# Patient Record
Sex: FEMALE | Race: WHITE | NOT HISPANIC OR LATINO | Employment: OTHER | URBAN - NONMETROPOLITAN AREA
[De-identification: names, ages, dates, MRNs, and addresses within clinical notes are randomized per-mention and may not be internally consistent; named-entity substitution may affect disease eponyms.]

---

## 2017-01-20 ENCOUNTER — OFFICE VISIT (OUTPATIENT)
Dept: FAMILY MEDICINE CLINIC | Facility: CLINIC | Age: 67
End: 2017-01-20

## 2017-01-20 VITALS
HEIGHT: 65 IN | BODY MASS INDEX: 36.32 KG/M2 | DIASTOLIC BLOOD PRESSURE: 76 MMHG | SYSTOLIC BLOOD PRESSURE: 128 MMHG | WEIGHT: 218 LBS

## 2017-01-20 DIAGNOSIS — E03.9 ACQUIRED HYPOTHYROIDISM: ICD-10-CM

## 2017-01-20 DIAGNOSIS — Z11.59 NEED FOR HEPATITIS C SCREENING TEST: ICD-10-CM

## 2017-01-20 DIAGNOSIS — E78.00 PURE HYPERCHOLESTEROLEMIA: ICD-10-CM

## 2017-01-20 DIAGNOSIS — E11.9 DIABETES MELLITUS TYPE 2, DIET-CONTROLLED (HCC): ICD-10-CM

## 2017-01-20 DIAGNOSIS — I10 ESSENTIAL HYPERTENSION: Primary | ICD-10-CM

## 2017-01-20 LAB
ALBUMIN SERPL-MCNC: 3.8 GM/DL (ref 3.4–4.8)
ALP SERPL-CCNC: 66 U/L (ref 38–126)
ALT SERPL-CCNC: 33 U/L (ref 9–52)
ANION GAP SERPL CALCULATED.3IONS-SCNC: 11 MMOL/L (ref 5–15)
AST SERPL-CCNC: 20 U/L (ref 14–36)
BILIRUB SERPL-MCNC: 0.7 MG/DL (ref 0.2–1.3)
BUN SERPL-MCNC: 21 MG/DL (ref 7–21)
CALCIUM SERPL-MCNC: 9.7 MG/DL (ref 8.4–10.2)
CHLORIDE SERPL-SCNC: 101 MMOL/L (ref 95–110)
CHOLEST SERPL-MCNC: 252 MG/DL (ref 0–199)
CO2 SERPL-SCNC: 27 MMOL/L (ref 22–31)
CREAT SERPL-MCNC: 0.9 MG/DL (ref 0.5–1)
CREAT UR-MCNC: 107.9 MG/DL
GLUCOSE SERPL-MCNC: 88 MG/DL (ref 60–100)
HBA1C MFR BLD CALC: 6.9 %TOTHGB (ref 4–5.6)
HCV AB SERPL QL IA: NEGATIVE
HDLC SERPL-MCNC: 64 MG/DL (ref 60–200)
LDLC SERPL CALC-MCNC: 154 MG/DL (ref 0–129)
MICROALBUMIN UR-MCNC: 0.6 MG/DL (ref 0–1.7)
MICROALBUMIN/CREAT UR: 6 MG/G (ref 0–30)
POTASSIUM SERPL-SCNC: 4.4 MMOL/L (ref 3.5–5.1)
PROT SERPL-MCNC: 7.1 GM/DL (ref 6.3–8.6)
SODIUM SERPL-SCNC: 139 MMOL/L (ref 137–145)
TRIGL SERPL-MCNC: 168 MG/DL (ref 20–199)

## 2017-01-20 PROCEDURE — 99214 OFFICE O/P EST MOD 30 MIN: CPT | Performed by: FAMILY MEDICINE

## 2017-01-20 NOTE — PROGRESS NOTES
Subjective   Emily Haas is a 66 y.o. female.     Hypertension   This is a chronic problem. The problem is unchanged. The problem is controlled. Associated symptoms include palpitations (seeing Dr Villanueva). Pertinent negatives include no chest pain, orthopnea, peripheral edema, PND or shortness of breath.   Hypothyroidism   This is a chronic problem. The problem has been unchanged. Associated symptoms include fatigue. Pertinent negatives include no chest pain. Rash: lips.   Diabetes   She presents for her follow-up diabetic visit. She has type 2 diabetes mellitus. Associated symptoms include fatigue. Pertinent negatives for diabetes include no chest pain, no foot paresthesias, no foot ulcerations, no polydipsia and no polyuria. She monitors urine at home 1-2 x per day. Her breakfast blood glucose is taken between 7-8 am. Her breakfast blood glucose range is generally 140-180 mg/dl. Her highest blood glucose is 140-180 mg/dl. An ACE inhibitor/angiotensin II receptor blocker is being taken. Eye exam is current.   Hyperlipidemia   This is a chronic problem. The problem is controlled. Exacerbating diseases include hypothyroidism. Pertinent negatives include no chest pain or shortness of breath.        The following portions of the patient's history were reviewed and updated as appropriate: allergies, current medications, past family history, past medical history, past social history, past surgical history and problem list.    Review of Systems   Constitutional: Positive for fatigue.   Respiratory: Negative for shortness of breath.    Cardiovascular: Positive for palpitations (seeing Dr Villanueva). Negative for chest pain, orthopnea and PND.   Endocrine: Negative for polydipsia and polyuria.   Skin: Rash: lips.       Objective   Physical Exam   Constitutional: She is oriented to person, place, and time. She appears well-developed and well-nourished. No distress.   HENT:   Head: Normocephalic and atraumatic.    Cardiovascular: Normal rate, regular rhythm, normal heart sounds and intact distal pulses.  Exam reveals no gallop and no friction rub.    No murmur heard.  Pulmonary/Chest: Effort normal. No respiratory distress. She has no wheezes. She has no rales.   Abdominal: Soft. Normal appearance and bowel sounds are normal. She exhibits no shifting dullness, no distension, no pulsatile liver, no fluid wave, no abdominal bruit, no ascites, no pulsatile midline mass and no mass. There is no hepatosplenomegaly. There is tenderness in the periumbilical area. There is no rigidity and no rebound.   Musculoskeletal: She exhibits no edema or tenderness.    Emily had a diabetic foot exam performed (callus noted) today.   During the foot exam she had a monofilament test performed (normal).    Vascular Status -  Her exam exhibits right foot vasculature abnormal and no right foot edema. Her exam exhibits left foot vasculature abnormal and no left foot edema.  Neurological: She is alert and oriented to person, place, and time.   Skin: Skin is warm and dry. She is not diaphoretic.        Psychiatric: She has a normal mood and affect. Her behavior is normal. Judgment and thought content normal.   Nursing note and vitals reviewed.      Assessment/Plan   Problems Addressed this Visit     None          At this point she does not have an acute abdomen.    this point we have discussed other options including flat plate of the abdomen GI consult and possible further workup.  Since it hasn't been a extreme acute change at this point she is chosen to observe this problem and follow-up sooner if her condition worsens.

## 2017-01-20 NOTE — MR AVS SNAPSHOT
Emily Haas   1/20/2017 1:45 PM   Office Visit    Dept Phone:  [phone]   Encounter #:  [id number]    Provider:  Fernando Hutton MD   Department:  [facility]                Your Full Care Plan              Today's Medication Changes          These changes are accurate as of: 1/20/17  2:10 PM.  If you have any questions, ask your nurse or doctor.               Stop taking medication(s)listed here:     spironolactone 25 MG tablet   Commonly known as:  ALDACTONE           valACYclovir 1000 MG tablet   Commonly known as:  VALTREX                      Your Updated Medication List          This list is accurate as of: 1/20/17  2:10 PM.  Always use your most recent med list.                albuterol 108 (90 BASE) MCG/ACT inhaler   Commonly known as:  PROVENTIL HFA;VENTOLIN HFA       carvedilol 3.125 MG tablet   Commonly known as:  COREG       fenofibrate 160 MG tablet       fexofenadine 180 MG tablet   Commonly known as:  ALLEGRA       glucose blood test strip       levothyroxine 175 MCG tablet   Commonly known as:  SYNTHROID, LEVOTHROID   Take 1 tablet by mouth Daily. Take first thing of the morning on an empty stomach.       losartan 100 MG tablet   Commonly known as:  COZAAR       meloxicam 7.5 MG tablet   Commonly known as:  MOBIC       MIRALAX powder   Generic drug:  polyethylene glycol       nitroglycerin 0.4 MG SL tablet   Commonly known as:  NITROSTAT               We Performed the Following     Comprehensive Metabolic Panel     Hemoglobin A1c     Hepatitis C Antibody     Lipid Panel     Microalbumin / Creatinine Urine Ratio       You Were Diagnosed With        Codes Comments    Essential hypertension    -  Primary ICD-10-CM: I10  ICD-9-CM: 401.9     Acquired hypothyroidism     ICD-10-CM: E03.9  ICD-9-CM: 244.9     Diabetes mellitus type 2, diet-controlled     ICD-10-CM: E11.9  ICD-9-CM: 250.00     Pure hypercholesterolemia     ICD-10-CM:  E78.00  ICD-9-CM: 272.0     Need for hepatitis C screening test     ICD-10-CM: Z11.59  ICD-9-CM: V73.89       Instructions     None    Patient Instructions History      Upcoming Appointments     Visit Type Date Time Department    OFFICE VISIT 2017  1:45 PM [facility]    FOLLOW UP 2017  9:15 AM [facility]    OFFICE VISIT 2017  9:45 AM [facility]      MyChart Signup     [employer] allows you to send messages to your doctor, view your test results, renew your prescriptions, schedule appointments, and more. To sign up, go to Send the Trend and click on the Sign Up Now link in the New User? box. Enter your "[employer]" Activation Code exactly as it appears below along with the last four digits of your Social Security Number and your Date of Birth () to complete the sign-up process. If you do not sign up before the expiration date, you must request a new code.    "[employer] Activation Code: [email or web address]  Expires: 2/3/2017  2:09 PM    If you have questions, you can email [email or web address] or call [phone] to talk to our "[employer] staff. Remember, "[employer]" is NOT to be used for urgent needs. For medical emergencies, dial 911.               Other Info from Your Visit           Your Appointments     2017  9:15 AM CDT   Follow Up with SLEEP CLINIC [facility] (--)    [address] [place] [address] [address] [place] [address] [phone]           Arrive 15 minutes prior to appointment.            2017  9:45 AM CDT   Office Visit with Fernando Hutton MD   [facility] (--)    [address] [place] [address] Dr  [facility] [address] [place] [id number] [phone]           Arrive 15 minutes prior to appointment.              Allergies     Atenolol      Augmentin [Amoxicillin-pot Clavulanate]      Dilaudid [Hydromorphone]      Lipitor [Atorvastatin]      Pravachol [Pravastatin]      Sulfa Antibiotics   "      Reason for Visit     Hypertension recheck for essential hypertension,hypothyroidism,type 2 diabetes mellitus      Vital Signs     Blood Pressure Height Weight Last Menstrual Period Body Mass Index Smoking Status    128/76 65\" (165.1 cm) 218 lb (98.9 kg) (LMP Unknown) 36.28 kg/m2 Former Smoker      Problems and Diagnoses Noted     Acquired underactive thyroid    Diet-controlled type 2 diabetes mellitus    High blood pressure    High cholesterol or triglycerides    Need for hepatitis C screening test            "

## 2017-01-22 NOTE — PROGRESS NOTES
Cholesterol still too high.  Recommend adding Zetia 10 mg a day to fenofibrate.  She is also a little dehydrated.  Have her use her fluids to 6-8 glasses of water a day and recheck a BMP in 2 weeks.  If this does not improve, we'll have to consider stopping her Meloxicam.

## 2017-02-08 ENCOUNTER — TELEPHONE (OUTPATIENT)
Dept: FAMILY MEDICINE CLINIC | Facility: CLINIC | Age: 67
End: 2017-02-08

## 2017-02-08 DIAGNOSIS — E86.0 DEHYDRATION: Primary | ICD-10-CM

## 2017-02-08 RX ORDER — EZETIMIBE 10 MG/1
10 TABLET ORAL DAILY
Qty: 30 TABLET | Refills: 5 | Status: SHIPPED | OUTPATIENT
Start: 2017-02-08 | End: 2017-04-05

## 2017-02-08 NOTE — PROGRESS NOTES
Pr Dr. Hutton's instruction, patient was called with test results & recommendations  He Cholesterol is high, adding Zetia 10 mg daily in addition the the fenofibrate   Script sent to [facility],  Dehydrated, She needs to increase her fluids to 6-8 glasses of water a day and repeat labs in 2 weeks.( BMP Ordered)  If not improved, may have to consider stopping her Meloxicam.  Patient states understanding of these recommendations, labs ordered and srcipt sent.

## 2017-02-08 NOTE — TELEPHONE ENCOUNTER
Pr Dr. Hutton's instruction, patient was called with test results & recommendations  He Cholesterol is high, adding Zetia 10 mg daily in addition the the fenofibrate   Script sent to [facility],  Dehydrated, She needs to increase her fluids to 6-8 glasses of water a day and repeat labs in 2 weeks.( BMP Ordered)  If not improved, may have to consider stopping her Meloxicam.  Patient states understanding of these recommendations, labs ordered and srcipt sent.     ----- Message from Fernando Hutton MD sent at 1/22/2017 10:47 AM CST -----  Cholesterol still too high.  Recommend adding Zetia 10 mg a day to fenofibrate.  She is also a little dehydrated.  Have her use her fluids to 6-8 glasses of water a day and recheck a BMP in 2 weeks.  If this does not improve, we'll have to consider stopping her Meloxicam.

## 2017-03-02 ENCOUNTER — OFFICE VISIT (OUTPATIENT)
Dept: RETAIL CLINIC | Facility: CLINIC | Age: 67
End: 2017-03-02

## 2017-03-02 VITALS
TEMPERATURE: 98.6 F | RESPIRATION RATE: 20 BRPM | BODY MASS INDEX: 36.85 KG/M2 | DIASTOLIC BLOOD PRESSURE: 90 MMHG | WEIGHT: 221.2 LBS | OXYGEN SATURATION: 97 % | HEIGHT: 65 IN | SYSTOLIC BLOOD PRESSURE: 110 MMHG | HEART RATE: 96 BPM

## 2017-03-02 DIAGNOSIS — J40 BRONCHITIS: Primary | ICD-10-CM

## 2017-03-02 PROBLEM — I86.8 VARICOSE VEINS OF OTHER SPECIFIED SITES: Status: ACTIVE | Noted: 2017-03-02

## 2017-03-02 PROCEDURE — 99213 OFFICE O/P EST LOW 20 MIN: CPT | Performed by: NURSE PRACTITIONER

## 2017-03-02 RX ORDER — METHYLPREDNISOLONE 4 MG/1
TABLET ORAL
Qty: 21 TABLET | Refills: 0 | Status: SHIPPED | OUTPATIENT
Start: 2017-03-02 | End: 2017-04-05

## 2017-03-02 RX ORDER — LEVOFLOXACIN 500 MG/1
500 TABLET, FILM COATED ORAL DAILY
Qty: 7 TABLET | Refills: 0 | Status: SHIPPED | OUTPATIENT
Start: 2017-03-02 | End: 2017-04-05

## 2017-03-02 RX ORDER — ALBUTEROL SULFATE 90 UG/1
2 AEROSOL, METERED RESPIRATORY (INHALATION) EVERY 4 HOURS PRN
Qty: 3.7 G | Refills: 0 | Status: SHIPPED | OUTPATIENT
Start: 2017-03-02 | End: 2017-07-20

## 2017-03-02 RX ORDER — BROMPHENIRAMINE MALEATE, PSEUDOEPHEDRINE HYDROCHLORIDE, AND DEXTROMETHORPHAN HYDROBROMIDE 2; 30; 10 MG/5ML; MG/5ML; MG/5ML
5-10 SYRUP ORAL 4 TIMES DAILY PRN
Qty: 120 ML | Refills: 0 | Status: SHIPPED | OUTPATIENT
Start: 2017-03-02 | End: 2017-04-05

## 2017-03-02 NOTE — PROGRESS NOTES
Subjective   Emily Haas is a 67 y.o. female.     Cough   This is a new problem. The current episode started in the past 7 days. The problem has been gradually worsening. The problem occurs every few hours. The cough is productive of purulent sputum. Associated symptoms include postnasal drip and a sore throat. The symptoms are aggravated by exercise. She has tried OTC cough suppressant for the symptoms. The treatment provided mild relief.        The following portions of the patient's history were reviewed and updated as appropriate: allergies, current medications, past family history, past medical history, past social history, past surgical history and problem list.  ...    Review of Systems   Constitutional: Negative.    HENT: Positive for postnasal drip and sore throat.    Eyes: Negative.    Respiratory: Positive for cough.    Cardiovascular: Negative.    Gastrointestinal: Negative.    Endocrine: Negative.    Genitourinary: Negative.    Musculoskeletal: Negative.    Skin: Negative.    Allergic/Immunologic: Negative.    Neurological: Negative.    Hematological: Negative.    Psychiatric/Behavioral: Negative.        Objective   Physical Exam   Constitutional: She is oriented to person, place, and time. She appears well-nourished.   HENT:   Head: Normocephalic and atraumatic.   Right Ear: External ear normal.   Left Ear: External ear normal.   Mouth/Throat: Oropharynx is clear and moist.   Eyes: Conjunctivae are normal.   Neck: Neck supple.   Cardiovascular: Normal rate and regular rhythm.    Pulmonary/Chest:   Coarse breath sounds bilarerally   Abdominal: Soft. Bowel sounds are normal.   Musculoskeletal: Normal range of motion.   Neurological: She is alert and oriented to person, place, and time.   Skin: Skin is warm and dry.   Psychiatric: She has a normal mood and affect. Her behavior is normal.   Nursing note and vitals reviewed.      Assessment/Plan   Emily was seen today for cough.    Diagnoses and all  orders for this visit:    Bronchitis    Other orders  -     levoFLOXacin (LEVAQUIN) 500 MG tablet; Take 1 tablet by mouth Daily.  -     brompheniramine-pseudoephedrine-DM 30-2-10 MG/5ML syrup; Take 5-10 mL by mouth 4 (Four) Times a Day As Needed for cough.  -     MethylPREDNISolone (MEDROL, SAM,) 4 MG tablet; Take as directed on package instructions.  -     albuterol (PROAIR HFA) 108 (90 BASE) MCG/ACT inhaler; Inhale 2 puffs Every 4 (Four) Hours As Needed for wheezing.

## 2017-03-21 ENCOUNTER — HOSPITAL ENCOUNTER (EMERGENCY)
Facility: HOSPITAL | Age: 67
Discharge: HOME | End: 2017-03-21
Attending: EMERGENCY MEDICINE | Admitting: NURSE PRACTITIONER

## 2017-03-21 ENCOUNTER — APPOINTMENT (OUTPATIENT)
Dept: CT IMAGING | Facility: HOSPITAL | Age: 67
End: 2017-03-21

## 2017-03-21 ENCOUNTER — APPOINTMENT (OUTPATIENT)
Dept: GENERAL RADIOLOGY | Facility: HOSPITAL | Age: 67
End: 2017-03-21

## 2017-03-21 VITALS
TEMPERATURE: 97.8 F | HEART RATE: 73 BPM | OXYGEN SATURATION: 98 % | DIASTOLIC BLOOD PRESSURE: 78 MMHG | RESPIRATION RATE: 18 BRPM | HEIGHT: 65 IN | BODY MASS INDEX: 37.36 KG/M2 | WEIGHT: 224.2 LBS | SYSTOLIC BLOOD PRESSURE: 158 MMHG

## 2017-03-21 DIAGNOSIS — K21.9 GASTROESOPHAGEAL REFLUX DISEASE WITHOUT ESOPHAGITIS: ICD-10-CM

## 2017-03-21 DIAGNOSIS — M54.9 OTHER ACUTE BACK PAIN: Primary | ICD-10-CM

## 2017-03-21 LAB
ALBUMIN SERPL-MCNC: 3.8 G/DL (ref 3.4–4.8)
ALBUMIN/GLOB SERPL: 1.5 G/DL (ref 1.1–1.8)
ALP SERPL-CCNC: 60 U/L (ref 38–126)
ALT SERPL W P-5'-P-CCNC: 36 U/L (ref 9–52)
ANION GAP SERPL CALCULATED.3IONS-SCNC: 10 MMOL/L (ref 5–15)
AST SERPL-CCNC: 23 U/L (ref 14–36)
BASOPHILS # BLD AUTO: 0.02 10*3/MM3 (ref 0–0.2)
BASOPHILS NFR BLD AUTO: 0.2 % (ref 0–2)
BILIRUB SERPL-MCNC: 0.5 MG/DL (ref 0.2–1.3)
BUN BLD-MCNC: 19 MG/DL (ref 7–21)
BUN/CREAT SERPL: 21.6 (ref 7–25)
CALCIUM SPEC-SCNC: 9.1 MG/DL (ref 8.4–10.2)
CHLORIDE SERPL-SCNC: 101 MMOL/L (ref 95–110)
CK MB SERPL-CCNC: 0.43 NG/ML (ref 0–5)
CK MB SERPL-CCNC: 0.57 NG/ML (ref 0–5)
CK SERPL-CCNC: 29 U/L (ref 30–135)
CK SERPL-CCNC: 35 U/L (ref 30–135)
CO2 SERPL-SCNC: 26 MMOL/L (ref 22–31)
CREAT BLD-MCNC: 0.88 MG/DL (ref 0.5–1)
D-DIMER, QUANTITATIVE (MAD,POW, STR): 999 NG/ML (FEU) (ref 0–470)
DEPRECATED RDW RBC AUTO: 44.8 FL (ref 36.4–46.3)
EOSINOPHIL # BLD AUTO: 0.15 10*3/MM3 (ref 0–0.7)
EOSINOPHIL NFR BLD AUTO: 1.6 % (ref 0–7)
ERYTHROCYTE [DISTWIDTH] IN BLOOD BY AUTOMATED COUNT: 13.5 % (ref 11.5–14.5)
GFR SERPL CREATININE-BSD FRML MDRD: 64 ML/MIN/1.73 (ref 45–104)
GLOBULIN UR ELPH-MCNC: 2.6 GM/DL (ref 2.3–3.5)
GLUCOSE BLD-MCNC: 128 MG/DL (ref 60–100)
HCT VFR BLD AUTO: 36 % (ref 35–45)
HGB BLD-MCNC: 12.1 G/DL (ref 12–15.5)
HOLD SPECIMEN: NORMAL
HOLD SPECIMEN: NORMAL
IMM GRANULOCYTES # BLD: 0.04 10*3/MM3 (ref 0–0.02)
IMM GRANULOCYTES NFR BLD: 0.4 % (ref 0–0.5)
INR PPP: 0.93 (ref 0.8–1.2)
LYMPHOCYTES # BLD AUTO: 2.34 10*3/MM3 (ref 0.6–4.2)
LYMPHOCYTES NFR BLD AUTO: 24.3 % (ref 10–50)
MCH RBC QN AUTO: 30.9 PG (ref 26.5–34)
MCHC RBC AUTO-ENTMCNC: 33.6 G/DL (ref 31.4–36)
MCV RBC AUTO: 91.8 FL (ref 80–98)
MONOCYTES # BLD AUTO: 0.97 10*3/MM3 (ref 0–0.9)
MONOCYTES NFR BLD AUTO: 10.1 % (ref 0–12)
NEUTROPHILS # BLD AUTO: 6.12 10*3/MM3 (ref 2–8.6)
NEUTROPHILS NFR BLD AUTO: 63.4 % (ref 37–80)
NT-PROBNP SERPL-MCNC: 65.2 PG/ML (ref 0–900)
PLATELET # BLD AUTO: 181 10*3/MM3 (ref 150–450)
PMV BLD AUTO: 10.3 FL (ref 8–12)
POTASSIUM BLD-SCNC: 3.5 MMOL/L (ref 3.5–5.1)
PROT SERPL-MCNC: 6.4 G/DL (ref 6.3–8.6)
PROTHROMBIN TIME: 12.5 SECONDS (ref 11.1–15.3)
RBC # BLD AUTO: 3.92 10*6/MM3 (ref 3.77–5.16)
SODIUM BLD-SCNC: 137 MMOL/L (ref 137–145)
TROPONIN I SERPL-MCNC: <0.012 NG/ML
TROPONIN I SERPL-MCNC: <0.012 NG/ML
WBC NRBC COR # BLD: 9.64 10*3/MM3 (ref 3.2–9.8)
WHOLE BLOOD HOLD SPECIMEN: NORMAL
WHOLE BLOOD HOLD SPECIMEN: NORMAL

## 2017-03-21 PROCEDURE — 85610 PROTHROMBIN TIME: CPT | Performed by: NURSE PRACTITIONER

## 2017-03-21 PROCEDURE — 99284 EMERGENCY DEPT VISIT MOD MDM: CPT

## 2017-03-21 PROCEDURE — 84484 ASSAY OF TROPONIN QUANT: CPT | Performed by: NURSE PRACTITIONER

## 2017-03-21 PROCEDURE — 93005 ELECTROCARDIOGRAM TRACING: CPT

## 2017-03-21 PROCEDURE — 0 IOPAMIDOL PER 1 ML: Performed by: EMERGENCY MEDICINE

## 2017-03-21 PROCEDURE — 85379 FIBRIN DEGRADATION QUANT: CPT | Performed by: NURSE PRACTITIONER

## 2017-03-21 PROCEDURE — 82550 ASSAY OF CK (CPK): CPT | Performed by: NURSE PRACTITIONER

## 2017-03-21 PROCEDURE — 85025 COMPLETE CBC W/AUTO DIFF WBC: CPT | Performed by: NURSE PRACTITIONER

## 2017-03-21 PROCEDURE — 71275 CT ANGIOGRAPHY CHEST: CPT

## 2017-03-21 PROCEDURE — 71020 HC CHEST PA AND LATERAL: CPT

## 2017-03-21 PROCEDURE — 82553 CREATINE MB FRACTION: CPT | Performed by: NURSE PRACTITIONER

## 2017-03-21 PROCEDURE — 83880 ASSAY OF NATRIURETIC PEPTIDE: CPT | Performed by: NURSE PRACTITIONER

## 2017-03-21 PROCEDURE — 80053 COMPREHEN METABOLIC PANEL: CPT | Performed by: NURSE PRACTITIONER

## 2017-03-21 RX ORDER — CLONIDINE HYDROCHLORIDE 0.2 MG/1
0.2 TABLET ORAL ONCE
Status: COMPLETED | OUTPATIENT
Start: 2017-03-21 | End: 2017-03-21

## 2017-03-21 RX ORDER — ASPIRIN 325 MG
325 TABLET ORAL ONCE
Status: COMPLETED | OUTPATIENT
Start: 2017-03-21 | End: 2017-03-21

## 2017-03-21 RX ORDER — SODIUM CHLORIDE 0.9 % (FLUSH) 0.9 %
10 SYRINGE (ML) INJECTION AS NEEDED
Status: DISCONTINUED | OUTPATIENT
Start: 2017-03-21 | End: 2017-03-22 | Stop reason: HOSPADM

## 2017-03-21 RX ADMIN — IOPAMIDOL 52 ML: 755 INJECTION, SOLUTION INTRAVENOUS at 20:54

## 2017-03-21 RX ADMIN — CLONIDINE HYDROCHLORIDE 0.2 MG: 0.2 TABLET ORAL at 16:45

## 2017-03-21 RX ADMIN — ASPIRIN 325 MG: 325 TABLET, COATED ORAL at 16:45

## 2017-03-21 NOTE — ED NOTES
Pt presents to the ED with complaints of mid in between shoulder blades that started at 13:00 today. It started suddenly and was described as severe.  Pt denies any chest pain or soa.  Pt stated that she did have a brief pain in right shoulder.  Symptoms have improved upon assessment.  Pain is now 2/10.  Pt has had nausea, but no vomiting.      Nahomy Leyva RN  03/21/17 [id number]

## 2017-03-21 NOTE — ED PROVIDER NOTES
Subjective   HPI Comments: C/o pain between shoulder blades while putting up groceries that lasted about an hour, had nausea at that time. Took soda water, then zantac. Denies chest pain or SOB. C/o feeling weak.      History provided by:  Patient      Review of Systems   HENT: Negative.    Eyes: Negative.    Respiratory: Negative.    Cardiovascular: Negative.  Negative for chest pain.   Gastrointestinal: Negative.    Genitourinary: Negative.    Musculoskeletal: Negative.    Skin: Negative.    Neurological: Positive for weakness.   Psychiatric/Behavioral: Negative.        Past Medical History:   Diagnosis Date   • Abrasion, right lower leg, initial encounter    • Acquired hypothyroidism    • Acromioclavicular joint pain    • Allergic rhinitis    • Anxiety state    • Asthma    • Degenerative joint disease involving multiple joints    • Diabetes mellitus type 2, diet-controlled    • Diverticular disease of colon    • Diverticulitis of colon    • Essential hypertension    • Generalized anxiety disorder    • Hyperlipidemia    • Ingrowing toenail    • Insomnia    • Irritable bowel syndrome    • Joint pain    • Local infection of the skin and subcutaneous tissue, unspecified    • Low back pain     abn MRI   • Neck pain     abn MRI-post Fusion   • Need for prophylactic vaccination against Streptococcus pneumoniae (pneumococcus)    • Open wound of lower leg    • Pain in right hip    • Peripheral edema    • Screening mammogram, encounter for 06/11/2015   • Vitamin D deficiency        Allergies   Allergen Reactions   • Atenolol    • Augmentin [Amoxicillin-Pot Clavulanate]    • Dilaudid [Hydromorphone]    • Lipitor [Atorvastatin]    • Pravachol [Pravastatin]    • Sulfa Antibiotics        Past Surgical History:   Procedure Laterality Date   • BLADDER SURGERY     • CHOLECYSTECTOMY     • COLON SURGERY     • ENDOSCOPY  03/02/2012    Colon endoscopy 14109 (1)    Diverticulum in sigmoid colon.    • EXCISION LESION  08/04/2015     "Destruction of Benign Lesion (1-14) 71532 (1)     • HYSTERECTOMY     • INJECTION OF MEDICATION  07/08/2014    Celestone (betamethasone) (3)      • INJECTION OF MEDICATION  09/23/2013    Dexamethasone (1)      • INJECTION OF MEDICATION  06/28/2016    Kenalog (8)          History reviewed. No pertinent family history.    Social History     Social History   • Marital status:      Spouse name: N/A   • Number of children: N/A   • Years of education: N/A     Social History Main Topics   • Smoking status: Former Smoker   • Smokeless tobacco: Never Used   • Alcohol use No   • Drug use: No   • Sexual activity: Defer     Other Topics Concern   • None     Social History Narrative   • None           Objective   Physical Exam   Constitutional: She is oriented to person, place, and time. She appears well-developed and well-nourished.   HENT:   Head: Normocephalic.   Eyes: EOM are normal. Pupils are equal, round, and reactive to light.   Neck: Normal range of motion. Neck supple.   Cardiovascular: Normal rate, regular rhythm and normal heart sounds.    Pulmonary/Chest: Effort normal and breath sounds normal.   Abdominal: Soft. Bowel sounds are normal.   Musculoskeletal:   No point tenderness along spinal processes or paraspinal muscles, thoracic   Neurological: She is alert and oriented to person, place, and time.   Skin: Skin is warm and dry.   Nursing note and vitals reviewed.    /78 (BP Location: Left arm, Patient Position: Lying)  Pulse 73  Temp 97.8 °F (36.6 °C) (Oral)   Resp 18  Ht 65\" (165.1 cm)  Wt 224 lb 3.2 oz (102 kg)  SpO2 98%  BMI 37.31 kg/m2      ECG 12 Lead    Date/Time: 3/21/2017 3:29 PM  Performed by: HEIDY MONTGOMERY  Authorized by: BRENDA AKHTAR   Interpreted by physician  Previous ECG: no previous ECG available  Rhythm: sinus rhythm  BPM: 87  Clinical impression: normal ECG                 ED Course  ED Course      Results for orders placed or performed during the hospital encounter of " 03/21/17   Troponin   Result Value Ref Range    Troponin I <0.012 <=0.034 ng/mL   Comprehensive Metabolic Panel   Result Value Ref Range    Glucose 128 (H) 60 - 100 mg/dL    BUN 19 7 - 21 mg/dL    Creatinine 0.88 0.50 - 1.00 mg/dL    Sodium 137 137 - 145 mmol/L    Potassium 3.5 3.5 - 5.1 mmol/L    Chloride 101 95 - 110 mmol/L    CO2 26.0 22.0 - 31.0 mmol/L    Calcium 9.1 8.4 - 10.2 mg/dL    Total Protein 6.4 6.3 - 8.6 g/dL    Albumin 3.80 3.40 - 4.80 g/dL    ALT (SGPT) 36 9 - 52 U/L    AST (SGOT) 23 14 - 36 U/L    Alkaline Phosphatase 60 38 - 126 U/L    Total Bilirubin 0.5 0.2 - 1.3 mg/dL    eGFR Non  Amer 64 45 - 104 mL/min/1.73    Globulin 2.6 2.3 - 3.5 gm/dL    A/G Ratio 1.5 1.1 - 1.8 g/dL    BUN/Creatinine Ratio 21.6 7.0 - 25.0    Anion Gap 10.0 5.0 - 15.0 mmol/L   BNP   Result Value Ref Range    proBNP 65.2 0.0 - 900.0 pg/mL   Protime-INR   Result Value Ref Range    Protime 12.5 11.1 - 15.3 Seconds    INR 0.93 0.80 - 1.20   CK   Result Value Ref Range    Creatine Kinase 35 30 - 135 U/L   CK-MB   Result Value Ref Range    CKMB 0.57 0.00 - 5.00 ng/mL   CBC Auto Differential   Result Value Ref Range    WBC 9.64 3.20 - 9.80 10*3/mm3    RBC 3.92 3.77 - 5.16 10*6/mm3    Hemoglobin 12.1 12.0 - 15.5 g/dL    Hematocrit 36.0 35.0 - 45.0 %    MCV 91.8 80.0 - 98.0 fL    MCH 30.9 26.5 - 34.0 pg    MCHC 33.6 31.4 - 36.0 g/dL    RDW 13.5 11.5 - 14.5 %    RDW-SD 44.8 36.4 - 46.3 fl    MPV 10.3 8.0 - 12.0 fL    Platelets 181 150 - 450 10*3/mm3    Neutrophil % 63.4 37.0 - 80.0 %    Lymphocyte % 24.3 10.0 - 50.0 %    Monocyte % 10.1 0.0 - 12.0 %    Eosinophil % 1.6 0.0 - 7.0 %    Basophil % 0.2 0.0 - 2.0 %    Immature Grans % 0.4 0.0 - 0.5 %    Neutrophils, Absolute 6.12 2.00 - 8.60 10*3/mm3    Lymphocytes, Absolute 2.34 0.60 - 4.20 10*3/mm3    Monocytes, Absolute 0.97 (H) 0.00 - 0.90 10*3/mm3    Eosinophils, Absolute 0.15 0.00 - 0.70 10*3/mm3    Basophils, Absolute 0.02 0.00 - 0.20 10*3/mm3    Immature Grans, Absolute  0.04 (H) 0.00 - 0.02 10*3/mm3   CK-MB   Result Value Ref Range    CKMB 0.43 0.00 - 5.00 ng/mL   Troponin   Result Value Ref Range    Troponin I <0.012 <=0.034 ng/mL   CK   Result Value Ref Range    Creatine Kinase 29 (L) 30 - 135 U/L   D-dimer, Quantitative   Result Value Ref Range    D-Dimer, Quantitative 999 (H) 0 - 470 ng/mL (FEU)   Light Blue Top   Result Value Ref Range    Extra Tube hold for add-on    Green Top (Gel)   Result Value Ref Range    Extra Tube Hold for add-ons.    Lavender Top   Result Value Ref Range    Extra Tube hold for add-on    Gold Top - SST   Result Value Ref Range    Extra Tube Hold for add-ons.      CT Angiogram Chest With Contrast   Final Result   Unremarkable CT angiogram of the chest. No evidence   of pulmonary embolus.         Electronically signed by:  John Hathaway MD  3/21/2017 9:09 PM   CDT Workstation: [employer] Chest 2 View   Final Result   CONCLUSION:   No Acute Disease      [id number]      Electronically signed by:  Nikolay Pelaez MD  3/21/2017 4:51 PM CDT   Workstation: [id number]                      [facility]  Number of Diagnoses or Management Options  Gastroesophageal reflux disease without esophagitis:   Other acute back pain:   Diagnosis management comments: C/o back pain and nausea onset today. C/o being under more stress at home. Had not taken her BP med today. Cardiac enzymes negative, 2 sets. D-dimer was elevated, CTA chest negative. Denied chest pain, nausea, or SOB while in ED. EKG and CXR negative as well. Will follow up with her PCP. Advised return to ED any worsening in symptoms.      Final diagnoses:   Other acute back pain   Gastroesophageal reflux disease without esophagitis            Linda Richardson, APRN  03/23/17 1133

## 2017-03-21 NOTE — ED NOTES
Pt is presented to ED with c/o pain between her shoulder blades and nausea.  Pt states this started approximately 1300 today.     Nova Tellez RN  03/21/17 7992

## 2017-03-22 NOTE — DISCHARGE INSTRUCTIONS
Continue with the Zantac BID (over the counter).  Follow up with Dr Hutton.  May return to ED any worsening of symptoms.

## 2017-04-05 ENCOUNTER — OFFICE VISIT (OUTPATIENT)
Dept: OPHTHALMOLOGY | Facility: CLINIC | Age: 67
End: 2017-04-05

## 2017-04-05 DIAGNOSIS — E11.9 DIABETES MELLITUS WITHOUT COMPLICATION (HCC): ICD-10-CM

## 2017-04-05 DIAGNOSIS — H25.049 POSTERIOR SUBCAPSULAR POLAR AGE-RELATED CATARACT, UNSPECIFIED LATERALITY: Primary | ICD-10-CM

## 2017-04-05 PROCEDURE — 92004 COMPRE OPH EXAM NEW PT 1/>: CPT | Performed by: OPHTHALMOLOGY

## 2017-04-05 RX ORDER — LANOLIN ALCOHOL/MO/W.PET/CERES
CREAM (GRAM) TOPICAL
COMMUNITY
End: 2017-10-24

## 2017-04-05 RX ORDER — ACETAMINOPHEN 325 MG/1
TABLET ORAL EVERY 6 HOURS
COMMUNITY
End: 2018-05-31

## 2017-04-05 NOTE — PROGRESS NOTES
Subjective   Emily Haas is a 67 y.o. female.   Chief Complaint   Patient presents with   • Blurred Vision   • Diabetic Eye Exam     HPI     Blurred Vision   Laterality: right eye   Onset: 3 days ago           Diabetic Eye Exam   Associated symptoms: blurred vision   Diabetes Type: Type 2   Duration: years   Blood Sugars: is controlled           Comments   BV OD x 3 days       Last edited by Oscar Parker MD on 4/5/2017  1:51 PM. (History)          Review of Systems    Objective   Base Eye Exam     Visual Acuity (Snellen - Linear)      Right Left   Dist cc 20/50 -2 20/40 -2       Correction:  Glasses      Tonometry (Applanation, 1:15 PM)      Right Left   Pressure 17 17         Pupils      Pupils   Right PERRL   Left PERRL         Visual Fields      Left Right   Result Full Full         Extraocular Movement      Right Left   Result Full Full         Dilation     Both eyes:  2.5% Paul Synephrine, 1.0% Mydriacyl @ 1:18 PM            Additional Tests     Amsler      Right Left   Amsler Normal Normal               Slit Lamp and Fundus Exam     External Exam      Right Left    External Normal Normal      Slit Lamp Exam      Right Left    Lids/Lashes Normal Normal    Conjunctiva/Sclera White and quiet White and quiet    Cornea Clear Clear    Anterior Chamber Deep and quiet Deep and quiet    Iris Round and reactive Round and reactive    Lens 2+ Posterior subcapsular cataract 1+ Posterior subcapsular cataract    Vitreous Normal Normal      Fundus Exam      Right Left    Disc Normal Normal    Macula Normal Normal    Vessels Normal Normal    Periphery Normal Normal            Refraction     Wearing Rx      Sphere Cylinder Axis Add   Right +1.25 +1.25 006 +2.25   Left +1.25 +0.75 031 +2.25         Manifest Refraction      Sphere   Right ni   Left ni                   Assessment/Plan   Diagnoses and all orders for this visit:    Posterior subcapsular polar age-related cataract, unspecified laterality    Diabetes  mellitus without complication    Plan:   consider CE  Eye disease risks with diabetes discussed      Return in about 1 year (around 4/5/2018).

## 2017-04-20 ENCOUNTER — OFFICE VISIT (OUTPATIENT)
Dept: FAMILY MEDICINE CLINIC | Facility: CLINIC | Age: 67
End: 2017-04-20

## 2017-04-20 VITALS
HEART RATE: 73 BPM | WEIGHT: 217 LBS | DIASTOLIC BLOOD PRESSURE: 80 MMHG | SYSTOLIC BLOOD PRESSURE: 128 MMHG | BODY MASS INDEX: 36.11 KG/M2 | OXYGEN SATURATION: 99 %

## 2017-04-20 DIAGNOSIS — H16.131: ICD-10-CM

## 2017-04-20 DIAGNOSIS — I10 ESSENTIAL HYPERTENSION: ICD-10-CM

## 2017-04-20 DIAGNOSIS — L82.0 INFLAMED SEBORRHEIC KERATOSIS: Primary | ICD-10-CM

## 2017-04-20 DIAGNOSIS — E11.9 DIABETES MELLITUS TYPE 2, DIET-CONTROLLED (HCC): ICD-10-CM

## 2017-04-20 PROCEDURE — 17110 DESTRUCTION B9 LES UP TO 14: CPT | Performed by: FAMILY MEDICINE

## 2017-04-20 PROCEDURE — 99214 OFFICE O/P EST MOD 30 MIN: CPT | Performed by: FAMILY MEDICINE

## 2017-04-20 NOTE — PROGRESS NOTES
Subjective   Emily Haas is a 67 y.o. female.     Hypertension   This is a chronic problem. The problem is unchanged. The problem is controlled. Pertinent negatives include no chest pain, orthopnea, palpitations (seeing Dr Villanueva), peripheral edema, PND or shortness of breath.   Hypothyroidism   This is a chronic problem. The problem has been unchanged. Associated symptoms include fatigue. Pertinent negatives include no chest pain. Rash: lips.   Diabetes   She presents for her follow-up diabetic visit. She has type 2 diabetes mellitus. Associated symptoms include fatigue. Pertinent negatives for diabetes include no chest pain, no foot paresthesias, no foot ulcerations, no polydipsia and no polyuria. She monitors urine at home 1-2 x per day. Her breakfast blood glucose is taken between 7-8 am. Her breakfast blood glucose range is generally 110-130 mg/dl. Her highest blood glucose is 140-180 mg/dl. An ACE inhibitor/angiotensin II receptor blocker is being taken. Eye exam is current.   Hyperlipidemia   This is a chronic problem. The problem is controlled. Exacerbating diseases include hypothyroidism. Pertinent negatives include no chest pain or shortness of breath.        The following portions of the patient's history were reviewed and updated as appropriate: allergies, current medications, past family history, past medical history, past social history, past surgical history and problem list.    Review of Systems   Constitutional: Positive for fatigue.   Respiratory: Negative for shortness of breath.    Cardiovascular: Negative for chest pain, palpitations (seeing Dr Villanueva), orthopnea and PND.   Endocrine: Negative for polydipsia and polyuria.   Skin: Rash: lips.       Objective   Physical Exam   Constitutional: She is oriented to person, place, and time. She appears well-developed and well-nourished. No distress.   HENT:   Head: Normocephalic and atraumatic.   Cardiovascular: Normal rate, regular rhythm,  normal heart sounds and intact distal pulses.  Exam reveals no gallop and no friction rub.    No murmur heard.  Pulmonary/Chest: Effort normal. No respiratory distress. She has no wheezes. She has no rales.   Abdominal: Normal appearance. She exhibits no shifting dullness, no pulsatile liver, no fluid wave, no abdominal bruit, no ascites and no pulsatile midline mass. There is no hepatosplenomegaly. There is no rigidity.   Musculoskeletal: She exhibits no edema or tenderness.    Emily had a diabetic foot exam performed (callus noted) today.   During the foot exam she had a monofilament test performed (normal).    Vascular Status -  Her exam exhibits right foot vasculature abnormal and no right foot edema. Her exam exhibits left foot vasculature abnormal and no left foot edema.  Neurological: She is alert and oriented to person, place, and time.   Skin: Skin is warm and dry. She is not diaphoretic.   Psychiatric: She has a normal mood and affect. Her behavior is normal. Judgment and thought content normal.   Nursing note and vitals reviewed.      Assessment/Plan   Problems Addressed this Visit        Cardiovascular and Mediastinum    Essential hypertension       Endocrine    Diabetes mellitus type 2, diet-controlled      Other Visit Diagnoses     Inflamed seborrheic keratosis    -  Primary    Relevant Orders    Cryotherapy, Skin Lesion    Actinic keratitis, right        Relevant Orders    Cryotherapy, Skin Lesion

## 2017-04-20 NOTE — PROGRESS NOTES
Procedure   Cryotherapy, Skin Lesion  Date/Time: 4/20/2017 10:20 AM  Performed by: CORA SANDERS  Authorized by: CORA SANDERS   Local anesthesia used: no    Anesthesia:  Local anesthesia used: no  Sedation:  Patient sedated: no    Patient tolerance: Patient tolerated the procedure well with no immediate complications  Comments: Inflamed seb keratosis left ankle frozen and AK on right ear lobe

## 2017-06-12 ENCOUNTER — TELEPHONE (OUTPATIENT)
Dept: FAMILY MEDICINE CLINIC | Facility: CLINIC | Age: 67
End: 2017-06-12

## 2017-06-14 ENCOUNTER — CLINICAL SUPPORT (OUTPATIENT)
Dept: FAMILY MEDICINE CLINIC | Facility: CLINIC | Age: 67
End: 2017-06-14

## 2017-06-14 DIAGNOSIS — M19.90 ARTHRITIS: Primary | ICD-10-CM

## 2017-06-14 PROCEDURE — 96372 THER/PROPH/DIAG INJ SC/IM: CPT | Performed by: FAMILY MEDICINE

## 2017-06-14 RX ORDER — TRIAMCINOLONE ACETONIDE 40 MG/ML
80 INJECTION, SUSPENSION INTRA-ARTICULAR; INTRAMUSCULAR ONCE
Status: COMPLETED | OUTPATIENT
Start: 2017-06-14 | End: 2017-06-14

## 2017-06-14 RX ADMIN — TRIAMCINOLONE ACETONIDE 80 MG: 40 INJECTION, SUSPENSION INTRA-ARTICULAR; INTRAMUSCULAR at 14:36

## 2017-07-20 ENCOUNTER — LAB (OUTPATIENT)
Dept: LAB | Facility: HOSPITAL | Age: 67
End: 2017-07-20

## 2017-07-20 ENCOUNTER — OFFICE VISIT (OUTPATIENT)
Dept: FAMILY MEDICINE CLINIC | Facility: CLINIC | Age: 67
End: 2017-07-20

## 2017-07-20 VITALS
HEIGHT: 65 IN | DIASTOLIC BLOOD PRESSURE: 80 MMHG | SYSTOLIC BLOOD PRESSURE: 132 MMHG | OXYGEN SATURATION: 100 % | WEIGHT: 207.5 LBS | BODY MASS INDEX: 34.57 KG/M2 | HEART RATE: 70 BPM

## 2017-07-20 DIAGNOSIS — Z12.11 SCREEN FOR COLON CANCER: ICD-10-CM

## 2017-07-20 DIAGNOSIS — E03.9 ACQUIRED HYPOTHYROIDISM: ICD-10-CM

## 2017-07-20 DIAGNOSIS — E78.00 PURE HYPERCHOLESTEROLEMIA: Primary | ICD-10-CM

## 2017-07-20 DIAGNOSIS — R53.83 FATIGUE, UNSPECIFIED TYPE: ICD-10-CM

## 2017-07-20 DIAGNOSIS — F41.1 GENERALIZED ANXIETY DISORDER: ICD-10-CM

## 2017-07-20 DIAGNOSIS — I10 ESSENTIAL HYPERTENSION: ICD-10-CM

## 2017-07-20 DIAGNOSIS — E11.9 DIABETES MELLITUS TYPE 2, DIET-CONTROLLED (HCC): ICD-10-CM

## 2017-07-20 LAB
ALBUMIN SERPL-MCNC: 4.1 G/DL (ref 3.4–4.8)
ALBUMIN UR-MCNC: <0.6 MG/L
ALBUMIN/GLOB SERPL: 1.2 G/DL (ref 1.1–1.8)
ALP SERPL-CCNC: 86 U/L (ref 38–126)
ALT SERPL W P-5'-P-CCNC: 36 U/L (ref 9–52)
ANION GAP SERPL CALCULATED.3IONS-SCNC: 8 MMOL/L (ref 5–15)
ARTICHOKE IGE QN: 138 MG/DL (ref 1–129)
AST SERPL-CCNC: 31 U/L (ref 14–36)
BILIRUB SERPL-MCNC: 0.7 MG/DL (ref 0.2–1.3)
BUN BLD-MCNC: 18 MG/DL (ref 7–21)
BUN/CREAT SERPL: 18.6 (ref 7–25)
CALCIUM SPEC-SCNC: 9.4 MG/DL (ref 8.4–10.2)
CHLORIDE SERPL-SCNC: 102 MMOL/L (ref 95–110)
CHOLEST SERPL-MCNC: 221 MG/DL (ref 0–199)
CO2 SERPL-SCNC: 27 MMOL/L (ref 22–31)
CREAT BLD-MCNC: 0.97 MG/DL (ref 0.5–1)
CREAT UR-MCNC: 83.4 MG/DL
GFR SERPL CREATININE-BSD FRML MDRD: 57 ML/MIN/1.73 (ref 45–104)
GLOBULIN UR ELPH-MCNC: 3.3 GM/DL (ref 2.3–3.5)
GLUCOSE BLD-MCNC: 110 MG/DL (ref 60–100)
HBA1C MFR BLD: 6.58 % (ref 4–5.6)
HDLC SERPL-MCNC: 54 MG/DL (ref 60–200)
LDLC/HDLC SERPL: 2.45 {RATIO} (ref 0–3.22)
MICROALBUMIN/CREAT UR: NORMAL MG/G (ref 0–30)
POTASSIUM BLD-SCNC: 4.6 MMOL/L (ref 3.5–5.1)
PROT SERPL-MCNC: 7.4 G/DL (ref 6.3–8.6)
SODIUM BLD-SCNC: 137 MMOL/L (ref 137–145)
T4 FREE SERPL-MCNC: 1.97 NG/DL (ref 0.78–2.19)
TRIGL SERPL-MCNC: 174 MG/DL (ref 20–199)
TSH SERPL DL<=0.05 MIU/L-ACNC: 3.27 MIU/ML (ref 0.46–4.68)

## 2017-07-20 PROCEDURE — 80053 COMPREHEN METABOLIC PANEL: CPT | Performed by: FAMILY MEDICINE

## 2017-07-20 PROCEDURE — 99214 OFFICE O/P EST MOD 30 MIN: CPT | Performed by: FAMILY MEDICINE

## 2017-07-20 PROCEDURE — 82570 ASSAY OF URINE CREATININE: CPT | Performed by: FAMILY MEDICINE

## 2017-07-20 PROCEDURE — 82043 UR ALBUMIN QUANTITATIVE: CPT | Performed by: FAMILY MEDICINE

## 2017-07-20 PROCEDURE — 96372 THER/PROPH/DIAG INJ SC/IM: CPT | Performed by: FAMILY MEDICINE

## 2017-07-20 PROCEDURE — 36415 COLL VENOUS BLD VENIPUNCTURE: CPT | Performed by: FAMILY MEDICINE

## 2017-07-20 PROCEDURE — 80061 LIPID PANEL: CPT | Performed by: FAMILY MEDICINE

## 2017-07-20 PROCEDURE — 84443 ASSAY THYROID STIM HORMONE: CPT | Performed by: FAMILY MEDICINE

## 2017-07-20 PROCEDURE — 83036 HEMOGLOBIN GLYCOSYLATED A1C: CPT | Performed by: FAMILY MEDICINE

## 2017-07-20 PROCEDURE — 84439 ASSAY OF FREE THYROXINE: CPT | Performed by: FAMILY MEDICINE

## 2017-07-20 RX ORDER — CARVEDILOL 3.12 MG/1
3.12 TABLET ORAL 2 TIMES DAILY
Qty: 180 TABLET | Refills: 2 | Status: SHIPPED | OUTPATIENT
Start: 2017-07-20 | End: 2019-11-15

## 2017-07-20 RX ORDER — LOSARTAN POTASSIUM 100 MG/1
100 TABLET ORAL DAILY
Qty: 90 TABLET | Refills: 2 | Status: SHIPPED | OUTPATIENT
Start: 2017-07-20 | End: 2018-06-21 | Stop reason: SDUPTHER

## 2017-07-20 RX ORDER — CYANOCOBALAMIN 1000 UG/ML
1000 INJECTION, SOLUTION INTRAMUSCULAR; SUBCUTANEOUS ONCE
Status: COMPLETED | OUTPATIENT
Start: 2017-07-20 | End: 2017-07-20

## 2017-07-20 RX ORDER — HYDROXYZINE HYDROCHLORIDE 25 MG/1
25 TABLET, FILM COATED ORAL NIGHTLY PRN
Qty: 30 TABLET | Refills: 11 | Status: SHIPPED | OUTPATIENT
Start: 2017-07-20 | End: 2017-09-22

## 2017-07-20 RX ORDER — GUANFACINE 1 MG/1
1 TABLET ORAL DAILY
COMMUNITY
Start: 2017-06-12 | End: 2022-02-11

## 2017-07-20 RX ORDER — LEVOTHYROXINE SODIUM 175 UG/1
175 TABLET ORAL DAILY
Qty: 90 TABLET | Refills: 3 | Status: SHIPPED | OUTPATIENT
Start: 2017-07-20 | End: 2018-10-08 | Stop reason: SDUPTHER

## 2017-07-20 RX ADMIN — CYANOCOBALAMIN 1000 MCG: 1000 INJECTION, SOLUTION INTRAMUSCULAR; SUBCUTANEOUS at 11:05

## 2017-07-20 NOTE — PROGRESS NOTES
Subjective   Emily Haas is a 67 y.o. female.     Hypertension   This is a chronic problem. The current episode started more than 1 year ago. The problem is unchanged. The problem is controlled. Pertinent negatives include no chest pain, orthopnea, palpitations, peripheral edema, PND or shortness of breath.   Hypothyroidism   This is a chronic problem. The current episode started more than 1 year ago. The problem has been unchanged. Associated symptoms include fatigue. Pertinent negatives include no chest pain or myalgias. Rash: lips.   Diabetes   She presents for her follow-up diabetic visit. She has type 2 diabetes mellitus. Hypoglycemia symptoms include nervousness/anxiousness. Associated symptoms include fatigue. Pertinent negatives for diabetes include no chest pain, no foot paresthesias, no foot ulcerations, no polydipsia and no polyuria. She monitors urine at home 1-2 x per day. Her breakfast blood glucose is taken between 7-8 am. Her breakfast blood glucose range is generally  mg/dl. An ACE inhibitor/angiotensin II receptor blocker is being taken. Eye exam is current.   Hyperlipidemia   This is a chronic problem. The problem is uncontrolled. Exacerbating diseases include hypothyroidism. Pertinent negatives include no chest pain, myalgias or shortness of breath.        The following portions of the patient's history were reviewed and updated as appropriate: allergies, current medications, past family history, past medical history, past social history, past surgical history and problem list.    Review of Systems   Constitutional: Positive for fatigue.   Respiratory: Negative for shortness of breath.    Cardiovascular: Negative for chest pain, palpitations, orthopnea and PND.   Endocrine: Negative for polydipsia and polyuria.   Musculoskeletal: Negative for myalgias.   Skin: Rash: lips.   Psychiatric/Behavioral: Positive for sleep disturbance. Negative for dysphoric mood. The patient is  nervous/anxious.        Objective   Physical Exam   Constitutional: She is oriented to person, place, and time. She appears well-developed and well-nourished. No distress.   HENT:   Head: Normocephalic and atraumatic.   Cardiovascular: Normal rate, regular rhythm, normal heart sounds and intact distal pulses.  Exam reveals no gallop and no friction rub.    No murmur heard.  Pulmonary/Chest: Effort normal. No respiratory distress. She has no wheezes. She has no rales.   Abdominal: Normal appearance. She exhibits no shifting dullness, no pulsatile liver, no fluid wave, no abdominal bruit, no ascites and no pulsatile midline mass. There is no hepatosplenomegaly. There is no rigidity.   Musculoskeletal: She exhibits no edema or tenderness.    Emily had a diabetic foot exam performed (callus noted) today.   During the foot exam she had a monofilament test performed (normal).    Vascular Status -  Her exam exhibits right foot vasculature abnormal and no right foot edema. Her exam exhibits left foot vasculature abnormal and no left foot edema.  Neurological: She is alert and oriented to person, place, and time.   Skin: Skin is warm and dry. She is not diaphoretic.   Psychiatric: She has a normal mood and affect. Her behavior is normal. Judgment and thought content normal.   Nursing note and vitals reviewed.      Assessment/Plan   Problems Addressed this Visit        Cardiovascular and Mediastinum    Hyperlipidemia - Primary    Relevant Orders    Lipid Panel    Essential hypertension    Relevant Medications    guanFACINE (TENEX) 1 MG tablet    losartan (COZAAR) 100 MG tablet    carvedilol (COREG) 3.125 MG tablet    Other Relevant Orders    Comprehensive Metabolic Panel       Endocrine    Diabetes mellitus type 2, diet-controlled    Relevant Orders    Hemoglobin A1c    Microalbumin / Creatinine Urine Ratio    Acquired hypothyroidism    Relevant Medications    levothyroxine (SYNTHROID, LEVOTHROID) 175 MCG tablet    carvedilol  (COREG) 3.125 MG tablet    Other Relevant Orders    TSH    T4, free       Other    Generalized anxiety disorder    Relevant Medications    hydrOXYzine (ATARAX) 25 MG tablet      Other Visit Diagnoses     Fatigue, unspecified type

## 2017-07-25 NOTE — PROGRESS NOTES
Pr Dr. Hutton, Ms. Haas has been called with her recent lab results & recommendations.  Continue her current medications and follow-up as planned or sooner if any problems.

## 2017-09-22 ENCOUNTER — OFFICE VISIT (OUTPATIENT)
Dept: GASTROENTEROLOGY | Facility: CLINIC | Age: 67
End: 2017-09-22

## 2017-09-22 VITALS
HEIGHT: 65 IN | BODY MASS INDEX: 34.27 KG/M2 | WEIGHT: 205.7 LBS | SYSTOLIC BLOOD PRESSURE: 146 MMHG | DIASTOLIC BLOOD PRESSURE: 84 MMHG | HEART RATE: 71 BPM

## 2017-09-22 DIAGNOSIS — K58.2 IRRITABLE BOWEL SYNDROME WITH BOTH CONSTIPATION AND DIARRHEA: Primary | ICD-10-CM

## 2017-09-22 DIAGNOSIS — K21.9 GASTROESOPHAGEAL REFLUX DISEASE, ESOPHAGITIS PRESENCE NOT SPECIFIED: ICD-10-CM

## 2017-09-22 DIAGNOSIS — Z12.11 ENCOUNTER FOR SCREENING FOR MALIGNANT NEOPLASM OF COLON: ICD-10-CM

## 2017-09-22 DIAGNOSIS — K57.30 DIVERTICULOSIS OF LARGE INTESTINE WITHOUT HEMORRHAGE: ICD-10-CM

## 2017-09-22 PROCEDURE — S0260 H&P FOR SURGERY: HCPCS | Performed by: PHYSICIAN ASSISTANT

## 2017-09-22 RX ORDER — SODIUM, POTASSIUM,MAG SULFATES 17.5-3.13G
1 SOLUTION, RECONSTITUTED, ORAL ORAL ONCE
Qty: 1 BOTTLE | Refills: 0 | Status: SHIPPED | OUTPATIENT
Start: 2017-09-22 | End: 2017-09-22

## 2017-09-22 RX ORDER — DEXTROSE AND SODIUM CHLORIDE 5; .45 G/100ML; G/100ML
30 INJECTION, SOLUTION INTRAVENOUS CONTINUOUS PRN
Status: CANCELLED | OUTPATIENT
Start: 2017-11-17

## 2017-10-02 ENCOUNTER — TELEPHONE (OUTPATIENT)
Dept: FAMILY MEDICINE CLINIC | Facility: CLINIC | Age: 67
End: 2017-10-02

## 2017-10-02 NOTE — TELEPHONE ENCOUNTER
Relayed to patient she can come by office and get Kenalog injection per Dr. Hutton. Pt verbalized understanding.

## 2017-10-02 NOTE — TELEPHONE ENCOUNTER
----- Message from Fernando Hutton MD sent at 10/2/2017  1:09 PM CDT -----   Kenalog 80 mg OK  ----- Message -----     From: Deep Wilson MA     Sent: 10/2/2017  11:22 AM       To: Fernando Hutton MD    For allergies, her last injection was 06/12/2017.  Please advise, thank you    ----- Message -----     From: Nicole Rogers     Sent: 10/2/2017   8:26 AM       To: Deep Wilson MA    Emily called this am and asked if it is time for her to be able to get another Kenalog inj.?

## 2017-10-03 ENCOUNTER — CLINICAL SUPPORT (OUTPATIENT)
Dept: FAMILY MEDICINE CLINIC | Facility: CLINIC | Age: 67
End: 2017-10-03

## 2017-10-03 DIAGNOSIS — J30.2 CHRONIC SEASONAL ALLERGIC RHINITIS DUE TO OTHER ALLERGEN: Primary | ICD-10-CM

## 2017-10-03 PROCEDURE — 96372 THER/PROPH/DIAG INJ SC/IM: CPT | Performed by: FAMILY MEDICINE

## 2017-10-03 RX ORDER — TRIAMCINOLONE ACETONIDE 40 MG/ML
80 INJECTION, SUSPENSION INTRA-ARTICULAR; INTRAMUSCULAR ONCE
Status: COMPLETED | OUTPATIENT
Start: 2017-10-03 | End: 2017-10-03

## 2017-10-03 RX ADMIN — TRIAMCINOLONE ACETONIDE 80 MG: 40 INJECTION, SUSPENSION INTRA-ARTICULAR; INTRAMUSCULAR at 14:48

## 2017-10-24 ENCOUNTER — OFFICE VISIT (OUTPATIENT)
Dept: FAMILY MEDICINE CLINIC | Facility: CLINIC | Age: 67
End: 2017-10-24

## 2017-10-24 VITALS
BODY MASS INDEX: 34.24 KG/M2 | WEIGHT: 205.5 LBS | OXYGEN SATURATION: 97 % | DIASTOLIC BLOOD PRESSURE: 84 MMHG | HEART RATE: 66 BPM | HEIGHT: 65 IN | SYSTOLIC BLOOD PRESSURE: 131 MMHG

## 2017-10-24 DIAGNOSIS — I10 ESSENTIAL HYPERTENSION: Primary | ICD-10-CM

## 2017-10-24 DIAGNOSIS — E11.9 DIABETES MELLITUS WITHOUT COMPLICATION (HCC): ICD-10-CM

## 2017-10-24 DIAGNOSIS — E03.9 ACQUIRED HYPOTHYROIDISM: ICD-10-CM

## 2017-10-24 PROCEDURE — 90662 IIV NO PRSV INCREASED AG IM: CPT | Performed by: FAMILY MEDICINE

## 2017-10-24 PROCEDURE — G0008 ADMIN INFLUENZA VIRUS VAC: HCPCS | Performed by: FAMILY MEDICINE

## 2017-10-24 PROCEDURE — 96372 THER/PROPH/DIAG INJ SC/IM: CPT | Performed by: FAMILY MEDICINE

## 2017-10-24 PROCEDURE — 99214 OFFICE O/P EST MOD 30 MIN: CPT | Performed by: FAMILY MEDICINE

## 2017-10-24 RX ORDER — CYANOCOBALAMIN 1000 UG/ML
1000 INJECTION, SOLUTION INTRAMUSCULAR; SUBCUTANEOUS ONCE
Status: COMPLETED | OUTPATIENT
Start: 2017-10-24 | End: 2017-10-24

## 2017-10-24 RX ADMIN — CYANOCOBALAMIN 1000 MCG: 1000 INJECTION, SOLUTION INTRAMUSCULAR; SUBCUTANEOUS at 10:17

## 2017-10-24 NOTE — PROGRESS NOTES
Subjective   Emily Haas is a 67 y.o. female.     Hyperlipidemia   This is a chronic problem. The problem is uncontrolled. Exacerbating diseases include hypothyroidism. Pertinent negatives include no chest pain, myalgias or shortness of breath.   Hypertension   This is a chronic problem. The current episode started more than 1 year ago. The problem is unchanged. The problem is controlled. Associated symptoms include peripheral edema. Pertinent negatives include no chest pain, orthopnea, palpitations, PND or shortness of breath.   Hypothyroidism   This is a chronic problem. The current episode started more than 1 year ago. The problem has been unchanged. Associated symptoms include fatigue. Pertinent negatives include no chest pain or myalgias. Rash: lips.   Diabetes   She presents for her follow-up diabetic visit. She has type 2 diabetes mellitus. Associated symptoms include fatigue. Pertinent negatives for diabetes include no chest pain, no foot paresthesias, no foot ulcerations, no polydipsia and no polyuria. She monitors urine at home 1-2 x per day. Her breakfast blood glucose is taken between 7-8 am. Her breakfast blood glucose range is generally  mg/dl. An ACE inhibitor/angiotensin II receptor blocker is being taken. Eye exam is current.        The following portions of the patient's history were reviewed and updated as appropriate: allergies, current medications, past family history, past medical history, past social history, past surgical history and problem list.    Review of Systems   Constitutional: Positive for fatigue.   Respiratory: Negative for shortness of breath.    Cardiovascular: Negative for chest pain, palpitations, orthopnea and PND.   Endocrine: Negative for polydipsia and polyuria.   Musculoskeletal: Negative for myalgias.   Skin: Rash: lips.   Psychiatric/Behavioral: Positive for sleep disturbance. Negative for dysphoric mood.       Objective   Physical Exam   Constitutional: She  is oriented to person, place, and time. She appears well-developed and well-nourished. No distress.   HENT:   Head: Normocephalic and atraumatic.   Cardiovascular: Normal rate, regular rhythm, normal heart sounds and intact distal pulses.  Exam reveals no gallop and no friction rub.    No murmur heard.  Pulmonary/Chest: Effort normal. No respiratory distress. She has no wheezes. She has no rales.   Abdominal: Normal appearance. She exhibits no shifting dullness, no pulsatile liver, no fluid wave, no abdominal bruit, no ascites and no pulsatile midline mass. There is no hepatosplenomegaly. There is no rigidity.   Musculoskeletal: She exhibits no edema or tenderness.    Emily had a diabetic foot exam performed (callus noted) today.   During the foot exam she had a monofilament test performed (normal).    Vascular Status -  Her exam exhibits right foot vasculature normal. Her exam exhibits no right foot edema. Her exam exhibits left foot vasculature normal. Her exam exhibits no left foot edema.  Neurological: She is alert and oriented to person, place, and time.   Skin: Skin is warm and dry. She is not diaphoretic.   Psychiatric: She has a normal mood and affect. Her behavior is normal. Judgment and thought content normal.   Nursing note and vitals reviewed.      Assessment/Plan   Problems Addressed this Visit        Cardiovascular and Mediastinum    Essential hypertension - Primary       Endocrine    Acquired hypothyroidism    Diabetes mellitus without complication

## 2017-11-17 ENCOUNTER — ANESTHESIA EVENT (OUTPATIENT)
Dept: GASTROENTEROLOGY | Facility: HOSPITAL | Age: 67
End: 2017-11-17

## 2017-11-17 ENCOUNTER — HOSPITAL ENCOUNTER (OUTPATIENT)
Facility: HOSPITAL | Age: 67
Setting detail: HOSPITAL OUTPATIENT SURGERY
Discharge: HOME | End: 2017-11-17
Attending: INTERNAL MEDICINE | Admitting: INTERNAL MEDICINE

## 2017-11-17 ENCOUNTER — ANESTHESIA (OUTPATIENT)
Dept: GASTROENTEROLOGY | Facility: HOSPITAL | Age: 67
End: 2017-11-17

## 2017-11-17 VITALS
BODY MASS INDEX: 34.09 KG/M2 | OXYGEN SATURATION: 98 % | SYSTOLIC BLOOD PRESSURE: 169 MMHG | RESPIRATION RATE: 16 BRPM | HEART RATE: 72 BPM | TEMPERATURE: 97.6 F | HEIGHT: 65 IN | WEIGHT: 204.59 LBS | DIASTOLIC BLOOD PRESSURE: 82 MMHG

## 2017-11-17 DIAGNOSIS — K57.30 DIVERTICULOSIS OF LARGE INTESTINE WITHOUT HEMORRHAGE: ICD-10-CM

## 2017-11-17 DIAGNOSIS — K58.2 IRRITABLE BOWEL SYNDROME WITH BOTH CONSTIPATION AND DIARRHEA: ICD-10-CM

## 2017-11-17 DIAGNOSIS — Z12.11 ENCOUNTER FOR SCREENING FOR MALIGNANT NEOPLASM OF COLON: ICD-10-CM

## 2017-11-17 LAB — GLUCOSE BLDC GLUCOMTR-MCNC: 96 MG/DL (ref 70–130)

## 2017-11-17 PROCEDURE — G0121 COLON CA SCRN NOT HI RSK IND: HCPCS | Performed by: INTERNAL MEDICINE

## 2017-11-17 PROCEDURE — 82962 GLUCOSE BLOOD TEST: CPT

## 2017-11-17 PROCEDURE — 25010000002 PROPOFOL 10 MG/ML EMULSION: Performed by: NURSE ANESTHETIST, CERTIFIED REGISTERED

## 2017-11-17 PROCEDURE — 25010000002 MIDAZOLAM PER 1 MG: Performed by: NURSE ANESTHETIST, CERTIFIED REGISTERED

## 2017-11-17 RX ORDER — DEXTROSE AND SODIUM CHLORIDE 5; .45 G/100ML; G/100ML
30 INJECTION, SOLUTION INTRAVENOUS CONTINUOUS PRN
Status: DISCONTINUED | OUTPATIENT
Start: 2017-11-17 | End: 2017-11-17 | Stop reason: HOSPADM

## 2017-11-17 RX ORDER — PROPOFOL 10 MG/ML
VIAL (ML) INTRAVENOUS AS NEEDED
Status: DISCONTINUED | OUTPATIENT
Start: 2017-11-17 | End: 2017-11-17 | Stop reason: SURG

## 2017-11-17 RX ORDER — MIDAZOLAM HYDROCHLORIDE 1 MG/ML
INJECTION INTRAMUSCULAR; INTRAVENOUS AS NEEDED
Status: DISCONTINUED | OUTPATIENT
Start: 2017-11-17 | End: 2017-11-17 | Stop reason: SURG

## 2017-11-17 RX ADMIN — PROPOFOL 20 MG: 10 INJECTION, EMULSION INTRAVENOUS at 11:55

## 2017-11-17 RX ADMIN — PROPOFOL 60 MG: 10 INJECTION, EMULSION INTRAVENOUS at 11:49

## 2017-11-17 RX ADMIN — MIDAZOLAM 2 MG: 1 INJECTION INTRAMUSCULAR; INTRAVENOUS at 11:47

## 2017-11-17 RX ADMIN — PROPOFOL 20 MG: 10 INJECTION, EMULSION INTRAVENOUS at 11:52

## 2017-11-17 RX ADMIN — DEXTROSE AND SODIUM CHLORIDE 30 ML/HR: 5; 450 INJECTION, SOLUTION INTRAVENOUS at 10:43

## 2017-11-17 NOTE — ANESTHESIA POSTPROCEDURE EVALUATION
Patient: Emily Haas    Procedure Summary     Date Anesthesia Start Anesthesia Stop Room / Location    11/17/17 1147 1200 [facility] ENDOSCOPY 1 / [facility] ENDOSCOPY       Procedure Diagnosis Surgeon Provider    COLONOSCOPY (N/A ) Encounter for screening for malignant neoplasm of colon; Diverticulosis of large intestine without hemorrhage; Irritable bowel syndrome with both constipation and diarrhea  (Encounter for screening for malignant neoplasm of colon [Z12.11]; Diverticulosis of large intestine without hemorrhage [K57.30]; Irritable bowel syndrome with both constipation and diarrhea [K58.2]) MD Nicole Rdz CRNA          Anesthesia Type: MAC  Last vitals  BP   (!) 220/99 (11/17/17 1031)   Temp   98 °F (36.7 °C) (11/17/17 1020)   Pulse   86 (11/17/17 1020)   Resp   16 (11/17/17 1020)     SpO2   98 % (11/17/17 1020)     Post Anesthesia Care and Evaluation    Patient location during evaluation: bedside  Patient participation: complete - patient participated  Level of consciousness: awake and alert  Pain management: adequate  Airway patency: patent  Anesthetic complications: No anesthetic complications  PONV Status: none  Cardiovascular status: acceptable  Respiratory status: acceptable  Hydration status: acceptable

## 2017-11-17 NOTE — H&P
Progress Notes  Encounter Date: 11/17/2017  Cole cartagena  Gastroenterology   Expand All Collapse All    []Hide copied text  []Hover for attribution information       Chief Complaint   Patient presents with   • Eval For Colonoscopy       Ref. Dr. Brennen MARTINEZ PROCEDURE ORDERED: COLON-screen, diverticular dz, colon resection        Subjective        Emily Haas is a 67 y.o. female. 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 Hutton who saw the patient on 7/20/17 with hypertension, diabetes.  Was last seen 5/2/12.  She does undergone colonoscopy on 3/2/12 showing diverticulosis of the sigmoid colon.  She had a previous colon resection in 2006.  She has a personal history of bladder cancer.  Patient states she has occasional constipation, sometimes she has some difficulty with her bowel movements.  Especially if she eats a salad or french fries at a restaurant she will get explosive diarrhea.  Generally she denies any blood in her stool, no mucus in her stool, no color changes.  She denies significant abdominal pain.  She takes over-the-counter Zantac and does well for her heartburn.  She denied nausea, vomiting, dysphagia.  Weight is down 18 pounds since last visit.     Patient had CT scan angio of the chest on 3/21/17 showed a small hiatal hernia.  D-dimer was 999, negative cardiac enzymes, INR, BNP, CBC.  CMP showed glucose 128, otherwise normal.  She had a UTI on 5/13/17.  More recent laboratory on 7/20/17 showed normal TSH, T4, cholesterol 221, CMP showed glucose 110, otherwise normal, A1c 6.58%.     Patient currently denies tobacco, alcohol, illicit substance use.  She was previously a 20 year smoker.  She has a history of diabetes, hysterectomy, cholecystectomy, bladder cancer, spinal fusion, colon resection, breast reduction.  Family  history of diabetes, cancer, hypertension, gallstones, heart disease.  Father  age 74 with Alzheimer's and pneumonia.  Mother  age 88 with CHF.  Spouse in good health,  48 years.  One brother  of CHF.  2 children one in good health, one is diabetic and a drug addict.     A/P: GERD appears well-controlled on Zantac.  Patient encouraged continued dietary modification and weight loss.  Normal liver enzymes.  Known diverticular disease.  Personal history of bladder cancer at some increased risk for colon cancer.  Recommend colonoscopy screening.  Further pending clinical course and the results of the above.      The following portions of the patient's history were reviewed and updated as appropriate:    Medical History         Past Medical History:   Diagnosis Date   • Abrasion, right lower leg, initial encounter     • Acquired hypothyroidism     • Acromioclavicular joint pain     • Allergic rhinitis     • Anxiety state     • Asthma     • Degenerative joint disease involving multiple joints     • Diabetes mellitus type 2, diet-controlled     • Diverticular disease of colon     • Diverticulitis of colon     • Essential hypertension     • Generalized anxiety disorder     • Hyperlipidemia     • Ingrowing toenail     • Insomnia     • Irritable bowel syndrome     • Joint pain     • Local infection of the skin and subcutaneous tissue, unspecified     • Low back pain       abn MRI   • Neck pain       abn MRI-post Fusion   • Need for prophylactic vaccination against Streptococcus pneumoniae (pneumococcus)     • Open wound of lower leg     • Pain in right hip     • Peripheral edema     • Screening mammogram, encounter for 2015   • Vitamin D deficiency            Surgical History          Past Surgical History:   Procedure Laterality Date   • BLADDER SURGERY       • CHOLECYSTECTOMY       • COLON SURGERY       • COLONOSCOPY   2012   • ENDOSCOPY   2012     Colon endoscopy 61538 (1)     "Diverticulum in sigmoid colon.    • EXCISION LESION   08/04/2015     Destruction of Benign Lesion (1-14) 86510 (1)     • HYSTERECTOMY       • INJECTION OF MEDICATION   07/08/2014     Celestone (betamethasone) (3)      • INJECTION OF MEDICATION   09/23/2013     Dexamethasone (1)      • INJECTION OF MEDICATION   06/28/2016     Kenalog (8)                  Family History   Problem Relation Age of Onset   • Diabetes Other     • Heart disease Other     • Cancer Other     • Hypertension Other            OB History      No data available                Allergies   Allergen Reactions   • Atenolol     • Augmentin [Amoxicillin-Pot Clavulanate]     • Dilaudid [Hydromorphone]     • Lipitor [Atorvastatin]     • Pravachol [Pravastatin]     • Statins Other (See Comments)       \"muscle pain \"    • Sulfa Antibiotics         Social History    Social History            Social History   • Marital status:        Spouse name: N/A   • Number of children: N/A   • Years of education: N/A           Social History Main Topics   • Smoking status: Former Smoker   • Smokeless tobacco: Never Used   • Alcohol use No   • Drug use: No   • Sexual activity: Defer           Other Topics Concern   • None      Social History Narrative            Current Outpatient Prescriptions:   •  acetaminophen (TYLENOL) 325 MG tablet, Take  by mouth Every 6 (Six) Hours., Disp: , Rfl:   •  carvedilol (COREG) 3.125 MG tablet, Take 1 tablet by mouth 2 (Two) Times a Day., Disp: 180 tablet, Rfl: 2  •  glucose blood test strip, by Other route 2 (Two) Times a Day. Use as instructed, Disp: , Rfl:   •  guanFACINE (TENEX) 1 MG tablet, Take 1 mg by mouth Daily., Disp: , Rfl:   •  levothyroxine (SYNTHROID, LEVOTHROID) 175 MCG tablet, Take 1 tablet by mouth Daily. Take first thing of the morning on an empty stomach., Disp: 90 tablet, Rfl: 3  •  losartan (COZAAR) 100 MG tablet, Take 1 tablet by mouth Daily., Disp: 90 tablet, Rfl: 2  •  meloxicam (MOBIC) 7.5 MG tablet, Take " "7.5 mg by mouth Daily As Needed (1-2 tablets daily prn pain)., Disp: , Rfl:   •  polyethylene glycol (MIRALAX) powder, Take 17 g by mouth As Needed. 17gm in 8oz bid , Disp: , Rfl:   •  vitamin B-12 (CYANOCOBALAMIN) 1000 MCG tablet, Take  by mouth., Disp: , Rfl:   Review of Systems  Review of Systems   Constitutional: Negative for unexpected weight change.   HENT: Negative for trouble swallowing.    Respiratory: Negative for chest tightness.    Cardiovascular: Negative for chest pain.   Gastrointestinal: Positive for abdominal pain, constipation and diarrhea. Negative for abdominal distention, anal bleeding, blood in stool, nausea, rectal pain and vomiting.   Genitourinary: Negative for difficulty urinating.   Musculoskeletal: Negative for myalgias.   All other systems reviewed and are negative.                                 Objective        /84 (BP Location: Left arm)  Pulse 71  Ht 65\" (165.1 cm)  Wt 205 lb 11.2 oz (93.3 kg)  LMP  (LMP Unknown)  BMI 34.23 kg/m2  Physical Exam   Constitutional: She is oriented to person, place, and time. She appears well-developed and well-nourished. No distress.   HENT:   Head: Normocephalic and atraumatic.   Eyes: EOM are normal. Pupils are equal, round, and reactive to light.   Neck: Normal range of motion.   Cardiovascular: Normal rate, regular rhythm and normal heart sounds.    Pulmonary/Chest: Effort normal and breath sounds normal.   Abdominal: Soft. Bowel sounds are normal. She exhibits no shifting dullness, no distension, no abdominal bruit, no ascites and no mass. There is no hepatosplenomegaly. There is tenderness. There is no rigidity, no rebound, no guarding and no CVA tenderness. No hernia. Hernia confirmed negative in the ventral area.   Mild, scar   Musculoskeletal: Normal range of motion.   Neurological: She is alert and oriented to person, place, and time.   Skin: Skin is warm and dry.   Psychiatric: She has a normal mood and affect. Her behavior is " normal. Judgment and thought content normal.   Nursing note and vitals reviewed.        Assessment/Plan           1. Irritable bowel syndrome with both constipation and diarrhea    2. Diverticulosis of large intestine without hemorrhage    3. Encounter for screening for malignant neoplasm of colon    4. Gastroesophageal reflux disease, esophagitis presence not specified    .   Emily was seen today for eval for colonoscopy.     Diagnoses and all orders for this visit:     Irritable bowel syndrome with both constipation and diarrhea  -     Case Request; Standing  -     dextrose 5 % and sodium chloride 0.45 % infusion; Infuse 30 mL/hr into a venous catheter Continuous As Needed (Start Prior to Procedure).  -     Case Request     Diverticulosis of large intestine without hemorrhage  -     Case Request; Standing  -     dextrose 5 % and sodium chloride 0.45 % infusion; Infuse 30 mL/hr into a venous catheter Continuous As Needed (Start Prior to Procedure).  -     Case Request     Encounter for screening for malignant neoplasm of colon  -     Case Request; Standing  -     dextrose 5 % and sodium chloride 0.45 % infusion; Infuse 30 mL/hr into a venous catheter Continuous As Needed (Start Prior to Procedure).  -     Case Request     Gastroesophageal reflux disease, esophagitis presence not specified     Other orders  -     Obtain Informed Consent; Standing  -     POC Glucose Fingerstick; Standing  -     sodium-potassium-magnesium sulfates (SUPREP BOWEL PREP KIT) 17.5-3.13-1.6 GM/180ML solution oral solution; Take 1 bottle by mouth 1 (One) Time for 1 dose. Take as per instruction sheet for colonoscopy prep.           Orders placed during this encounter include:  No orders of the defined types were placed in this encounter.        Medications prescribed:       New Medications Ordered This Visit   Medications   • sodium-potassium-magnesium sulfates (SUPREP BOWEL PREP KIT) 17.5-3.13-1.6 GM/180ML solution oral solution       Sig:  Take 1 bottle by mouth 1 (One) Time for 1 dose. Take as per instruction sheet for colonoscopy prep.       Dispense:  1 bottle       Refill:  0      Discontinued Medications        Reason for Discontinue     hydrOXYzine (ATARAX) 25 MG tablet Not Efficacious     fexofenadine (ALLEGRA) 180 MG tablet Non-compliance         Requested Prescriptions             Signed Prescriptions Disp Refills   • sodium-potassium-magnesium sulfates (SUPREP BOWEL PREP KIT) 17.5-3.13-1.6 GM/180ML solution oral solution 1 bottle 0       Sig: Take 1 bottle by mouth 1 (One) Time for 1 dose. Take as per instruction sheet for colonoscopy prep.         Review and/or summary of lab tests, radiology, procedures, medications. Review and summary of old records and obtaining of history. The risks and benefits of my recommendations, as well as other treatment options were discussed with the patient today. Questions were answered.     Follow-up: Return if symptoms worsen or fail to improve.     COLONOSCOPY (N/A)           This document has been electronically signed by Cole Quezada MD on November 17, 2017 10:11 AM               Results for orders placed or performed in visit on 07/20/17   TSH   Result Value Ref Range     TSH 3.270 0.460 - 4.680 mIU/mL   T4, free   Result Value Ref Range     Free T4 1.97 0.78 - 2.19 ng/dL   Results for orders placed or performed in visit on 07/20/17   Microalbumin / Creatinine Urine Ratio   Result Value Ref Range     Microalbumin/Creatinine Ratio   0.0 - 30.0 mg/g     Creatinine, Urine 83.4 mg/dL     Microalbumin, Urine <0.6 mg/L   Hemoglobin A1c   Result Value Ref Range     Hemoglobin A1C 6.58 (H) 4 - 5.6 %   Lipid Panel   Result Value Ref Range     Total Cholesterol 221 (H) 0 - 199 mg/dL     Triglycerides 174 20 - 199 mg/dL     HDL Cholesterol 54 (L) 60 - 200 mg/dL     LDL Cholesterol  138 (H) 1 - 129 mg/dL     LDL/HDL Ratio 2.45 0.00 - 3.22   Comprehensive Metabolic Panel   Result Value Ref Range     Glucose 110  (H) 60 - 100 mg/dL     BUN 18 7 - 21 mg/dL     Creatinine 0.97 0.50 - 1.00 mg/dL     Sodium 137 137 - 145 mmol/L     Potassium 4.6 3.5 - 5.1 mmol/L     Chloride 102 95 - 110 mmol/L     CO2 27.0 22.0 - 31.0 mmol/L     Calcium 9.4 8.4 - 10.2 mg/dL     Total Protein 7.4 6.3 - 8.6 g/dL     Albumin 4.10 3.40 - 4.80 g/dL     ALT (SGPT) 36 9 - 52 U/L     AST (SGOT) 31 14 - 36 U/L     Alkaline Phosphatase 86 38 - 126 U/L     Total Bilirubin 0.7 0.2 - 1.3 mg/dL     eGFR Non  Amer 57 45 - 104 mL/min/1.73     Globulin 3.3 2.3 - 3.5 gm/dL     A/G Ratio 1.2 1.1 - 1.8 g/dL     BUN/Creatinine Ratio 18.6 7.0 - 25.0     Anion Gap 8.0 5.0 - 15.0 mmol/L   Results for orders placed or performed during the hospital encounter of 05/13/17   POCT Urinalysis (manual dipstick)   Result Value Ref Range     Color Yellow Yellow, Straw, Dark Yellow, Ángela     Clarity, UA Clear Clear     Glucose, UA Negative Negative, 1000 mg/dL (3+) mg/dL     Bilirubin Negative Negative     Ketones, UA Negative Negative     Specific Gravity  1.010 1.005 - 1.030     Blood, UA Negative Negative     pH, Urine 7.0 5.0 - 8.0     Protein, POC Negative Negative mg/dL     Urobilinogen, UA Normal Normal     Leukocytes Moderate (2+) (A) Negative     Nitrite, UA Negative Negative   Results for orders placed or performed during the hospital encounter of 03/21/17   Gold Top - SST   Result Value Ref Range     Extra Tube Hold for add-ons.     Green Top (Gel)   Result Value Ref Range     Extra Tube Hold for add-ons.     CK-MB   Result Value Ref Range     CKMB 0.43 0.00 - 5.00 ng/mL   CK-MB   Result Value Ref Range     CKMB 0.57 0.00 - 5.00 ng/mL   CBC Auto Differential   Result Value Ref Range     WBC 9.64 3.20 - 9.80 10*3/mm3     RBC 3.92 3.77 - 5.16 10*6/mm3     Hemoglobin 12.1 12.0 - 15.5 g/dL     Hematocrit 36.0 35.0 - 45.0 %     MCV 91.8 80.0 - 98.0 fL     MCH 30.9 26.5 - 34.0 pg     MCHC 33.6 31.4 - 36.0 g/dL     RDW 13.5 11.5 - 14.5 %     RDW-SD 44.8 36.4 - 46.3  fl     MPV 10.3 8.0 - 12.0 fL     Platelets 181 150 - 450 10*3/mm3     Neutrophil % 63.4 37.0 - 80.0 %     Lymphocyte % 24.3 10.0 - 50.0 %     Monocyte % 10.1 0.0 - 12.0 %     Eosinophil % 1.6 0.0 - 7.0 %     Basophil % 0.2 0.0 - 2.0 %     Immature Grans % 0.4 0.0 - 0.5 %     Neutrophils, Absolute 6.12 2.00 - 8.60 10*3/mm3     Lymphocytes, Absolute 2.34 0.60 - 4.20 10*3/mm3     Monocytes, Absolute 0.97 (H) 0.00 - 0.90 10*3/mm3     Eosinophils, Absolute 0.15 0.00 - 0.70 10*3/mm3     Basophils, Absolute 0.02 0.00 - 0.20 10*3/mm3     Immature Grans, Absolute 0.04 (H) 0.00 - 0.02 10*3/mm3   Lavender Top   Result Value Ref Range     Extra Tube hold for add-on     Light Blue Top   Result Value Ref Range     Extra Tube hold for add-on     Troponin   Result Value Ref Range     Troponin I <0.012 <=0.034 ng/mL   Troponin   Result Value Ref Range     Troponin I <0.012 <=0.034 ng/mL   Protime-INR   Result Value Ref Range     Protime 12.5 11.1 - 15.3 Seconds     INR 0.93 0.80 - 1.20   D-dimer, Quantitative   Result Value Ref Range     D-Dimer, Quantitative 999 (H) 0 - 470 ng/mL (FEU)   BNP   Result Value Ref Range     proBNP 65.2 0.0 - 900.0 pg/mL   CK   Result Value Ref Range     Creatine Kinase 29 (L) 30 - 135 U/L   CK   Result Value Ref Range     Creatine Kinase 35 30 - 135 U/L   Comprehensive Metabolic Panel   Result Value Ref Range     Glucose 128 (H) 60 - 100 mg/dL     BUN 19 7 - 21 mg/dL     Creatinine 0.88 0.50 - 1.00 mg/dL     Sodium 137 137 - 145 mmol/L     Potassium 3.5 3.5 - 5.1 mmol/L     Chloride 101 95 - 110 mmol/L     CO2 26.0 22.0 - 31.0 mmol/L     Calcium 9.1 8.4 - 10.2 mg/dL     Total Protein 6.4 6.3 - 8.6 g/dL     Albumin 3.80 3.40 - 4.80 g/dL     ALT (SGPT) 36 9 - 52 U/L     AST (SGOT) 23 14 - 36 U/L     Alkaline Phosphatase 60 38 - 126 U/L     Total Bilirubin 0.5 0.2 - 1.3 mg/dL     eGFR Non  Amer 64 45 - 104 mL/min/1.73     Globulin 2.6 2.3 - 3.5 gm/dL     A/G Ratio 1.5 1.1 - 1.8 g/dL      BUN/Creatinine Ratio 21.6 7.0 - 25.0     Anion Gap 10.0 5.0 - 15.0 mmol/L   Results for orders placed or performed during the hospital encounter of 01/20/17   TSH   Result Value Ref Range     TSH 0.77 0.46 - 4.68 uIU/ml   T4, Free   Result Value Ref Range     Free T4 1.51 0.78 - 2.19 ng/dl      *Note: Due to a large number of results and/or encounters for the requested time period, some results have not been displayed. A complete set of results can be found in Results Review.         Some portions of this note have been dictated using voice recognition software and may contain errors and/or omissions.            Office Visit on 9/22/2017              Detailed Report

## 2017-11-17 NOTE — PLAN OF CARE
Problem: Patient Care Overview (Adult)  Goal: Plan of Care Review    11/17/17 1216   Coping/Psychosocial Response Interventions   Plan Of Care Reviewed With patient   Patient Care Overview   Progress no change   Outcome Evaluation   Outcome Summary/Follow up Plan vss         Problem: GI Endoscopy (Adult)  Goal: Signs and Symptoms of Listed Potential Problems Will be Absent or Manageable (GI Endoscopy)    11/17/17 1216   GI Endoscopy   Problems Assessed (GI Endoscopy) all   Problems Present (GI Endoscopy) none

## 2017-11-17 NOTE — PLAN OF CARE
Problem: Patient Care Overview (Adult)  Goal: Plan of Care Review  Outcome: Ongoing (interventions implemented as appropriate)    11/17/17 1200   Coping/Psychosocial Response Interventions   Plan Of Care Reviewed With patient   Patient Care Overview   Progress no change   Outcome Evaluation   Outcome Summary/Follow up Plan vss         Problem: GI Endoscopy (Adult)  Goal: Signs and Symptoms of Listed Potential Problems Will be Absent or Manageable (GI Endoscopy)  Outcome: Ongoing (interventions implemented as appropriate)    11/17/17 1200   GI Endoscopy   Problems Assessed (GI Endoscopy) all   Problems Present (GI Endoscopy) none

## 2017-11-17 NOTE — ANESTHESIA PREPROCEDURE EVALUATION
Anesthesia Evaluation     no history of anesthetic complications:  NPO Solid Status: > 8 hours  NPO Liquid Status: > 2 hours     Airway   Mallampati: I  TM distance: <3 FB  Neck ROM: full  no difficulty expected  Dental - normal exam     Pulmonary - normal exam   (+) asthma, sleep apnea,   Cardiovascular - normal exam    (+) hypertension well controlled, PVD, hyperlipidemia      Neuro/Psych  (+) psychiatric history Anxiety and Depression,    GI/Hepatic/Renal/Endo    (+)  diabetes mellitus type 2 well controlled, hypothyroidism,     Musculoskeletal     (+) neck pain,   Abdominal    Substance History      OB/GYN          Other   (+) arthritis                                     Anesthesia Plan    ASA 2     MAC     intravenous induction   Anesthetic plan and risks discussed with patient.

## 2017-11-27 ENCOUNTER — OFFICE VISIT (OUTPATIENT)
Dept: GASTROENTEROLOGY | Facility: CLINIC | Age: 67
End: 2017-11-27

## 2017-11-27 VITALS
SYSTOLIC BLOOD PRESSURE: 142 MMHG | WEIGHT: 207.6 LBS | BODY MASS INDEX: 34.59 KG/M2 | DIASTOLIC BLOOD PRESSURE: 78 MMHG | HEART RATE: 58 BPM | HEIGHT: 65 IN

## 2017-11-27 DIAGNOSIS — K57.30 DIVERTICULOSIS OF LARGE INTESTINE WITHOUT HEMORRHAGE: ICD-10-CM

## 2017-11-27 DIAGNOSIS — K58.2 IRRITABLE BOWEL SYNDROME WITH BOTH CONSTIPATION AND DIARRHEA: Primary | ICD-10-CM

## 2017-11-27 PROCEDURE — 99212 OFFICE O/P EST SF 10 MIN: CPT | Performed by: PHYSICIAN ASSISTANT

## 2018-01-09 ENCOUNTER — OFFICE VISIT (OUTPATIENT)
Dept: PODIATRY | Facility: CLINIC | Age: 68
End: 2018-01-09

## 2018-01-09 VITALS
BODY MASS INDEX: 34.34 KG/M2 | WEIGHT: 206.13 LBS | SYSTOLIC BLOOD PRESSURE: 146 MMHG | OXYGEN SATURATION: 97 % | HEART RATE: 85 BPM | DIASTOLIC BLOOD PRESSURE: 81 MMHG | HEIGHT: 65 IN

## 2018-01-09 DIAGNOSIS — L60.0 INGROWN TOENAIL: Primary | ICD-10-CM

## 2018-01-09 DIAGNOSIS — M79.675 PAIN OF TOE OF LEFT FOOT: ICD-10-CM

## 2018-01-09 PROCEDURE — 99203 OFFICE O/P NEW LOW 30 MIN: CPT | Performed by: PODIATRIST

## 2018-01-09 PROCEDURE — 11730 AVULSION NAIL PLATE SIMPLE 1: CPT | Performed by: PODIATRIST

## 2018-01-09 NOTE — PROGRESS NOTES
Emily Haas  1950  67 y.o. female   PCP- Dr. Hutton  A1C- 6.5 on 07/20/2017  Patient presents today for left ingrown toenail.    01/09/2018  Chief Complaint   Patient presents with   • Left Foot - Ingrown Toenail           History of Present Illness    Emily Haas is a 67 y.o. female who presents for evaluation of ingrown toenail on the left foot.  She states is been ongoing for a few weeks.  She describes associated sharp pain with direct pressure.  She states there is some swelling and redness.  She has intermittently had this problem in the past with no significant infections.  She denies any trauma or injuries.  She denies any formal treatments for this issue.    Past Medical History:   Diagnosis Date   • Abrasion, right lower leg, initial encounter    • Acquired hypothyroidism    • Acromioclavicular joint pain    • Allergic rhinitis    • Anxiety state    • Asthma    • Bleeding disorder    • Cancer    • Degenerative joint disease involving multiple joints    • Diabetes mellitus type 2, diet-controlled    • Diverticular disease of colon    • Diverticulitis of colon    • Essential hypertension    • Generalized anxiety disorder    • Hyperlipidemia    • Ingrowing toenail    • Insomnia    • Irritable bowel syndrome    • Joint pain    • Local infection of the skin and subcutaneous tissue, unspecified    • Low back pain     abn MRI   • Neck pain     abn MRI-post Fusion   • Need for prophylactic vaccination against Streptococcus pneumoniae (pneumococcus)    • Open wound of lower leg    • Pain in right hip    • Peripheral edema    • Screening mammogram, encounter for 06/11/2015   • Vitamin D deficiency          Past Surgical History:   Procedure Laterality Date   • BILATERAL BREAST REDUCTION     • BLADDER SURGERY     • CHOLECYSTECTOMY     • COLON SURGERY     • COLONOSCOPY  03/02/2012   • COLONOSCOPY N/A 11/17/2017    Procedure: COLONOSCOPY;  Surgeon: Cole Quezada MD;  Location: [facility] ENDOSCOPY;  Service:     • ENDOSCOPY  03/02/2012    Colon endoscopy 40762 (1)    Diverticulum in sigmoid colon.    • EXCISION LESION  08/04/2015    Destruction of Benign Lesion (1-14) 14170 (1)     • HYSTERECTOMY     • INJECTION OF MEDICATION  07/08/2014    Celestone (betamethasone) (3)      • INJECTION OF MEDICATION  09/23/2013    Dexamethasone (1)      • INJECTION OF MEDICATION  06/28/2016    Kenalog (8)            Family History   Problem Relation Age of Onset   • Diabetes Other    • Heart disease Other    • Cancer Other    • Hypertension Other    • Hypertension Mother    • Heart disease Mother    • Alzheimer's disease Father    • Diabetes Brother    • Heart disease Brother    • Alzheimer's disease Brother    • No Known Problems Maternal Grandmother    • No Known Problems Maternal Grandfather    • No Known Problems Paternal Grandmother    • No Known Problems Paternal Grandfather    • Diabetes Daughter    • Hypothyroidism Daughter    • Hypertension Daughter    • Drug abuse Daughter    • No Known Problems Daughter          Social History     Social History   • Marital status:      Spouse name: N/A   • Number of children: N/A   • Years of education: N/A     Occupational History   • Not on file.     Social History Main Topics   • Smoking status: Former Smoker   • Smokeless tobacco: Never Used      Comment: QUIT 30 YEARS AGO   • Alcohol use No   • Drug use: No   • Sexual activity: Defer     Other Topics Concern   • Not on file     Social History Narrative         Current Outpatient Prescriptions   Medication Sig Dispense Refill   • acetaminophen (TYLENOL) 325 MG tablet Take  by mouth Every 6 (Six) Hours.     • carvedilol (COREG) 3.125 MG tablet Take 1 tablet by mouth 2 (Two) Times a Day. 180 tablet 2   • glucose blood test strip by Other route 2 (Two) Times a Day. Use as instructed     • guanFACINE (TENEX) 1 MG tablet Take 1 mg by mouth Daily.     • levothyroxine (SYNTHROID, LEVOTHROID) 175 MCG tablet Take 1 tablet by mouth Daily.  "Take first thing of the morning on an empty stomach. 90 tablet 3   • losartan (COZAAR) 100 MG tablet Take 1 tablet by mouth Daily. 90 tablet 2   • meloxicam (MOBIC) 7.5 MG tablet Take 7.5 mg by mouth Daily As Needed (1-2 tablets daily prn pain).     • polyethylene glycol (MIRALAX) powder Take 17 g by mouth As Needed. 17gm in 8oz bid        No current facility-administered medications for this visit.          OBJECTIVE    /81  Pulse 85  Ht 165.1 cm (65\")  Wt 93.5 kg (206 lb 2.1 oz)  LMP  (LMP Unknown)  SpO2 97%  BMI 34.3 kg/m2      Review of Systems   Constitutional: Negative.    HENT: Negative.    Eyes: Negative.    Respiratory: Positive for cough.    Cardiovascular: Negative.    Gastrointestinal: Negative.    Endocrine: Negative.    Genitourinary: Negative.    Musculoskeletal: Negative.    Skin: Negative.    Allergic/Immunologic: Negative.    Neurological: Negative.    Hematological: Negative.    Psychiatric/Behavioral: Negative.          Physical Exam   Constitutional: she appears well-developed and well-nourished.   CV: No chest pain. Normal RR  Resp: Non labored respirations  Psychiatric: she has a normal mood and affect. her behavior is normal.      Lower Extremity Exam:  Vascular: DP/PT pulses palpable 2+.   Left hallux edema  Toes warm  Neuro: Protective sensation intact, b/l.  DTRs intact  Integument: No open wounds.  Ingrown lateral nail border, left hallux. Mild erythema, edema. +tenderness to palpation.  Web spaces c/d/i  No skin lesions  Musculoskeletal: LE muscle strength 5/5.   Gait normal  Ankle ROM full without pain or crepitus  STJ ROM full without pain or crepitus  No digital deformities      Nail Removal  Date/Time: 1/9/2018 9:52 AM  Performed by: JESUS FOSTER  Authorized by: JESUS FOSTER   Consent: Written consent obtained.  Risks and benefits: risks, benefits and alternatives were discussed  Consent given by: patient  Time out: Immediately prior to procedure a \"time out\" " was called to verify the correct patient, procedure, equipment, support staff and site/side marked as required.  Location: left foot  Location details: left big toe  Anesthesia: digital block    Anesthesia:  Local Anesthetic: lidocaine 2% without epinephrine  Anesthetic total: 3 mL  Preparation: skin prepped with Betadine  Amount removed: partial  Nail removed location: lateral.  Wedge excision of skin of nail fold: no  Nail bed sutured: no  Removed nail replaced and anchored: no  Dressing: 4x4, antibiotic ointment and gauze roll  Patient tolerance: Patient tolerated the procedure well with no immediate complications  Comments: Matrixectomy with 10% NaOH. Neutralized with acetic acid.                  ASSESSMENT AND PLAN    Emily was seen today for ingrown toenail.    Diagnoses and all orders for this visit:    Ingrown toenail  -     Nail Removal    Pain of toe of left foot    -Comprehensive foot and ankle exam performed  -Diagnosis, prevention, and treatment of ingrown toe nails discussed with patient, including risks and potential benefits of nail avulsion both temporary and permanent versus simple debridement.  -Pt elected for partial permanent avulsion of left hallux  -Aftercare instructions for soapy hauser soaks BID  -Recheck 2 weeks          This document has been electronically signed by Guzman Mistry DPM on January 16, 2018 9:53 AM     EMR Dragon/Transcription disclaimer:   Much of this encounter note is an electronic transcription/translation of spoken language to printed text. The electronic translation of spoken language may permit erroneous, or at times, nonsensical words or phrases to be inadvertently transcribed; Although I have reviewed the note for such errors, some may still exist.    Guzman Mistry DPM  1/16/2018  9:53 AM

## 2018-01-23 ENCOUNTER — OFFICE VISIT (OUTPATIENT)
Dept: PODIATRY | Facility: CLINIC | Age: 68
End: 2018-01-23

## 2018-01-23 VITALS — WEIGHT: 206.13 LBS | BODY MASS INDEX: 34.34 KG/M2 | HEIGHT: 65 IN

## 2018-01-23 DIAGNOSIS — L60.0 INGROWN TOENAIL: Primary | ICD-10-CM

## 2018-01-23 DIAGNOSIS — S91.209D NAIL AVULSION, TOE, SUBSEQUENT ENCOUNTER: ICD-10-CM

## 2018-01-23 PROCEDURE — 99212 OFFICE O/P EST SF 10 MIN: CPT | Performed by: PODIATRIST

## 2018-01-23 NOTE — PROGRESS NOTES
Emily Haas  1950  67 y.o. female   PCP- Dr. Hutton  A1C- 6.5 on 07/20/2017  Patient presents today for left ingrown toenail follow up.    Chief Complaint   Patient presents with   • Left Foot - Follow-up           History of Present Illness    Emily Haas is a 67 y.o. female who presents for Follow-up of left hallux partial permanent nail avulsion    Past Medical History:   Diagnosis Date   • Abrasion, right lower leg, initial encounter    • Acquired hypothyroidism    • Acromioclavicular joint pain    • Allergic rhinitis    • Anxiety state    • Asthma    • Bleeding disorder    • Cancer    • Degenerative joint disease involving multiple joints    • Diabetes mellitus type 2, diet-controlled    • Diverticular disease of colon    • Diverticulitis of colon    • Essential hypertension    • Generalized anxiety disorder    • Hyperlipidemia    • Ingrowing toenail    • Insomnia    • Irritable bowel syndrome    • Joint pain    • Local infection of the skin and subcutaneous tissue, unspecified    • Low back pain     abn MRI   • Neck pain     abn MRI-post Fusion   • Need for prophylactic vaccination against Streptococcus pneumoniae (pneumococcus)    • Open wound of lower leg    • Pain in right hip    • Peripheral edema    • Screening mammogram, encounter for 06/11/2015   • Vitamin D deficiency          Past Surgical History:   Procedure Laterality Date   • BILATERAL BREAST REDUCTION     • BLADDER SURGERY     • CHOLECYSTECTOMY     • COLON SURGERY     • COLONOSCOPY  03/02/2012   • COLONOSCOPY N/A 11/17/2017    Procedure: COLONOSCOPY;  Surgeon: Cole Quezada MD;  Location: [facility] ENDOSCOPY;  Service:    • ENDOSCOPY  03/02/2012    Colon endoscopy 02817 (1)    Diverticulum in sigmoid colon.    • EXCISION LESION  08/04/2015    Destruction of Benign Lesion (1-14) 66796 (1)     • HYSTERECTOMY     • INJECTION OF MEDICATION  07/08/2014    Celestone (betamethasone) (3)      • INJECTION OF MEDICATION  09/23/2013     Dexamethasone (1)      • INJECTION OF MEDICATION  06/28/2016    Kenalog (8)            Family History   Problem Relation Age of Onset   • Diabetes Other    • Heart disease Other    • Cancer Other    • Hypertension Other    • Hypertension Mother    • Heart disease Mother    • Alzheimer's disease Father    • Diabetes Brother    • Heart disease Brother    • Alzheimer's disease Brother    • No Known Problems Maternal Grandmother    • No Known Problems Maternal Grandfather    • No Known Problems Paternal Grandmother    • No Known Problems Paternal Grandfather    • Diabetes Daughter    • Hypothyroidism Daughter    • Hypertension Daughter    • Drug abuse Daughter    • No Known Problems Daughter          Social History     Social History   • Marital status:      Spouse name: N/A   • Number of children: N/A   • Years of education: N/A     Occupational History   • Not on file.     Social History Main Topics   • Smoking status: Former Smoker   • Smokeless tobacco: Never Used      Comment: QUIT 30 YEARS AGO   • Alcohol use No   • Drug use: No   • Sexual activity: Defer     Other Topics Concern   • Not on file     Social History Narrative         Current Outpatient Prescriptions   Medication Sig Dispense Refill   • acetaminophen (TYLENOL) 325 MG tablet Take  by mouth Every 6 (Six) Hours.     • carvedilol (COREG) 3.125 MG tablet Take 1 tablet by mouth 2 (Two) Times a Day. 180 tablet 2   • glucose blood test strip by Other route 2 (Two) Times a Day. Use as instructed     • guanFACINE (TENEX) 1 MG tablet Take 1 mg by mouth Daily.     • levothyroxine (SYNTHROID, LEVOTHROID) 175 MCG tablet Take 1 tablet by mouth Daily. Take first thing of the morning on an empty stomach. 90 tablet 3   • losartan (COZAAR) 100 MG tablet Take 1 tablet by mouth Daily. 90 tablet 2   • meloxicam (MOBIC) 7.5 MG tablet Take 7.5 mg by mouth Daily As Needed (1-2 tablets daily prn pain).     • polyethylene glycol (MIRALAX) powder Take 17 g by mouth As  "Needed. 17gm in 8oz bid      • albuterol (PROVENTIL HFA;VENTOLIN HFA) 108 (90 Base) MCG/ACT inhaler Inhale 2 puffs 4 (Four) Times a Day. 8 g 0     Current Facility-Administered Medications   Medication Dose Route Frequency Provider Last Rate Last Dose   • triamcinolone acetonide (KENALOG-40) injection 80 mg  80 mg Intramuscular Once Fernando ELLIOTT Hutton MD             OBJECTIVE    Ht 165.1 cm (65\")  Wt 93.5 kg (206 lb 2.1 oz)  LMP  (LMP Unknown)  BMI 34.3 kg/m2      Review of Systems   Constitutional: Negative.    HENT: Negative.    Eyes: Negative.    Respiratory: Positive for cough.    Cardiovascular: Negative.    Gastrointestinal: Negative.    Endocrine: Negative.    Genitourinary: Negative.    Musculoskeletal: Negative.    Skin: Negative.    Allergic/Immunologic: Negative.    Neurological: Negative.    Hematological: Negative.    Psychiatric/Behavioral: Negative.          Physical Exam   Constitutional: she appears well-developed and well-nourished.   CV: No chest pain. Normal RR  Resp: Non labored respirations  Psychiatric: she has a normal mood and affect. her behavior is normal.      Lower Extremity Exam:  Vascular: DP/PT pulses palpable 2+.   Left hallux edema  Toes warm  Neuro: Protective sensation intact, b/l.  DTRs intact  Integument: No open wounds.  Left hallux nail border clean dry and intact with minimal erythema.  Web spaces c/d/i  No skin lesions  Musculoskeletal: LE muscle strength 5/5.   Gait normal  Ankle ROM full without pain or crepitus  STJ ROM full without pain or crepitus  No digital deformities      Procedures          ASSESSMENT AND PLAN    Emily was seen today for follow-up.    Diagnoses and all orders for this visit:    Ingrown toenail    Nail avulsion, toe, subsequent encounter    -Comprehensive foot and ankle exam performed  -Doing well.  May wean soaks and bandaging.  -Recheck as needed          This document has been electronically signed by Guzman Mistry DPM on January 25, 2018 " 7:53 AM     EMR Dragon/Transcription disclaimer:   Much of this encounter note is an electronic transcription/translation of spoken language to printed text. The electronic translation of spoken language may permit erroneous, or at times, nonsensical words or phrases to be inadvertently transcribed; Although I have reviewed the note for such errors, some may still exist.    Guzman Mistry DPM  1/25/2018  7:53 AM

## 2018-01-24 ENCOUNTER — OFFICE VISIT (OUTPATIENT)
Dept: FAMILY MEDICINE CLINIC | Facility: CLINIC | Age: 68
End: 2018-01-24

## 2018-01-24 ENCOUNTER — LAB (OUTPATIENT)
Dept: LAB | Facility: HOSPITAL | Age: 68
End: 2018-01-24

## 2018-01-24 ENCOUNTER — OFFICE VISIT (OUTPATIENT)
Dept: SLEEP MEDICINE | Facility: HOSPITAL | Age: 68
End: 2018-01-24

## 2018-01-24 VITALS
WEIGHT: 206 LBS | HEART RATE: 74 BPM | SYSTOLIC BLOOD PRESSURE: 132 MMHG | OXYGEN SATURATION: 98 % | BODY MASS INDEX: 34.32 KG/M2 | HEIGHT: 65 IN | DIASTOLIC BLOOD PRESSURE: 78 MMHG

## 2018-01-24 VITALS
BODY MASS INDEX: 34.16 KG/M2 | DIASTOLIC BLOOD PRESSURE: 72 MMHG | SYSTOLIC BLOOD PRESSURE: 130 MMHG | HEIGHT: 65 IN | WEIGHT: 205 LBS

## 2018-01-24 DIAGNOSIS — E78.00 PURE HYPERCHOLESTEROLEMIA: Primary | ICD-10-CM

## 2018-01-24 DIAGNOSIS — I10 ESSENTIAL HYPERTENSION: ICD-10-CM

## 2018-01-24 DIAGNOSIS — E86.0 DEHYDRATION: ICD-10-CM

## 2018-01-24 DIAGNOSIS — E11.9 DIABETES MELLITUS TYPE 2, DIET-CONTROLLED (HCC): ICD-10-CM

## 2018-01-24 DIAGNOSIS — F51.04 PSYCHOPHYSIOLOGICAL INSOMNIA: ICD-10-CM

## 2018-01-24 DIAGNOSIS — E03.9 ACQUIRED HYPOTHYROIDISM: ICD-10-CM

## 2018-01-24 DIAGNOSIS — G47.33 OBSTRUCTIVE SLEEP APNEA, ADULT: Primary | ICD-10-CM

## 2018-01-24 DIAGNOSIS — M54.2 NECK PAIN: ICD-10-CM

## 2018-01-24 LAB
ALBUMIN SERPL-MCNC: 4 G/DL (ref 3.4–4.8)
ALBUMIN UR-MCNC: 0.8 MG/L
ALBUMIN/GLOB SERPL: 1.3 G/DL (ref 1.1–1.8)
ALP SERPL-CCNC: 74 U/L (ref 38–126)
ALT SERPL W P-5'-P-CCNC: 28 U/L (ref 9–52)
ANION GAP SERPL CALCULATED.3IONS-SCNC: 9 MMOL/L (ref 5–15)
ANION GAP SERPL CALCULATED.3IONS-SCNC: 9 MMOL/L (ref 5–15)
ARTICHOKE IGE QN: 166 MG/DL (ref 1–129)
AST SERPL-CCNC: 28 U/L (ref 14–36)
BILIRUB SERPL-MCNC: 0.6 MG/DL (ref 0.2–1.3)
BUN BLD-MCNC: 23 MG/DL (ref 7–21)
BUN BLD-MCNC: 24 MG/DL (ref 7–21)
BUN/CREAT SERPL: 24.2 (ref 7–25)
BUN/CREAT SERPL: 24.7 (ref 7–25)
CALCIUM SPEC-SCNC: 9.1 MG/DL (ref 8.4–10.2)
CALCIUM SPEC-SCNC: 9.5 MG/DL (ref 8.4–10.2)
CHLORIDE SERPL-SCNC: 105 MMOL/L (ref 95–110)
CHLORIDE SERPL-SCNC: 105 MMOL/L (ref 95–110)
CHOLEST SERPL-MCNC: 245 MG/DL (ref 0–199)
CO2 SERPL-SCNC: 25 MMOL/L (ref 22–31)
CO2 SERPL-SCNC: 26 MMOL/L (ref 22–31)
CREAT BLD-MCNC: 0.95 MG/DL (ref 0.5–1)
CREAT BLD-MCNC: 0.97 MG/DL (ref 0.5–1)
CREAT UR-MCNC: 191.7 MG/DL
GFR SERPL CREATININE-BSD FRML MDRD: 57 ML/MIN/1.73 (ref 45–104)
GFR SERPL CREATININE-BSD FRML MDRD: 59 ML/MIN/1.73 (ref 45–104)
GLOBULIN UR ELPH-MCNC: 3 GM/DL (ref 2.3–3.5)
GLUCOSE BLD-MCNC: 124 MG/DL (ref 60–100)
GLUCOSE BLD-MCNC: 128 MG/DL (ref 60–100)
HBA1C MFR BLD: 6.6 % (ref 4–5.6)
HDLC SERPL-MCNC: 54 MG/DL (ref 60–200)
LDLC/HDLC SERPL: 2.95 {RATIO} (ref 0–3.22)
MICROALBUMIN/CREAT UR: 4.2 MG/G (ref 0–30)
POTASSIUM BLD-SCNC: 4.1 MMOL/L (ref 3.5–5.1)
POTASSIUM BLD-SCNC: 4.3 MMOL/L (ref 3.5–5.1)
PROT SERPL-MCNC: 7 G/DL (ref 6.3–8.6)
SODIUM BLD-SCNC: 139 MMOL/L (ref 137–145)
SODIUM BLD-SCNC: 140 MMOL/L (ref 137–145)
T4 FREE SERPL-MCNC: 1.68 NG/DL (ref 0.78–2.19)
TRIGL SERPL-MCNC: 159 MG/DL (ref 20–199)
TSH SERPL DL<=0.05 MIU/L-ACNC: 5.1 MIU/ML (ref 0.46–4.68)

## 2018-01-24 PROCEDURE — 80053 COMPREHEN METABOLIC PANEL: CPT | Performed by: FAMILY MEDICINE

## 2018-01-24 PROCEDURE — 84443 ASSAY THYROID STIM HORMONE: CPT | Performed by: FAMILY MEDICINE

## 2018-01-24 PROCEDURE — 82043 UR ALBUMIN QUANTITATIVE: CPT | Performed by: FAMILY MEDICINE

## 2018-01-24 PROCEDURE — 82570 ASSAY OF URINE CREATININE: CPT | Performed by: FAMILY MEDICINE

## 2018-01-24 PROCEDURE — 84439 ASSAY OF FREE THYROXINE: CPT | Performed by: FAMILY MEDICINE

## 2018-01-24 PROCEDURE — 99214 OFFICE O/P EST MOD 30 MIN: CPT | Performed by: FAMILY MEDICINE

## 2018-01-24 PROCEDURE — 83036 HEMOGLOBIN GLYCOSYLATED A1C: CPT | Performed by: FAMILY MEDICINE

## 2018-01-24 PROCEDURE — 36415 COLL VENOUS BLD VENIPUNCTURE: CPT | Performed by: FAMILY MEDICINE

## 2018-01-24 PROCEDURE — 99213 OFFICE O/P EST LOW 20 MIN: CPT | Performed by: INTERNAL MEDICINE

## 2018-01-24 PROCEDURE — 80061 LIPID PANEL: CPT | Performed by: FAMILY MEDICINE

## 2018-01-24 RX ORDER — ALBUTEROL SULFATE 90 UG/1
2 AEROSOL, METERED RESPIRATORY (INHALATION)
Qty: 8 G | Refills: 0 | Status: SHIPPED | OUTPATIENT
Start: 2018-01-24 | End: 2018-05-04

## 2018-01-24 RX ORDER — TRIAMCINOLONE ACETONIDE 40 MG/ML
80 INJECTION, SUSPENSION INTRA-ARTICULAR; INTRAMUSCULAR ONCE
Status: DISCONTINUED | OUTPATIENT
Start: 2018-01-24 | End: 2018-09-25

## 2018-01-24 NOTE — PROGRESS NOTES
Subjective   Emily Haas is a 67 y.o. female.     Hypertension   This is a chronic problem. The current episode started more than 1 year ago. The problem is unchanged. The problem is controlled. Associated symptoms include neck pain. Pertinent negatives include no chest pain, orthopnea, peripheral edema, PND or shortness of breath.   Neck Pain    This is a chronic problem. The current episode started more than 1 year ago. The problem occurs intermittently. The problem has been waxing and waning. The pain is present in the left side. The quality of the pain is described as aching. The pain is at a severity of 8/10. The pain is moderate. Pertinent negatives include no chest pain.   Hyperlipidemia   This is a chronic problem. The current episode started more than 1 year ago. The problem is uncontrolled. Exacerbating diseases include hypothyroidism. Pertinent negatives include no chest pain, myalgias or shortness of breath.   Hypothyroidism   This is a chronic problem. The current episode started more than 1 year ago. The problem has been unchanged. Associated symptoms include neck pain. Pertinent negatives include no chest pain, fatigue or myalgias. Rash: lips.   Diabetes   She presents for her follow-up diabetic visit. She has type 2 diabetes mellitus. Pertinent negatives for diabetes include no chest pain, no fatigue, no foot paresthesias, no foot ulcerations, no polydipsia and no polyuria. She monitors urine at home 1-2 x per day. Her breakfast blood glucose is taken between 7-8 am. Her breakfast blood glucose range is generally 110-130 mg/dl. An ACE inhibitor/angiotensin II receptor blocker is being taken. Eye exam is current.        The following portions of the patient's history were reviewed and updated as appropriate: allergies, current medications, past family history, past medical history, past social history, past surgical history and problem list.    Review of Systems   Constitutional: Negative for  fatigue.   Respiratory: Negative for shortness of breath.    Cardiovascular: Negative for chest pain, orthopnea and PND.   Endocrine: Negative for polydipsia and polyuria.   Musculoskeletal: Positive for neck pain. Negative for myalgias.   Skin: Rash: lips.   Psychiatric/Behavioral: Positive for sleep disturbance. Negative for dysphoric mood.       Objective   Physical Exam   Constitutional: She is oriented to person, place, and time. She appears well-developed and well-nourished. No distress.   HENT:   Head: Normocephalic and atraumatic.   Cardiovascular: Normal rate, regular rhythm, normal heart sounds and intact distal pulses.  Exam reveals no gallop and no friction rub.    No murmur heard.  Pulmonary/Chest: Effort normal. No respiratory distress. She has no wheezes. She has no rales.   Abdominal: Normal appearance. She exhibits no shifting dullness, no pulsatile liver, no fluid wave, no abdominal bruit, no ascites and no pulsatile midline mass. There is no hepatosplenomegaly. There is no rigidity.   Musculoskeletal: She exhibits no edema.        Cervical back: She exhibits decreased range of motion, tenderness, pain and spasm. She exhibits no swelling, no edema, no deformity and no laceration.    Emily had a diabetic foot exam performed (callus noted) today.   During the foot exam she had a monofilament test performed (normal).    Vascular Status -  Her exam exhibits right foot vasculature normal. Her exam exhibits no right foot edema. Her exam exhibits left foot vasculature normal. Her exam exhibits no left foot edema.  Neurological: She is alert and oriented to person, place, and time.   Reflex Scores:       Bicep reflexes are 2+ on the left side.  Skin: Skin is warm and dry. She is not diaphoretic.   Psychiatric: She has a normal mood and affect. Her behavior is normal. Judgment and thought content normal.   Nursing note and vitals reviewed.      Assessment/Plan   Problems Addressed this Visit         Cardiovascular and Mediastinum    Hyperlipidemia - Primary    Relevant Orders    Lipid Panel (Completed)    Essential hypertension    Relevant Orders    Comprehensive Metabolic Panel (Completed)       Endocrine    Diabetes mellitus type 2, diet-controlled    Relevant Orders    Hemoglobin A1c (Completed)    Microalbumin / Creatinine Urine Ratio - Urine, Clean Catch (Completed)    Acquired hypothyroidism    Relevant Orders    T4, Free (Completed)    TSH (Completed)      Other Visit Diagnoses     Neck pain        Relevant Medications    triamcinolone acetonide (KENALOG-40) injection 80 mg

## 2018-01-24 NOTE — PROGRESS NOTES
Thyroid test are little off and she is slightly dehydrated.  Have her increase fluids to 6-8 glasses water a day, tell her to be sure she doesn't miss any of her thyroid medicine for the next month, and then recheck freeT4 and TSH.  Cholesterol was elevated also but with her allergies diet is all I'm recommending at this time

## 2018-01-24 NOTE — PROGRESS NOTES
Sleep Clinic Follow Up    Date: 1/24/2018  Primary Care Physician: Fernando Hutton MD      Interim History (1/3):  Since the last visit on 04/14/2016, patient has:      1)  TAVO - Has not remained compliant with CPAP since ~ 04/2017. She has a lot of home stressors. She denies mask and machine issues, dry mouth, headaches, pressures intolerance, or non-compliance. She denies abnormal dreams, sleep paralysis, hypnagogic hallucinations, or cataplexy symptoms.     PAP Data:  Time frame: 10/24/2016 - 04/21/2017   Compliance 87.1%  PAP range : 6-12 cm H2O  Average 90% pressure: 9.0 cmH2O  Leak: < 1 minute   Average AHI 6.2 events/hr  Mask type: nasal  DME: [facility]    Bed time: 2215  Sleep latency: 30-60 minutes  Number of times awakens during the night: 3-4  Wake time: 0730  Estimated total sleep time at night: 4 hours  Caffeine intake: 1-2 coffee  Alcohol intake: none  Nap time: none  Sleepiness with Driving: yes    2) Patient denies RLS symptoms.     PMHx, FH, SH reviewed and pertinent changes are: unchanged from last office visit on 04/14/2016      REVIEW OF SYSTEMS:   Negative for chest pain, fever, chills, SOA, abdominal pain. Smoking: none      Exam (6-11/12):    Vitals:    01/24/18 1019   BP: 130/72     HR - 70  RR - 15    Body mass index is 34.11 kg/(m^2).  Gen:  No distress, conversant, pleasant, appears stated age, alert, oriented  Eyes:   Anicteric sclera, moist conjunctiva, no lid lag     PERRLA, EOMI   Heent:   NC/AT    Oropharynx clear, Mallampati 4    normal hearing  Lungs:  Normal effort, non-labored breathing    Clear to auscultation    CV:  Normal S1, prominent S2    no lower extremity edema  ABD:  Soft, normal bowel sounds       Psych:  Appropriate affect  Neuro:  CN 2-12 intact    Past Medical History:   Diagnosis Date   • Abrasion, right lower leg, initial encounter    • Acquired hypothyroidism    • Acromioclavicular joint pain    • Allergic rhinitis    • Anxiety state    • Asthma    • Bleeding  disorder    • Cancer    • Degenerative joint disease involving multiple joints    • Diabetes mellitus type 2, diet-controlled    • Diverticular disease of colon    • Diverticulitis of colon    • Essential hypertension    • Generalized anxiety disorder    • Hyperlipidemia    • Ingrowing toenail    • Insomnia    • Irritable bowel syndrome    • Joint pain    • Local infection of the skin and subcutaneous tissue, unspecified    • Low back pain     abn MRI   • Neck pain     abn MRI-post Fusion   • Need for prophylactic vaccination against Streptococcus pneumoniae (pneumococcus)    • Open wound of lower leg    • Pain in right hip    • Peripheral edema    • Screening mammogram, encounter for 06/11/2015   • Vitamin D deficiency        Current Outpatient Prescriptions:   •  acetaminophen (TYLENOL) 325 MG tablet, Take  by mouth Every 6 (Six) Hours., Disp: , Rfl:   •  albuterol (PROVENTIL HFA;VENTOLIN HFA) 108 (90 Base) MCG/ACT inhaler, Inhale 2 puffs 4 (Four) Times a Day., Disp: 8 g, Rfl: 0  •  carvedilol (COREG) 3.125 MG tablet, Take 1 tablet by mouth 2 (Two) Times a Day., Disp: 180 tablet, Rfl: 2  •  glucose blood test strip, by Other route 2 (Two) Times a Day. Use as instructed, Disp: , Rfl:   •  guanFACINE (TENEX) 1 MG tablet, Take 1 mg by mouth Daily., Disp: , Rfl:   •  levothyroxine (SYNTHROID, LEVOTHROID) 175 MCG tablet, Take 1 tablet by mouth Daily. Take first thing of the morning on an empty stomach., Disp: 90 tablet, Rfl: 3  •  losartan (COZAAR) 100 MG tablet, Take 1 tablet by mouth Daily., Disp: 90 tablet, Rfl: 2  •  meloxicam (MOBIC) 7.5 MG tablet, Take 7.5 mg by mouth Daily As Needed (1-2 tablets daily prn pain)., Disp: , Rfl:   •  polyethylene glycol (MIRALAX) powder, Take 17 g by mouth As Needed. 17gm in 8oz bid , Disp: , Rfl:     Current Facility-Administered Medications:   •  triamcinolone acetonide (KENALOG-40) injection 80 mg, 80 mg, Intramuscular, Once, Fernando Hutton MD      ASSESSMENT / PLAN:      1. Obstructive sleep apnea  1. PSG on 05/02/2005, AHI of 46.3  2. Currently on 6-12 cm H2O  3. Resume PAP as prescribed.   4. Script for PAP supplies  5. Return to clinic in  3 months  with compliance check unless sx change in the interim period.  2. Insomnia - sleep onset   1. Stress  2. Stop caffeine in the evening      Total time 15 min, more than half spent in face to face counseling and coordination of care.     This document has been electronically signed by Kenan Elmore MD on January 24, 2018         CC: Fernando Hutton MD          No ref. provider found

## 2018-01-26 ENCOUNTER — TELEPHONE (OUTPATIENT)
Dept: FAMILY MEDICINE CLINIC | Facility: CLINIC | Age: 68
End: 2018-01-26

## 2018-01-26 NOTE — TELEPHONE ENCOUNTER
----- Message from Fernando Hutton MD sent at 1/24/2018  5:29 PM CST -----  Thyroid test are little off and she is slightly dehydrated.  Have her increase fluids to 6-8 glasses water a day, tell her to be sure she doesn't miss any of her thyroid medicine for the next month, and then recheck freeT4 and TSH.  Cholesterol was elevated also but with her allergies diet is all I'm recommending at this time

## 2018-04-18 ENCOUNTER — TELEPHONE (OUTPATIENT)
Dept: FAMILY MEDICINE CLINIC | Facility: CLINIC | Age: 68
End: 2018-04-18

## 2018-04-20 ENCOUNTER — CLINICAL SUPPORT NO REQUIREMENTS (OUTPATIENT)
Dept: FAMILY MEDICINE CLINIC | Facility: CLINIC | Age: 68
End: 2018-04-20

## 2018-04-20 DIAGNOSIS — E11.9 DIABETES MELLITUS TYPE 2 WITHOUT RETINOPATHY (HCC): Primary | ICD-10-CM

## 2018-04-30 ENCOUNTER — OFFICE VISIT (OUTPATIENT)
Dept: SLEEP MEDICINE | Facility: HOSPITAL | Age: 68
End: 2018-04-30

## 2018-04-30 VITALS
DIASTOLIC BLOOD PRESSURE: 76 MMHG | SYSTOLIC BLOOD PRESSURE: 136 MMHG | WEIGHT: 215 LBS | HEIGHT: 65 IN | BODY MASS INDEX: 35.82 KG/M2

## 2018-04-30 DIAGNOSIS — F51.04 PSYCHOPHYSIOLOGICAL INSOMNIA: ICD-10-CM

## 2018-04-30 DIAGNOSIS — G47.33 OBSTRUCTIVE SLEEP APNEA, ADULT: Primary | ICD-10-CM

## 2018-04-30 PROCEDURE — 99213 OFFICE O/P EST LOW 20 MIN: CPT | Performed by: INTERNAL MEDICINE

## 2018-05-04 ENCOUNTER — OFFICE VISIT (OUTPATIENT)
Dept: FAMILY MEDICINE CLINIC | Facility: CLINIC | Age: 68
End: 2018-05-04

## 2018-05-04 VITALS
SYSTOLIC BLOOD PRESSURE: 148 MMHG | HEIGHT: 65 IN | HEART RATE: 84 BPM | BODY MASS INDEX: 35.74 KG/M2 | DIASTOLIC BLOOD PRESSURE: 100 MMHG | WEIGHT: 214.5 LBS | OXYGEN SATURATION: 98 %

## 2018-05-04 DIAGNOSIS — E78.00 PURE HYPERCHOLESTEROLEMIA: ICD-10-CM

## 2018-05-04 DIAGNOSIS — L57.0 ACTINIC KERATOSIS: ICD-10-CM

## 2018-05-04 DIAGNOSIS — I10 ESSENTIAL HYPERTENSION: ICD-10-CM

## 2018-05-04 DIAGNOSIS — E11.9 DIABETES MELLITUS TYPE 2, DIET-CONTROLLED (HCC): Primary | ICD-10-CM

## 2018-05-04 DIAGNOSIS — Z00.00 MEDICARE ANNUAL WELLNESS VISIT, SUBSEQUENT: ICD-10-CM

## 2018-05-04 PROCEDURE — G0439 PPPS, SUBSEQ VISIT: HCPCS | Performed by: FAMILY MEDICINE

## 2018-05-04 PROCEDURE — 99214 OFFICE O/P EST MOD 30 MIN: CPT | Performed by: FAMILY MEDICINE

## 2018-05-04 PROCEDURE — 17000 DESTRUCT PREMALG LESION: CPT | Performed by: FAMILY MEDICINE

## 2018-05-04 RX ORDER — FUROSEMIDE 20 MG/1
20 TABLET ORAL DAILY
COMMUNITY
Start: 2018-04-03 | End: 2019-10-18

## 2018-05-04 NOTE — PROGRESS NOTES
Procedure   Cryotherapy, Skin Lesion  Date/Time: 5/4/2018 9:44 AM  Performed by: CORA SANDERS  Authorized by: CORA SANDERS   Local anesthesia used: no    Anesthesia:  Local anesthesia used: no    Sedation:  Patient sedated: no  Patient tolerance: Patient tolerated the procedure well with no immediate complications  Comments: Actinic Keratosis right ear frozen

## 2018-05-04 NOTE — PROGRESS NOTES
Subjective   Emily Haas is a 68 y.o. female.     Hypertension   This is a chronic problem. The current episode started more than 1 year ago. The problem is unchanged. The problem is controlled. Associated symptoms include peripheral edema. Pertinent negatives include no chest pain, orthopnea, palpitations, PND or shortness of breath.   Hyperlipidemia   This is a chronic problem. The current episode started more than 1 year ago. The problem is uncontrolled. Recent lipid tests were reviewed and are high. Exacerbating diseases include hypothyroidism. Pertinent negatives include no chest pain, myalgias or shortness of breath.   Hypothyroidism   This is a chronic problem. The current episode started more than 1 year ago. The problem occurs constantly. The problem has been unchanged. Pertinent negatives include no chest pain or myalgias.   Diabetes   She presents for her follow-up diabetic visit. She has type 2 diabetes mellitus. Her disease course has been stable. Pertinent negatives for diabetes include no chest pain, no foot paresthesias, no foot ulcerations, no polydipsia and no polyuria. She monitors urine at home 1-2 x per day. Her breakfast blood glucose is taken between 7-8 am. Her breakfast blood glucose range is generally 130-140 mg/dl. Her overall blood glucose range is 140-180 mg/dl. An ACE inhibitor/angiotensin II receptor blocker is being taken. Eye exam is current.        The following portions of the patient's history were reviewed and updated as appropriate: allergies, current medications, past family history, past medical history, past social history, past surgical history and problem list.    Review of Systems   Respiratory: Negative for shortness of breath.    Cardiovascular: Negative for chest pain, palpitations, orthopnea and PND.   Endocrine: Negative for polydipsia and polyuria.   Musculoskeletal: Negative for myalgias.   Skin: Positive for wound (lesion right ear).   Psychiatric/Behavioral:  Negative for dysphoric mood.       Objective   Physical Exam   Constitutional: She is oriented to person, place, and time. She appears well-developed and well-nourished. No distress.   HENT:   Head: Normocephalic and atraumatic.   Cardiovascular: Normal rate, regular rhythm, normal heart sounds and intact distal pulses.  Exam reveals no gallop and no friction rub.    No murmur heard.  Pulmonary/Chest: Effort normal. No respiratory distress. She has no wheezes. She has no rales.   Abdominal: Normal appearance. She exhibits no shifting dullness, no pulsatile liver, no fluid wave, no abdominal bruit, no ascites and no pulsatile midline mass. There is no hepatosplenomegaly. There is no rigidity.   Musculoskeletal: She exhibits no edema.        Cervical back: She exhibits decreased range of motion, tenderness, pain and spasm. She exhibits no swelling, no edema, no deformity and no laceration.    Emily had a diabetic foot exam performed (callus noted) today.   During the foot exam she had a monofilament test performed (normal).  Vascular Status -  Her right foot exhibits normal foot vasculature  and no edema. Her left foot exhibits normal foot vasculature  and no edema.  Neurological: She is alert and oriented to person, place, and time.   Reflex Scores:       Bicep reflexes are 2+ on the left side.  Skin: Skin is warm and dry. Lesion noted. She is not diaphoretic.        Psychiatric: She has a normal mood and affect. Her behavior is normal. Judgment and thought content normal.   Nursing note and vitals reviewed.      Assessment/Plan   Problems Addressed this Visit        Cardiovascular and Mediastinum    Hyperlipidemia    Essential hypertension    Relevant Medications    furosemide (LASIX) 20 MG tablet       Endocrine    Diabetes mellitus type 2, diet-controlled - Primary      Other Visit Diagnoses     Actinic keratosis        Relevant Orders    Cryotherapy, Skin Lesion (Completed)    Medicare annual wellness visit,  subsequent

## 2018-05-04 NOTE — PROGRESS NOTES
QUICK REFERENCE INFORMATION:  The ABCs of the Annual Wellness Visit    Subsequent Medicare Wellness Visit    HEALTH RISK ASSESSMENT    1950    Recent Hospitalizations:  No hospitalization(s) within the last year..        Current Medical Providers:  Patient Care Team:  Fernando Hutton MD as PCP - General  Fernando Hutton MD as PCP - Claims Attributed  Guzman Mistry DPM as Consulting Physician (Podiatry)  Anna M. D'Amico, MD as Consulting Physician (Urology)  Kenan Elmore MD as Consulting Physician (Sleep Medicine)        Smoking Status:  History   Smoking Status   • Former Smoker   Smokeless Tobacco   • Never Used     Comment: QUIT 30 YEARS AGO       Alcohol Consumption:  History   Alcohol Use No       Depression Screen:   PHQ-2/PHQ-9 Depression Screening 5/4/2018   Little interest or pleasure in doing things 0   Feeling down, depressed, or hopeless 0   Trouble falling or staying asleep, or sleeping too much 2   Feeling tired or having little energy 1   Poor appetite or overeating 3   Feeling bad about yourself - or that you are a failure or have let yourself or your family down 1   Trouble concentrating on things, such as reading the newspaper or watching television 0   Moving or speaking so slowly that other people could have noticed. Or the opposite - being so fidgety or restless that you have been moving around a lot more than usual 1   Thoughts that you would be better off dead, or of hurting yourself in some way 0   Total Score 8   If you checked off any problems, how difficult have these problems made it for you to do your work, take care of things at home, or get along with other people? Not difficult at all       Health Habits and Functional and Cognitive Screening:  Functional & Cognitive Status 5/4/2018   Do you have difficulty preparing food and eating? No   Do you have difficulty bathing yourself, getting dressed or grooming yourself? No   Do you have difficulty using the toilet? No   Do you  have difficulty moving around from place to place? No   Do you have trouble with steps or getting out of a bed or a chair? No   In the past year have you fallen or experienced a near fall? Yes   Current Diet Well Balanced Diet   Dental Exam Not up to date   Eye Exam Up to date   Exercise (times per week) 0 times per week   Current Exercise Activities Include None   Do you need help using the phone?  No   Are you deaf or do you have serious difficulty hearing?  No   Do you need help with transportation? No   Do you need help shopping? No   Do you need help preparing meals?  No   Do you need help with housework?  No   Do you need help with laundry? No   Do you need help taking your medications? No   Do you need help managing money? No   Do you ever drive or ride in a car without wearing a seat belt? No   Have you felt unusual stress, anger or loneliness in the last month? Yes   Who do you live with? Spouse   If you need help, do you have trouble finding someone available to you? No   Have you been bothered in the last four weeks by sexual problems? No   Do you have difficulty concentrating, remembering or making decisions? No           Does the patient have evidence of cognitive impairment? No    Aspirin use counseling: Does not need ASA (and currently is not on it)      Recent Lab Results:  CMP:  Lab Results   Component Value Date    BUN 24 (H) 01/24/2018    CREATININE 0.97 01/24/2018    EGFRIFNONA 57 01/24/2018    BCR 24.7 01/24/2018     01/24/2018    K 4.3 01/24/2018    CO2 26.0 01/24/2018    CALCIUM 9.5 01/24/2018    ALBUMIN 4.00 01/24/2018    LABIL2 1.3 01/24/2018    BILITOT 0.6 01/24/2018    ALKPHOS 74 01/24/2018    AST 28 01/24/2018    ALT 28 01/24/2018     Lipid Panel:  Lab Results   Component Value Date    CHOL 245 (H) 01/24/2018    TRIG 159 01/24/2018    HDL 54 (L) 01/24/2018    LDLHDL 2.95 01/24/2018     HbA1c:  Lab Results   Component Value Date    HGBA1C 6.6 (H) 01/24/2018       Visual Acuity:  No  exam data present    Age-appropriate Screening Schedule:  Refer to the list below for future screening recommendations based on patient's age, sex and/or medical conditions. Orders for these recommended tests are listed in the plan section. The patient has been provided with a written plan.    Health Maintenance   Topic Date Due   • HEMOGLOBIN A1C  07/24/2018   • INFLUENZA VACCINE  08/01/2018   • MAMMOGRAM  09/14/2018   • PNEUMOCOCCAL VACCINES (65+ LOW/MEDIUM RISK) (2 of 2 - PPSV23) 09/23/2018   • DIABETIC FOOT EXAM  01/24/2019   • LIPID PANEL  01/24/2019   • URINE MICROALBUMIN  01/24/2019   • DIABETIC EYE EXAM  04/20/2019   • TDAP/TD VACCINES (2 - Td) 05/05/2026   • COLONOSCOPY  11/17/2027   • ZOSTER VACCINE  Completed        Subjective   History of Present Illness    Emily Haas is a 68 y.o. female who presents for an Subsequent Wellness Visit.    The following portions of the patient's history were reviewed and updated as appropriate: allergies, current medications, past family history, past medical history, past social history, past surgical history and problem list.    Outpatient Medications Prior to Visit   Medication Sig Dispense Refill   • acetaminophen (TYLENOL) 325 MG tablet Take  by mouth Every 6 (Six) Hours.     • carvedilol (COREG) 3.125 MG tablet Take 1 tablet by mouth 2 (Two) Times a Day. 180 tablet 2   • glucose blood test strip by Other route 2 (Two) Times a Day. Use as instructed     • guanFACINE (TENEX) 1 MG tablet Take 1 mg by mouth Daily.     • levothyroxine (SYNTHROID, LEVOTHROID) 175 MCG tablet Take 1 tablet by mouth Daily. Take first thing of the morning on an empty stomach. 90 tablet 3   • losartan (COZAAR) 100 MG tablet Take 1 tablet by mouth Daily. 90 tablet 2   • meloxicam (MOBIC) 7.5 MG tablet Take 7.5 mg by mouth Daily As Needed (1-2 tablets daily prn pain).     • polyethylene glycol (MIRALAX) powder Take 17 g by mouth As Needed. 17gm in 8oz bid      • albuterol (PROVENTIL  "HFA;VENTOLIN HFA) 108 (90 Base) MCG/ACT inhaler Inhale 2 puffs 4 (Four) Times a Day. 8 g 0     Facility-Administered Medications Prior to Visit   Medication Dose Route Frequency Provider Last Rate Last Dose   • triamcinolone acetonide (KENALOG-40) injection 80 mg  80 mg Intramuscular Once Fernando ELLIOTT Hutton MD           Patient Active Problem List   Diagnosis   • Vitamin D deficiency   • Irritable bowel syndrome   • Insomnia   • Hyperlipidemia   • Generalized anxiety disorder   • Essential hypertension   • Diverticular disease of colon   • Diabetes mellitus type 2, diet-controlled   • Anxiety state   • Acquired hypothyroidism   • Asthma   • Periumbilical abdominal pain   • Telangiectasia of limb   • Varicose veins of other specified sites   • Diabetes mellitus without complication   • Posterior subcapsular polar age-related cataract   • Encounter for screening for malignant neoplasm of colon   • Diverticulosis of large intestine without hemorrhage   • Irritable bowel syndrome with both constipation and diarrhea       Advance Care Planning:  has an advance directive - a copy has been provided and is in file    Identification of Risk Factors:  Risk factors include: weight .    Review of Systems    Compared to one year ago, the patient feels her physical health is better.  Compared to one year ago, the patient feels her mental health is better.    Objective     Physical Exam    Vitals:    05/04/18 0917   BP: 148/100   Pulse: 84   SpO2: 98%   Weight: 97.3 kg (214 lb 8 oz)   Height: 165.1 cm (65\")   PainSc: 0-No pain       Patient's Body mass index is 35.69 kg/m². BMI is above normal parameters. Follow-up plan includes:  educational material.      Assessment/Plan   Patient Self-Management and Personalized Health Advice  The patient has been provided with information about: diet and exercise and preventive services including:   · Exercise counseling provided.    Visit Diagnoses:  No diagnosis found.    No orders of the " defined types were placed in this encounter.      Outpatient Encounter Prescriptions as of 5/4/2018   Medication Sig Dispense Refill   • acetaminophen (TYLENOL) 325 MG tablet Take  by mouth Every 6 (Six) Hours.     • carvedilol (COREG) 3.125 MG tablet Take 1 tablet by mouth 2 (Two) Times a Day. 180 tablet 2   • furosemide (LASIX) 20 MG tablet Take 20 mg by mouth Daily.     • glucose blood test strip by Other route 2 (Two) Times a Day. Use as instructed     • guanFACINE (TENEX) 1 MG tablet Take 1 mg by mouth Daily.     • levothyroxine (SYNTHROID, LEVOTHROID) 175 MCG tablet Take 1 tablet by mouth Daily. Take first thing of the morning on an empty stomach. 90 tablet 3   • losartan (COZAAR) 100 MG tablet Take 1 tablet by mouth Daily. 90 tablet 2   • meloxicam (MOBIC) 7.5 MG tablet Take 7.5 mg by mouth Daily As Needed (1-2 tablets daily prn pain).     • polyethylene glycol (MIRALAX) powder Take 17 g by mouth As Needed. 17gm in 8oz bid      • [DISCONTINUED] albuterol (PROVENTIL HFA;VENTOLIN HFA) 108 (90 Base) MCG/ACT inhaler Inhale 2 puffs 4 (Four) Times a Day. 8 g 0     Facility-Administered Encounter Medications as of 5/4/2018   Medication Dose Route Frequency Provider Last Rate Last Dose   • triamcinolone acetonide (KENALOG-40) injection 80 mg  80 mg Intramuscular Once Fernando ELLIOTT Hutton MD           Reviewed use of high risk medication in the elderly: yes  Reviewed for potential of harmful drug interactions in the elderly: yes    Follow Up:  No Follow-up on file.     An After Visit Summary and PPPS with all of these plans were given to the patient.

## 2018-05-04 NOTE — PATIENT INSTRUCTIONS

## 2018-05-05 ENCOUNTER — HOSPITAL ENCOUNTER (EMERGENCY)
Facility: HOSPITAL | Age: 68
Discharge: HOME | End: 2018-05-05
Attending: EMERGENCY MEDICINE | Admitting: EMERGENCY MEDICINE

## 2018-05-05 VITALS
HEIGHT: 65 IN | SYSTOLIC BLOOD PRESSURE: 164 MMHG | HEART RATE: 88 BPM | WEIGHT: 205 LBS | BODY MASS INDEX: 34.16 KG/M2 | RESPIRATION RATE: 16 BRPM | OXYGEN SATURATION: 97 % | TEMPERATURE: 98.1 F | DIASTOLIC BLOOD PRESSURE: 88 MMHG

## 2018-05-05 DIAGNOSIS — S81.811A SKIN TEAR OF RIGHT LOWER LEG WITHOUT COMPLICATION, INITIAL ENCOUNTER: Primary | ICD-10-CM

## 2018-05-05 PROCEDURE — 99283 EMERGENCY DEPT VISIT LOW MDM: CPT

## 2018-05-05 RX ORDER — ACETAMINOPHEN AND CODEINE PHOSPHATE 300; 30 MG/1; MG/1
1 TABLET ORAL ONCE
Status: COMPLETED | OUTPATIENT
Start: 2018-05-05 | End: 2018-05-05

## 2018-05-05 RX ADMIN — ACETAMINOPHEN AND CODEINE PHOSPHATE 1 TABLET: 300; 30 TABLET ORAL at 18:26

## 2018-05-05 NOTE — ED PROVIDER NOTES
Subjective   Lower Extremity Issue   Location:  Leg  Time since incident:  1 hour  Injury: yes    Mechanism of injury: fall    Fall:     Fall occurred:  Down stairs    Impact surface:  Carpet  Leg location:  L leg  Pain details:     Quality:  Throbbing    Radiates to:  Does not radiate    Severity:  Mild    Onset quality:  Sudden    Timing:  Constant    Progression:  Worsening  Chronicity:  New  Relieved by:  Nothing  Ineffective treatments:  None tried  Associated symptoms: swelling    Associated symptoms: no fever        Review of Systems   Constitutional: Negative.  Negative for fever.   HENT: Negative.    Respiratory: Negative.    Cardiovascular: Negative.  Negative for chest pain.   Gastrointestinal: Negative.  Negative for abdominal pain.   Endocrine: Negative.    Genitourinary: Negative.  Negative for dysuria.   Skin: Positive for wound.   Neurological: Negative.    Psychiatric/Behavioral: Negative.    All other systems reviewed and are negative.      Past Medical History:   Diagnosis Date   • Abrasion, right lower leg, initial encounter    • Acquired hypothyroidism    • Acromioclavicular joint pain    • Allergic rhinitis    • Anxiety state    • Asthma    • Bleeding disorder    • Cancer    • Degenerative joint disease involving multiple joints    • Diabetes mellitus type 2, diet-controlled    • Diverticular disease of colon    • Diverticulitis of colon    • Essential hypertension    • Generalized anxiety disorder    • Hyperlipidemia    • Ingrowing toenail    • Insomnia    • Irritable bowel syndrome    • Joint pain    • Local infection of the skin and subcutaneous tissue, unspecified    • Low back pain     abn MRI   • Neck pain     abn MRI-post Fusion   • Need for prophylactic vaccination against Streptococcus pneumoniae (pneumococcus)    • Open wound of lower leg    • Pain in right hip    • Peripheral edema    • Screening mammogram, encounter for 06/11/2015   • Vitamin D deficiency        Allergies   Allergen  "Reactions   • Atenolol Myalgia   • Augmentin [Amoxicillin-Pot Clavulanate] Hives   • Dilaudid [Hydromorphone] Nausea And Vomiting   • Lipitor [Atorvastatin] Myalgia   • Pravachol [Pravastatin] Myalgia   • Statins Other (See Comments)     \"muscle pain \"    • Sulfa Antibiotics Hives       Past Surgical History:   Procedure Laterality Date   • BILATERAL BREAST REDUCTION     • BLADDER SURGERY     • CHOLECYSTECTOMY     • COLON SURGERY     • COLONOSCOPY  03/02/2012   • COLONOSCOPY N/A 11/17/2017    Procedure: COLONOSCOPY;  Surgeon: Cole Quezada MD;  Location: [facility] ENDOSCOPY;  Service:    • ENDOSCOPY  03/02/2012    Colon endoscopy 11383 (1)    Diverticulum in sigmoid colon.    • EXCISION LESION  08/04/2015    Destruction of Benign Lesion (1-14) 87874 (1)     • HYSTERECTOMY     • INJECTION OF MEDICATION  07/08/2014    Celestone (betamethasone) (3)      • INJECTION OF MEDICATION  09/23/2013    Dexamethasone (1)      • INJECTION OF MEDICATION  06/28/2016    Kenalog (8)          Family History   Problem Relation Age of Onset   • Diabetes Other    • Heart disease Other    • Cancer Other    • Hypertension Other    • Hypertension Mother    • Heart disease Mother    • Arthritis Mother    • Alzheimer's disease Father    • Diabetes Brother    • Heart disease Brother    • Alzheimer's disease Brother    • Hyperlipidemia Brother    • Kidney disease Brother    • No Known Problems Maternal Grandmother    • No Known Problems Maternal Grandfather    • No Known Problems Paternal Grandmother    • No Known Problems Paternal Grandfather    • Diabetes Daughter    • Hypothyroidism Daughter    • Hypertension Daughter    • Drug abuse Daughter    • No Known Problems Daughter    • Cancer Paternal Aunt 80     pancreatic       Social History     Social History   • Marital status:      Social History Main Topics   • Smoking status: Former Smoker   • Smokeless tobacco: Never Used      Comment: QUIT 30 YEARS AGO   • Alcohol use No   • Drug " use: No   • Sexual activity: Defer     Other Topics Concern   • Not on file           Objective   Physical Exam   Constitutional: She is oriented to person, place, and time. She appears well-developed and well-nourished. No distress.   HENT:   Head: Normocephalic and atraumatic.   Nose: Nose normal.   Eyes: Conjunctivae are normal.   Neck: Normal range of motion. Neck supple. No JVD present. No tracheal deviation present.   Cardiovascular: Normal rate.    No murmur heard.  Pulmonary/Chest: Effort normal. No respiratory distress. She has no wheezes.   Abdominal: Soft. There is no tenderness.   Musculoskeletal: Normal range of motion. She exhibits no edema or deformity.   Neurological: She is alert and oriented to person, place, and time. No cranial nerve deficit.   Skin: Skin is warm and dry. No rash noted. She is not diaphoretic. No erythema. No pallor.   5 cm skin tear noted to the anterior tibial crest of the right lower extremity.  Bleeding is controlled.   Psychiatric: She has a normal mood and affect. Her behavior is normal. Thought content normal.   Nursing note and vitals reviewed.      Procedures           ED Course  ED Course                  MDM  Number of Diagnoses or Management Options  Skin tear of right lower leg without complication, initial encounter: minor  Risk of Complications, Morbidity, and/or Mortality  Presenting problems: low  Diagnostic procedures: low  Management options: low    Patient Progress  Patient progress: stable        Final diagnoses:   Skin tear of right lower leg without complication, initial encounter            NILDA Barnhart  05/05/18 9236

## 2018-05-15 ENCOUNTER — OFFICE VISIT (OUTPATIENT)
Dept: FAMILY MEDICINE CLINIC | Facility: CLINIC | Age: 68
End: 2018-05-15

## 2018-05-15 VITALS
HEIGHT: 65 IN | BODY MASS INDEX: 34.32 KG/M2 | SYSTOLIC BLOOD PRESSURE: 138 MMHG | DIASTOLIC BLOOD PRESSURE: 78 MMHG | HEART RATE: 84 BPM | WEIGHT: 206 LBS | OXYGEN SATURATION: 98 %

## 2018-05-15 DIAGNOSIS — S81.811A LACERATION OF RIGHT LOWER LEG, INITIAL ENCOUNTER: Primary | ICD-10-CM

## 2018-05-15 PROCEDURE — 99213 OFFICE O/P EST LOW 20 MIN: CPT | Performed by: FAMILY MEDICINE

## 2018-05-15 RX ORDER — DOCUSATE SODIUM 100 MG/1
100 CAPSULE, LIQUID FILLED ORAL
Qty: 60 CAPSULE | Refills: 11 | Status: SHIPPED | OUTPATIENT
Start: 2018-05-15 | End: 2018-05-31

## 2018-05-15 NOTE — PROGRESS NOTES
Subjective   Emily Haas is a 68 y.o. female.     Wound Check   She was originally treated 5 to 10 days ago. Previous treatment included wound cleansing or irrigation. Her temperature was unmeasured prior to arrival. There has been clear discharge from the wound. There is no redness present. There is no swelling present. The pain has improved. She has no difficulty moving the affected extremity or digit.        The following portions of the patient's history were reviewed and updated as appropriate: allergies, current medications, past family history, past medical history, past social history, past surgical history and problem list.    Review of Systems    Objective   Physical Exam   Constitutional: She is oriented to person, place, and time. She appears well-developed and well-nourished. No distress.   HENT:   Head: Normocephalic and atraumatic.   Neurological: She is alert and oriented to person, place, and time.   Skin: Skin is warm and dry. Abrasion and lesion noted. She is not diaphoretic.        Psychiatric: She has a normal mood and affect. Her behavior is normal. Judgment and thought content normal.   Nursing note and vitals reviewed.      Assessment/Plan   Problems Addressed this Visit     None      Visit Diagnoses     Laceration of right lower leg, initial encounter    -  Primary    Relevant Orders    Ambulatory Referral to Wound Clinic        Wound dressed today.    ER notes reviewed. Reascent TDAP

## 2018-05-16 ENCOUNTER — OFFICE VISIT (OUTPATIENT)
Dept: WOUND CARE | Facility: HOSPITAL | Age: 68
End: 2018-05-16

## 2018-05-16 ENCOUNTER — HOSPITAL ENCOUNTER (OUTPATIENT)
Dept: ULTRASOUND IMAGING | Facility: HOSPITAL | Age: 68
Discharge: HOME | End: 2018-05-16
Admitting: NURSE PRACTITIONER

## 2018-05-16 ENCOUNTER — HOSPITAL ENCOUNTER (OUTPATIENT)
Dept: GENERAL RADIOLOGY | Facility: HOSPITAL | Age: 68
Discharge: HOME | End: 2018-05-16

## 2018-05-16 DIAGNOSIS — R60.0 LOCALIZED EDEMA: ICD-10-CM

## 2018-05-16 DIAGNOSIS — S81.811A LACERATION OF RIGHT LOWER LEG, INITIAL ENCOUNTER: ICD-10-CM

## 2018-05-16 PROCEDURE — 73590 X-RAY EXAM OF LOWER LEG: CPT

## 2018-05-16 PROCEDURE — 93971 EXTREMITY STUDY: CPT

## 2018-05-16 PROCEDURE — G0463 HOSPITAL OUTPT CLINIC VISIT: HCPCS

## 2018-05-18 ENCOUNTER — OFFICE VISIT (OUTPATIENT)
Dept: WOUND CARE | Facility: HOSPITAL | Age: 68
End: 2018-05-18

## 2018-05-22 ENCOUNTER — OFFICE VISIT (OUTPATIENT)
Dept: WOUND CARE | Facility: HOSPITAL | Age: 68
End: 2018-05-22

## 2018-05-22 ENCOUNTER — LAB REQUISITION (OUTPATIENT)
Dept: LAB | Facility: HOSPITAL | Age: 68
End: 2018-05-22

## 2018-05-22 DIAGNOSIS — S81.811A LACERATION OF RIGHT LOWER LEG WITHOUT FOREIGN BODY: ICD-10-CM

## 2018-05-22 PROCEDURE — 87077 CULTURE AEROBIC IDENTIFY: CPT | Performed by: NURSE PRACTITIONER

## 2018-05-22 PROCEDURE — 87186 SC STD MICRODIL/AGAR DIL: CPT | Performed by: NURSE PRACTITIONER

## 2018-05-22 PROCEDURE — 87070 CULTURE OTHR SPECIMN AEROBIC: CPT | Performed by: NURSE PRACTITIONER

## 2018-05-22 PROCEDURE — 87205 SMEAR GRAM STAIN: CPT | Performed by: NURSE PRACTITIONER

## 2018-05-22 PROCEDURE — 87147 CULTURE TYPE IMMUNOLOGIC: CPT | Performed by: NURSE PRACTITIONER

## 2018-05-25 LAB
BACTERIA SPEC AEROBE CULT: ABNORMAL
BACTERIA SPEC AEROBE CULT: ABNORMAL
GRAM STN SPEC: ABNORMAL
GRAM STN SPEC: ABNORMAL

## 2018-05-30 ENCOUNTER — OFFICE VISIT (OUTPATIENT)
Dept: WOUND CARE | Facility: HOSPITAL | Age: 68
End: 2018-05-30

## 2018-06-05 ENCOUNTER — OFFICE VISIT (OUTPATIENT)
Dept: WOUND CARE | Facility: HOSPITAL | Age: 68
End: 2018-06-05

## 2018-06-07 ENCOUNTER — OFFICE VISIT (OUTPATIENT)
Dept: WOUND CARE | Facility: HOSPITAL | Age: 68
End: 2018-06-07

## 2018-06-12 ENCOUNTER — OFFICE VISIT (OUTPATIENT)
Dept: WOUND CARE | Facility: HOSPITAL | Age: 68
End: 2018-06-12

## 2018-06-12 PROCEDURE — 17250 CHEM CAUT OF GRANLTJ TISSUE: CPT

## 2018-06-14 ENCOUNTER — OFFICE VISIT (OUTPATIENT)
Dept: WOUND CARE | Facility: HOSPITAL | Age: 68
End: 2018-06-14

## 2018-06-19 ENCOUNTER — OFFICE VISIT (OUTPATIENT)
Dept: WOUND CARE | Facility: HOSPITAL | Age: 68
End: 2018-06-19

## 2018-06-21 ENCOUNTER — OFFICE VISIT (OUTPATIENT)
Dept: FAMILY MEDICINE CLINIC | Facility: CLINIC | Age: 68
End: 2018-06-21

## 2018-06-21 VITALS
HEART RATE: 82 BPM | BODY MASS INDEX: 34.66 KG/M2 | HEIGHT: 64 IN | OXYGEN SATURATION: 99 % | DIASTOLIC BLOOD PRESSURE: 80 MMHG | SYSTOLIC BLOOD PRESSURE: 130 MMHG | WEIGHT: 203 LBS

## 2018-06-21 DIAGNOSIS — R05.9 COUGH: Primary | ICD-10-CM

## 2018-06-21 PROCEDURE — 99213 OFFICE O/P EST LOW 20 MIN: CPT | Performed by: FAMILY MEDICINE

## 2018-06-21 RX ORDER — ALBUTEROL SULFATE 90 UG/1
2 AEROSOL, METERED RESPIRATORY (INHALATION)
Qty: 8 G | Refills: 0 | Status: SHIPPED | OUTPATIENT
Start: 2018-06-21 | End: 2018-06-21 | Stop reason: SDUPTHER

## 2018-06-21 RX ORDER — ALBUTEROL SULFATE 90 UG/1
2 AEROSOL, METERED RESPIRATORY (INHALATION)
Qty: 8 G | Refills: 0 | Status: SHIPPED | OUTPATIENT
Start: 2018-06-21 | End: 2021-02-17

## 2018-06-21 RX ORDER — LOSARTAN POTASSIUM 100 MG/1
TABLET ORAL
Qty: 90 TABLET | Refills: 2 | Status: SHIPPED | OUTPATIENT
Start: 2018-06-21 | End: 2019-09-25 | Stop reason: SDUPTHER

## 2018-06-21 NOTE — PROGRESS NOTES
Subjective   Emily Haas is a 68 y.o. female.     URI    This is a new problem. The current episode started 1 to 4 weeks ago. The problem has been gradually improving. There has been no fever. Associated symptoms include coughing and wheezing. Pertinent negatives include no chest pain, congestion, sinus pain, sore throat or vomiting.        The following portions of the patient's history were reviewed and updated as appropriate: allergies, current medications, past family history, past medical history, past social history, past surgical history and problem list.    Review of Systems   HENT: Negative for congestion, sinus pain and sore throat.    Respiratory: Positive for cough and wheezing.    Cardiovascular: Negative for chest pain.   Gastrointestinal: Negative for vomiting.       Objective   Physical Exam   Constitutional: She is oriented to person, place, and time. She appears well-developed and well-nourished. No distress.   HENT:   Head: Normocephalic and atraumatic.   Cardiovascular: Normal rate, regular rhythm and normal heart sounds.  Exam reveals no gallop and no friction rub.    No murmur heard.  Pulmonary/Chest: Effort normal. She has decreased breath sounds. She has wheezes. She has rhonchi. She has no rales.   Neurological: She is alert and oriented to person, place, and time.   Skin: Skin is warm and dry. She is not diaphoretic.   Psychiatric: She has a normal mood and affect. Her behavior is normal. Judgment and thought content normal.   Nursing note and vitals reviewed.      Assessment/Plan   Problems Addressed this Visit     None      Visit Diagnoses     Cough    -  Primary        Post bronchitis cough

## 2018-06-22 ENCOUNTER — OFFICE VISIT (OUTPATIENT)
Dept: WOUND CARE | Facility: HOSPITAL | Age: 68
End: 2018-06-22

## 2018-06-26 ENCOUNTER — OFFICE VISIT (OUTPATIENT)
Dept: WOUND CARE | Facility: HOSPITAL | Age: 68
End: 2018-06-26

## 2018-06-28 ENCOUNTER — OFFICE VISIT (OUTPATIENT)
Dept: WOUND CARE | Facility: HOSPITAL | Age: 68
End: 2018-06-28

## 2018-07-02 ENCOUNTER — OFFICE VISIT (OUTPATIENT)
Dept: WOUND CARE | Facility: HOSPITAL | Age: 68
End: 2018-07-02

## 2018-07-02 PROCEDURE — G0463 HOSPITAL OUTPT CLINIC VISIT: HCPCS

## 2018-07-26 ENCOUNTER — OFFICE VISIT (OUTPATIENT)
Dept: PODIATRY | Facility: CLINIC | Age: 68
End: 2018-07-26

## 2018-07-26 VITALS — HEIGHT: 64 IN | BODY MASS INDEX: 35.58 KG/M2 | WEIGHT: 208.4 LBS

## 2018-07-26 DIAGNOSIS — L60.0 INGROWN TOENAIL: ICD-10-CM

## 2018-07-26 DIAGNOSIS — M79.674 PAIN OF TOE OF RIGHT FOOT: Primary | ICD-10-CM

## 2018-07-26 PROCEDURE — 99213 OFFICE O/P EST LOW 20 MIN: CPT | Performed by: PODIATRIST

## 2018-07-26 PROCEDURE — 11750 EXCISION NAIL&NAIL MATRIX: CPT | Performed by: PODIATRIST

## 2018-07-26 NOTE — PROGRESS NOTES
Emily Haas  1950  68 y.o. female   PCP- Dr. Hutton  BS:133 per patient      Chief Complaint   Patient presents with   • Right Foot - Ingrown Toenail           History of Present Illness    Emily Haas is a 68 y.o. female who presents For evaluation of right hallux ingrown toenail.  Underwent left hallux partial nail avulsion approximately 6 months prior.    Past Medical History:   Diagnosis Date   • Abrasion, right lower leg, initial encounter    • Acquired hypothyroidism    • Acromioclavicular joint pain    • Allergic rhinitis    • Anxiety state    • Asthma    • Bleeding disorder (CMS/HCC)    • Cancer (CMS/HCC)    • Degenerative joint disease involving multiple joints    • Diabetes mellitus type 2, diet-controlled (CMS/HCC)    • Diverticular disease of colon    • Diverticulitis of colon    • Essential hypertension    • Generalized anxiety disorder    • Hyperlipidemia    • Ingrowing toenail    • Insomnia    • Irritable bowel syndrome    • Joint pain    • Local infection of the skin and subcutaneous tissue, unspecified    • Low back pain     abn MRI   • Neck pain     abn MRI-post Fusion   • Need for prophylactic vaccination against Streptococcus pneumoniae (pneumococcus)    • Open wound of lower leg    • Pain in right hip    • Peripheral edema    • Screening mammogram, encounter for 06/11/2015   • Vitamin D deficiency          Past Surgical History:   Procedure Laterality Date   • BILATERAL BREAST REDUCTION     • BLADDER SURGERY     • CHOLECYSTECTOMY     • COLON SURGERY     • COLONOSCOPY  03/02/2012   • COLONOSCOPY N/A 11/17/2017    Procedure: COLONOSCOPY;  Surgeon: Cole Quezada MD;  Location: [facility] ENDOSCOPY;  Service:    • ENDOSCOPY  03/02/2012    Colon endoscopy 79488 (1)    Diverticulum in sigmoid colon.    • EXCISION LESION  08/04/2015    Destruction of Benign Lesion (1-14) 09277 (1)     • HYSTERECTOMY     • INJECTION OF MEDICATION  07/08/2014    Celestone (betamethasone) (3)      • INJECTION  OF MEDICATION  09/23/2013    Dexamethasone (1)      • INJECTION OF MEDICATION  06/28/2016    Kenalog (8)            Family History   Problem Relation Age of Onset   • Diabetes Other    • Heart disease Other    • Cancer Other    • Hypertension Other    • Hypertension Mother    • Heart disease Mother    • Arthritis Mother    • Alzheimer's disease Father    • Diabetes Brother    • Heart disease Brother    • Alzheimer's disease Brother    • Hyperlipidemia Brother    • Kidney disease Brother    • No Known Problems Maternal Grandmother    • No Known Problems Maternal Grandfather    • No Known Problems Paternal Grandmother    • No Known Problems Paternal Grandfather    • Diabetes Daughter    • Hypothyroidism Daughter    • Hypertension Daughter    • Drug abuse Daughter    • No Known Problems Daughter    • Cancer Paternal Aunt 80        pancreatic         Social History     Social History   • Marital status:      Spouse name: N/A   • Number of children: N/A   • Years of education: N/A     Occupational History   • Not on file.     Social History Main Topics   • Smoking status: Former Smoker   • Smokeless tobacco: Never Used      Comment: QUIT 30 YEARS AGO   • Alcohol use No   • Drug use: No   • Sexual activity: Defer     Other Topics Concern   • Not on file     Social History Narrative   • No narrative on file         Current Outpatient Prescriptions   Medication Sig Dispense Refill   • albuterol (PROVENTIL HFA;VENTOLIN HFA) 108 (90 Base) MCG/ACT inhaler Inhale 2 puffs 4 (Four) Times a Day. 8 g 0   • carvedilol (COREG) 3.125 MG tablet Take 1 tablet by mouth 2 (Two) Times a Day. 180 tablet 2   • furosemide (LASIX) 20 MG tablet Take 20 mg by mouth Daily.     • glucose blood test strip by Other route 2 (Two) Times a Day. Use as instructed     • guanFACINE (TENEX) 1 MG tablet Take 1 mg by mouth Daily.     • levothyroxine (SYNTHROID, LEVOTHROID) 175 MCG tablet Take 1 tablet by mouth Daily. Take first thing of the morning on  "an empty stomach. 90 tablet 3   • losartan (COZAAR) 100 MG tablet TAKE 1 TABLET EVERY DAY 90 tablet 2   • meloxicam (MOBIC) 7.5 MG tablet Take 7.5 mg by mouth Daily As Needed (1-2 tablets daily prn pain).     • polyethylene glycol (MIRALAX) powder Take 17 g by mouth As Needed. 17gm in 8oz bid        Current Facility-Administered Medications   Medication Dose Route Frequency Provider Last Rate Last Dose   • triamcinolone acetonide (KENALOG-40) injection 80 mg  80 mg Intramuscular Once Fernando ELLIOTT Hutton MD             OBJECTIVE    Ht 162.6 cm (64\")   Wt 94.5 kg (208 lb 6.4 oz)   LMP  (LMP Unknown)   BMI 35.77 kg/m²       Review of Systems   Constitutional: Negative.    HENT: Negative.    Eyes: Negative.    Respiratory: Positive for cough.    Cardiovascular: Negative.    Gastrointestinal: Negative.    Endocrine: Negative.    Genitourinary: Negative.    Musculoskeletal: Negative.    Skin: Negative.    Allergic/Immunologic: Negative.    Neurological: Negative.    Hematological: Negative.    Psychiatric/Behavioral: Negative.          Physical Exam   Constitutional: she appears well-developed and well-nourished.   CV: No chest pain. Normal RR  Resp: Non labored respirations  Psychiatric: she has a normal mood and affect. her behavior is normal.      Lower Extremity Exam:  Vascular: DP/PT pulses palpable 2+.   No edema  Toes warm  Neuro: Protective sensation intact, b/l.  DTRs intact  Integument: No open wounds.  Right hallux medial and lateral nail border mildly ingrown.  No erythema or drainage.  Positive tenderness to palpation.  Web spaces c/d/i  No skin lesions  Musculoskeletal: LE muscle strength 5/5.   Gait normal  Ankle ROM full without pain or crepitus  STJ ROM full without pain or crepitus  No digital deformities      Nail Removal  Date/Time: 7/26/2018 8:36 AM  Performed by: JESUS FOSTER  Authorized by: JESUS FOSTER   Consent: Written consent obtained.  Risks and benefits: risks, benefits and " alternatives were discussed  Consent given by: patient  Location: right foot  Location details: right big toe  Anesthesia: digital block    Anesthesia:  Local Anesthetic: lidocaine 2% without epinephrine  Anesthetic total: 3 mL  Preparation: skin prepped with Betadine  Amount removed: partial  Side: lateral and medial  Wedge excision of skin of nail fold: no  Nail bed sutured: no  Nail matrix removed: partial  Removed nail replaced and anchored: no  Dressing: 4x4, antibiotic ointment and gauze roll  Patient tolerance: Patient tolerated the procedure well with no immediate complications  Comments: Matrixectomy with 10% NaOH. Neutralized with acetic acid.            ASSESSMENT AND PLAN    Emily was seen today for ingrown toenail.    Diagnoses and all orders for this visit:    Ingrown toenail    Pain of toe of right foot      -Comprehensive foot and ankle exam performed  -Diagnosis, prevention, and treatment of ingrown toe nails discussed with patient, including risks and potential benefits of nail avulsion both temporary and permanent versus simple debridement.  -Pt elected for partial permanent avulsion of right hallux  -Aftercare instructions for soapy hauser soaks BID  -Recheck 2 weeks            This document has been electronically signed by Guzman Mistry DPM on July 26, 2018 8:36 AM     EMR Dragon/Transcription disclaimer:   Much of this encounter note is an electronic transcription/translation of spoken language to printed text. The electronic translation of spoken language may permit erroneous, or at times, nonsensical words or phrases to be inadvertently transcribed; Although I have reviewed the note for such errors, some may still exist.    Guzman Mistry DPM  7/26/2018  8:36 AM

## 2018-08-10 ENCOUNTER — OFFICE VISIT (OUTPATIENT)
Dept: PODIATRY | Facility: CLINIC | Age: 68
End: 2018-08-10

## 2018-08-10 VITALS — BODY MASS INDEX: 35.57 KG/M2 | HEIGHT: 64 IN | WEIGHT: 208.34 LBS

## 2018-08-10 DIAGNOSIS — S91.209D NAIL AVULSION, TOE, SUBSEQUENT ENCOUNTER: Primary | ICD-10-CM

## 2018-08-10 DIAGNOSIS — L60.0 INGROWN TOENAIL: ICD-10-CM

## 2018-08-10 PROCEDURE — 99212 OFFICE O/P EST SF 10 MIN: CPT | Performed by: PODIATRIST

## 2018-08-10 NOTE — PROGRESS NOTES
Emily Haas  1950  68 y.o. female   PCP- Dr. Hutton  BS:118 per patient  Patient presents today for right great toe follow up.    Chief Complaint   Patient presents with   • Right Foot - Follow-up           History of Present Illness    Emily Haas is a 68 y.o. female who presents For evaluation of right hallux nail avulsion.    Past Medical History:   Diagnosis Date   • Abrasion, right lower leg, initial encounter    • Acquired hypothyroidism    • Acromioclavicular joint pain    • Allergic rhinitis    • Anxiety state    • Asthma    • Bleeding disorder (CMS/HCC)    • Cancer (CMS/HCC)    • Degenerative joint disease involving multiple joints    • Diabetes mellitus type 2, diet-controlled (CMS/HCC)    • Diverticular disease of colon    • Diverticulitis of colon    • Essential hypertension    • Generalized anxiety disorder    • Hyperlipidemia    • Ingrowing toenail    • Insomnia    • Irritable bowel syndrome    • Joint pain    • Local infection of the skin and subcutaneous tissue, unspecified    • Low back pain     abn MRI   • Neck pain     abn MRI-post Fusion   • Need for prophylactic vaccination against Streptococcus pneumoniae (pneumococcus)    • Open wound of lower leg    • Pain in right hip    • Peripheral edema    • Screening mammogram, encounter for 06/11/2015   • Vitamin D deficiency          Past Surgical History:   Procedure Laterality Date   • BILATERAL BREAST REDUCTION     • BLADDER SURGERY     • CHOLECYSTECTOMY     • COLON SURGERY     • COLONOSCOPY  03/02/2012   • COLONOSCOPY N/A 11/17/2017    Procedure: COLONOSCOPY;  Surgeon: Cole Quezada MD;  Location: [facility] ENDOSCOPY;  Service:    • ENDOSCOPY  03/02/2012    Colon endoscopy 98772 (1)    Diverticulum in sigmoid colon.    • EXCISION LESION  08/04/2015    Destruction of Benign Lesion (1-14) 88222 (1)     • HYSTERECTOMY     • INJECTION OF MEDICATION  07/08/2014    Celestone (betamethasone) (3)      • INJECTION OF MEDICATION  09/23/2013     Dexamethasone (1)      • INJECTION OF MEDICATION  06/28/2016    Kenalog (8)            Family History   Problem Relation Age of Onset   • Diabetes Other    • Heart disease Other    • Cancer Other    • Hypertension Other    • Hypertension Mother    • Heart disease Mother    • Arthritis Mother    • Alzheimer's disease Father    • Diabetes Brother    • Heart disease Brother    • Alzheimer's disease Brother    • Hyperlipidemia Brother    • Kidney disease Brother    • No Known Problems Maternal Grandmother    • No Known Problems Maternal Grandfather    • No Known Problems Paternal Grandmother    • No Known Problems Paternal Grandfather    • Diabetes Daughter    • Hypothyroidism Daughter    • Hypertension Daughter    • Drug abuse Daughter    • No Known Problems Daughter    • Cancer Paternal Aunt 80        pancreatic         Social History     Social History   • Marital status:      Spouse name: N/A   • Number of children: N/A   • Years of education: N/A     Occupational History   • Not on file.     Social History Main Topics   • Smoking status: Former Smoker   • Smokeless tobacco: Never Used      Comment: QUIT 30 YEARS AGO   • Alcohol use No   • Drug use: No   • Sexual activity: Defer     Other Topics Concern   • Not on file     Social History Narrative   • No narrative on file         Current Outpatient Prescriptions   Medication Sig Dispense Refill   • albuterol (PROVENTIL HFA;VENTOLIN HFA) 108 (90 Base) MCG/ACT inhaler Inhale 2 puffs 4 (Four) Times a Day. 8 g 0   • carvedilol (COREG) 3.125 MG tablet Take 1 tablet by mouth 2 (Two) Times a Day. 180 tablet 2   • furosemide (LASIX) 20 MG tablet Take 20 mg by mouth Daily.     • glucose blood test strip by Other route 2 (Two) Times a Day. Use as instructed     • guanFACINE (TENEX) 1 MG tablet Take 1 mg by mouth Daily.     • levothyroxine (SYNTHROID, LEVOTHROID) 175 MCG tablet Take 1 tablet by mouth Daily. Take first thing of the morning on an empty stomach. 90 tablet  "3   • losartan (COZAAR) 100 MG tablet TAKE 1 TABLET EVERY DAY 90 tablet 2   • meloxicam (MOBIC) 7.5 MG tablet Take 7.5 mg by mouth Daily As Needed (1-2 tablets daily prn pain).     • polyethylene glycol (MIRALAX) powder Take 17 g by mouth As Needed. 17gm in 8oz bid        Current Facility-Administered Medications   Medication Dose Route Frequency Provider Last Rate Last Dose   • triamcinolone acetonide (KENALOG-40) injection 80 mg  80 mg Intramuscular Once Fernando Hutton MD             OBJECTIVE    Ht 162.6 cm (64.02\")   Wt 94.5 kg (208 lb 5.4 oz)   LMP  (LMP Unknown)   BMI 35.74 kg/m²       Review of Systems   Constitutional: Negative.    HENT: Negative.    Eyes: Negative.    Respiratory: Positive for cough.    Cardiovascular: Negative.    Gastrointestinal: Negative.    Endocrine: Negative.    Genitourinary: Negative.    Musculoskeletal: Negative.    Skin: Negative.    Allergic/Immunologic: Negative.    Neurological: Negative.    Hematological: Negative.    Psychiatric/Behavioral: Negative.          Physical Exam   Constitutional: she appears well-developed and well-nourished.   CV: No chest pain. Normal RR  Resp: Non labored respirations  Psychiatric: she has a normal mood and affect. her behavior is normal.      Lower Extremity Exam:  Vascular: DP/PT pulses palpable 2+.   No edema  Toes warm  Neuro: Protective sensation intact, b/l.  DTRs intact  Integument: No open wounds.  Right hallux medial and lateral nail border intact with mild debris and erythema.  No significant drainage or tenderness palpation.    Web spaces c/d/i  No skin lesions  Musculoskeletal: LE muscle strength 5/5.   Gait normal  Ankle ROM full without pain or crepitus  STJ ROM full without pain or crepitus  No digital deformities      Procedures    ASSESSMENT AND PLAN    Emily was seen today for follow-up.    Diagnoses and all orders for this visit:    Nail avulsion, toe, subsequent encounter    Ingrown toenail      -Comprehensive foot and " ankle exam performed  -May begin to wean soaks and bandaging  -Recheck as needed            This document has been electronically signed by Guzman Mistry DPM on August 10, 2018 10:25 AM     EMR Dragon/Transcription disclaimer:   Much of this encounter note is an electronic transcription/translation of spoken language to printed text. The electronic translation of spoken language may permit erroneous, or at times, nonsensical words or phrases to be inadvertently transcribed; Although I have reviewed the note for such errors, some may still exist.    Guzman Mistry DPM  8/10/2018  10:25 AM

## 2018-09-10 ENCOUNTER — APPOINTMENT (OUTPATIENT)
Dept: LAB | Facility: HOSPITAL | Age: 68
End: 2018-09-10

## 2018-09-10 ENCOUNTER — OFFICE VISIT (OUTPATIENT)
Dept: FAMILY MEDICINE CLINIC | Facility: CLINIC | Age: 68
End: 2018-09-10

## 2018-09-10 VITALS
DIASTOLIC BLOOD PRESSURE: 80 MMHG | HEIGHT: 64 IN | SYSTOLIC BLOOD PRESSURE: 130 MMHG | BODY MASS INDEX: 34.31 KG/M2 | WEIGHT: 201 LBS

## 2018-09-10 DIAGNOSIS — N76.0 VAGINOSIS: Primary | ICD-10-CM

## 2018-09-10 LAB
CANDIDA ALBICANS: NEGATIVE
GARDNERELLA VAGINALIS: NEGATIVE
TRICHOMONAS VAGINALIS PCR: NEGATIVE

## 2018-09-10 PROCEDURE — 87480 CANDIDA DNA DIR PROBE: CPT | Performed by: NURSE PRACTITIONER

## 2018-09-10 PROCEDURE — 99213 OFFICE O/P EST LOW 20 MIN: CPT | Performed by: NURSE PRACTITIONER

## 2018-09-10 PROCEDURE — 87510 GARDNER VAG DNA DIR PROBE: CPT | Performed by: NURSE PRACTITIONER

## 2018-09-10 PROCEDURE — 87660 TRICHOMONAS VAGIN DIR PROBE: CPT | Performed by: NURSE PRACTITIONER

## 2018-09-10 RX ORDER — FLUCONAZOLE 150 MG/1
150 TABLET ORAL ONCE
Qty: 3 TABLET | Refills: 0 | Status: SHIPPED | OUTPATIENT
Start: 2018-09-10 | End: 2018-09-10

## 2018-09-10 RX ORDER — KETOCONAZOLE 20 MG/G
CREAM TOPICAL EVERY 12 HOURS SCHEDULED
Qty: 60 G | Refills: 0 | Status: SHIPPED | OUTPATIENT
Start: 2018-09-10 | End: 2021-02-17

## 2018-09-10 RX ORDER — NYSTATIN 100000 [USP'U]/G
POWDER TOPICAL 3 TIMES DAILY
Qty: 45 G | Refills: 1 | Status: SHIPPED | OUTPATIENT
Start: 2018-09-10 | End: 2021-02-17

## 2018-09-10 NOTE — PROGRESS NOTES
"  Chief Complaint   Patient presents with   • Vaginitis     had been on antibiotics ( [facility] Patient )     Subjective   Emily Haas is a 68 y.o. female.     Vaginitis   This is a new problem. The current episode started in the past 7 days. The problem occurs every several days. The problem has been gradually worsening. Pertinent negatives include no abdominal pain, anorexia, arthralgias or chest pain. She has tried nothing for the symptoms. The treatment provided no relief.        The following portions of the patient's history were reviewed and updated as appropriate: allergies, current medications, past social history and problem list.    Review of Systems   Constitutional: Negative.    HENT: Negative.    Eyes: Negative.    Respiratory: Negative.    Cardiovascular: Negative for chest pain.   Gastrointestinal: Negative for abdominal pain and anorexia.   Endocrine: Negative.  Negative for cold intolerance, heat intolerance, polydipsia, polyphagia and polyuria.   Genitourinary: Positive for vaginal discharge and vaginal pain.   Musculoskeletal: Negative for arthralgias.   Allergic/Immunologic: Negative.  Negative for environmental allergies, food allergies and immunocompromised state.   Neurological: Negative.  Negative for dizziness and facial asymmetry.   Hematological: Negative.  Negative for adenopathy. Does not bruise/bleed easily.   Psychiatric/Behavioral: Negative.        Objective   /80   Ht 162.6 cm (64\")   Wt 91.2 kg (201 lb)   LMP  (LMP Unknown)   BMI 34.50 kg/m²   Physical Exam   Constitutional: She appears well-developed.   HENT:   Head: Normocephalic.   Eyes: Pupils are equal, round, and reactive to light.   Neck: Normal range of motion.   Cardiovascular: Normal rate, regular rhythm, normal heart sounds and normal pulses.    Pulmonary/Chest: Effort normal.   Abdominal: Soft.   Genitourinary: Rectum normal. Pelvic exam was performed with patient supine. Uterus is not deviated, not enlarged, " not fixed and not tender. Cervix exhibits no motion tenderness, no discharge and no friability. Right adnexum displays no mass. Left adnexum displays no mass. Vaginal discharge found.   Genitourinary Comments: Swollen genital area-hx of antibiotic usage and dm    Musculoskeletal: Normal range of motion.   Neurological: She is alert.   Psychiatric: She has a normal mood and affect.       Assessment/Plan   Problem List Items Addressed This Visit        Genitourinary    Vaginosis - Primary    Relevant Orders    Gardnerella vaginalis, Trichomonas vaginalis, Candida albicans, PCR - Swab, Vagina           New Medications Ordered This Visit   Medications   • fluconazole (DIFLUCAN) 150 MG tablet     Sig: Take 1 tablet by mouth 1 (One) Time for 1 dose. 1 a day for 3 days     Dispense:  3 tablet     Refill:  0   • ketoconazole (NIZORAL) 2 % cream     Sig: Apply  topically to the appropriate area as directed Every 12 (Twelve) Hours.     Dispense:  60 g     Refill:  0   • nystatin (MYCOSTATIN) 286703 UNIT/GM powder     Sig: Apply  topically to the appropriate area as directed 3 (Three) Times a Day.     Dispense:  45 g     Refill:  1      decrease sugar in diet, monitor bs, meds as directed, follow up if worsen

## 2018-09-25 ENCOUNTER — TELEPHONE (OUTPATIENT)
Dept: FAMILY MEDICINE CLINIC | Facility: CLINIC | Age: 68
End: 2018-09-25

## 2018-09-25 ENCOUNTER — APPOINTMENT (OUTPATIENT)
Dept: LAB | Facility: HOSPITAL | Age: 68
End: 2018-09-25

## 2018-09-25 ENCOUNTER — OFFICE VISIT (OUTPATIENT)
Dept: FAMILY MEDICINE CLINIC | Facility: CLINIC | Age: 68
End: 2018-09-25

## 2018-09-25 VITALS
DIASTOLIC BLOOD PRESSURE: 76 MMHG | BODY MASS INDEX: 34.15 KG/M2 | HEIGHT: 64 IN | OXYGEN SATURATION: 97 % | SYSTOLIC BLOOD PRESSURE: 126 MMHG | WEIGHT: 200 LBS | HEART RATE: 83 BPM

## 2018-09-25 DIAGNOSIS — R53.83 FATIGUE, UNSPECIFIED TYPE: ICD-10-CM

## 2018-09-25 DIAGNOSIS — E03.9 ACQUIRED HYPOTHYROIDISM: ICD-10-CM

## 2018-09-25 DIAGNOSIS — R74.01 ELEVATED AST (SGOT): Primary | ICD-10-CM

## 2018-09-25 DIAGNOSIS — E11.9 DIABETES MELLITUS TYPE 2, DIET-CONTROLLED (HCC): ICD-10-CM

## 2018-09-25 DIAGNOSIS — I10 ESSENTIAL HYPERTENSION: Primary | ICD-10-CM

## 2018-09-25 DIAGNOSIS — L82.0 INFLAMED SEBORRHEIC KERATOSIS: ICD-10-CM

## 2018-09-25 DIAGNOSIS — M19.90 ARTHRITIS: ICD-10-CM

## 2018-09-25 DIAGNOSIS — E78.00 PURE HYPERCHOLESTEROLEMIA: ICD-10-CM

## 2018-09-25 LAB
ALBUMIN SERPL-MCNC: 4.2 G/DL (ref 3.4–4.8)
ALBUMIN UR-MCNC: 1 MG/L
ALBUMIN/GLOB SERPL: 1.2 G/DL (ref 1.1–1.8)
ALP SERPL-CCNC: 119 U/L (ref 38–126)
ALT SERPL W P-5'-P-CCNC: 31 U/L (ref 9–52)
ANION GAP SERPL CALCULATED.3IONS-SCNC: 13 MMOL/L (ref 5–15)
ARTICHOKE IGE QN: 159 MG/DL (ref 1–129)
AST SERPL-CCNC: 39 U/L (ref 14–36)
BILIRUB SERPL-MCNC: 0.6 MG/DL (ref 0.2–1.3)
BUN BLD-MCNC: 21 MG/DL (ref 7–21)
BUN/CREAT SERPL: 21.6 (ref 7–25)
CALCIUM SPEC-SCNC: 9.8 MG/DL (ref 8.4–10.2)
CHLORIDE SERPL-SCNC: 100 MMOL/L (ref 95–110)
CHOLEST SERPL-MCNC: 243 MG/DL (ref 0–199)
CO2 SERPL-SCNC: 26 MMOL/L (ref 22–31)
CREAT BLD-MCNC: 0.97 MG/DL (ref 0.5–1)
CREAT UR-MCNC: 207 MG/DL
GFR SERPL CREATININE-BSD FRML MDRD: 57 ML/MIN/1.73 (ref 45–104)
GLOBULIN UR ELPH-MCNC: 3.6 GM/DL (ref 2.3–3.5)
GLUCOSE BLD-MCNC: 119 MG/DL (ref 60–100)
HBA1C MFR BLD: 6.5 % (ref 4–5.6)
HDLC SERPL-MCNC: 48 MG/DL (ref 60–200)
LDLC/HDLC SERPL: 2.88 {RATIO} (ref 0–3.22)
MICROALBUMIN/CREAT UR: 4.8 MG/G (ref 0–30)
POTASSIUM BLD-SCNC: 4.6 MMOL/L (ref 3.5–5.1)
PROT SERPL-MCNC: 7.8 G/DL (ref 6.3–8.6)
SODIUM BLD-SCNC: 139 MMOL/L (ref 137–145)
T4 FREE SERPL-MCNC: 1.65 NG/DL (ref 0.78–2.19)
TRIGL SERPL-MCNC: 285 MG/DL (ref 20–199)
TSH SERPL DL<=0.05 MIU/L-ACNC: 0.56 MIU/ML (ref 0.46–4.68)

## 2018-09-25 PROCEDURE — 84443 ASSAY THYROID STIM HORMONE: CPT | Performed by: FAMILY MEDICINE

## 2018-09-25 PROCEDURE — 84439 ASSAY OF FREE THYROXINE: CPT | Performed by: FAMILY MEDICINE

## 2018-09-25 PROCEDURE — 83036 HEMOGLOBIN GLYCOSYLATED A1C: CPT | Performed by: FAMILY MEDICINE

## 2018-09-25 PROCEDURE — 82043 UR ALBUMIN QUANTITATIVE: CPT | Performed by: FAMILY MEDICINE

## 2018-09-25 PROCEDURE — 17110 DESTRUCTION B9 LES UP TO 14: CPT | Performed by: FAMILY MEDICINE

## 2018-09-25 PROCEDURE — 96372 THER/PROPH/DIAG INJ SC/IM: CPT | Performed by: FAMILY MEDICINE

## 2018-09-25 PROCEDURE — 80061 LIPID PANEL: CPT | Performed by: FAMILY MEDICINE

## 2018-09-25 PROCEDURE — 80053 COMPREHEN METABOLIC PANEL: CPT | Performed by: FAMILY MEDICINE

## 2018-09-25 PROCEDURE — 82570 ASSAY OF URINE CREATININE: CPT | Performed by: FAMILY MEDICINE

## 2018-09-25 PROCEDURE — 99214 OFFICE O/P EST MOD 30 MIN: CPT | Performed by: FAMILY MEDICINE

## 2018-09-25 PROCEDURE — 36415 COLL VENOUS BLD VENIPUNCTURE: CPT | Performed by: FAMILY MEDICINE

## 2018-09-25 RX ORDER — CYANOCOBALAMIN 1000 UG/ML
1000 INJECTION, SOLUTION INTRAMUSCULAR; SUBCUTANEOUS ONCE
Status: COMPLETED | OUTPATIENT
Start: 2018-09-25 | End: 2018-09-25

## 2018-09-25 RX ORDER — TRIAMCINOLONE ACETONIDE 40 MG/ML
80 INJECTION, SUSPENSION INTRA-ARTICULAR; INTRAMUSCULAR ONCE
Status: COMPLETED | OUTPATIENT
Start: 2018-09-25 | End: 2018-09-25

## 2018-09-25 RX ORDER — MINOCYCLINE HYDROCHLORIDE 50 MG/1
1 TABLET ORAL DAILY
COMMUNITY
Start: 2018-09-14 | End: 2019-06-29

## 2018-09-25 RX ADMIN — TRIAMCINOLONE ACETONIDE 80 MG: 40 INJECTION, SUSPENSION INTRA-ARTICULAR; INTRAMUSCULAR at 14:00

## 2018-09-25 RX ADMIN — CYANOCOBALAMIN 1000 MCG: 1000 INJECTION, SOLUTION INTRAMUSCULAR; SUBCUTANEOUS at 14:02

## 2018-09-25 NOTE — PROGRESS NOTES
Procedure   Cryotherapy, Skin Lesion  Date/Time: 9/25/2018 9:39 AM  Performed by: CORA SANDERS  Authorized by: CORA SANDERS   Local anesthesia used: no    Anesthesia:  Local anesthesia used: no    Sedation:  Patient sedated: no  Patient tolerance: Patient tolerated the procedure well with no immediate complications  Comments: Inflamed Seb Keratosis on left index finger frozen

## 2018-09-25 NOTE — PROGRESS NOTES
AST is elevated.  Recommend iron, TIBC, ferritin, CBC, acute hepatitis panel, and ultrasound liver.  Otherwise labs are okay except for cholesterol elevated however with her allergies do not recommend any other medicine at this point just low-cholesterol

## 2018-09-25 NOTE — PROGRESS NOTES
Pr Dr. Cao, Ms. Haas has been called with her recent lab results & recommendations.  Continue her current medications and follow-up as planned or sooner if any problems.    Labs & US ordered

## 2018-09-25 NOTE — PROGRESS NOTES
Subjective   Emily Haas is a 68 y.o. female.     Pain   This is a new (left ahoulder and right wrist) problem. The current episode started more than 1 month ago. The problem occurs constantly. The problem has been gradually worsening. Associated symptoms include myalgias. Pertinent negatives include no chest pain. The symptoms are aggravated by twisting.   Hypertension   This is a chronic problem. The current episode started more than 1 year ago. The problem is unchanged. The problem is controlled. Pertinent negatives include no chest pain, orthopnea, palpitations, peripheral edema, PND or shortness of breath.   Hyperlipidemia   This is a chronic problem. The current episode started more than 1 year ago. The problem is uncontrolled. Recent lipid tests were reviewed and are high. Exacerbating diseases include hypothyroidism. Associated symptoms include myalgias. Pertinent negatives include no chest pain or shortness of breath.   Hypothyroidism   This is a chronic problem. The current episode started more than 1 year ago. The problem occurs constantly. The problem has been unchanged. Associated symptoms include myalgias. Pertinent negatives include no chest pain.   Diabetes   She presents for her follow-up diabetic visit. She has type 2 diabetes mellitus. Her disease course has been stable. Pertinent negatives for diabetes include no chest pain, no foot paresthesias, no foot ulcerations, no polydipsia and no polyuria. She monitors blood glucose at home 1-2 x per week. Her breakfast blood glucose is taken between 7-8 am. Her breakfast blood glucose range is generally 110-130 mg/dl. An ACE inhibitor/angiotensin II receptor blocker is being taken. Eye exam is current.        The following portions of the patient's history were reviewed and updated as appropriate: allergies, current medications, past family history, past medical history, past social history, past surgical history and problem list.    Review of  Systems   Respiratory: Negative for shortness of breath.    Cardiovascular: Negative for chest pain, palpitations, orthopnea and PND.   Endocrine: Negative for polydipsia and polyuria.   Musculoskeletal: Positive for myalgias.   Skin: Positive for wound (lesion right ear, recurrent).   Psychiatric/Behavioral: Negative for dysphoric mood.       Objective   Physical Exam   Constitutional: She is oriented to person, place, and time. She appears well-developed and well-nourished. No distress.   HENT:   Head: Normocephalic and atraumatic.   Cardiovascular: Normal rate, regular rhythm, normal heart sounds and intact distal pulses.  Exam reveals no gallop and no friction rub.    No murmur heard.  Pulmonary/Chest: Effort normal. No respiratory distress. She has no wheezes. She has no rales.   Abdominal: Normal appearance. She exhibits no shifting dullness, no pulsatile liver, no fluid wave, no abdominal bruit, no ascites and no pulsatile midline mass. There is no hepatosplenomegaly. There is no rigidity.   Musculoskeletal: She exhibits no edema.        Left shoulder: She exhibits decreased range of motion and tenderness.        Right wrist: She exhibits tenderness. She exhibits normal range of motion.    Emily had a diabetic foot exam performed (callus noted) today.   During the foot exam she had a monofilament test performed (normal).  Vascular Status -  Her right foot exhibits normal foot vasculature  and no edema. Her left foot exhibits normal foot vasculature  and no edema.  Neurological: She is alert and oriented to person, place, and time.   Reflex Scores:       Bicep reflexes are 2+ on the left side.  Skin: Skin is warm and dry. Lesion noted. She is not diaphoretic.        Psychiatric: She has a normal mood and affect. Her behavior is normal. Judgment and thought content normal.   Nursing note and vitals reviewed.      Assessment/Plan   Problems Addressed this Visit        Cardiovascular and Mediastinum     Hyperlipidemia    Relevant Orders    Lipid Panel    Essential hypertension - Primary    Relevant Orders    Comprehensive Metabolic Panel       Endocrine    Diabetes mellitus type 2, diet-controlled (CMS/[facility])    Relevant Orders    Hemoglobin A1c    Microalbumin / Creatinine Urine Ratio - Urine, Clean Catch    Acquired hypothyroidism    Relevant Orders    TSH    T4, Free      Other Visit Diagnoses     Arthritis        Relevant Medications    triamcinolone acetonide (KENALOG-40) injection 80 mg (Start on 9/25/2018  9:45 AM)    Fatigue, unspecified type        Inflamed seborrheic keratosis        Relevant Orders    Cryotherapy, Skin Lesion

## 2018-09-25 NOTE — TELEPHONE ENCOUNTER
Pr Dr. Cao, Ms. Haas has been called with her recent lab results & recommendations.  Continue her current medications and follow-up as planned or sooner if any problems.    Labs & US ordered      ----- Message from Fernando Hutton MD sent at 9/25/2018  4:20 PM CDT -----  AST is elevated.  Recommend iron, TIBC, ferritin, CBC, acute hepatitis panel, and ultrasound liver.  Otherwise labs are okay except for cholesterol elevated however with her allergies do not recommend any other medicine at this point just low-cholesterol

## 2018-10-04 ENCOUNTER — TELEPHONE (OUTPATIENT)
Dept: FAMILY MEDICINE CLINIC | Facility: CLINIC | Age: 68
End: 2018-10-04

## 2018-10-04 ENCOUNTER — LAB (OUTPATIENT)
Dept: LAB | Facility: HOSPITAL | Age: 68
End: 2018-10-04

## 2018-10-04 ENCOUNTER — HOSPITAL ENCOUNTER (OUTPATIENT)
Dept: ULTRASOUND IMAGING | Facility: HOSPITAL | Age: 68
Discharge: HOME | End: 2018-10-04
Attending: FAMILY MEDICINE | Admitting: FAMILY MEDICINE

## 2018-10-04 DIAGNOSIS — R74.01 ELEVATED AST (SGOT): ICD-10-CM

## 2018-10-04 PROBLEM — K76.0 FATTY LIVER: Status: ACTIVE | Noted: 2018-10-04

## 2018-10-04 LAB
BASOPHILS # BLD AUTO: 0.05 10*3/MM3 (ref 0–0.2)
BASOPHILS NFR BLD AUTO: 0.5 % (ref 0–2)
DEPRECATED RDW RBC AUTO: 43.1 FL (ref 36.4–46.3)
EOSINOPHIL # BLD AUTO: 0.45 10*3/MM3 (ref 0–0.7)
EOSINOPHIL NFR BLD AUTO: 4.6 % (ref 0–7)
ERYTHROCYTE [DISTWIDTH] IN BLOOD BY AUTOMATED COUNT: 13.2 % (ref 11.5–14.5)
FERRITIN SERPL-MCNC: 40 NG/ML (ref 11.1–264)
HCT VFR BLD AUTO: 41.4 % (ref 35–45)
HGB BLD-MCNC: 13.6 G/DL (ref 12–15.5)
IMM GRANULOCYTES # BLD: 0.02 10*3/MM3 (ref 0–0.02)
IMM GRANULOCYTES NFR BLD: 0.2 % (ref 0–0.5)
IRON 24H UR-MRATE: 83 MCG/DL (ref 37–170)
IRON SATN MFR SERPL: 22 % (ref 15–50)
LYMPHOCYTES # BLD AUTO: 2.7 10*3/MM3 (ref 0.6–4.2)
LYMPHOCYTES NFR BLD AUTO: 27.8 % (ref 10–50)
MCH RBC QN AUTO: 29.3 PG (ref 26.5–34)
MCHC RBC AUTO-ENTMCNC: 32.9 G/DL (ref 31.4–36)
MCV RBC AUTO: 89.2 FL (ref 80–98)
MONOCYTES # BLD AUTO: 0.59 10*3/MM3 (ref 0–0.9)
MONOCYTES NFR BLD AUTO: 6.1 % (ref 0–12)
NEUTROPHILS # BLD AUTO: 5.9 10*3/MM3 (ref 2–8.6)
NEUTROPHILS NFR BLD AUTO: 60.8 % (ref 37–80)
PLATELET # BLD AUTO: 242 10*3/MM3 (ref 150–450)
PMV BLD AUTO: 11.1 FL (ref 8–12)
RBC # BLD AUTO: 4.64 10*6/MM3 (ref 3.77–5.16)
TIBC SERPL-MCNC: 369 MCG/DL (ref 265–497)
WBC NRBC COR # BLD: 9.71 10*3/MM3 (ref 3.2–9.8)

## 2018-10-04 PROCEDURE — 83540 ASSAY OF IRON: CPT

## 2018-10-04 PROCEDURE — 82728 ASSAY OF FERRITIN: CPT

## 2018-10-04 PROCEDURE — 80074 ACUTE HEPATITIS PANEL: CPT

## 2018-10-04 PROCEDURE — 36415 COLL VENOUS BLD VENIPUNCTURE: CPT

## 2018-10-04 PROCEDURE — 83550 IRON BINDING TEST: CPT

## 2018-10-04 PROCEDURE — 85025 COMPLETE CBC W/AUTO DIFF WBC: CPT

## 2018-10-04 PROCEDURE — 76705 ECHO EXAM OF ABDOMEN: CPT

## 2018-10-04 NOTE — TELEPHONE ENCOUNTER
Pr Dr. Hutton, Ms. Haas has been called with her recent lab & US of the Liver results & recommendations.  Continue her current medications and follow-up as planned or sooner if any problems.      ----- Message from Fernando Hutton MD sent at 10/4/2018  3:30 PM CDT -----  Ok, call or send card.

## 2018-10-04 NOTE — PROGRESS NOTES
Pr Dr. Hutton, Ms. Haas has been called with her recent lab & US of the Liver results & recommendations.  Continue her current medications and follow-up as planned or sooner if any problems.

## 2018-10-05 LAB
HAV IGM SERPL QL IA: NEGATIVE
HBV CORE IGM SERPL QL IA: NEGATIVE
HBV SURFACE AG SERPL QL IA: NEGATIVE
HCV AB SER DONR QL: NEGATIVE

## 2018-10-09 RX ORDER — LEVOTHYROXINE SODIUM 175 UG/1
TABLET ORAL
Qty: 90 TABLET | Refills: 3 | Status: SHIPPED | OUTPATIENT
Start: 2018-10-09 | End: 2019-04-08 | Stop reason: DRUGHIGH

## 2018-10-09 RX ORDER — HYDROXYZINE HYDROCHLORIDE 25 MG/1
TABLET, FILM COATED ORAL
Qty: 30 TABLET | Refills: 11 | Status: SHIPPED | OUTPATIENT
Start: 2018-10-09 | End: 2021-02-17

## 2019-02-22 ENCOUNTER — CLINICAL SUPPORT NO REQUIREMENTS (OUTPATIENT)
Dept: FAMILY MEDICINE CLINIC | Facility: CLINIC | Age: 69
End: 2019-02-22

## 2019-02-22 DIAGNOSIS — E11.9 DIABETES MELLITUS TYPE 2 WITHOUT RETINOPATHY (HCC): Primary | ICD-10-CM

## 2019-03-01 ENCOUNTER — OFFICE VISIT (OUTPATIENT)
Dept: FAMILY MEDICINE CLINIC | Facility: CLINIC | Age: 69
End: 2019-03-01

## 2019-03-01 VITALS
HEIGHT: 64 IN | HEART RATE: 76 BPM | DIASTOLIC BLOOD PRESSURE: 82 MMHG | WEIGHT: 207.5 LBS | SYSTOLIC BLOOD PRESSURE: 134 MMHG | OXYGEN SATURATION: 99 % | BODY MASS INDEX: 35.42 KG/M2

## 2019-03-01 DIAGNOSIS — E11.9 DIABETES MELLITUS TYPE 2, DIET-CONTROLLED (HCC): ICD-10-CM

## 2019-03-01 DIAGNOSIS — E11.9 DIABETES MELLITUS TYPE 2 WITHOUT RETINOPATHY (HCC): ICD-10-CM

## 2019-03-01 DIAGNOSIS — Z23 FLU VACCINE NEED: ICD-10-CM

## 2019-03-01 DIAGNOSIS — Z12.39 BREAST CANCER SCREENING: Primary | ICD-10-CM

## 2019-03-01 DIAGNOSIS — E03.9 ACQUIRED HYPOTHYROIDISM: ICD-10-CM

## 2019-03-01 DIAGNOSIS — Z23 PNEUMOCOCCAL VACCINATION ADMINISTERED AT CURRENT VISIT: ICD-10-CM

## 2019-03-01 DIAGNOSIS — E78.00 PURE HYPERCHOLESTEROLEMIA: ICD-10-CM

## 2019-03-01 DIAGNOSIS — M67.40 GANGLION CYST: ICD-10-CM

## 2019-03-01 DIAGNOSIS — I10 ESSENTIAL HYPERTENSION: ICD-10-CM

## 2019-03-01 PROCEDURE — 99214 OFFICE O/P EST MOD 30 MIN: CPT | Performed by: FAMILY MEDICINE

## 2019-03-01 PROCEDURE — G0008 ADMIN INFLUENZA VIRUS VAC: HCPCS | Performed by: FAMILY MEDICINE

## 2019-03-01 PROCEDURE — G0009 ADMIN PNEUMOCOCCAL VACCINE: HCPCS | Performed by: FAMILY MEDICINE

## 2019-03-01 PROCEDURE — 90662 IIV NO PRSV INCREASED AG IM: CPT | Performed by: FAMILY MEDICINE

## 2019-03-01 PROCEDURE — 90732 PPSV23 VACC 2 YRS+ SUBQ/IM: CPT | Performed by: FAMILY MEDICINE

## 2019-03-01 NOTE — PROGRESS NOTES
Subjective   Emily Haas is a 69 y.o. female.     Hypertension          {Common H&P Review Areas:26213}    Review of Systems    Objective   Physical Exam    Assessment/Plan   {Assess/PlanSmartLinks:56773}

## 2019-03-01 NOTE — PROGRESS NOTES
Subjective   Emily Haas is a 69 y.o. female.     Hypertension   This is a chronic problem. The current episode started more than 1 year ago. The problem is unchanged. The problem is controlled. Pertinent negatives include no orthopnea, palpitations, peripheral edema, PND or shortness of breath.   Hyperlipidemia   This is a chronic problem. The current episode started more than 1 year ago. The problem is uncontrolled. Recent lipid tests were reviewed and are high. Exacerbating diseases include hypothyroidism. Pertinent negatives include no shortness of breath.   Hypothyroidism   This is a chronic problem. The current episode started more than 1 year ago. The problem occurs constantly. The problem has been unchanged. Associated symptoms include arthralgias (knots on hands).   Diabetes   She presents for her follow-up diabetic visit. She has type 2 diabetes mellitus. Her disease course has been stable. Pertinent negatives for diabetes include no foot paresthesias, no foot ulcerations, no polydipsia and no polyuria. She monitors blood glucose at home 1-2 x per week. (NONE) An ACE inhibitor/angiotensin II receptor blocker is being taken. Eye exam is current.        The following portions of the patient's history were reviewed and updated as appropriate: allergies, current medications, past family history, past medical history, past social history, past surgical history and problem list.    Review of Systems   Respiratory: Negative for shortness of breath.    Cardiovascular: Negative for palpitations, orthopnea and PND.   Endocrine: Negative for polydipsia and polyuria.   Musculoskeletal: Positive for arthralgias (knots on hands).   Skin: Positive for wound (lesion right ear, recurrent).   Psychiatric/Behavioral: Negative for dysphoric mood.       Objective   Physical Exam   Constitutional: She is oriented to person, place, and time. She appears well-developed and well-nourished. No distress.   HENT:   Head:  Normocephalic and atraumatic.   Cardiovascular: Normal rate, regular rhythm, normal heart sounds and intact distal pulses. Exam reveals no gallop and no friction rub.   No murmur heard.  Pulmonary/Chest: Effort normal. No respiratory distress. She has no wheezes. She has no rales.   Abdominal: Normal appearance. She exhibits no shifting dullness, no pulsatile liver, no fluid wave, no abdominal bruit, no ascites and no pulsatile midline mass. There is no hepatosplenomegaly. There is no rigidity.   Musculoskeletal: She exhibits no edema.        Right wrist: She exhibits tenderness. She exhibits normal range of motion.        Arms:   Emily had a diabetic foot exam performed (callus noted) today.   During the foot exam she had a monofilament test performed (normal).  Vascular Status -  Her right foot exhibits no edema. Her left foot exhibits no edema.  Neurological: She is alert and oriented to person, place, and time.   Reflex Scores:       Bicep reflexes are 2+ on the left side.  Skin: Skin is warm and dry. Lesion noted. She is not diaphoretic.        Psychiatric: She has a normal mood and affect. Her behavior is normal. Judgment and thought content normal.   Nursing note and vitals reviewed.      Assessment/Plan   Problems Addressed this Visit        Cardiovascular and Mediastinum    Hyperlipidemia    Relevant Orders    Lipid Panel    Essential hypertension    Relevant Orders    Comprehensive Metabolic Panel       Endocrine    Diabetes mellitus type 2, diet-controlled (CMS/HCC)    Acquired hypothyroidism    Relevant Orders    T4, Free    TSH    Diabetes mellitus type 2 without retinopathy (CMS/HCC)    Relevant Orders    Hemoglobin A1c    Microalbumin / Creatinine Urine Ratio - Urine, Clean Catch    Lipid Panel      Other Visit Diagnoses     Breast cancer screening    -  Primary    Relevant Orders    Mammo Screening Digital Tomosynthesis Bilateral With CAD    Ganglion cyst

## 2019-03-13 ENCOUNTER — APPOINTMENT (OUTPATIENT)
Dept: LAB | Facility: HOSPITAL | Age: 69
End: 2019-03-13

## 2019-03-13 ENCOUNTER — TELEPHONE (OUTPATIENT)
Dept: FAMILY MEDICINE CLINIC | Facility: CLINIC | Age: 69
End: 2019-03-13

## 2019-03-13 LAB
ALBUMIN SERPL-MCNC: 3.9 G/DL (ref 3.4–4.8)
ALBUMIN UR-MCNC: 1.5 MG/L
ALBUMIN/GLOB SERPL: 1.2 G/DL (ref 1.1–1.8)
ALP SERPL-CCNC: 73 U/L (ref 38–126)
ALT SERPL W P-5'-P-CCNC: 9 U/L (ref 9–52)
ANION GAP SERPL CALCULATED.3IONS-SCNC: 6 MMOL/L (ref 5–15)
ARTICHOKE IGE QN: 162 MG/DL (ref 1–129)
AST SERPL-CCNC: 22 U/L (ref 14–36)
BILIRUB SERPL-MCNC: 0.5 MG/DL (ref 0.2–1.3)
BUN BLD-MCNC: 19 MG/DL (ref 7–21)
BUN/CREAT SERPL: 23.2 (ref 7–25)
CALCIUM SPEC-SCNC: 9 MG/DL (ref 8.4–10.2)
CHLORIDE SERPL-SCNC: 103 MMOL/L (ref 95–110)
CHOLEST SERPL-MCNC: 260 MG/DL (ref 0–199)
CO2 SERPL-SCNC: 29 MMOL/L (ref 22–31)
CREAT BLD-MCNC: 0.82 MG/DL (ref 0.5–1)
CREAT UR-MCNC: 247 MG/DL
GFR SERPL CREATININE-BSD FRML MDRD: 69 ML/MIN/1.73 (ref 45–104)
GLOBULIN UR ELPH-MCNC: 3.2 GM/DL (ref 2.3–3.5)
GLUCOSE BLD-MCNC: 112 MG/DL (ref 60–100)
HBA1C MFR BLD: 6.4 % (ref 4–5.6)
HDLC SERPL-MCNC: 48 MG/DL (ref 60–200)
LDLC/HDLC SERPL: 3.52 {RATIO} (ref 0–3.22)
MICROALBUMIN/CREAT UR: 6.1 MG/G (ref 0–30)
POTASSIUM BLD-SCNC: 4.8 MMOL/L (ref 3.5–5.1)
PROT SERPL-MCNC: 7.1 G/DL (ref 6.3–8.6)
SODIUM BLD-SCNC: 138 MMOL/L (ref 137–145)
T4 FREE SERPL-MCNC: 1.37 NG/DL (ref 0.78–2.19)
TRIGL SERPL-MCNC: 216 MG/DL (ref 20–199)
TSH SERPL DL<=0.05 MIU/L-ACNC: 6.78 MIU/ML (ref 0.46–4.68)

## 2019-03-13 PROCEDURE — 82043 UR ALBUMIN QUANTITATIVE: CPT | Performed by: FAMILY MEDICINE

## 2019-03-13 PROCEDURE — 83036 HEMOGLOBIN GLYCOSYLATED A1C: CPT | Performed by: FAMILY MEDICINE

## 2019-03-13 PROCEDURE — 84439 ASSAY OF FREE THYROXINE: CPT | Performed by: FAMILY MEDICINE

## 2019-03-13 PROCEDURE — 36415 COLL VENOUS BLD VENIPUNCTURE: CPT | Performed by: FAMILY MEDICINE

## 2019-03-13 PROCEDURE — 80053 COMPREHEN METABOLIC PANEL: CPT | Performed by: FAMILY MEDICINE

## 2019-03-13 PROCEDURE — 84443 ASSAY THYROID STIM HORMONE: CPT | Performed by: FAMILY MEDICINE

## 2019-03-13 PROCEDURE — 82570 ASSAY OF URINE CREATININE: CPT | Performed by: FAMILY MEDICINE

## 2019-03-13 PROCEDURE — 80061 LIPID PANEL: CPT | Performed by: FAMILY MEDICINE

## 2019-03-13 NOTE — TELEPHONE ENCOUNTER
--Per Dr. Hutton, Ms. Haas has been called with recent lab results & recommendations.  Continue current medications and follow-up as planned or sooner if any problems.      --- Message from Fernando Hutton MD sent at 3/13/2019 11:25 AM CDT -----  Ok, call or send card.

## 2019-03-13 NOTE — PROGRESS NOTES
Per Dr. Hutton, Ms. Haas has been called with recent lab results & recommendations.  Continue current medications and follow-up as planned or sooner if any problems.

## 2019-03-14 NOTE — PROGRESS NOTES
TSH is elevated.  She is already on a fairly high dose of levothyroxine.  Call and confirm that she takes that in the morning before breakfast on empty stomach has not missed any doses.  Also cholesterol high, but with her allergies just recommend focusing on diet

## 2019-03-15 ENCOUNTER — TELEPHONE (OUTPATIENT)
Dept: FAMILY MEDICINE CLINIC | Facility: CLINIC | Age: 69
End: 2019-03-15

## 2019-03-15 NOTE — TELEPHONE ENCOUNTER
-Per Dr. Hutton, Ms. Arevalo has been called with recent lab results & recommendations.  Continue current medications and follow-up as planned or sooner if any problems.    Dr. Hutton,  She states she is on Levothyroxine  She has Not missed any doses  She does Not take it on an empty stomach.    I did recommend she start taking it on an empty stomach with a glass of water and wait at lease 20-30 min before eating anything.     Please Advise if any further action needs to be taken.    ---- Message from Fernando Hutton MD sent at 3/14/2019  5:50 PM CDT -----  TSH is elevated.  She is already on a fairly high dose of levothyroxine.  Call and confirm that she takes that in the morning before breakfast on empty stomach has not missed any doses.  Also cholesterol high, but with her allergies just recommend focusing on diet

## 2019-03-15 NOTE — PROGRESS NOTES
Per Dr. Hutton, Ms. Arevalo has been called with recent lab results & recommendations.  Continue current medications and follow-up as planned or sooner if any problems.    Dr. Hutton,  She states she is on Levothyroxine  She has Not missed any doses  She does Not take it on an empty stomach.    I did recommend she start taking it on an empty stomach with a glass of water and wait at lease 20-30 min before eating anything.     Please Advise if any further action needs to be taken.

## 2019-03-18 ENCOUNTER — TELEPHONE (OUTPATIENT)
Dept: FAMILY MEDICINE CLINIC | Facility: CLINIC | Age: 69
End: 2019-03-18

## 2019-03-18 DIAGNOSIS — E03.9 HYPOTHYROIDISM, UNSPECIFIED TYPE: Primary | ICD-10-CM

## 2019-03-18 NOTE — TELEPHONE ENCOUNTER
Per Dr. Hutton, Ms. Haas has been called and advised to have her labs rechecked in 1 month  Order placed for Free T-4 & TSH      ----- Message from Fernando Hutton MD sent at 3/17/2019  2:25 PM CDT -----  Do that and recheck free T4 and TSH in 1 month

## 2019-04-02 ENCOUNTER — LAB (OUTPATIENT)
Dept: LAB | Facility: HOSPITAL | Age: 69
End: 2019-04-02

## 2019-04-02 DIAGNOSIS — E03.9 HYPOTHYROIDISM, UNSPECIFIED TYPE: ICD-10-CM

## 2019-04-02 PROCEDURE — 36415 COLL VENOUS BLD VENIPUNCTURE: CPT

## 2019-04-02 PROCEDURE — 84439 ASSAY OF FREE THYROXINE: CPT

## 2019-04-02 PROCEDURE — 84443 ASSAY THYROID STIM HORMONE: CPT

## 2019-04-03 LAB
T4 FREE SERPL-MCNC: 1.87 NG/DL (ref 0.93–1.7)
TSH SERPL DL<=0.05 MIU/L-ACNC: 0.31 MIU/ML (ref 0.27–4.2)

## 2019-04-08 ENCOUNTER — TELEPHONE (OUTPATIENT)
Dept: FAMILY MEDICINE CLINIC | Facility: CLINIC | Age: 69
End: 2019-04-08

## 2019-04-08 DIAGNOSIS — E03.9 ACQUIRED HYPOTHYROIDISM: Primary | ICD-10-CM

## 2019-04-08 RX ORDER — LEVOTHYROXINE SODIUM 0.15 MG/1
150 TABLET ORAL DAILY
Qty: 30 TABLET | Refills: 3 | Status: SHIPPED | OUTPATIENT
Start: 2019-04-08 | End: 2019-05-22

## 2019-04-08 NOTE — PROGRESS NOTES
Free T4 is elevated.  This is more consistent with what I expect with a high dose of levothyroxine she is on.  Recommend decrease levothyroxine from 175 mcg a day down to 150 mcg a day.  Recheck free T4 and TSH in 1 month.  Continue to take her levothyroxine first thing in the morning on empty stomach 30 minutes before breakfast

## 2019-04-08 NOTE — PROGRESS NOTES
Per Dr. Hutton, Ms. Haas has been called with recent lab results & recommendations.  Continue current medications and follow-up as planned or sooner if any problems.    New Script sent to [facility] in [place] at patient's request   Labs ordered for 1 month

## 2019-04-08 NOTE — TELEPHONE ENCOUNTER
Per Dr. Hutton, Ms. Haas has been called with recent lab results & recommendations.  Continue current medications and follow-up as planned or sooner if any problems.    New Script sent to [facility] in [place] at patient's request   Labs ordered for 1 month      ----- Message from Fernando Hutton MD sent at 4/8/2019  3:39 PM CDT -----  Free T4 is elevated.  This is more consistent with what I expect with a high dose of levothyroxine she is on.  Recommend decrease levothyroxine from 175 mcg a day down to 150 mcg a day.  Recheck free T4 and TSH in 1 month.  Continue to take her levothyroxine first thing in the morning on empty stomach 30 minutes before breakfast

## 2019-04-16 ENCOUNTER — CLINICAL SUPPORT (OUTPATIENT)
Dept: FAMILY MEDICINE CLINIC | Facility: CLINIC | Age: 69
End: 2019-04-16

## 2019-04-16 DIAGNOSIS — M19.90 ARTHRITIS: Primary | ICD-10-CM

## 2019-04-16 PROCEDURE — 96372 THER/PROPH/DIAG INJ SC/IM: CPT | Performed by: FAMILY MEDICINE

## 2019-04-16 RX ORDER — TRIAMCINOLONE ACETONIDE 40 MG/ML
80 INJECTION, SUSPENSION INTRA-ARTICULAR; INTRAMUSCULAR ONCE
Status: COMPLETED | OUTPATIENT
Start: 2019-04-16 | End: 2019-04-16

## 2019-04-16 RX ADMIN — TRIAMCINOLONE ACETONIDE 80 MG: 40 INJECTION, SUSPENSION INTRA-ARTICULAR; INTRAMUSCULAR at 14:13

## 2019-05-16 ENCOUNTER — TELEPHONE (OUTPATIENT)
Dept: FAMILY MEDICINE CLINIC | Facility: CLINIC | Age: 69
End: 2019-05-16

## 2019-05-16 NOTE — TELEPHONE ENCOUNTER
This medication is not on her medication list, she will need to make an appointment to see Dr Hutton to discuss.  Please schedule her one day next week.  Thank you

## 2019-05-16 NOTE — TELEPHONE ENCOUNTER
Pt has been taking her husbands lexapro. She wants to know if you can call it in for her? Or does she need to be seen?

## 2019-05-21 ENCOUNTER — LAB (OUTPATIENT)
Dept: LAB | Facility: HOSPITAL | Age: 69
End: 2019-05-21

## 2019-05-21 ENCOUNTER — OFFICE VISIT (OUTPATIENT)
Dept: FAMILY MEDICINE CLINIC | Facility: CLINIC | Age: 69
End: 2019-05-21

## 2019-05-21 VITALS
HEART RATE: 75 BPM | HEIGHT: 64 IN | BODY MASS INDEX: 35.34 KG/M2 | OXYGEN SATURATION: 98 % | SYSTOLIC BLOOD PRESSURE: 132 MMHG | WEIGHT: 207 LBS | DIASTOLIC BLOOD PRESSURE: 80 MMHG

## 2019-05-21 DIAGNOSIS — E78.00 PURE HYPERCHOLESTEROLEMIA: ICD-10-CM

## 2019-05-21 DIAGNOSIS — B07.8 OTHER VIRAL WARTS: ICD-10-CM

## 2019-05-21 DIAGNOSIS — E03.9 ACQUIRED HYPOTHYROIDISM: ICD-10-CM

## 2019-05-21 DIAGNOSIS — F32.89 OTHER DEPRESSION: ICD-10-CM

## 2019-05-21 DIAGNOSIS — E11.9 DIABETES MELLITUS TYPE 2 WITHOUT RETINOPATHY (HCC): ICD-10-CM

## 2019-05-21 DIAGNOSIS — I10 ESSENTIAL HYPERTENSION: Primary | ICD-10-CM

## 2019-05-21 PROBLEM — F32.A DEPRESSION: Status: ACTIVE | Noted: 2019-05-21

## 2019-05-21 LAB
T4 FREE SERPL-MCNC: 1.42 NG/DL (ref 0.93–1.7)
TSH SERPL DL<=0.05 MIU/L-ACNC: 10.6 MIU/ML (ref 0.27–4.2)

## 2019-05-21 PROCEDURE — 84443 ASSAY THYROID STIM HORMONE: CPT

## 2019-05-21 PROCEDURE — 84439 ASSAY OF FREE THYROXINE: CPT

## 2019-05-21 PROCEDURE — 99214 OFFICE O/P EST MOD 30 MIN: CPT | Performed by: FAMILY MEDICINE

## 2019-05-21 PROCEDURE — 36415 COLL VENOUS BLD VENIPUNCTURE: CPT

## 2019-05-21 RX ORDER — ESCITALOPRAM OXALATE 10 MG/1
10 TABLET ORAL DAILY
Qty: 90 TABLET | Refills: 3 | Status: SHIPPED | OUTPATIENT
Start: 2019-05-21 | End: 2020-09-17

## 2019-05-21 NOTE — PROGRESS NOTES
Subjective   Emily Haas is a 69 y.o. female.     Hypertension   This is a chronic problem. The current episode started more than 1 year ago. The problem is unchanged. The problem is controlled. Pertinent negatives include no chest pain, orthopnea, palpitations, peripheral edema, PND or shortness of breath.   Hyperlipidemia   This is a chronic problem. The current episode started more than 1 year ago. The problem is uncontrolled. Recent lipid tests were reviewed and are high. Exacerbating diseases include hypothyroidism. Pertinent negatives include no chest pain, myalgias or shortness of breath.   Hypothyroidism   This is a chronic problem. The current episode started more than 1 year ago. The problem occurs constantly. The problem has been unchanged. Pertinent negatives include no arthralgias, chest pain or myalgias.   Diabetes   She presents for her follow-up diabetic visit. She has type 2 diabetes mellitus. Her disease course has been stable. Pertinent negatives for hypoglycemia include no nervousness/anxiousness. Pertinent negatives for diabetes include no chest pain, no foot paresthesias, no foot ulcerations, no polydipsia and no polyuria. Her breakfast blood glucose range is generally  mg/dl. An ACE inhibitor/angiotensin II receptor blocker is being taken. Eye exam is current.   Depression   Visit Type: initial  Onset of symptoms: 1 to 6 months ago  Progression since onset: gradually worsening  Patient presents with the following symptoms: depressed mood and excessive worry.  Patient is not experiencing: insomnia, irritability, nervousness/anxiety, palpitations and shortness of breath.         The following portions of the patient's history were reviewed and updated as appropriate: allergies, current medications, past family history, past medical history, past social history, past surgical history and problem list.    Review of Systems   Constitutional: Negative for irritability.   Respiratory:  Negative for shortness of breath.    Cardiovascular: Negative for chest pain, palpitations, orthopnea and PND.   Endocrine: Negative for polydipsia and polyuria.   Musculoskeletal: Negative for arthralgias and myalgias.   Skin:        Wart left hand and foot X3   Psychiatric/Behavioral: Negative for dysphoric mood. The patient is not nervous/anxious and does not have insomnia.        Objective   Physical Exam   Constitutional: She is oriented to person, place, and time. She appears well-developed and well-nourished. No distress.   HENT:   Head: Normocephalic and atraumatic.   Cardiovascular: Normal rate, regular rhythm, normal heart sounds and intact distal pulses. Exam reveals no gallop and no friction rub.   No murmur heard.  Pulmonary/Chest: Effort normal. No respiratory distress. She has no wheezes. She has no rales.   Abdominal: Normal appearance. She exhibits no shifting dullness, no pulsatile liver, no fluid wave, no abdominal bruit, no ascites and no pulsatile midline mass. There is no hepatosplenomegaly. There is no rigidity.   Musculoskeletal: She exhibits no edema.        Right wrist: She exhibits tenderness. She exhibits normal range of motion.        Arms:   Emily had a diabetic foot exam performed (callus noted) today.   During the foot exam she had a monofilament test performed (normal).  Vascular Status -  Her right foot exhibits no edema. Her left foot exhibits no edema.  Neurological: She is alert and oriented to person, place, and time.   Reflex Scores:       Bicep reflexes are 2+ on the left side.  Skin: Skin is warm and dry. Lesion noted. She is not diaphoretic.        Psychiatric: She has a normal mood and affect. Her speech is normal and behavior is normal. Judgment and thought content normal.   Nursing note and vitals reviewed.      Assessment/Plan   Problems Addressed this Visit        Cardiovascular and Mediastinum    Essential hypertension - Primary    Pure hypercholesterolemia        Endocrine    Acquired hypothyroidism    Diabetes mellitus type 2 without retinopathy (CMS/HCC)       Other    Depression    Relevant Medications    escitalopram (LEXAPRO) 10 MG tablet      Other Visit Diagnoses     Other viral warts              duofilm and duct tape recommended

## 2019-05-22 ENCOUNTER — TELEPHONE (OUTPATIENT)
Dept: FAMILY MEDICINE CLINIC | Facility: CLINIC | Age: 69
End: 2019-05-22

## 2019-05-22 DIAGNOSIS — Z51.89 ENCOUNTER FOR MEDICATION ADJUSTMENT: Primary | ICD-10-CM

## 2019-05-22 DIAGNOSIS — E03.9 HYPOTHYROIDISM, UNSPECIFIED TYPE: ICD-10-CM

## 2019-05-22 RX ORDER — LEVOTHYROXINE SODIUM 175 UG/1
TABLET ORAL
Qty: 30 TABLET | Refills: 3 | Status: SHIPPED | OUTPATIENT
Start: 2019-05-22 | End: 2019-07-02 | Stop reason: CLARIF

## 2019-05-22 RX ORDER — LEVOTHYROXINE SODIUM 0.15 MG/1
TABLET ORAL
Qty: 30 TABLET | Refills: 3 | Status: SHIPPED | OUTPATIENT
Start: 2019-05-22 | End: 2019-07-02 | Stop reason: CLARIF

## 2019-05-22 NOTE — TELEPHONE ENCOUNTER
-Per Dr. Hutton, Ms. Haas has been called with recent lab results & recommendations.  Continue current medications and follow-up as planned or sooner if any problems.    Dr. Hutton  She states she has not missed any of her thyroid medication, she stats she may not take it the same time every day but it is on an empty stomach.  I advised her that if at all possible she needs to try and take this the same time every day on an empty stomach.  I also advised her to alternate between the 175 and 150 mcg tablets daily for 1 month and to nate her labs.  She states understanding and also states she feels great.     Labs ordered and Med List up dated.        ---- Message from Fernando Hutton MD sent at 5/22/2019 10:11 AM CDT -----  There is quite a dramatic shift in her thyroid levels.  More than I would expect from going home from 175 mcg a day down to 150 mcg a day make sure she had missed any doses if not, switch her to 175 mcg alternating with 150 mcg every other day.  Recheck free T4 and TSH in 1 month

## 2019-05-22 NOTE — PROGRESS NOTES
Per Dr. Hutton, Ms. Haas has been called with recent lab results & recommendations.  Continue current medications and follow-up as planned or sooner if any problems.    Dr. Hutton  She states she has not missed any of her thyroid medication, she stats she may not take it the same time every day but it is on an empty stomach.  I advised her that if at all possible she needs to try and take this the same time every day on an empty stomach.  I also advised her to alternate between the 175 and 150 mcg tablets daily for 1 month and to nate her labs.  She states understanding and also states she feels great.     Labs ordered and Med List up dated.

## 2019-05-22 NOTE — PROGRESS NOTES
There is quite a dramatic shift in her thyroid levels.  More than I would expect from going home from 175 mcg a day down to 150 mcg a day make sure she had missed any doses if not, switch her to 175 mcg alternating with 150 mcg every other day.  Recheck free T4 and TSH in 1 month

## 2019-06-25 ENCOUNTER — LAB (OUTPATIENT)
Dept: LAB | Facility: HOSPITAL | Age: 69
End: 2019-06-25

## 2019-06-25 DIAGNOSIS — E03.9 HYPOTHYROIDISM, UNSPECIFIED TYPE: ICD-10-CM

## 2019-06-25 DIAGNOSIS — Z51.89 ENCOUNTER FOR MEDICATION ADJUSTMENT: ICD-10-CM

## 2019-06-25 LAB
T4 FREE SERPL-MCNC: 2.1 NG/DL (ref 0.93–1.7)
TSH SERPL DL<=0.05 MIU/L-ACNC: 0.31 MIU/ML (ref 0.27–4.2)

## 2019-06-25 PROCEDURE — 84443 ASSAY THYROID STIM HORMONE: CPT

## 2019-06-25 PROCEDURE — 36415 COLL VENOUS BLD VENIPUNCTURE: CPT

## 2019-06-25 PROCEDURE — 84439 ASSAY OF FREE THYROXINE: CPT

## 2019-06-28 ENCOUNTER — TELEPHONE (OUTPATIENT)
Dept: FAMILY MEDICINE CLINIC | Facility: CLINIC | Age: 69
End: 2019-06-28

## 2019-06-28 NOTE — TELEPHONE ENCOUNTER
Per Dr. Hutton, Mr. Haas has been called with recent lab results & recommendations.  Continue current medications and follow-up as planned or sooner if any problems.    She states she is taking Levothyroxine 150 mg alternating with Levothyroxine 175 mg daily first thing in the morning on an empty stomach.   She states there may have been a couple of days when she took it a little later but did take it.       ----- Message from Fernando Hutton MD sent at 6/28/2019  2:41 PM CDT -----  Call and confirm her current dose of thyroid medicine.  Also has diffuse changed to a different brand and/or generic recently.  And sent back to me.

## 2019-06-28 NOTE — PROGRESS NOTES
Call and confirm her current dose of thyroid medicine.  Also has diffuse changed to a different brand and/or generic recently.  And sent back to me.

## 2019-06-28 NOTE — PROGRESS NOTES
Per Dr. Hutton, Mr. Haas has been called with recent lab results & recommendations.  Continue current medications and follow-up as planned or sooner if any problems.    She states she is taking Levothyroxine 150 mg alternating with Levothyroxine 175 mg daily first thing in the morning on an empty stomach.   She states there may have been a couple of days when she took it a little later but did take it.

## 2019-07-02 ENCOUNTER — TELEPHONE (OUTPATIENT)
Dept: FAMILY MEDICINE CLINIC | Facility: CLINIC | Age: 69
End: 2019-07-02

## 2019-07-02 DIAGNOSIS — Z79.899 MEDICATION THERAPY CHANGED: ICD-10-CM

## 2019-07-02 DIAGNOSIS — E03.9 HYPOTHYROIDISM, UNSPECIFIED TYPE: Primary | ICD-10-CM

## 2019-07-02 RX ORDER — LEVOTHYROXINE SODIUM 150 MCG
150 TABLET ORAL EVERY OTHER DAY
Qty: 15 TABLET | Refills: 1 | Status: SHIPPED | OUTPATIENT
Start: 2019-07-02 | End: 2019-09-20 | Stop reason: DRUGHIGH

## 2019-07-02 RX ORDER — LEVOTHYROXINE SODIUM 175 MCG
175 TABLET ORAL EVERY OTHER DAY
Qty: 15 TABLET | Refills: 1 | Status: SHIPPED | OUTPATIENT
Start: 2019-07-02 | End: 2019-09-24 | Stop reason: DRUGHIGH

## 2019-07-02 NOTE — TELEPHONE ENCOUNTER
Per Dr. Hutton, Ms. Haas  has been called with recent lab results & recommendations.  Continue current medications and follow-up as planned or sooner if any problems.    New Scripts sent for Name Brand for both strengths of Synthroid 150 mg alternating with 175 mg.  #15 of each with 1 refill  She states understanding to have her thyroid levels rechecked in 1 month.  Labs ordered      ----- Message from Fernando Hutton MD sent at 7/1/2019  4:14 PM CDT -----  Switch her to Synthroid brand name medically necessary and at the same dose.  Recheck free T4 TSH and free T3 in 1 month

## 2019-07-03 ENCOUNTER — OFFICE VISIT (OUTPATIENT)
Dept: FAMILY MEDICINE CLINIC | Facility: CLINIC | Age: 69
End: 2019-07-03

## 2019-07-03 VITALS
HEART RATE: 78 BPM | DIASTOLIC BLOOD PRESSURE: 80 MMHG | HEIGHT: 65 IN | SYSTOLIC BLOOD PRESSURE: 130 MMHG | WEIGHT: 203.5 LBS | OXYGEN SATURATION: 98 % | BODY MASS INDEX: 33.91 KG/M2

## 2019-07-03 DIAGNOSIS — S81.812A LACERATION OF LEFT LOWER EXTREMITY, INITIAL ENCOUNTER: Primary | ICD-10-CM

## 2019-07-03 PROCEDURE — 99213 OFFICE O/P EST LOW 20 MIN: CPT | Performed by: FAMILY MEDICINE

## 2019-07-03 NOTE — PROGRESS NOTES
Subjective   Emily Haas is a 69 y.o. female.     Laceration    The incident occurred 5 to 7 days ago. The laceration is located on the left leg. The laceration is 3 cm in size. The laceration mechanism was a blunt object. The patient is experiencing no pain. She reports no foreign bodies present. Her tetanus status is UTD.   Wound Check   She was originally treated 5 to 10 days ago. Previous treatment included oral antibiotics. There has been clear discharge from the wound. There is no redness present. The swelling has not changed.        The following portions of the patient's history were reviewed and updated as appropriate: allergies, current medications, past family history, past medical history, past social history, past surgical history and problem list.    Review of Systems    Objective   Physical Exam   Constitutional: She is oriented to person, place, and time. She appears well-developed and well-nourished. No distress.   HENT:   Head: Normocephalic and atraumatic.   Musculoskeletal: She exhibits edema (trace) and tenderness.   Neurological: She is alert and oriented to person, place, and time.   Skin: Skin is warm and dry. Laceration noted. She is not diaphoretic. No erythema.        Psychiatric: She has a normal mood and affect. Her behavior is normal. Judgment and thought content normal.   Nursing note and vitals reviewed.      Assessment/Plan   Problems Addressed this Visit     None      Visit Diagnoses     Laceration of left lower extremity, initial encounter    -  Primary          Skin not mature enough to debride. Tegaderm applied and change in 4 day. Consider debridement then

## 2019-07-09 ENCOUNTER — OFFICE VISIT (OUTPATIENT)
Dept: FAMILY MEDICINE CLINIC | Facility: CLINIC | Age: 69
End: 2019-07-09

## 2019-07-09 VITALS
SYSTOLIC BLOOD PRESSURE: 124 MMHG | HEART RATE: 72 BPM | HEIGHT: 65 IN | BODY MASS INDEX: 33.72 KG/M2 | DIASTOLIC BLOOD PRESSURE: 88 MMHG | WEIGHT: 202.4 LBS | OXYGEN SATURATION: 97 %

## 2019-07-09 DIAGNOSIS — S81.812A LACERATION OF LEFT LOWER EXTREMITY, INITIAL ENCOUNTER: Primary | ICD-10-CM

## 2019-07-09 DIAGNOSIS — M19.90 ARTHRITIS: ICD-10-CM

## 2019-07-09 PROCEDURE — 99213 OFFICE O/P EST LOW 20 MIN: CPT | Performed by: FAMILY MEDICINE

## 2019-07-09 PROCEDURE — 96372 THER/PROPH/DIAG INJ SC/IM: CPT | Performed by: FAMILY MEDICINE

## 2019-07-09 RX ORDER — TRIAMCINOLONE ACETONIDE 40 MG/ML
80 INJECTION, SUSPENSION INTRA-ARTICULAR; INTRAMUSCULAR ONCE
Status: COMPLETED | OUTPATIENT
Start: 2019-07-09 | End: 2019-07-09

## 2019-07-09 RX ADMIN — TRIAMCINOLONE ACETONIDE 80 MG: 40 INJECTION, SUSPENSION INTRA-ARTICULAR; INTRAMUSCULAR at 08:53

## 2019-07-09 NOTE — PROGRESS NOTES
Subjective   Emily Haas is a 69 y.o. female.     Laceration    The incident occurred more than 1 week ago. The laceration is located on the left leg. The laceration is 3 cm in size. The laceration mechanism was a blunt object. The patient is experiencing no pain. She reports no foreign bodies present. Her tetanus status is UTD.   Wound Check   She was originally treated 5 to 10 days ago. Previous treatment included oral antibiotics. There has been clear discharge from the wound. There is no redness present. The swelling has not changed.   Arthritis   Presents for follow-up visit. She complains of pain and stiffness. She reports no joint warmth. The symptoms have been worsening. Affected locations include the left shoulder.        The following portions of the patient's history were reviewed and updated as appropriate: allergies, current medications, past family history, past medical history, past social history, past surgical history and problem list.    Review of Systems   Musculoskeletal: Positive for arthritis and stiffness.       Objective   Physical Exam   Constitutional: She is oriented to person, place, and time. She appears well-developed and well-nourished. No distress.   HENT:   Head: Normocephalic and atraumatic.   Musculoskeletal: She exhibits edema (trace) and tenderness.        Left shoulder: She exhibits tenderness, bony tenderness and pain. She exhibits no swelling, no deformity, no laceration and no spasm.   Neurological: She is alert and oriented to person, place, and time.   Skin: Skin is warm and dry. Laceration noted. She is not diaphoretic. No erythema.        Psychiatric: She has a normal mood and affect. Her behavior is normal. Judgment and thought content normal.   Nursing note and vitals reviewed.      Assessment/Plan   Problems Addressed this Visit     None      Visit Diagnoses     Laceration of left lower extremity, initial encounter    -  Primary    Arthritis        Relevant  Medications    triamcinolone acetonide (KENALOG-40) injection 80 mg (Start on 7/9/2019  8:48 AM)           Tegaderm applied and change twice weekly.  No debridement needed today

## 2019-07-22 NOTE — PROGRESS NOTES
Sleep Clinic Follow Up    Date: 4/30/2018  Primary Care Physician: Fernando Hutton MD      Interim History (1/3):  Since the last visit on 01/24/2018, patient has:      1)  TAVO - Has been compliant with CPAP. She denies mask and machine issues, dry mouth, headaches, pressures intolerance, or non-compliance. She denies abnormal dreams, sleep paralysis, hypnagogic hallucinations, or cataplexy symptoms.     PAP Data:  Time frame: 01/30/2018 - 04/29/2018  Compliance 87.8%  PAP range : 6-12 cm H2O  Average 90% pressure: 8-9 cmH2O  Leak: < 5 minute   Average AHI: 5.5 events/hr  Mask type: pillows  DME: [facility]    Bed time: 2200  Sleep latency: 30-12 minutes  Number of times awakens during the night: 1  Wake time: 0700  Estimated total sleep time at night: 6 hours  Caffeine intake: 2-3 per day  Alcohol intake: none  Nap time: none  Sleepiness with Driving: yes    2) Patient denies RLS symptoms.     PMHx, FH, SH reviewed and pertinent changes are: unchanged from last office visit on 01/24/2018      REVIEW OF SYSTEMS:   Negative for chest pain, fever, chills, SOA, abdominal pain. Smoking: none      Exam (6-11/12):    Vitals:    04/30/18 1016   BP: 136/76     Body mass index is 35.78 kg/m².  Gen:  No distress, conversant, pleasant, appears stated age, alert, oriented  Eyes:   Anicteric sclera, moist conjunctiva, no lid lag     PERRLA, EOMI   Heent:   NC/AT    Oropharynx clear, Mallampati 4  Lungs:  Normal effort, non-labored breathing    Clear to auscultation    CV:  Normal S1, prominent S2    no lower extremity edema  ABD:  Soft, normal bowel sounds    Psych:  Appropriate affect  Neuro:  CN 2-12 intact    Past Medical History:   Diagnosis Date   • Abrasion, right lower leg, initial encounter    • Acquired hypothyroidism    • Acromioclavicular joint pain    • Allergic rhinitis    • Anxiety state    • Asthma    • Bleeding disorder    • Cancer    • Degenerative joint disease involving multiple joints    • Diabetes mellitus  type 2, diet-controlled    • Diverticular disease of colon    • Diverticulitis of colon    • Essential hypertension    • Generalized anxiety disorder    • Hyperlipidemia    • Ingrowing toenail    • Insomnia    • Irritable bowel syndrome    • Joint pain    • Local infection of the skin and subcutaneous tissue, unspecified    • Low back pain     abn MRI   • Neck pain     abn MRI-post Fusion   • Need for prophylactic vaccination against Streptococcus pneumoniae (pneumococcus)    • Open wound of lower leg    • Pain in right hip    • Peripheral edema    • Screening mammogram, encounter for 06/11/2015   • Vitamin D deficiency        Current Outpatient Prescriptions:   •  acetaminophen (TYLENOL) 325 MG tablet, Take  by mouth Every 6 (Six) Hours., Disp: , Rfl:   •  albuterol (PROVENTIL HFA;VENTOLIN HFA) 108 (90 Base) MCG/ACT inhaler, Inhale 2 puffs 4 (Four) Times a Day., Disp: 8 g, Rfl: 0  •  carvedilol (COREG) 3.125 MG tablet, Take 1 tablet by mouth 2 (Two) Times a Day., Disp: 180 tablet, Rfl: 2  •  glucose blood test strip, by Other route 2 (Two) Times a Day. Use as instructed, Disp: , Rfl:   •  guanFACINE (TENEX) 1 MG tablet, Take 1 mg by mouth Daily., Disp: , Rfl:   •  levothyroxine (SYNTHROID, LEVOTHROID) 175 MCG tablet, Take 1 tablet by mouth Daily. Take first thing of the morning on an empty stomach., Disp: 90 tablet, Rfl: 3  •  losartan (COZAAR) 100 MG tablet, Take 1 tablet by mouth Daily., Disp: 90 tablet, Rfl: 2  •  meloxicam (MOBIC) 7.5 MG tablet, Take 7.5 mg by mouth Daily As Needed (1-2 tablets daily prn pain)., Disp: , Rfl:   •  polyethylene glycol (MIRALAX) powder, Take 17 g by mouth As Needed. 17gm in 8oz bid , Disp: , Rfl:     Current Facility-Administered Medications:   •  triamcinolone acetonide (KENALOG-40) injection 80 mg, 80 mg, Intramuscular, Once, Fernando Hutton MD      ASSESSMENT / PLAN:     1. Obstructive sleep apnea  1. PSG on 05/02/2005, AHI of 46.3  2. Currently on 6-12 cm H2O  3. Continue  CPAP as prescibed  4. Return to clinic in  9 months  with compliance check unless sx change in the interim period.  2. Insomnia - sleep onset   1. Stress - techniques to reduce discussed      Total time 15 min, more than half spent in face to face counseling and coordination of care.     This document has been electronically signed by Kenan Elmore MD on April 30, 2018         CC: Fernando Hutton MD          No ref. provider found   Initiate Treatment: .\\nTriamcinolone 0.1% Cream twice daily for 2 weeks. \\nXyzal 5 mg daily. Darius Carrillo Detail Level: Zone

## 2019-07-23 ENCOUNTER — OFFICE VISIT (OUTPATIENT)
Dept: FAMILY MEDICINE CLINIC | Facility: CLINIC | Age: 69
End: 2019-07-23

## 2019-07-23 VITALS
SYSTOLIC BLOOD PRESSURE: 122 MMHG | HEIGHT: 65 IN | WEIGHT: 201.7 LBS | HEART RATE: 74 BPM | DIASTOLIC BLOOD PRESSURE: 86 MMHG | BODY MASS INDEX: 33.61 KG/M2 | OXYGEN SATURATION: 98 %

## 2019-07-23 DIAGNOSIS — S81.812D LACERATION OF LEFT LOWER EXTREMITY, SUBSEQUENT ENCOUNTER: Primary | ICD-10-CM

## 2019-07-23 PROCEDURE — 99213 OFFICE O/P EST LOW 20 MIN: CPT | Performed by: FAMILY MEDICINE

## 2019-07-23 NOTE — PROGRESS NOTES
Subjective   Emily Haas is a 69 y.o. female.     Laceration    The incident occurred more than 1 week ago. The laceration is located on the left leg. The laceration is 3 cm in size. The laceration mechanism was a blunt object. The patient is experiencing no pain. She reports no foreign bodies present. Her tetanus status is UTD.   Wound Check   She was originally treated 5 to 10 days ago. Previous treatment included oral antibiotics. There has been clear discharge from the wound. There is no redness present. The swelling has not changed.   Arthritis   Presents for follow-up visit. She complains of pain and stiffness. She reports no joint warmth. The symptoms have been worsening. Affected locations include the left shoulder.        The following portions of the patient's history were reviewed and updated as appropriate: allergies, current medications, past family history, past medical history, past social history, past surgical history and problem list.    Review of Systems   Musculoskeletal: Positive for arthritis and stiffness.       Objective   Physical Exam   Constitutional: She is oriented to person, place, and time. She appears well-developed and well-nourished. No distress.   HENT:   Head: Normocephalic and atraumatic.   Musculoskeletal: She exhibits edema (trace) and tenderness.   Neurological: She is alert and oriented to person, place, and time.   Skin: Skin is warm and dry. Laceration noted. She is not diaphoretic. No erythema.        Psychiatric: She has a normal mood and affect. Her behavior is normal. Judgment and thought content normal.   Nursing note and vitals reviewed.      Assessment/Plan   Problems Addressed this Visit     None      Visit Diagnoses     Laceration of left lower extremity, subsequent encounter    -  Primary           Tegaderm applied and change twice weekly.  No debridement needed today

## 2019-07-23 NOTE — PROGRESS NOTES
Subjective   Emily Haas is a 69 y.o. female.     History of Present Illness     {Common H&P Review Areas:99978}    Review of Systems    Objective   Physical Exam    Assessment/Plan   {Assess/PlanSmartLinks:12490}

## 2019-08-01 ENCOUNTER — LAB (OUTPATIENT)
Dept: LAB | Facility: HOSPITAL | Age: 69
End: 2019-08-01

## 2019-08-01 ENCOUNTER — TELEPHONE (OUTPATIENT)
Dept: FAMILY MEDICINE CLINIC | Facility: CLINIC | Age: 69
End: 2019-08-01

## 2019-08-01 DIAGNOSIS — E03.9 HYPOTHYROIDISM, UNSPECIFIED TYPE: ICD-10-CM

## 2019-08-01 DIAGNOSIS — Z79.899 MEDICATION THERAPY CHANGED: ICD-10-CM

## 2019-08-01 PROCEDURE — 36415 COLL VENOUS BLD VENIPUNCTURE: CPT

## 2019-08-01 PROCEDURE — 84439 ASSAY OF FREE THYROXINE: CPT

## 2019-08-01 PROCEDURE — 84481 FREE ASSAY (FT-3): CPT

## 2019-08-01 PROCEDURE — 84443 ASSAY THYROID STIM HORMONE: CPT

## 2019-08-01 NOTE — TELEPHONE ENCOUNTER
Emily called and wants to know if she needs to continue taking her Synthroid?  She has been on it for a month and didn't know if she needs to refill it or go to the lab?

## 2019-08-01 NOTE — TELEPHONE ENCOUNTER
LM for patient to please go to lab and have bloodwork done for thyroid and after Dr Hutton gets results, we will call and let her know if she will stay on same dosage or if it will be adjusted and Rx will sent to her pharmacy for her.

## 2019-08-02 LAB
T3FREE SERPL-MCNC: 2.21 PG/ML (ref 2–4.4)
T4 FREE SERPL-MCNC: 1.66 NG/DL (ref 0.93–1.7)
TSH SERPL DL<=0.05 MIU/L-ACNC: 0.09 MIU/ML (ref 0.27–4.2)

## 2019-08-05 ENCOUNTER — TELEPHONE (OUTPATIENT)
Dept: FAMILY MEDICINE CLINIC | Facility: CLINIC | Age: 69
End: 2019-08-05

## 2019-08-05 NOTE — TELEPHONE ENCOUNTER
Per Dr. Hutton, Ms. Arevalo  has been called with recent lab results & recommendations.  Continue current medications and follow-up as planned or sooner if any problems.      ----- Message from Fernando Hutton MD sent at 8/2/2019  4:10 PM CDT -----  Okay for now.

## 2019-08-05 NOTE — PROGRESS NOTES
Per Dr. Hutton, Ms. Arevalo  has been called with recent lab results & recommendations.  Continue current medications and follow-up as planned or sooner if any problems.

## 2019-09-18 ENCOUNTER — OFFICE VISIT (OUTPATIENT)
Dept: FAMILY MEDICINE CLINIC | Facility: CLINIC | Age: 69
End: 2019-09-18

## 2019-09-18 ENCOUNTER — APPOINTMENT (OUTPATIENT)
Dept: LAB | Facility: HOSPITAL | Age: 69
End: 2019-09-18

## 2019-09-18 VITALS
HEART RATE: 72 BPM | WEIGHT: 201 LBS | HEIGHT: 65 IN | DIASTOLIC BLOOD PRESSURE: 76 MMHG | BODY MASS INDEX: 33.49 KG/M2 | OXYGEN SATURATION: 98 % | SYSTOLIC BLOOD PRESSURE: 126 MMHG

## 2019-09-18 DIAGNOSIS — E11.9 DIABETES MELLITUS TYPE 2 WITHOUT RETINOPATHY (HCC): ICD-10-CM

## 2019-09-18 DIAGNOSIS — M25.552 LEFT HIP PAIN: Primary | ICD-10-CM

## 2019-09-18 DIAGNOSIS — E03.9 ACQUIRED HYPOTHYROIDISM: ICD-10-CM

## 2019-09-18 DIAGNOSIS — I10 ESSENTIAL HYPERTENSION: ICD-10-CM

## 2019-09-18 DIAGNOSIS — F32.89 OTHER DEPRESSION: ICD-10-CM

## 2019-09-18 DIAGNOSIS — E78.00 PURE HYPERCHOLESTEROLEMIA: ICD-10-CM

## 2019-09-18 LAB
ALBUMIN SERPL-MCNC: 4 G/DL (ref 3.5–5.2)
ALBUMIN UR-MCNC: <1.2 MG/DL
ALBUMIN/GLOB SERPL: 1.5 G/DL
ALP SERPL-CCNC: 67 U/L (ref 39–117)
ALT SERPL W P-5'-P-CCNC: 10 U/L (ref 1–33)
ANION GAP SERPL CALCULATED.3IONS-SCNC: 10.9 MMOL/L (ref 5–15)
AST SERPL-CCNC: 14 U/L (ref 1–32)
BACTERIA UR QL AUTO: ABNORMAL /HPF
BILIRUB SERPL-MCNC: 0.5 MG/DL (ref 0.2–1.2)
BILIRUB UR QL STRIP: NEGATIVE
BUN BLD-MCNC: 19 MG/DL (ref 8–23)
BUN/CREAT SERPL: 18.6 (ref 7–25)
CALCIUM SPEC-SCNC: 9.5 MG/DL (ref 8.6–10.5)
CHLORIDE SERPL-SCNC: 103 MMOL/L (ref 98–107)
CHOLEST SERPL-MCNC: 267 MG/DL (ref 0–200)
CLARITY UR: CLEAR
CO2 SERPL-SCNC: 26.1 MMOL/L (ref 22–29)
COLOR UR: YELLOW
CREAT BLD-MCNC: 1.02 MG/DL (ref 0.57–1)
CREAT UR-MCNC: 130.2 MG/DL
GFR SERPL CREATININE-BSD FRML MDRD: 54 ML/MIN/1.73
GLOBULIN UR ELPH-MCNC: 2.7 GM/DL
GLUCOSE BLD-MCNC: 107 MG/DL (ref 65–99)
GLUCOSE UR STRIP-MCNC: NEGATIVE MG/DL
HBA1C MFR BLD: 6.99 % (ref 4.8–5.6)
HDLC SERPL-MCNC: 58 MG/DL (ref 40–60)
HGB UR QL STRIP.AUTO: NEGATIVE
HYALINE CASTS UR QL AUTO: ABNORMAL /LPF
KETONES UR QL STRIP: NEGATIVE
LDLC SERPL CALC-MCNC: 174 MG/DL (ref 0–100)
LDLC/HDLC SERPL: 3 {RATIO}
LEUKOCYTE ESTERASE UR QL STRIP.AUTO: NEGATIVE
MICROALBUMIN/CREAT UR: NORMAL MG/G{CREAT}
NITRITE UR QL STRIP: NEGATIVE
PH UR STRIP.AUTO: 6.5 [PH] (ref 5–8)
POTASSIUM BLD-SCNC: 5.2 MMOL/L (ref 3.5–5.2)
PROT SERPL-MCNC: 6.7 G/DL (ref 6–8.5)
PROT UR QL STRIP: NEGATIVE
RBC # UR: ABNORMAL /HPF
REF LAB TEST METHOD: ABNORMAL
SODIUM BLD-SCNC: 140 MMOL/L (ref 136–145)
SP GR UR STRIP: 1.02 (ref 1–1.03)
SQUAMOUS #/AREA URNS HPF: ABNORMAL /HPF
T4 FREE SERPL-MCNC: 1.78 NG/DL (ref 0.93–1.7)
TRIGL SERPL-MCNC: 175 MG/DL (ref 0–150)
TSH SERPL DL<=0.05 MIU/L-ACNC: 0.29 UIU/ML (ref 0.27–4.2)
UROBILINOGEN UR QL STRIP: NORMAL
VLDLC SERPL-MCNC: 35 MG/DL (ref 5–40)
WBC UR QL AUTO: ABNORMAL /HPF

## 2019-09-18 PROCEDURE — 96372 THER/PROPH/DIAG INJ SC/IM: CPT | Performed by: FAMILY MEDICINE

## 2019-09-18 PROCEDURE — 99214 OFFICE O/P EST MOD 30 MIN: CPT | Performed by: FAMILY MEDICINE

## 2019-09-18 PROCEDURE — 84443 ASSAY THYROID STIM HORMONE: CPT | Performed by: FAMILY MEDICINE

## 2019-09-18 PROCEDURE — 82570 ASSAY OF URINE CREATININE: CPT | Performed by: FAMILY MEDICINE

## 2019-09-18 PROCEDURE — 80053 COMPREHEN METABOLIC PANEL: CPT | Performed by: FAMILY MEDICINE

## 2019-09-18 PROCEDURE — 81001 URINALYSIS AUTO W/SCOPE: CPT | Performed by: FAMILY MEDICINE

## 2019-09-18 PROCEDURE — 82043 UR ALBUMIN QUANTITATIVE: CPT | Performed by: FAMILY MEDICINE

## 2019-09-18 PROCEDURE — 83036 HEMOGLOBIN GLYCOSYLATED A1C: CPT | Performed by: FAMILY MEDICINE

## 2019-09-18 PROCEDURE — 80061 LIPID PANEL: CPT | Performed by: FAMILY MEDICINE

## 2019-09-18 PROCEDURE — 36415 COLL VENOUS BLD VENIPUNCTURE: CPT | Performed by: FAMILY MEDICINE

## 2019-09-18 PROCEDURE — 84439 ASSAY OF FREE THYROXINE: CPT | Performed by: FAMILY MEDICINE

## 2019-09-18 RX ORDER — TRIAMCINOLONE ACETONIDE 40 MG/ML
80 INJECTION, SUSPENSION INTRA-ARTICULAR; INTRAMUSCULAR ONCE
Status: COMPLETED | OUTPATIENT
Start: 2019-09-18 | End: 2019-09-18

## 2019-09-18 RX ADMIN — TRIAMCINOLONE ACETONIDE 80 MG: 40 INJECTION, SUSPENSION INTRA-ARTICULAR; INTRAMUSCULAR at 10:53

## 2019-09-18 NOTE — PROGRESS NOTES
Subjective   Emily Haas is a 69 y.o. female.     Hip Pain    The incident occurred more than 1 week ago. There was no injury mechanism. The pain is present in the left hip. The quality of the pain is described as aching. The pain is moderate. The pain has been fluctuating since onset. She reports no foreign bodies present. The symptoms are aggravated by movement. She has tried nothing for the symptoms. The treatment provided no relief.   Hypertension   This is a chronic problem. The current episode started more than 1 year ago. The problem is unchanged. The problem is controlled. Pertinent negatives include no orthopnea, palpitations, peripheral edema, PND or shortness of breath.   Hyperlipidemia   This is a chronic problem. The current episode started more than 1 year ago. The problem is uncontrolled. Recent lipid tests were reviewed and are high. Exacerbating diseases include hypothyroidism. Pertinent negatives include no myalgias or shortness of breath.   Hypothyroidism   This is a chronic problem. The current episode started more than 1 year ago. The problem occurs constantly. The problem has been unchanged. Pertinent negatives include no arthralgias or myalgias.   Diabetes   She presents for her follow-up diabetic visit. She has type 2 diabetes mellitus. Her disease course has been stable. Pertinent negatives for hypoglycemia include no nervousness/anxiousness. Pertinent negatives for diabetes include no foot paresthesias, no foot ulcerations, no polydipsia and no polyuria. (None) An ACE inhibitor/angiotensin II receptor blocker is being taken. Eye exam is current.   Depression   Visit Type: initial  Onset of symptoms: 1 to 6 months ago  Progression since onset: gradually worsening  Patient presents with the following symptoms: excessive worry.  Patient is not experiencing: depressed mood, insomnia, irritability, nervousness/anxiety, palpitations and shortness of breath.         The following portions of  the patient's history were reviewed and updated as appropriate: allergies, current medications, past family history, past medical history, past social history, past surgical history and problem list.    Review of Systems   Constitutional: Negative for irritability.   Respiratory: Negative for shortness of breath.    Cardiovascular: Negative for palpitations, orthopnea and PND.   Endocrine: Negative for polydipsia and polyuria.   Genitourinary: Negative for dysuria and hematuria.   Musculoskeletal: Negative for arthralgias and myalgias.   Skin:        Wart left hand and foot X3   Psychiatric/Behavioral: Negative for dysphoric mood. The patient is not nervous/anxious and does not have insomnia.        Objective   Physical Exam   Constitutional: She is oriented to person, place, and time. She appears well-developed and well-nourished. No distress.   HENT:   Head: Normocephalic and atraumatic.   Cardiovascular: Normal rate, regular rhythm, normal heart sounds and intact distal pulses. Exam reveals no gallop and no friction rub.   No murmur heard.  Pulmonary/Chest: Effort normal. No respiratory distress. She has no wheezes. She has no rales.   Abdominal: Normal appearance. She exhibits no shifting dullness, no pulsatile liver, no fluid wave, no abdominal bruit, no ascites and no pulsatile midline mass. There is no hepatosplenomegaly. There is no rigidity.   Musculoskeletal: She exhibits no edema.        Left hip: She exhibits decreased range of motion and tenderness.    Emily had a diabetic foot exam performed (callus noted) today.   During the foot exam she had a monofilament test performed (normal).  Vascular Status -  Her right foot exhibits no edema. Her left foot exhibits no edema.  Neurological: She is alert and oriented to person, place, and time.   Reflex Scores:       Bicep reflexes are 2+ on the left side.  Skin: Skin is warm and dry. She is not diaphoretic.   Psychiatric: She has a normal mood and affect. Her  speech is normal and behavior is normal. Judgment and thought content normal.   Nursing note and vitals reviewed.      Assessment/Plan   Problems Addressed this Visit        Cardiovascular and Mediastinum    Essential hypertension    Relevant Orders    Comprehensive Metabolic Panel    Urinalysis With Microscopic - Urine, Clean Catch    Pure hypercholesterolemia    Relevant Orders    Lipid Panel       Endocrine    Acquired hypothyroidism    Relevant Orders    T4, Free    TSH    Diabetes mellitus type 2 without retinopathy (CMS/HCC)    Relevant Orders    Hemoglobin A1c    Microalbumin / Creatinine Urine Ratio - Urine, Clean Catch       Other    Depression      Other Visit Diagnoses     Left hip pain    -  Primary    Relevant Medications    triamcinolone acetonide (KENALOG-40) injection 80 mg (Start on 9/18/2019 10:52 AM)          duofilm and duct tape recommended

## 2019-09-20 ENCOUNTER — TELEPHONE (OUTPATIENT)
Dept: FAMILY MEDICINE CLINIC | Facility: CLINIC | Age: 69
End: 2019-09-20

## 2019-09-20 DIAGNOSIS — R79.89 ELEVATED SERUM CREATININE: ICD-10-CM

## 2019-09-20 DIAGNOSIS — E03.9 ACQUIRED HYPOTHYROIDISM: Primary | ICD-10-CM

## 2019-09-20 RX ORDER — LEVOTHYROXINE SODIUM 125 MCG
125 TABLET ORAL DAILY
Qty: 15 TABLET | Refills: 3 | Status: CANCELLED | OUTPATIENT
Start: 2019-09-20

## 2019-09-20 RX ORDER — LEVOTHYROXINE SODIUM 175 MCG
175 TABLET ORAL EVERY OTHER DAY
Qty: 15 TABLET | Refills: 3 | Status: CANCELLED | OUTPATIENT
Start: 2019-09-20

## 2019-09-20 NOTE — PROGRESS NOTES
Per Dr. Hutton, Ms. Haas has been called with recent lab results & recommendations.  Continue current medications and follow-up as planned or sooner if any problems.    New Script sent and labs ordered    Dr. Hutton,    Ms. Haas is currently taking Synthroid 175 mcg alternating with Synthroid 150 mcg.  Just to Clarify you want her to decrease the Synthroid 150 mcg only down to Synthroid 125 mcg and continue alternating with Synthroid 175 mcg?  I told Ms. Haas I would ask and call her back on Monday.    Please Advise

## 2019-09-20 NOTE — TELEPHONE ENCOUNTER
Per Dr. Hutton, Ms. Haas has been called with recent lab results & recommendations.  Continue current medications and follow-up as planned or sooner if any problems.    New Script sent and labs ordered      Dr. Hutton,    Ms. Haas is currently taking Synthroid 175 mcg alternating with Synthroid 150 mcg.  Just to Clarify you want her to decrease the Synthroid 150 mcg only down to Synthroid 125 mcg and continue alternating with Synthroid 175 mcg?  I told Ms. Haas I would ask and call her back on Monday.    Please Advise      ----- Message from Fernando Hutton MD sent at 9/20/2019 11:29 AM CDT -----  Free T4 is elevated, creatinine is elevated, and LDL cholesterol is elevated.  Recommend decrease levothyroxine from 150 mcg a day down to a total of 125 mcg a day.  Hold Lasix and increase fluid to 6 to 8 glasses water a day.  For cholesterol she is allergic to Lipitor so we will just recommend low-cholesterol diet at this point.  Recheck free T4, TSH and renal function panel in 2 weeks.

## 2019-09-20 NOTE — PROGRESS NOTES
Free T4 is elevated, creatinine is elevated, and LDL cholesterol is elevated.  Recommend decrease levothyroxine from 150 mcg a day down to a total of 125 mcg a day.  Hold Lasix and increase fluid to 6 to 8 glasses water a day.  For cholesterol she is allergic to Lipitor so we will just recommend low-cholesterol diet at this point.  Recheck free T4, TSH and renal function panel in 2 weeks.

## 2019-09-23 NOTE — TELEPHONE ENCOUNTER
Fernando Hutton MD   You 7 hours ago (9:10 AM)      Just decreased to 150 mcg a day should hopefully solve the problem.  Let us do that and recheck it in a month.  Thanks    Routing Comment      Corrected Dosing instructions.  Per Dr. Hutton, He would like for Ms. Haas to take Synthroid 150 mg 1 tablet Daily.  He does not want her to alternate two different strengths.     New script has been sent to [facility] in [place] for #30 with 2 refills,  She will be having blood work drawn in about 2 weeks to check her Thyroid levels and her renal function studies.

## 2019-09-24 RX ORDER — LEVOTHYROXINE SODIUM 150 MCG
150 TABLET ORAL EVERY OTHER DAY
Qty: 30 TABLET | Refills: 2 | Status: SHIPPED | OUTPATIENT
Start: 2019-09-24 | End: 2019-09-27 | Stop reason: SDUPTHER

## 2019-09-24 NOTE — TELEPHONE ENCOUNTER
Per Dr. Hutton, Ms. Haas has been called with confirmation on medication dosage and instructions.  She will have her labs rechecked as planned.

## 2019-09-25 RX ORDER — LOSARTAN POTASSIUM 100 MG/1
TABLET ORAL
Qty: 90 TABLET | Refills: 2 | Status: SHIPPED | OUTPATIENT
Start: 2019-09-25 | End: 2020-09-17

## 2019-09-27 RX ORDER — LEVOTHYROXINE SODIUM 150 MCG
150 TABLET ORAL EVERY OTHER DAY
Qty: 30 TABLET | Refills: 2 | Status: SHIPPED | OUTPATIENT
Start: 2019-09-27 | End: 2019-10-04 | Stop reason: SDUPTHER

## 2019-10-04 RX ORDER — LEVOTHYROXINE SODIUM 150 MCG
150 TABLET ORAL DAILY
Qty: 90 TABLET | Refills: 2 | Status: SHIPPED | OUTPATIENT
Start: 2019-10-04 | End: 2020-02-14 | Stop reason: DRUGHIGH

## 2019-10-09 DIAGNOSIS — R10.2 PELVIC PAIN: Primary | ICD-10-CM

## 2019-10-09 DIAGNOSIS — Z85.51 PERSONAL HISTORY OF BLADDER CANCER: Primary | ICD-10-CM

## 2019-10-09 DIAGNOSIS — R35.0 FREQUENCY OF URINATION: ICD-10-CM

## 2019-10-09 DIAGNOSIS — Z85.51 PERSONAL HISTORY OF BLADDER CANCER: ICD-10-CM

## 2019-10-11 ENCOUNTER — HOSPITAL ENCOUNTER (OUTPATIENT)
Dept: ULTRASOUND IMAGING | Facility: HOSPITAL | Age: 69
Discharge: HOME | End: 2019-10-11

## 2019-10-11 ENCOUNTER — HOSPITAL ENCOUNTER (OUTPATIENT)
Dept: ULTRASOUND IMAGING | Facility: HOSPITAL | Age: 69
Discharge: HOME | End: 2019-10-11
Admitting: UROLOGY

## 2019-10-11 ENCOUNTER — LAB (OUTPATIENT)
Dept: LAB | Facility: HOSPITAL | Age: 69
End: 2019-10-11

## 2019-10-11 DIAGNOSIS — R10.2 PELVIC PAIN: ICD-10-CM

## 2019-10-11 DIAGNOSIS — R35.0 FREQUENCY OF URINATION: ICD-10-CM

## 2019-10-11 DIAGNOSIS — E03.9 ACQUIRED HYPOTHYROIDISM: ICD-10-CM

## 2019-10-11 DIAGNOSIS — Z85.51 PERSONAL HISTORY OF BLADDER CANCER: ICD-10-CM

## 2019-10-11 DIAGNOSIS — R79.89 ELEVATED SERUM CREATININE: ICD-10-CM

## 2019-10-11 LAB
ALBUMIN SERPL-MCNC: 4.2 G/DL (ref 3.5–5.2)
ANION GAP SERPL CALCULATED.3IONS-SCNC: 14.8 MMOL/L (ref 5–15)
BUN BLD-MCNC: 29 MG/DL (ref 8–23)
BUN/CREAT SERPL: 25 (ref 7–25)
CALCIUM SPEC-SCNC: 9.4 MG/DL (ref 8.6–10.5)
CHLORIDE SERPL-SCNC: 101 MMOL/L (ref 98–107)
CO2 SERPL-SCNC: 23.2 MMOL/L (ref 22–29)
CREAT BLD-MCNC: 1.16 MG/DL (ref 0.57–1)
GFR SERPL CREATININE-BSD FRML MDRD: 46 ML/MIN/1.73
GLUCOSE BLD-MCNC: 198 MG/DL (ref 65–99)
PHOSPHATE SERPL-MCNC: 2.8 MG/DL (ref 2.5–4.5)
POTASSIUM BLD-SCNC: 4.7 MMOL/L (ref 3.5–5.2)
SODIUM BLD-SCNC: 139 MMOL/L (ref 136–145)
T4 FREE SERPL-MCNC: 1.69 NG/DL (ref 0.93–1.7)
TSH SERPL DL<=0.05 MIU/L-ACNC: 0.39 UIU/ML (ref 0.27–4.2)

## 2019-10-11 PROCEDURE — 80069 RENAL FUNCTION PANEL: CPT

## 2019-10-11 PROCEDURE — 87109 MYCOPLASMA: CPT

## 2019-10-11 PROCEDURE — 76775 US EXAM ABDO BACK WALL LIM: CPT

## 2019-10-11 PROCEDURE — 87086 URINE CULTURE/COLONY COUNT: CPT

## 2019-10-11 PROCEDURE — 76857 US EXAM PELVIC LIMITED: CPT

## 2019-10-11 PROCEDURE — 36415 COLL VENOUS BLD VENIPUNCTURE: CPT

## 2019-10-11 PROCEDURE — 84443 ASSAY THYROID STIM HORMONE: CPT

## 2019-10-11 PROCEDURE — 76856 US EXAM PELVIC COMPLETE: CPT

## 2019-10-11 PROCEDURE — 84439 ASSAY OF FREE THYROXINE: CPT

## 2019-10-12 LAB — BACTERIA SPEC AEROBE CULT: NO GROWTH

## 2019-10-17 ENCOUNTER — TELEPHONE (OUTPATIENT)
Dept: FAMILY MEDICINE CLINIC | Facility: CLINIC | Age: 69
End: 2019-10-17

## 2019-10-18 DIAGNOSIS — R79.89 ELEVATED SERUM CREATININE: Primary | ICD-10-CM

## 2019-10-18 NOTE — PROGRESS NOTES
Renal function worse.  We called her a month ago with her elevated creatinine and recommended she hold her Lasix.  Make sure she is done that.  She also needs to avoid nonsteroidal anti-inflammatories.  Would like referral to a nephrologist.  Please arrange that.  She is had an ultrasound done within the last month by Dr. Damico we do not have to repeat that.

## 2019-10-21 LAB
MYCOPLASMA HOMINIS: NEGATIVE
UREAPLASMA UREALYTICUM: POSITIVE

## 2019-11-07 ENCOUNTER — APPOINTMENT (OUTPATIENT)
Dept: LAB | Facility: HOSPITAL | Age: 69
End: 2019-11-07

## 2019-11-07 PROCEDURE — 88112 CYTOPATH CELL ENHANCE TECH: CPT

## 2019-11-07 PROCEDURE — 88112 CYTOPATH CELL ENHANCE TECH: CPT | Performed by: PATHOLOGY

## 2019-11-08 ENCOUNTER — TELEPHONE (OUTPATIENT)
Dept: FAMILY MEDICINE CLINIC | Facility: CLINIC | Age: 69
End: 2019-11-08

## 2019-11-11 LAB
CYTO UR: NORMAL
LAB AP CASE REPORT: NORMAL
LAB AP NON-GYN INTERPRETATION: NORMAL
PATH REPORT.FINAL DX SPEC: NORMAL
PATH REPORT.GROSS SPEC: NORMAL
STAT OF ADQ CVX/VAG CYTO-IMP: NORMAL

## 2019-11-13 ENCOUNTER — APPOINTMENT (OUTPATIENT)
Dept: LAB | Facility: HOSPITAL | Age: 69
End: 2019-11-13

## 2019-11-13 ENCOUNTER — TRANSCRIBE ORDERS (OUTPATIENT)
Dept: LAB | Facility: HOSPITAL | Age: 69
End: 2019-11-13

## 2019-11-13 DIAGNOSIS — N18.30 CHRONIC KIDNEY DISEASE, STAGE III (MODERATE) (HCC): Primary | ICD-10-CM

## 2019-11-13 DIAGNOSIS — E78.5 HYPERLIPIDEMIA, UNSPECIFIED HYPERLIPIDEMIA TYPE: ICD-10-CM

## 2019-11-13 DIAGNOSIS — E11.9 TYPE 2 DIABETES MELLITUS WITHOUT COMPLICATION, UNSPECIFIED WHETHER LONG TERM INSULIN USE (HCC): ICD-10-CM

## 2019-11-13 DIAGNOSIS — I10 ESSENTIAL (PRIMARY) HYPERTENSION: ICD-10-CM

## 2019-11-13 DIAGNOSIS — N39.0 URINARY TRACT INFECTION WITHOUT HEMATURIA, SITE UNSPECIFIED: ICD-10-CM

## 2019-11-13 LAB
25(OH)D3 SERPL-MCNC: 17.4 NG/ML (ref 30–100)
ALBUMIN SERPL-MCNC: 3.8 G/DL (ref 3.5–5.2)
ANION GAP SERPL CALCULATED.3IONS-SCNC: 8.4 MMOL/L (ref 5–15)
BACTERIA UR QL AUTO: ABNORMAL /HPF
BILIRUB UR QL STRIP: NEGATIVE
BUN BLD-MCNC: 17 MG/DL (ref 8–23)
BUN/CREAT SERPL: 18.5 (ref 7–25)
CALCIUM SPEC-SCNC: 9.3 MG/DL (ref 8.6–10.5)
CHLORIDE SERPL-SCNC: 97 MMOL/L (ref 98–107)
CLARITY UR: CLEAR
CO2 SERPL-SCNC: 25.6 MMOL/L (ref 22–29)
COLOR UR: YELLOW
CREAT BLD-MCNC: 0.92 MG/DL (ref 0.57–1)
CREAT UR-MCNC: 55.2 MG/DL
GFR SERPL CREATININE-BSD FRML MDRD: 61 ML/MIN/1.73
GLUCOSE BLD-MCNC: 142 MG/DL (ref 65–99)
GLUCOSE UR STRIP-MCNC: NEGATIVE MG/DL
HBV SURFACE AG SERPL QL IA: NORMAL
HCT VFR BLD AUTO: 39.5 % (ref 34–46.6)
HCV AB SER DONR QL: NORMAL
HGB BLD-MCNC: 13.3 G/DL (ref 12–15.9)
HGB UR QL STRIP.AUTO: NEGATIVE
HIV1+2 AB SER QL: NORMAL
HYALINE CASTS UR QL AUTO: ABNORMAL /LPF
KETONES UR QL STRIP: NEGATIVE
LEUKOCYTE ESTERASE UR QL STRIP.AUTO: NEGATIVE
MAGNESIUM SERPL-MCNC: 2.2 MG/DL (ref 1.6–2.4)
NITRITE UR QL STRIP: NEGATIVE
PH UR STRIP.AUTO: 6.5 [PH] (ref 5–8)
PHOSPHATE SERPL-MCNC: 3.1 MG/DL (ref 2.5–4.5)
POTASSIUM BLD-SCNC: 4.7 MMOL/L (ref 3.5–5.2)
PROT UR QL STRIP: NEGATIVE
PROT UR-MCNC: 7 MG/DL
PROT/CREAT UR: 126.8 MG/G CREA (ref 0–200)
PTH-INTACT SERPL-MCNC: 45.8 PG/ML (ref 15–65)
RBC # UR: ABNORMAL /HPF
REF LAB TEST METHOD: ABNORMAL
SODIUM BLD-SCNC: 131 MMOL/L (ref 136–145)
SP GR UR STRIP: 1.01 (ref 1–1.03)
SQUAMOUS #/AREA URNS HPF: ABNORMAL /HPF
URATE SERPL-MCNC: 5.3 MG/DL (ref 2.4–5.7)
UROBILINOGEN UR QL STRIP: NORMAL
WBC UR QL AUTO: ABNORMAL /HPF

## 2019-11-13 PROCEDURE — 83516 IMMUNOASSAY NONANTIBODY: CPT | Performed by: NURSE PRACTITIONER

## 2019-11-13 PROCEDURE — G0432 EIA HIV-1/HIV-2 SCREEN: HCPCS | Performed by: NURSE PRACTITIONER

## 2019-11-13 PROCEDURE — 83735 ASSAY OF MAGNESIUM: CPT | Performed by: NURSE PRACTITIONER

## 2019-11-13 PROCEDURE — 83970 ASSAY OF PARATHORMONE: CPT | Performed by: NURSE PRACTITIONER

## 2019-11-13 PROCEDURE — 86235 NUCLEAR ANTIGEN ANTIBODY: CPT | Performed by: NURSE PRACTITIONER

## 2019-11-13 PROCEDURE — 86162 COMPLEMENT TOTAL (CH50): CPT | Performed by: NURSE PRACTITIONER

## 2019-11-13 PROCEDURE — 86225 DNA ANTIBODY NATIVE: CPT | Performed by: NURSE PRACTITIONER

## 2019-11-13 PROCEDURE — 84156 ASSAY OF PROTEIN URINE: CPT | Performed by: NURSE PRACTITIONER

## 2019-11-13 PROCEDURE — 85014 HEMATOCRIT: CPT | Performed by: NURSE PRACTITIONER

## 2019-11-13 PROCEDURE — 85018 HEMOGLOBIN: CPT | Performed by: NURSE PRACTITIONER

## 2019-11-13 PROCEDURE — 86803 HEPATITIS C AB TEST: CPT | Performed by: NURSE PRACTITIONER

## 2019-11-13 PROCEDURE — 82306 VITAMIN D 25 HYDROXY: CPT | Performed by: NURSE PRACTITIONER

## 2019-11-13 PROCEDURE — 86160 COMPLEMENT ANTIGEN: CPT | Performed by: NURSE PRACTITIONER

## 2019-11-13 PROCEDURE — 86225 DNA ANTIBODY NATIVE: CPT

## 2019-11-13 PROCEDURE — 84550 ASSAY OF BLOOD/URIC ACID: CPT | Performed by: NURSE PRACTITIONER

## 2019-11-13 PROCEDURE — 87340 HEPATITIS B SURFACE AG IA: CPT | Performed by: NURSE PRACTITIONER

## 2019-11-13 PROCEDURE — 82570 ASSAY OF URINE CREATININE: CPT | Performed by: NURSE PRACTITIONER

## 2019-11-13 PROCEDURE — 81001 URINALYSIS AUTO W/SCOPE: CPT | Performed by: NURSE PRACTITIONER

## 2019-11-13 PROCEDURE — 80069 RENAL FUNCTION PANEL: CPT | Performed by: NURSE PRACTITIONER

## 2019-11-13 PROCEDURE — 36415 COLL VENOUS BLD VENIPUNCTURE: CPT | Performed by: NURSE PRACTITIONER

## 2019-11-14 LAB
C3 SERPL-MCNC: 162 MG/DL (ref 82–167)
C4 SERPL-MCNC: 30 MG/DL (ref 14–44)
CENTROMERE B AB SER-ACNC: <0.2 AI (ref 0–0.9)
CH50 SERPL-ACNC: >60 U/ML (ref 42–999999)
CHROMATIN AB SERPL-ACNC: <0.2 AI (ref 0–0.9)
DSDNA AB SER-ACNC: <1 IU/ML (ref 0–9)
ENA JO1 AB SER-ACNC: <0.2 AI (ref 0–0.9)
ENA RNP AB SER-ACNC: <0.2 AI (ref 0–0.9)
ENA SCL70 AB SER-ACNC: <0.2 AI (ref 0–0.9)
ENA SM AB SER-ACNC: <0.2 AI (ref 0–0.9)
ENA SS-A AB SER-ACNC: <0.2 AI (ref 0–0.9)
ENA SS-B AB SER-ACNC: <0.2 AI (ref 0–0.9)
Lab: NORMAL
RIBOSOMAL P AB SER-ACNC: <0.2 AI (ref 0–0.9)
RIBOSOMAL P AB SER-ACNC: <0.2 AI (ref 0–0.9)

## 2019-11-15 ENCOUNTER — OFFICE VISIT (OUTPATIENT)
Dept: FAMILY MEDICINE CLINIC | Facility: CLINIC | Age: 69
End: 2019-11-15

## 2019-11-15 VITALS
HEIGHT: 65 IN | HEART RATE: 77 BPM | OXYGEN SATURATION: 99 % | BODY MASS INDEX: 33.67 KG/M2 | DIASTOLIC BLOOD PRESSURE: 100 MMHG | SYSTOLIC BLOOD PRESSURE: 162 MMHG | WEIGHT: 202.1 LBS

## 2019-11-15 DIAGNOSIS — Z00.00 MEDICARE ANNUAL WELLNESS VISIT, SUBSEQUENT: ICD-10-CM

## 2019-11-15 DIAGNOSIS — F32.89 OTHER DEPRESSION: ICD-10-CM

## 2019-11-15 DIAGNOSIS — E03.9 ACQUIRED HYPOTHYROIDISM: ICD-10-CM

## 2019-11-15 DIAGNOSIS — E11.9 DIABETES MELLITUS TYPE 2 WITHOUT RETINOPATHY (HCC): ICD-10-CM

## 2019-11-15 DIAGNOSIS — E78.00 PURE HYPERCHOLESTEROLEMIA: ICD-10-CM

## 2019-11-15 DIAGNOSIS — I10 ESSENTIAL HYPERTENSION: Primary | ICD-10-CM

## 2019-11-15 DIAGNOSIS — E66.09 CLASS 1 OBESITY DUE TO EXCESS CALORIES WITH SERIOUS COMORBIDITY AND BODY MASS INDEX (BMI) OF 33.0 TO 33.9 IN ADULT: ICD-10-CM

## 2019-11-15 PROCEDURE — 99214 OFFICE O/P EST MOD 30 MIN: CPT | Performed by: FAMILY MEDICINE

## 2019-11-15 PROCEDURE — G0439 PPPS, SUBSEQ VISIT: HCPCS | Performed by: FAMILY MEDICINE

## 2019-11-15 RX ORDER — CARVEDILOL 6.25 MG/1
6.25 TABLET ORAL 2 TIMES DAILY WITH MEALS
Qty: 3 TABLET | Refills: 90 | Status: SHIPPED | OUTPATIENT
Start: 2019-11-15 | End: 2020-05-12

## 2019-11-15 NOTE — PROGRESS NOTES
Subjective   Emily Haas is a 69 y.o. female.     Hypertension   This is a chronic problem. The current episode started more than 1 year ago. The problem is unchanged. The problem is uncontrolled. Pertinent negatives include no chest pain, orthopnea, palpitations, peripheral edema, PND or shortness of breath.   Hyperlipidemia   This is a chronic problem. The current episode started more than 1 year ago. The problem is uncontrolled. Recent lipid tests were reviewed and are high. Exacerbating diseases include hypothyroidism and obesity. Pertinent negatives include no chest pain, myalgias or shortness of breath.   Hypothyroidism   This is a chronic problem. The current episode started more than 1 year ago. The problem occurs constantly. The problem has been unchanged. Pertinent negatives include no arthralgias, chest pain or myalgias.   Diabetes   She presents for her follow-up diabetic visit. She has type 2 diabetes mellitus. Her disease course has been stable. Pertinent negatives for hypoglycemia include no nervousness/anxiousness. Pertinent negatives for diabetes include no chest pain, no foot paresthesias, no foot ulcerations, no polydipsia and no polyuria. Her breakfast blood glucose range is generally 110-130 mg/dl. An ACE inhibitor/angiotensin II receptor blocker is being taken. Eye exam is current.   Depression   Visit Type: initial  Onset of symptoms: 1 to 6 months ago  Progression since onset: gradually worsening  Patient presents with the following symptoms: depressed mood and excessive worry.  Patient is not experiencing: insomnia, irritability, nervousness/anxiety, palpitations and shortness of breath.    Obesity   This is a chronic problem. The current episode started more than 1 year ago. The problem occurs constantly. The problem has been unchanged. Pertinent negatives include no arthralgias, chest pain or myalgias.        The following portions of the patient's history were reviewed and updated as  appropriate: allergies, current medications, past family history, past medical history, past social history, past surgical history and problem list.    Review of Systems   Constitutional: Negative for irritability.   Respiratory: Negative for shortness of breath.    Cardiovascular: Negative for chest pain, palpitations, orthopnea and PND.   Endocrine: Negative for polydipsia and polyuria.   Genitourinary: Negative for dysuria and hematuria.   Musculoskeletal: Negative for arthralgias and myalgias.   Psychiatric/Behavioral: Negative for dysphoric mood. The patient is not nervous/anxious and does not have insomnia.        Objective   Physical Exam   Constitutional: She is oriented to person, place, and time. She appears well-developed and well-nourished. No distress.   HENT:   Head: Normocephalic and atraumatic.   Cardiovascular: Normal rate, regular rhythm, normal heart sounds and intact distal pulses. Exam reveals no gallop and no friction rub.   No murmur heard.  Pulmonary/Chest: Effort normal. No respiratory distress. She has no wheezes. She has no rales.   Abdominal: Normal appearance. She exhibits no shifting dullness, no pulsatile liver, no fluid wave, no abdominal bruit, no ascites and no pulsatile midline mass. There is no hepatosplenomegaly. There is no rigidity.   Musculoskeletal: She exhibits no edema.        Left hip: She exhibits decreased range of motion and tenderness.    Emily had a diabetic foot exam performed (callus noted) today.   During the foot exam she had a monofilament test performed (normal).  Vascular Status -  Her right foot exhibits no edema. Her left foot exhibits no edema.  Neurological: She is alert and oriented to person, place, and time.   Reflex Scores:       Bicep reflexes are 2+ on the left side.  Skin: Skin is warm and dry. She is not diaphoretic.   Psychiatric: She has a normal mood and affect. Her speech is normal and behavior is normal. Judgment and thought content normal.  "  Nursing note and vitals reviewed.      Assessment/Plan   Problems Addressed this Visit        Cardiovascular and Mediastinum    Essential hypertension - Primary    Relevant Medications    carvedilol (COREG) 6.25 MG tablet    Pure hypercholesterolemia       Endocrine    Acquired hypothyroidism    Relevant Medications    carvedilol (COREG) 6.25 MG tablet    Diabetes mellitus type 2 without retinopathy (CMS/HCC)       Other    Depression      Other Visit Diagnoses     Class 1 obesity due to excess calories with serious comorbidity and body mass index (BMI) of 33.0 to 33.9 in adult        Medicare annual wellness visit, subsequent            Call bp and pulse  Next week      Visit Vitals  /100   Pulse 77   Ht 165.1 cm (65\")   Wt 91.7 kg (202 lb 1.6 oz)   LMP  (LMP Unknown)   SpO2 99%   BMI 33.63 kg/m²       Body mass index is 33.63 kg/m².            This document has been electronically signed by Fernando Hutton MD on November 15, 2019 10:33 AM    "

## 2019-11-15 NOTE — PROGRESS NOTES
The ABCs of the Annual Wellness Visit  Subsequent Medicare Wellness Visit    Chief Complaint   Patient presents with   • Annual Exam     [facility]       Subjective   History of Present Illness:  Emily Haas is a 69 y.o. female who presents for a Subsequent Medicare Wellness Visit.    HEALTH RISK ASSESSMENT    Recent Hospitalizations:  No hospitalization(s) within the last year.    Current Medical Providers:  Patient Care Team:  Fernando Hutton MD as PCP - General  Fernando Hutton MD as PCP - Claims Attributed  Guzman Mistry DPM as Consulting Physician (Podiatry)  D'Amico, Anna M., MD as Consulting Physician (Urology)  Kenan Elmore MD as Consulting Physician (Sleep Medicine)    Smoking Status:  Social History     Tobacco Use   Smoking Status Former Smoker   Smokeless Tobacco Never Used   Tobacco Comment    QUIT 30 YEARS AGO       Alcohol Consumption:  Social History     Substance and Sexual Activity   Alcohol Use No       Depression Screen:   PHQ-2/PHQ-9 Depression Screening 3/1/2019   Little interest or pleasure in doing things 0   Feeling down, depressed, or hopeless 0   Trouble falling or staying asleep, or sleeping too much -   Feeling tired or having little energy -   Poor appetite or overeating -   Feeling bad about yourself - or that you are a failure or have let yourself or your family down -   Trouble concentrating on things, such as reading the newspaper or watching television -   Moving or speaking so slowly that other people could have noticed. Or the opposite - being so fidgety or restless that you have been moving around a lot more than usual -   Thoughts that you would be better off dead, or of hurting yourself in some way -   Total Score 0   If you checked off any problems, how difficult have these problems made it for you to do your work, take care of things at home, or get along with other people? -       Fall Risk Screen:  STEADI Fall Risk Assessment has not been completed.    Health  Habits and Functional and Cognitive Screening:  Functional & Cognitive Status 5/4/2018   Do you have difficulty preparing food and eating? No   Do you have difficulty bathing yourself, getting dressed or grooming yourself? No   Do you have difficulty using the toilet? No   Do you have difficulty moving around from place to place? No   Do you have trouble with steps or getting out of a bed or a chair? No   Current Diet Well Balanced Diet   Dental Exam Not up to date   Eye Exam Up to date   Exercise (times per week) 0 times per week   Current Exercise Activities Include None   Do you need help using the phone?  No   Are you deaf or do you have serious difficulty hearing?  No   Do you need help with transportation? No   Do you need help shopping? No   Do you need help preparing meals?  No   Do you need help with housework?  No   Do you need help with laundry? No   Do you need help taking your medications? No   Do you need help managing money? No   Do you ever drive or ride in a car without wearing a seat belt? No   Have you felt unusual stress, anger or loneliness in the last month? Yes   Who do you live with? Spouse   If you need help, do you have trouble finding someone available to you? No   Have you been bothered in the last four weeks by sexual problems? No   Do you have difficulty concentrating, remembering or making decisions? No         Does the patient have evidence of cognitive impairment? No    Asprin use counseling:Does not need ASA (and currently is not on it)    Age-appropriate Screening Schedule:  Refer to the list below for future screening recommendations based on patient's age, sex and/or medical conditions. Orders for these recommended tests are listed in the plan section. The patient has been provided with a written plan.    Health Maintenance   Topic Date Due   • ZOSTER VACCINE (2 of 3) 12/31/2015   • HEMOGLOBIN A1C  03/18/2020   • DIABETIC EYE EXAM  05/14/2020   • DIABETIC FOOT EXAM  09/18/2020    • LIPID PANEL  09/18/2020   • URINE MICROALBUMIN  11/13/2020   • MAMMOGRAM  08/06/2021   • TDAP/TD VACCINES (2 - Td) 05/05/2026   • COLONOSCOPY  11/17/2027   • INFLUENZA VACCINE  Completed   • PNEUMOCOCCAL VACCINES (65+ LOW/MEDIUM RISK)  Completed          The following portions of the patient's history were reviewed and updated as appropriate: allergies, current medications, past family history, past medical history, past social history, past surgical history and problem list.    Outpatient Medications Prior to Visit   Medication Sig Dispense Refill   • albuterol (PROVENTIL HFA;VENTOLIN HFA) 108 (90 Base) MCG/ACT inhaler Inhale 2 puffs 4 (Four) Times a Day. 8 g 0   • carvedilol (COREG) 3.125 MG tablet Take 1 tablet by mouth 2 (Two) Times a Day. 180 tablet 2   • escitalopram (LEXAPRO) 10 MG tablet Take 1 tablet by mouth Daily. 90 tablet 3   • glucose blood test strip by Other route 2 (Two) Times a Day. Use as instructed     • guanFACINE (TENEX) 1 MG tablet Take 1 mg by mouth Daily.     • hydrOXYzine (ATARAX) 25 MG tablet TAKE 1 TABLET AT NIGHT AS NEEDED FOR ITCHING/SLEEP (SUSBTITUTED FOR ATARAX) 30 tablet 11   • ketoconazole (NIZORAL) 2 % cream Apply  topically to the appropriate area as directed Every 12 (Twelve) Hours. 60 g 0   • losartan (COZAAR) 100 MG tablet TAKE 1 TABLET EVERY DAY 90 tablet 2   • nystatin (MYCOSTATIN) 648141 UNIT/GM powder Apply  topically to the appropriate area as directed 3 (Three) Times a Day. 45 g 1   • polyethylene glycol (MIRALAX) powder Take 17 g by mouth As Needed. 17gm in 8oz bid      • SYNTHROID 150 MCG tablet Take 1 tablet by mouth Daily. 90 tablet 2     No facility-administered medications prior to visit.        Patient Active Problem List   Diagnosis   • Vitamin D deficiency   • Irritable bowel syndrome   • Insomnia   • Hyperlipidemia   • Generalized anxiety disorder   • Essential hypertension   • Diverticular disease of colon   • Diabetes mellitus type 2, diet-controlled  "(CMS/[facility])   • Anxiety state   • Acquired hypothyroidism   • Asthma   • Periumbilical abdominal pain   • Telangiectasia of limb   • Varicose veins of other specified sites   • Diabetes mellitus without complication (CMS/HCC)   • Posterior subcapsular polar age-related cataract   • Encounter for screening for malignant neoplasm of colon   • Diverticulosis of large intestine without hemorrhage   • Irritable bowel syndrome with both constipation and diarrhea   • Vaginosis   • Fatty liver   • Diabetes mellitus type 2 without retinopathy (CMS/[facility])   • Depression   • Pure hypercholesterolemia       Advanced Care Planning:  Patient has an advance directive - a copy has not been provided. Have asked the patient to send this to us to add to record    Review of Systems    Compared to one year ago, the patient feels her physical health is worse.  Compared to one year ago, the patient feels her mental health is the same.    Reviewed chart for potential of high risk medication in the elderly: yes  Reviewed chart for potential of harmful drug interactions in the elderly:yes    Objective         Vitals:    11/15/19 0947   BP: 162/100   Pulse: 77   SpO2: 99%   Weight: 91.7 kg (202 lb 1.6 oz)   Height: 165.1 cm (65\")   PainSc: 0-No pain       Body mass index is 33.63 kg/m².  Discussed the patient's BMI with her. The BMI is above average; BMI management plan is completed.    Physical Exam    Lab Results   Component Value Date    TRIG 175 (H) 09/18/2019    HDL 58 09/18/2019     (H) 09/18/2019    VLDL 35 09/18/2019    HGBA1C 6.99 (H) 09/18/2019        Assessment/Plan   Medicare Risks and Personalized Health Plan  CMS Preventative Services Quick Reference  Advance Directive Discussion    The above risks/problems have been discussed with the patient.  Pertinent information has been shared with the patient in the After Visit Summary.  Follow up plans and orders are seen below in the Assessment/Plan Section.    Diagnoses and all " orders for this visit:    1. Essential hypertension (Primary)    2. Acquired hypothyroidism    3. Pure hypercholesterolemia    4. Diabetes mellitus type 2 without retinopathy (CMS/HCC)    5. Other depression    6. Class 1 obesity due to excess calories with serious comorbidity and body mass index (BMI) of 33.0 to 33.9 in adult      Follow Up:  No Follow-up on file.     An After Visit Summary and PPPS were given to the patient.

## 2019-11-15 NOTE — PATIENT INSTRUCTIONS

## 2020-01-15 ENCOUNTER — TELEPHONE (OUTPATIENT)
Dept: FAMILY MEDICINE CLINIC | Facility: CLINIC | Age: 70
End: 2020-01-15

## 2020-02-12 ENCOUNTER — OFFICE VISIT (OUTPATIENT)
Dept: FAMILY MEDICINE CLINIC | Facility: CLINIC | Age: 70
End: 2020-02-12

## 2020-02-12 ENCOUNTER — APPOINTMENT (OUTPATIENT)
Dept: LAB | Facility: HOSPITAL | Age: 70
End: 2020-02-12

## 2020-02-12 VITALS
HEART RATE: 78 BPM | DIASTOLIC BLOOD PRESSURE: 80 MMHG | OXYGEN SATURATION: 98 % | WEIGHT: 199 LBS | SYSTOLIC BLOOD PRESSURE: 132 MMHG | HEIGHT: 65 IN | BODY MASS INDEX: 33.15 KG/M2

## 2020-02-12 DIAGNOSIS — E78.00 PURE HYPERCHOLESTEROLEMIA: ICD-10-CM

## 2020-02-12 DIAGNOSIS — E03.9 ACQUIRED HYPOTHYROIDISM: ICD-10-CM

## 2020-02-12 DIAGNOSIS — I10 ESSENTIAL HYPERTENSION: Primary | ICD-10-CM

## 2020-02-12 DIAGNOSIS — I83.93 VARICOSE VEINS OF BOTH LOWER EXTREMITIES WITHOUT ULCER OR INFLAMMATION: ICD-10-CM

## 2020-02-12 DIAGNOSIS — E11.9 DIABETES MELLITUS TYPE 2 WITHOUT RETINOPATHY (HCC): ICD-10-CM

## 2020-02-12 DIAGNOSIS — F32.89 OTHER DEPRESSION: ICD-10-CM

## 2020-02-12 LAB
ALBUMIN SERPL-MCNC: 4.3 G/DL (ref 3.5–5.2)
ALBUMIN UR-MCNC: 1.3 MG/DL
ALBUMIN/GLOB SERPL: 1.5 G/DL
ALP SERPL-CCNC: 71 U/L (ref 39–117)
ALT SERPL W P-5'-P-CCNC: 12 U/L (ref 1–33)
ANION GAP SERPL CALCULATED.3IONS-SCNC: 13.5 MMOL/L (ref 5–15)
AST SERPL-CCNC: 17 U/L (ref 1–32)
BILIRUB SERPL-MCNC: 0.5 MG/DL (ref 0.2–1.2)
BUN BLD-MCNC: 17 MG/DL (ref 8–23)
BUN/CREAT SERPL: 15.5 (ref 7–25)
CALCIUM SPEC-SCNC: 9.9 MG/DL (ref 8.6–10.5)
CHLORIDE SERPL-SCNC: 101 MMOL/L (ref 98–107)
CHOLEST SERPL-MCNC: 264 MG/DL (ref 0–200)
CO2 SERPL-SCNC: 24.5 MMOL/L (ref 22–29)
CREAT BLD-MCNC: 1.1 MG/DL (ref 0.57–1)
CREAT UR-MCNC: 226.4 MG/DL
GFR SERPL CREATININE-BSD FRML MDRD: 49 ML/MIN/1.73
GLOBULIN UR ELPH-MCNC: 2.9 GM/DL
GLUCOSE BLD-MCNC: 108 MG/DL (ref 65–99)
HBA1C MFR BLD: 6.5 % (ref 4.8–5.6)
HDLC SERPL-MCNC: 59 MG/DL (ref 40–60)
LDLC SERPL CALC-MCNC: 167 MG/DL (ref 0–100)
LDLC/HDLC SERPL: 2.84 {RATIO}
MICROALBUMIN/CREAT UR: 5.7 MG/G
POTASSIUM BLD-SCNC: 4.3 MMOL/L (ref 3.5–5.2)
PROT SERPL-MCNC: 7.2 G/DL (ref 6–8.5)
SODIUM BLD-SCNC: 139 MMOL/L (ref 136–145)
T4 FREE SERPL-MCNC: 1.9 NG/DL (ref 0.93–1.7)
TRIGL SERPL-MCNC: 188 MG/DL (ref 0–150)
TSH SERPL DL<=0.05 MIU/L-ACNC: 0.95 UIU/ML (ref 0.27–4.2)
VLDLC SERPL-MCNC: 37.6 MG/DL (ref 5–40)

## 2020-02-12 PROCEDURE — 84439 ASSAY OF FREE THYROXINE: CPT | Performed by: FAMILY MEDICINE

## 2020-02-12 PROCEDURE — 80053 COMPREHEN METABOLIC PANEL: CPT | Performed by: FAMILY MEDICINE

## 2020-02-12 PROCEDURE — 80061 LIPID PANEL: CPT | Performed by: FAMILY MEDICINE

## 2020-02-12 PROCEDURE — 82043 UR ALBUMIN QUANTITATIVE: CPT | Performed by: FAMILY MEDICINE

## 2020-02-12 PROCEDURE — 84443 ASSAY THYROID STIM HORMONE: CPT | Performed by: FAMILY MEDICINE

## 2020-02-12 PROCEDURE — 83036 HEMOGLOBIN GLYCOSYLATED A1C: CPT | Performed by: FAMILY MEDICINE

## 2020-02-12 PROCEDURE — 99214 OFFICE O/P EST MOD 30 MIN: CPT | Performed by: FAMILY MEDICINE

## 2020-02-12 PROCEDURE — 82570 ASSAY OF URINE CREATININE: CPT | Performed by: FAMILY MEDICINE

## 2020-02-12 PROCEDURE — 36415 COLL VENOUS BLD VENIPUNCTURE: CPT | Performed by: FAMILY MEDICINE

## 2020-02-12 NOTE — PROGRESS NOTES
Subjective   Emily Haas is a 70 y.o. female.     Hypertension   This is a chronic problem. The current episode started more than 1 year ago. The problem is unchanged. The problem is uncontrolled. Pertinent negatives include no chest pain, orthopnea, palpitations, peripheral edema, PND or shortness of breath.   Hyperlipidemia   This is a chronic problem. The current episode started more than 1 year ago. The problem is uncontrolled. Recent lipid tests were reviewed and are high. Exacerbating diseases include hypothyroidism and obesity. Associated symptoms include myalgias. Pertinent negatives include no chest pain or shortness of breath.   Hypothyroidism   This is a chronic problem. The current episode started more than 1 year ago. The problem occurs constantly. The problem has been unchanged. Associated symptoms include myalgias. Pertinent negatives include no arthralgias or chest pain.   Diabetes   She presents for her follow-up diabetic visit. She has type 2 diabetes mellitus. Her disease course has been stable. Pertinent negatives for hypoglycemia include no nervousness/anxiousness. Pertinent negatives for diabetes include no chest pain, no foot paresthesias, no foot ulcerations, no polydipsia and no polyuria. Her breakfast blood glucose range is generally 110-130 mg/dl. An ACE inhibitor/angiotensin II receptor blocker is being taken. Eye exam is current.   Depression   Visit Type: initial  Onset of symptoms: 1 to 6 months ago  Progression since onset: gradually worsening  Patient presents with the following symptoms: depressed mood and excessive worry.  Patient is not experiencing: insomnia, irritability, nervousness/anxiety, palpitations and shortness of breath.    Obesity   This is a chronic problem. The current episode started more than 1 year ago. The problem occurs constantly. The problem has been gradually improving. Associated symptoms include myalgias. Pertinent negatives include no arthralgias or  chest pain.        The following portions of the patient's history were reviewed and updated as appropriate: allergies, current medications, past family history, past medical history, past social history, past surgical history and problem list.    Review of Systems   Constitutional: Negative for irritability.   Respiratory: Negative for shortness of breath.    Cardiovascular: Negative for chest pain, palpitations, orthopnea and PND.   Endocrine: Negative for polydipsia and polyuria.   Genitourinary: Negative for dysuria and hematuria.   Musculoskeletal: Positive for myalgias. Negative for arthralgias.   Psychiatric/Behavioral: Negative for dysphoric mood. The patient is not nervous/anxious and does not have insomnia.        Objective   Physical Exam   Constitutional: She is oriented to person, place, and time. She appears well-developed and well-nourished. No distress.   HENT:   Head: Normocephalic and atraumatic.   Cardiovascular: Normal rate, regular rhythm, normal heart sounds and intact distal pulses. Exam reveals no gallop and no friction rub.   No murmur heard.  Pulmonary/Chest: Effort normal. No respiratory distress. She has no wheezes. She has no rales.   Abdominal: Normal appearance. She exhibits no shifting dullness, no pulsatile liver, no fluid wave, no abdominal bruit, no ascites and no pulsatile midline mass. There is no hepatosplenomegaly. There is no rigidity.   Musculoskeletal: She exhibits no edema.        Left hip: She exhibits decreased range of motion and tenderness.    Emily had a diabetic foot exam performed (callus noted) today.   During the foot exam she had a monofilament test performed (normal).  Vascular Status -  Her right foot exhibits no edema. Her left foot exhibits no edema.  Neurological: She is alert and oriented to person, place, and time.   Reflex Scores:       Bicep reflexes are 2+ on the left side.  Skin: Skin is warm and dry. She is not diaphoretic.   Psychiatric: She has a  "normal mood and affect. Her speech is normal and behavior is normal. Judgment and thought content normal.   Nursing note and vitals reviewed.      Assessment/Plan   Problems Addressed this Visit        Cardiovascular and Mediastinum    Essential hypertension - Primary    Relevant Orders    Comprehensive Metabolic Panel    Pure hypercholesterolemia    Relevant Orders    Lipid Panel       Endocrine    Acquired hypothyroidism    Relevant Orders    T4, Free    TSH    Diabetes mellitus type 2 without retinopathy (CMS/HCC)    Relevant Orders    Hemoglobin A1c    Microalbumin / Creatinine Urine Ratio - Urine, Clean Catch       Other    Depression      Other Visit Diagnoses     Varicose veins of both lower extremities without ulcer or inflammation        Relevant Orders    Ambulatory Referral to Cardiovascular Surgery        Call bp and pulse  Next week      Visit Vitals  /80   Pulse 78   Ht 165.1 cm (65\")   Wt 90.3 kg (199 lb)   LMP  (LMP Unknown)   SpO2 98%   BMI 33.12 kg/m²       Body mass index is 33.12 kg/m².            This document has been electronically signed by Fernando Hutton MD on February 12, 2020 1:18 PM        Visit Vitals  /80   Pulse 78   Ht 165.1 cm (65\")   Wt 90.3 kg (199 lb)   LMP  (LMP Unknown)   SpO2 98%   BMI 33.12 kg/m²       Body mass index is 33.12 kg/m².            This document has been electronically signed by Fernando Hutton MD on February 12, 2020 1:18 PM    "

## 2020-02-14 ENCOUNTER — TELEPHONE (OUTPATIENT)
Dept: FAMILY MEDICINE CLINIC | Facility: CLINIC | Age: 70
End: 2020-02-14

## 2020-02-14 DIAGNOSIS — R94.4 RENAL FUNCTION TEST ABNORMAL: ICD-10-CM

## 2020-02-14 DIAGNOSIS — E03.9 ACQUIRED HYPOTHYROIDISM: Primary | ICD-10-CM

## 2020-02-14 RX ORDER — LEVOTHYROXINE SODIUM 125 MCG
125 TABLET ORAL DAILY
Qty: 30 TABLET | Refills: 3 | Status: SHIPPED | OUTPATIENT
Start: 2020-02-14 | End: 2020-08-12 | Stop reason: SDUPTHER

## 2020-02-14 NOTE — PROGRESS NOTES
Per Dr. Hutton, Ms Haas has been called with recent lab results & recommendations.  Continue current medications and follow-up as planned or sooner if any problems.  Labs ordered

## 2020-02-14 NOTE — TELEPHONE ENCOUNTER
Per Dr. Hutton, Ms Haas has been called with recent lab results & recommendations.  Continue current medications and follow-up as planned or sooner if any problems.  Labs ordered      ----- Message from Fernando Hutton MD sent at 2/14/2020 11:35 AM CST -----  Kidneys little stressed and thyroid too high.  Decrease levothyroxine from 125 mcg a day down to 150 mcg a day.  Increase fluids and avoid nonsteroidals.  In 1 month get a stat free T4, TSH, and BMP.

## 2020-02-14 NOTE — PROGRESS NOTES
Kidneys little stressed and thyroid too high.  Decrease levothyroxine from 125 mcg a day down to 150 mcg a day.  Increase fluids and avoid nonsteroidals.  In 1 month get a stat free T4, TSH, and BMP.

## 2020-03-11 ENCOUNTER — LAB (OUTPATIENT)
Dept: LAB | Facility: HOSPITAL | Age: 70
End: 2020-03-11

## 2020-03-11 DIAGNOSIS — E03.9 ACQUIRED HYPOTHYROIDISM: ICD-10-CM

## 2020-03-11 DIAGNOSIS — R94.4 RENAL FUNCTION TEST ABNORMAL: ICD-10-CM

## 2020-03-11 LAB
ANION GAP SERPL CALCULATED.3IONS-SCNC: 15 MMOL/L (ref 5–15)
BUN BLD-MCNC: 15 MG/DL (ref 8–23)
BUN/CREAT SERPL: 11.5 (ref 7–25)
CALCIUM SPEC-SCNC: 9.8 MG/DL (ref 8.6–10.5)
CHLORIDE SERPL-SCNC: 103 MMOL/L (ref 98–107)
CO2 SERPL-SCNC: 23 MMOL/L (ref 22–29)
CREAT BLD-MCNC: 1.3 MG/DL (ref 0.57–1)
GFR SERPL CREATININE-BSD FRML MDRD: 40 ML/MIN/1.73
GLUCOSE BLD-MCNC: 199 MG/DL (ref 65–99)
POTASSIUM BLD-SCNC: 4.4 MMOL/L (ref 3.5–5.2)
SODIUM BLD-SCNC: 141 MMOL/L (ref 136–145)
T4 FREE SERPL-MCNC: 1.36 NG/DL (ref 0.93–1.7)
TSH SERPL DL<=0.05 MIU/L-ACNC: 2.61 UIU/ML (ref 0.27–4.2)

## 2020-03-11 PROCEDURE — 84439 ASSAY OF FREE THYROXINE: CPT

## 2020-03-11 PROCEDURE — 80048 BASIC METABOLIC PNL TOTAL CA: CPT

## 2020-03-11 PROCEDURE — 84443 ASSAY THYROID STIM HORMONE: CPT

## 2020-03-11 PROCEDURE — 36415 COLL VENOUS BLD VENIPUNCTURE: CPT

## 2020-03-12 ENCOUNTER — TELEPHONE (OUTPATIENT)
Dept: FAMILY MEDICINE CLINIC | Facility: CLINIC | Age: 70
End: 2020-03-12

## 2020-03-12 DIAGNOSIS — R94.4 RENAL FUNCTION TEST ABNORMAL: Primary | ICD-10-CM

## 2020-03-12 DIAGNOSIS — R94.4 DECREASED GFR: ICD-10-CM

## 2020-03-12 NOTE — PROGRESS NOTES
Renal function continues to worsen.  Continues avoid nonsteroidals, get ultrasound of the kidneys, and refer to nephrology.

## 2020-03-12 NOTE — TELEPHONE ENCOUNTER
Per Dr. Hutton, Ms. Haas has been called with recent lab results & recommendations.  Continue current medications and follow-up as planned or sooner if any problems.  US Kidneys has been ordered.   She will call the Nephrology office ans see if she can get her appointment with MADHAV Mercer moved up, she was scheduled to go back in July      ----- Message from Fernando Hutton MD sent at 3/12/2020  3:35 PM CDT -----  Renal function continues to worsen.  Continues avoid nonsteroidals, get ultrasound of the kidneys, and refer to nephrology.

## 2020-03-12 NOTE — PROGRESS NOTES
Per Dr. Hutton, Ms. Haas has been called with recent lab results & recommendations.  Continue current medications and follow-up as planned or sooner if any problems.  US Kidneys has been ordered.   She will call the Nephrology office ans see if she can get her appointment with MADHAV Mercer moved up, she was scheduled to go back in July

## 2020-03-26 ENCOUNTER — HOSPITAL ENCOUNTER (OUTPATIENT)
Dept: ULTRASOUND IMAGING | Facility: HOSPITAL | Age: 70
Discharge: HOME | End: 2020-03-26
Admitting: FAMILY MEDICINE

## 2020-03-26 DIAGNOSIS — R94.4 RENAL FUNCTION TEST ABNORMAL: ICD-10-CM

## 2020-03-26 DIAGNOSIS — R94.4 DECREASED GFR: ICD-10-CM

## 2020-03-26 PROCEDURE — 76775 US EXAM ABDO BACK WALL LIM: CPT

## 2020-03-26 NOTE — PROGRESS NOTES
Diffuse thinning on the right kidney and  incidental cyst noted on the left.  Otherwise okay.  Keep appointment with nephrology.

## 2020-03-30 ENCOUNTER — OFFICE VISIT (OUTPATIENT)
Dept: CARDIAC SURGERY | Facility: CLINIC | Age: 70
End: 2020-03-30

## 2020-03-30 VITALS
OXYGEN SATURATION: 99 % | BODY MASS INDEX: 33.32 KG/M2 | DIASTOLIC BLOOD PRESSURE: 76 MMHG | SYSTOLIC BLOOD PRESSURE: 140 MMHG | WEIGHT: 200 LBS | HEART RATE: 75 BPM | HEIGHT: 65 IN | TEMPERATURE: 97.6 F

## 2020-03-30 DIAGNOSIS — I10 ESSENTIAL HYPERTENSION: ICD-10-CM

## 2020-03-30 DIAGNOSIS — I83.813 VARICOSE VEINS OF BOTH LOWER EXTREMITIES WITH PAIN: Primary | ICD-10-CM

## 2020-03-30 DIAGNOSIS — J45.40 MODERATE PERSISTENT ASTHMA WITHOUT COMPLICATION: ICD-10-CM

## 2020-03-30 DIAGNOSIS — E11.9 DIABETES MELLITUS TYPE 2 WITHOUT RETINOPATHY (HCC): ICD-10-CM

## 2020-03-30 DIAGNOSIS — E78.00 PURE HYPERCHOLESTEROLEMIA: ICD-10-CM

## 2020-03-30 DIAGNOSIS — E66.09 CLASS 1 OBESITY DUE TO EXCESS CALORIES WITH SERIOUS COMORBIDITY AND BODY MASS INDEX (BMI) OF 33.0 TO 33.9 IN ADULT: ICD-10-CM

## 2020-03-30 PROCEDURE — 99204 OFFICE O/P NEW MOD 45 MIN: CPT | Performed by: THORACIC SURGERY (CARDIOTHORACIC VASCULAR SURGERY)

## 2020-04-12 PROBLEM — I83.813 VARICOSE VEINS OF BOTH LOWER EXTREMITIES WITH PAIN: Status: ACTIVE | Noted: 2017-03-02

## 2020-04-12 PROBLEM — E66.09 CLASS 1 OBESITY DUE TO EXCESS CALORIES WITH SERIOUS COMORBIDITY AND BODY MASS INDEX (BMI) OF 33.0 TO 33.9 IN ADULT: Status: ACTIVE | Noted: 2020-04-12

## 2020-04-12 LAB
BH CV LOWER VASCULAR LEFT COMMON FEMORAL AUGMENT: NORMAL
BH CV LOWER VASCULAR LEFT COMMON FEMORAL COMPETENT: NORMAL
BH CV LOWER VASCULAR LEFT COMMON FEMORAL COMPRESS: NORMAL
BH CV LOWER VASCULAR LEFT COMMON FEMORAL PHASIC: NORMAL
BH CV LOWER VASCULAR LEFT COMMON FEMORAL SPONT: NORMAL
BH CV LOWER VASCULAR LEFT DISTAL FEMORAL AUGMENT: NORMAL
BH CV LOWER VASCULAR LEFT DISTAL FEMORAL COMPETENT: NORMAL
BH CV LOWER VASCULAR LEFT DISTAL FEMORAL COMPRESS: NORMAL
BH CV LOWER VASCULAR LEFT DISTAL FEMORAL PHASIC: NORMAL
BH CV LOWER VASCULAR LEFT DISTAL FEMORAL SPONT: NORMAL
BH CV LOWER VASCULAR LEFT GASTRONEMIUS COMPRESS: NORMAL
BH CV LOWER VASCULAR LEFT GREATER SAPH AK AUGMENT: NORMAL
BH CV LOWER VASCULAR LEFT GREATER SAPH AK COMPETENT: NORMAL
BH CV LOWER VASCULAR LEFT GREATER SAPH AK COMPRESS: NORMAL
BH CV LOWER VASCULAR LEFT GREATER SAPH AK PHASIC: NORMAL
BH CV LOWER VASCULAR LEFT GREATER SAPH AK SPONT: NORMAL
BH CV LOWER VASCULAR LEFT GREATER SAPH BK AUGMENT: NORMAL
BH CV LOWER VASCULAR LEFT GREATER SAPH BK COMPETENT: NORMAL
BH CV LOWER VASCULAR LEFT GREATER SAPH BK COMPRESS: NORMAL
BH CV LOWER VASCULAR LEFT LESSER SAPH AUGMENT: NORMAL
BH CV LOWER VASCULAR LEFT LESSER SAPH COMPETENT: NORMAL
BH CV LOWER VASCULAR LEFT LESSER SAPH COMPRESS: NORMAL
BH CV LOWER VASCULAR LEFT MID FEMORAL AUGMENT: NORMAL
BH CV LOWER VASCULAR LEFT MID FEMORAL COMPETENT: NORMAL
BH CV LOWER VASCULAR LEFT MID FEMORAL COMPRESS: NORMAL
BH CV LOWER VASCULAR LEFT MID FEMORAL PHASIC: NORMAL
BH CV LOWER VASCULAR LEFT MID FEMORAL SPONT: NORMAL
BH CV LOWER VASCULAR LEFT PERONEAL AUGMENT: NORMAL
BH CV LOWER VASCULAR LEFT PERONEAL COMPETENT: NORMAL
BH CV LOWER VASCULAR LEFT PERONEAL COMPRESS: NORMAL
BH CV LOWER VASCULAR LEFT POPLITEAL AUGMENT: NORMAL
BH CV LOWER VASCULAR LEFT POPLITEAL COMPETENT: NORMAL
BH CV LOWER VASCULAR LEFT POPLITEAL COMPRESS: NORMAL
BH CV LOWER VASCULAR LEFT POPLITEAL PHASIC: NORMAL
BH CV LOWER VASCULAR LEFT POPLITEAL SPONT: NORMAL
BH CV LOWER VASCULAR LEFT POSTERIOR TIBIAL AUGMENT: NORMAL
BH CV LOWER VASCULAR LEFT POSTERIOR TIBIAL COMPETENT: NORMAL
BH CV LOWER VASCULAR LEFT POSTERIOR TIBIAL COMPRESS: NORMAL
BH CV LOWER VASCULAR LEFT PROFUNDA FEMORAL AUGMENT: NORMAL
BH CV LOWER VASCULAR LEFT PROFUNDA FEMORAL COMPETENT: NORMAL
BH CV LOWER VASCULAR LEFT PROFUNDA FEMORAL COMPRESS: NORMAL
BH CV LOWER VASCULAR LEFT PROFUNDA FEMORAL PHASIC: NORMAL
BH CV LOWER VASCULAR LEFT PROFUNDA FEMORAL SPONT: NORMAL
BH CV LOWER VASCULAR LEFT PROXIMAL FEMORAL AUGMENT: NORMAL
BH CV LOWER VASCULAR LEFT PROXIMAL FEMORAL COMPETENT: NORMAL
BH CV LOWER VASCULAR LEFT PROXIMAL FEMORAL COMPRESS: NORMAL
BH CV LOWER VASCULAR LEFT PROXIMAL FEMORAL PHASIC: NORMAL
BH CV LOWER VASCULAR LEFT PROXIMAL FEMORAL SPONT: NORMAL
BH CV LOWER VASCULAR LEFT SAPHENOFEMORAL JUNCTION AUGMENT: NORMAL
BH CV LOWER VASCULAR LEFT SAPHENOFEMORAL JUNCTION COMPETENT: NORMAL
BH CV LOWER VASCULAR LEFT SAPHENOFEMORAL JUNCTION COMPRESS: NORMAL
BH CV LOWER VASCULAR LEFT SAPHENOFEMORAL JUNCTION PHASIC: NORMAL
BH CV LOWER VASCULAR LEFT SAPHENOFEMORAL JUNCTION SPONT: NORMAL
BH CV LOWER VASCULAR RIGHT COMMON FEMORAL AUGMENT: NORMAL
BH CV LOWER VASCULAR RIGHT COMMON FEMORAL COMPETENT: NORMAL
BH CV LOWER VASCULAR RIGHT COMMON FEMORAL COMPRESS: NORMAL
BH CV LOWER VASCULAR RIGHT COMMON FEMORAL PHASIC: NORMAL
BH CV LOWER VASCULAR RIGHT COMMON FEMORAL SPONT: NORMAL
BH CV LOWER VASCULAR RIGHT DISTAL FEMORAL AUGMENT: NORMAL
BH CV LOWER VASCULAR RIGHT DISTAL FEMORAL COMPETENT: NORMAL
BH CV LOWER VASCULAR RIGHT DISTAL FEMORAL COMPRESS: NORMAL
BH CV LOWER VASCULAR RIGHT DISTAL FEMORAL PHASIC: NORMAL
BH CV LOWER VASCULAR RIGHT DISTAL FEMORAL SPONT: NORMAL
BH CV LOWER VASCULAR RIGHT GASTRONEMIUS COMPRESS: NORMAL
BH CV LOWER VASCULAR RIGHT GREATER SAPH AK AUGMENT: NORMAL
BH CV LOWER VASCULAR RIGHT GREATER SAPH AK COMPETENT: NORMAL
BH CV LOWER VASCULAR RIGHT GREATER SAPH AK COMPRESS: NORMAL
BH CV LOWER VASCULAR RIGHT GREATER SAPH AK PHASIC: NORMAL
BH CV LOWER VASCULAR RIGHT GREATER SAPH AK SPONT: NORMAL
BH CV LOWER VASCULAR RIGHT GREATER SAPH BK AUGMENT: NORMAL
BH CV LOWER VASCULAR RIGHT GREATER SAPH BK COMPETENT: NORMAL
BH CV LOWER VASCULAR RIGHT GREATER SAPH BK COMPRESS: NORMAL
BH CV LOWER VASCULAR RIGHT LESSER SAPH AUGMENT: NORMAL
BH CV LOWER VASCULAR RIGHT LESSER SAPH COMPETENT: NORMAL
BH CV LOWER VASCULAR RIGHT LESSER SAPH COMPRESS: NORMAL
BH CV LOWER VASCULAR RIGHT MID FEMORAL AUGMENT: NORMAL
BH CV LOWER VASCULAR RIGHT MID FEMORAL COMPETENT: NORMAL
BH CV LOWER VASCULAR RIGHT MID FEMORAL COMPRESS: NORMAL
BH CV LOWER VASCULAR RIGHT MID FEMORAL PHASIC: NORMAL
BH CV LOWER VASCULAR RIGHT MID FEMORAL SPONT: NORMAL
BH CV LOWER VASCULAR RIGHT PERONEAL AUGMENT: NORMAL
BH CV LOWER VASCULAR RIGHT PERONEAL COMPETENT: NORMAL
BH CV LOWER VASCULAR RIGHT PERONEAL COMPRESS: NORMAL
BH CV LOWER VASCULAR RIGHT POPLITEAL AUGMENT: NORMAL
BH CV LOWER VASCULAR RIGHT POPLITEAL COMPETENT: NORMAL
BH CV LOWER VASCULAR RIGHT POPLITEAL COMPRESS: NORMAL
BH CV LOWER VASCULAR RIGHT POPLITEAL PHASIC: NORMAL
BH CV LOWER VASCULAR RIGHT POPLITEAL SPONT: NORMAL
BH CV LOWER VASCULAR RIGHT POSTERIOR TIBIAL AUGMENT: NORMAL
BH CV LOWER VASCULAR RIGHT POSTERIOR TIBIAL COMPETENT: NORMAL
BH CV LOWER VASCULAR RIGHT POSTERIOR TIBIAL COMPRESS: NORMAL
BH CV LOWER VASCULAR RIGHT PROFUNDA FEMORAL AUGMENT: NORMAL
BH CV LOWER VASCULAR RIGHT PROFUNDA FEMORAL COMPETENT: NORMAL
BH CV LOWER VASCULAR RIGHT PROFUNDA FEMORAL COMPRESS: NORMAL
BH CV LOWER VASCULAR RIGHT PROFUNDA FEMORAL PHASIC: NORMAL
BH CV LOWER VASCULAR RIGHT PROFUNDA FEMORAL SPONT: NORMAL
BH CV LOWER VASCULAR RIGHT PROXIMAL FEMORAL AUGMENT: NORMAL
BH CV LOWER VASCULAR RIGHT PROXIMAL FEMORAL COMPETENT: NORMAL
BH CV LOWER VASCULAR RIGHT PROXIMAL FEMORAL COMPRESS: NORMAL
BH CV LOWER VASCULAR RIGHT PROXIMAL FEMORAL PHASIC: NORMAL
BH CV LOWER VASCULAR RIGHT PROXIMAL FEMORAL SPONT: NORMAL
BH CV LOWER VASCULAR RIGHT SAPHENOFEMORAL JUNCTION AUGMENT: NORMAL
BH CV LOWER VASCULAR RIGHT SAPHENOFEMORAL JUNCTION COMPETENT: NORMAL
BH CV LOWER VASCULAR RIGHT SAPHENOFEMORAL JUNCTION COMPRESS: NORMAL
BH CV LOWER VASCULAR RIGHT SAPHENOFEMORAL JUNCTION PHASIC: NORMAL
BH CV LOWER VASCULAR RIGHT SAPHENOFEMORAL JUNCTION SPONT: NORMAL

## 2020-04-12 NOTE — PROGRESS NOTES
3/30/2020    Emily Haas  1950    Chief Complaint:    Chief Complaint   Patient presents with   • Varicose Veins       HPI:      PCP:  Fernando Hutton MD  Cardiology:  Dr Villanueva  Nephrology:  Dr Rojas    70 y.o. female with HTN(stable, increased risk stroke, rupture), Hyperlipidemia(stable, increased risk cardiovascular events), Diabetes Mellitus(stable, increased risk cardiovascular events), Obesity(uncontrolled, increased risk cardiovascular events) and Chronic Kidney Disease(stable, increased risk renal failure) , varicose veins(new, increase risk VTE).  former smoker.  Moderate pain legs x 2 years.  LEFT thigh lateral tender.  Varicose veins x years..  No TIA stroke amaurosis.  No MI claudication. No other associated signs, symptoms or modifying factors.    3/2020 Lower extremity venous duplex:  RIGHT  No dvt, reflux GSV mid distal thigh (>4sec).  LEFT no dvt, reflux.    3/2017 ECG:  NSR 87, QTc 435    The following portions of the patient's history were reviewed and updated as appropriate: allergies, current medications, past family history, past medical history, past social history, past surgical history and problem list.  Recent images independently reviewed.  Available laboratory values reviewed.    PMH:  Past Medical History:   Diagnosis Date   • Abrasion, right lower leg, initial encounter    • Acquired hypothyroidism    • Acromioclavicular joint pain    • Allergic rhinitis    • Anxiety state    • Asthma    • Bleeding disorder (CMS/HCC)    • Cancer (CMS/HCC)    • Degenerative joint disease involving multiple joints    • Diabetes mellitus type 2, diet-controlled (CMS/HCC)    • Diverticular disease of colon    • Diverticulitis of colon    • Essential hypertension    • Generalized anxiety disorder    • Hyperlipidemia    • Ingrowing toenail    • Insomnia    • Irritable bowel syndrome    • Joint pain    • Local infection of the skin and subcutaneous tissue, unspecified    • Low back pain     abn  MRI   • Neck pain     abn MRI-post Fusion   • Need for prophylactic vaccination against Streptococcus pneumoniae (pneumococcus)    • Open wound of lower leg    • Pain in right hip    • Peripheral edema    • Screening mammogram, encounter for 06/11/2015   • Vitamin D deficiency      Past Surgical History:   Procedure Laterality Date   • BILATERAL BREAST REDUCTION     • BLADDER SURGERY     • CHOLECYSTECTOMY     • COLON SURGERY     • COLONOSCOPY  03/02/2012   • COLONOSCOPY N/A 11/17/2017    Procedure: COLONOSCOPY;  Surgeon: Cole Quezada MD;  Location: [facility] ENDOSCOPY;  Service:    • ENDOSCOPY  03/02/2012    Colon endoscopy 63344 (1)    Diverticulum in sigmoid colon.    • EXCISION LESION  08/04/2015    Destruction of Benign Lesion (1-14) 53595 (1)     • HYSTERECTOMY     • INJECTION OF MEDICATION  07/08/2014    Celestone (betamethasone) (3)      • INJECTION OF MEDICATION  09/23/2013    Dexamethasone (1)      • INJECTION OF MEDICATION  06/28/2016    Kenalog (8)      • REDUCTION MAMMAPLASTY       Family History   Problem Relation Age of Onset   • Diabetes Other    • Heart disease Other    • Cancer Other    • Hypertension Other    • Hypertension Mother    • Heart disease Mother    • Arthritis Mother    • Alzheimer's disease Father    • Diabetes Brother    • Heart disease Brother    • Alzheimer's disease Brother    • Hyperlipidemia Brother    • Kidney disease Brother    • No Known Problems Maternal Grandmother    • No Known Problems Maternal Grandfather    • No Known Problems Paternal Grandmother    • No Known Problems Paternal Grandfather    • Diabetes Daughter    • Hypothyroidism Daughter    • Hypertension Daughter    • Drug abuse Daughter    • No Known Problems Daughter    • Cancer Paternal Aunt 80        pancreatic     Social History     Tobacco Use   • Smoking status: Former Smoker   • Smokeless tobacco: Never Used   • Tobacco comment: QUIT 30 YEARS AGO   Substance Use Topics   • Alcohol use: No   • Drug use: No  "      ALLERGIES:  Allergies   Allergen Reactions   • Atenolol Myalgia   • Augmentin [Amoxicillin-Pot Clavulanate] Hives   • Dilaudid [Hydromorphone] Nausea And Vomiting   • Lipitor [Atorvastatin] Myalgia   • Pravachol [Pravastatin] Myalgia   • Statins Other (See Comments)     \"muscle pain \"    • Sulfa Antibiotics Hives         MEDICATIONS:    Current Outpatient Medications:   •  albuterol (PROVENTIL HFA;VENTOLIN HFA) 108 (90 Base) MCG/ACT inhaler, Inhale 2 puffs 4 (Four) Times a Day., Disp: 8 g, Rfl: 0  •  carvedilol (COREG) 6.25 MG tablet, Take 1 tablet by mouth 2 (Two) Times a Day With Meals., Disp: 3 tablet, Rfl: 90  •  escitalopram (LEXAPRO) 10 MG tablet, Take 1 tablet by mouth Daily., Disp: 90 tablet, Rfl: 3  •  glucose blood test strip, by Other route 2 (Two) Times a Day. Use as instructed, Disp: , Rfl:   •  guanFACINE (TENEX) 1 MG tablet, Take 1 mg by mouth Daily., Disp: , Rfl:   •  hydrOXYzine (ATARAX) 25 MG tablet, TAKE 1 TABLET AT NIGHT AS NEEDED FOR ITCHING/SLEEP (SUSBTITUTED FOR ATARAX), Disp: 30 tablet, Rfl: 11  •  ketoconazole (NIZORAL) 2 % cream, Apply  topically to the appropriate area as directed Every 12 (Twelve) Hours., Disp: 60 g, Rfl: 0  •  losartan (COZAAR) 100 MG tablet, TAKE 1 TABLET EVERY DAY, Disp: 90 tablet, Rfl: 2  •  nystatin (MYCOSTATIN) 740384 UNIT/GM powder, Apply  topically to the appropriate area as directed 3 (Three) Times a Day., Disp: 45 g, Rfl: 1  •  polyethylene glycol (MIRALAX) powder, Take 17 g by mouth As Needed. 17gm in 8oz bid , Disp: , Rfl:   •  SYNTHROID 125 MCG tablet, Take 1 tablet by mouth Daily., Disp: 30 tablet, Rfl: 3    Review of Systems   Review of Systems   Constitution: Negative for malaise/fatigue, night sweats and weight loss.   HENT: Negative for hearing loss, hoarse voice and stridor.    Eyes: Negative for vision loss in left eye, vision loss in right eye and visual disturbance.   Cardiovascular: Negative for chest pain, leg swelling and palpitations. " "  Respiratory: Negative for cough, hemoptysis and shortness of breath.    Hematologic/Lymphatic: Negative for adenopathy and bleeding problem. Does not bruise/bleed easily.   Skin: Negative for color change, poor wound healing and rash.   Musculoskeletal: Positive for myalgias. Negative for arthritis, back pain, muscle weakness and neck pain.   Gastrointestinal: Negative for abdominal pain, dysphagia and heartburn.   Neurological: Negative for dizziness, numbness and seizures.   Psychiatric/Behavioral: Negative for altered mental status, depression and memory loss. The patient is not nervous/anxious.        Physical Exam   Vitals:    03/30/20 1333 03/30/20 1334   BP: 144/80 140/76   BP Location: Left arm Right arm   Patient Position: Sitting Sitting   Cuff Size: Adult Adult   Pulse: 75    Temp: 97.6 °F (36.4 °C)    TempSrc: Temporal    SpO2: 99%    Weight: 90.7 kg (200 lb)    Height: 165.1 cm (65\")      Physical Exam   Constitutional: She is oriented to person, place, and time. She is active and cooperative. She does not appear ill. No distress.   HENT:   Head: Atraumatic.   Right Ear: Hearing normal.   Left Ear: Hearing normal.   Nose: No nasal deformity. No epistaxis.   Mouth/Throat: She does not have dentures. Normal dentition.   Eyes: Conjunctivae and lids are normal. Right pupil is round and reactive. Left pupil is round and reactive.   Neck: No JVD present. Carotid bruit is not present. No tracheal deviation present. No thyroid mass and no thyromegaly present.   Cardiovascular: Normal rate and regular rhythm.   No murmur heard.  Pulses:       Carotid pulses are 2+ on the right side, and 2+ on the left side.       Radial pulses are 2+ on the right side, and 2+ on the left side.        Dorsalis pedis pulses are 2+ on the right side, and 2+ on the left side.   Moderate bilateral varicose veins   Pulmonary/Chest: Effort normal and breath sounds normal.   Abdominal: Soft. She exhibits no distension and no mass. " There is no splenomegaly or hepatomegaly. There is no tenderness.   Musculoskeletal: She exhibits no deformity.   Gait normal.    Lymphadenopathy:     She has no cervical adenopathy.        Right: No supraclavicular adenopathy present.        Left: No supraclavicular adenopathy present.   Neurological: She is alert and oriented to person, place, and time. She has normal strength.   Skin: Skin is warm and dry. No cyanosis or erythema. No pallor.   No venous staining   Psychiatric: She has a normal mood and affect. Her speech is normal. Judgment and thought content normal.       BUN   Date Value Ref Range Status   03/11/2020 15 8 - 23 mg/dL Final     Creatinine   Date Value Ref Range Status   03/11/2020 1.30 (H) 0.57 - 1.00 mg/dL Final     eGFR Non  Amer   Date Value Ref Range Status   03/11/2020 40 (L) >60 mL/min/1.73 Final       ASSESSMENT:  Emily was seen today for varicose veins.    Diagnoses and all orders for this visit:    Varicose veins of both lower extremities with pain  -     Duplex Venous Lower Extremity - Bilateral CAR    Essential hypertension    Pure hypercholesterolemia    Moderate persistent asthma without complication    Diabetes mellitus type 2 without retinopathy (CMS/[facility])    Class 1 obesity due to excess calories with serious comorbidity and body mass index (BMI) of 33.0 to 33.9 in adult      PLAN:  Detailed discussion with Emily Haas regarding situation and options.  Moderate varicose veins..  Multiple risk factors with severe comorbidities.  No intervention indicated at this time.  Will follow with interval imaging.  Risks, benefits discussed.  Understands and wishes to proceed with plan.    conservative measures (weight reduction, daily exercise program, compression stockings, leg elevation)    Return as needed  Obesity Class  1. Increased risk cardiovascular events, sleep and breathing disorders, joint issues, type 2 diabetes mellitus. Options for weight management, heart healthy  diet, exercise programs, and associated health risks of obesity discussed.  Recommended regular physical activity, progressive walking program.  Continue current medications as directed.  Will Obtain relevant old records.    Thank you for the opportunity to participate in this patient's care.    Copy to primary care provider.    EMR Dragon/Transcription disclaimer:   Much of this encounter note is an electronic transcription/translation of spoken language to printed text. The electronic translation of spoken language may permit erroneous, or at times, nonsensical words or phrases to be inadvertently transcribed; Although I have reviewed the note for such errors, some may still exist.

## 2020-05-12 ENCOUNTER — TELEMEDICINE (OUTPATIENT)
Dept: FAMILY MEDICINE CLINIC | Facility: CLINIC | Age: 70
End: 2020-05-12

## 2020-05-12 VITALS — WEIGHT: 198 LBS | SYSTOLIC BLOOD PRESSURE: 148 MMHG | DIASTOLIC BLOOD PRESSURE: 78 MMHG | BODY MASS INDEX: 32.95 KG/M2

## 2020-05-12 DIAGNOSIS — E78.00 PURE HYPERCHOLESTEROLEMIA: ICD-10-CM

## 2020-05-12 DIAGNOSIS — I10 ESSENTIAL HYPERTENSION: Primary | ICD-10-CM

## 2020-05-12 DIAGNOSIS — E03.9 ACQUIRED HYPOTHYROIDISM: ICD-10-CM

## 2020-05-12 DIAGNOSIS — E11.9 DIABETES MELLITUS TYPE 2 WITHOUT RETINOPATHY (HCC): ICD-10-CM

## 2020-05-12 DIAGNOSIS — F32.89 OTHER DEPRESSION: ICD-10-CM

## 2020-05-12 PROCEDURE — 99214 OFFICE O/P EST MOD 30 MIN: CPT | Performed by: FAMILY MEDICINE

## 2020-05-12 RX ORDER — CARVEDILOL 12.5 MG/1
12.5 TABLET ORAL 2 TIMES DAILY WITH MEALS
Qty: 180 TABLET | Refills: 3 | Status: SHIPPED | OUTPATIENT
Start: 2020-05-12

## 2020-05-12 NOTE — PROGRESS NOTES
Subjective   Emily Haas is a 70 y.o. female.     Hypertension   This is a chronic problem. The current episode started more than 1 year ago. The problem is unchanged. The problem is uncontrolled. Pertinent negatives include no chest pain, orthopnea, palpitations, peripheral edema, PND or shortness of breath.   Hyperlipidemia   This is a chronic problem. The current episode started more than 1 year ago. The problem is uncontrolled. Recent lipid tests were reviewed and are high. Exacerbating diseases include hypothyroidism and obesity. Associated symptoms include myalgias. Pertinent negatives include no chest pain or shortness of breath.   Hypothyroidism   This is a chronic problem. The current episode started more than 1 year ago. The problem occurs constantly. The problem has been unchanged. Associated symptoms include myalgias and numbness (1 toe is now). Pertinent negatives include no arthralgias or chest pain.   Diabetes   She presents for her follow-up diabetic visit. She has type 2 diabetes mellitus. Her disease course has been stable. Pertinent negatives for hypoglycemia include no nervousness/anxiousness. Pertinent negatives for diabetes include no chest pain, no foot paresthesias, no foot ulcerations, no polydipsia and no polyuria. Her breakfast blood glucose range is generally 140-180 mg/dl. An ACE inhibitor/angiotensin II receptor blocker is being taken.   Depression   Visit Type: initial  Onset of symptoms: 1 to 6 months ago  Progression since onset: gradually worsening  Patient is not experiencing: depressed mood, excessive worry, insomnia, irritability, nervousness/anxiety, palpitations and shortness of breath.    Obesity   This is a chronic problem. The current episode started more than 1 year ago. The problem occurs constantly. The problem has been gradually improving. Associated symptoms include myalgias and numbness (1 toe is now). Pertinent negatives include no arthralgias or chest pain.         The following portions of the patient's history were reviewed and updated as appropriate: allergies, current medications, past family history, past medical history, past social history, past surgical history and problem list.    Review of Systems   Constitutional: Negative for irritability.   Respiratory: Negative for shortness of breath.    Cardiovascular: Negative for chest pain, palpitations, orthopnea and PND.   Endocrine: Negative for polydipsia and polyuria.   Genitourinary: Negative for dysuria and hematuria.   Musculoskeletal: Positive for myalgias. Negative for arthralgias.   Neurological: Positive for numbness (1 toe is now).   Psychiatric/Behavioral: Negative for dysphoric mood. The patient is not nervous/anxious and does not have insomnia.        Objective   Physical Exam   Constitutional: She is oriented to person, place, and time. She appears well-developed and well-nourished. No distress.   HENT:   Head: Normocephalic and atraumatic.   Abdominal: Normal appearance.   Neurological: She is oriented to person, place, and time.   Skin: Skin is warm and dry. She is not diaphoretic.   Psychiatric: She has a normal mood and affect. Her speech is normal and behavior is normal. Judgment and thought content normal.   Nursing note and vitals reviewed.      Assessment/Plan   Problems Addressed this Visit        Cardiovascular and Mediastinum    Essential hypertension - Primary    Relevant Medications    carvedilol (COREG) 12.5 MG tablet    Pure hypercholesterolemia       Endocrine    Acquired hypothyroidism    Relevant Medications    carvedilol (COREG) 12.5 MG tablet    Diabetes mellitus type 2 without retinopathy (CMS/HCC)       Other    Depression              Visit Vitals  /78   Wt 89.8 kg (198 lb)   LMP  (LMP Unknown)   BMI 32.95 kg/m²       Body mass index is 32.95 kg/m².            This document has been electronically signed by Fernando Hutton MD on May 12, 2020 10:06

## 2020-05-21 ENCOUNTER — APPOINTMENT (OUTPATIENT)
Dept: LAB | Facility: HOSPITAL | Age: 70
End: 2020-05-21

## 2020-05-21 ENCOUNTER — TRANSCRIBE ORDERS (OUTPATIENT)
Dept: LAB | Facility: HOSPITAL | Age: 70
End: 2020-05-21

## 2020-05-21 DIAGNOSIS — N18.30 CHRONIC KIDNEY DISEASE, STAGE III (MODERATE) (HCC): Primary | ICD-10-CM

## 2020-05-21 DIAGNOSIS — N39.0 LOWER URINARY TRACT INFECTIOUS DISEASE: ICD-10-CM

## 2020-05-21 DIAGNOSIS — E78.5 HYPERLIPIDEMIA, UNSPECIFIED HYPERLIPIDEMIA TYPE: ICD-10-CM

## 2020-05-21 DIAGNOSIS — E11.9 DIABETES MELLITUS WITHOUT COMPLICATION (HCC): ICD-10-CM

## 2020-05-21 DIAGNOSIS — I10 ESSENTIAL (PRIMARY) HYPERTENSION: ICD-10-CM

## 2020-05-21 LAB
25(OH)D3 SERPL-MCNC: 14.1 NG/ML (ref 30–100)
ALBUMIN SERPL-MCNC: 4.2 G/DL (ref 3.5–5.2)
ANION GAP SERPL CALCULATED.3IONS-SCNC: 11.2 MMOL/L (ref 5–15)
BUN BLD-MCNC: 22 MG/DL (ref 8–23)
BUN/CREAT SERPL: 19.6 (ref 7–25)
CALCIUM SPEC-SCNC: 9.4 MG/DL (ref 8.6–10.5)
CHLORIDE SERPL-SCNC: 102 MMOL/L (ref 98–107)
CO2 SERPL-SCNC: 24.8 MMOL/L (ref 22–29)
CREAT BLD-MCNC: 1.12 MG/DL (ref 0.57–1)
GFR SERPL CREATININE-BSD FRML MDRD: 48 ML/MIN/1.73
GLUCOSE BLD-MCNC: 123 MG/DL (ref 65–99)
PHOSPHATE SERPL-MCNC: 2.2 MG/DL (ref 2.5–4.5)
POTASSIUM BLD-SCNC: 4.6 MMOL/L (ref 3.5–5.2)
SODIUM BLD-SCNC: 138 MMOL/L (ref 136–145)

## 2020-05-21 PROCEDURE — 80069 RENAL FUNCTION PANEL: CPT | Performed by: NURSE PRACTITIONER

## 2020-05-21 PROCEDURE — 36415 COLL VENOUS BLD VENIPUNCTURE: CPT | Performed by: NURSE PRACTITIONER

## 2020-05-21 PROCEDURE — 82306 VITAMIN D 25 HYDROXY: CPT | Performed by: NURSE PRACTITIONER

## 2020-06-30 ENCOUNTER — CLINICAL SUPPORT (OUTPATIENT)
Dept: FAMILY MEDICINE CLINIC | Facility: CLINIC | Age: 70
End: 2020-06-30

## 2020-06-30 DIAGNOSIS — M25.552 LEFT HIP PAIN: Primary | ICD-10-CM

## 2020-06-30 PROCEDURE — 96372 THER/PROPH/DIAG INJ SC/IM: CPT | Performed by: FAMILY MEDICINE

## 2020-06-30 PROCEDURE — 99211 OFF/OP EST MAY X REQ PHY/QHP: CPT | Performed by: FAMILY MEDICINE

## 2020-06-30 RX ORDER — TRIAMCINOLONE ACETONIDE 40 MG/ML
80 INJECTION, SUSPENSION INTRA-ARTICULAR; INTRAMUSCULAR ONCE
Status: COMPLETED | OUTPATIENT
Start: 2020-06-30 | End: 2020-06-30

## 2020-06-30 RX ADMIN — TRIAMCINOLONE ACETONIDE 80 MG: 40 INJECTION, SUSPENSION INTRA-ARTICULAR; INTRAMUSCULAR at 10:52

## 2020-08-12 ENCOUNTER — OFFICE VISIT (OUTPATIENT)
Dept: FAMILY MEDICINE CLINIC | Facility: CLINIC | Age: 70
End: 2020-08-12

## 2020-08-12 VITALS — DIASTOLIC BLOOD PRESSURE: 70 MMHG | BODY MASS INDEX: 31.28 KG/M2 | WEIGHT: 188 LBS | SYSTOLIC BLOOD PRESSURE: 118 MMHG

## 2020-08-12 DIAGNOSIS — F32.4 MAJOR DEPRESSIVE DISORDER WITH SINGLE EPISODE, IN PARTIAL REMISSION (HCC): ICD-10-CM

## 2020-08-12 DIAGNOSIS — I10 ESSENTIAL HYPERTENSION: Primary | ICD-10-CM

## 2020-08-12 DIAGNOSIS — E78.00 PURE HYPERCHOLESTEROLEMIA: ICD-10-CM

## 2020-08-12 DIAGNOSIS — E11.9 DIABETES MELLITUS TYPE 2 WITHOUT RETINOPATHY (HCC): ICD-10-CM

## 2020-08-12 DIAGNOSIS — E03.9 ACQUIRED HYPOTHYROIDISM: ICD-10-CM

## 2020-08-12 PROCEDURE — 99442 PR PHYS/QHP TELEPHONE EVALUATION 11-20 MIN: CPT | Performed by: FAMILY MEDICINE

## 2020-08-12 RX ORDER — LEVOTHYROXINE SODIUM 125 MCG
125 TABLET ORAL DAILY
Qty: 30 TABLET | Refills: 11 | Status: SHIPPED | OUTPATIENT
Start: 2020-08-12 | End: 2020-12-31

## 2020-09-17 RX ORDER — ESCITALOPRAM OXALATE 10 MG/1
TABLET ORAL
Qty: 90 TABLET | Refills: 3 | Status: SHIPPED | OUTPATIENT
Start: 2020-09-17 | End: 2021-12-13 | Stop reason: SDUPTHER

## 2020-09-17 RX ORDER — LOSARTAN POTASSIUM 100 MG/1
TABLET ORAL
Qty: 90 TABLET | Refills: 2 | Status: SHIPPED | OUTPATIENT
Start: 2020-09-17 | End: 2021-06-25 | Stop reason: SDUPTHER

## 2020-10-14 ENCOUNTER — CLINICAL SUPPORT (OUTPATIENT)
Dept: FAMILY MEDICINE CLINIC | Facility: CLINIC | Age: 70
End: 2020-10-14

## 2020-10-14 DIAGNOSIS — M19.90 ARTHRITIS: Primary | ICD-10-CM

## 2020-10-14 PROCEDURE — 96372 THER/PROPH/DIAG INJ SC/IM: CPT | Performed by: FAMILY MEDICINE

## 2020-10-14 RX ORDER — TRIAMCINOLONE ACETONIDE 40 MG/ML
80 INJECTION, SUSPENSION INTRA-ARTICULAR; INTRAMUSCULAR ONCE
Status: COMPLETED | OUTPATIENT
Start: 2020-10-14 | End: 2020-10-14

## 2020-10-14 RX ADMIN — TRIAMCINOLONE ACETONIDE 80 MG: 40 INJECTION, SUSPENSION INTRA-ARTICULAR; INTRAMUSCULAR at 13:08

## 2020-10-27 ENCOUNTER — TELEMEDICINE (OUTPATIENT)
Dept: FAMILY MEDICINE CLINIC | Facility: CLINIC | Age: 70
End: 2020-10-27

## 2020-10-27 DIAGNOSIS — M79.604 PAIN OF RIGHT LOWER EXTREMITY: Primary | ICD-10-CM

## 2020-10-27 PROCEDURE — 99442 PR PHYS/QHP TELEPHONE EVALUATION 11-20 MIN: CPT | Performed by: FAMILY MEDICINE

## 2020-10-27 NOTE — PROGRESS NOTES
Subjective   Emily Haas is a 70 y.o. female.     Leg Pain   The incident occurred more than 1 week ago. There was no injury mechanism. The pain is present in the right leg. The quality of the pain is described as aching. The pain is at a severity of 7/10. The pain is moderate. The pain has been constant since onset. Pertinent negatives include no inability to bear weight or loss of motion. She reports no foreign bodies present. The symptoms are aggravated by movement. She has tried nothing for the symptoms. The treatment provided no relief.        The following portions of the patient's history were reviewed and updated as appropriate: allergies, current medications, past family history, past medical history, past social history, past surgical history and problem list.    Review of Systems   Constitutional: Negative for fever.       Objective   Physical Exam  Neurological:      Mental Status: She is alert and oriented to person, place, and time.         Assessment/Plan   Problems Addressed this Visit     None      Visit Diagnoses     Pain of right lower extremity    -  Primary    Relevant Orders    US venous doppler lower extremity right (duplex)      Diagnoses       Codes Comments    Pain of right lower extremity    -  Primary ICD-10-CM: M79.604  ICD-9-CM: 729.5         I attest all information history review of systems is accurate.  She has been having pain in her right leg with swelling for about a week now.  Is just not getting any better.  At this point we will order a Doppler ultrasound to rule out DVT.  Last creatinine was 1.27 so we will avoid nonsteroidal anti-inflammatories at this point.  You have chosen to receive care through a telephone visit. Do you consent to use a telephone visit for your medical care today? Yes  This visit has been rescheduled as a phone visit to comply with patient safety concerns in accordance with CDC recommendations. Total time of discussion was   13 minutes.

## 2020-10-28 ENCOUNTER — HOSPITAL ENCOUNTER (OUTPATIENT)
Dept: ULTRASOUND IMAGING | Facility: HOSPITAL | Age: 70
Discharge: HOME | End: 2020-10-28
Admitting: FAMILY MEDICINE

## 2020-10-28 DIAGNOSIS — M79.604 PAIN OF RIGHT LOWER EXTREMITY: ICD-10-CM

## 2020-10-28 PROCEDURE — 93971 EXTREMITY STUDY: CPT

## 2020-10-28 NOTE — PROGRESS NOTES
No sign of blood clot at this time.  Just elevate leg and if continues to be a problem we can consider compression stockings.

## 2020-11-17 ENCOUNTER — OFFICE VISIT (OUTPATIENT)
Dept: FAMILY MEDICINE CLINIC | Facility: CLINIC | Age: 70
End: 2020-11-17

## 2020-11-17 VITALS
HEIGHT: 65 IN | BODY MASS INDEX: 31.32 KG/M2 | DIASTOLIC BLOOD PRESSURE: 72 MMHG | WEIGHT: 188 LBS | SYSTOLIC BLOOD PRESSURE: 128 MMHG | HEART RATE: 62 BPM

## 2020-11-17 DIAGNOSIS — R10.32 LEFT LOWER QUADRANT ABDOMINAL PAIN: ICD-10-CM

## 2020-11-17 DIAGNOSIS — E78.00 PURE HYPERCHOLESTEROLEMIA: ICD-10-CM

## 2020-11-17 DIAGNOSIS — E66.09 CLASS 1 OBESITY DUE TO EXCESS CALORIES WITH SERIOUS COMORBIDITY AND BODY MASS INDEX (BMI) OF 31.0 TO 31.9 IN ADULT: ICD-10-CM

## 2020-11-17 DIAGNOSIS — E03.9 ACQUIRED HYPOTHYROIDISM: ICD-10-CM

## 2020-11-17 DIAGNOSIS — Z00.00 MEDICARE ANNUAL WELLNESS VISIT, SUBSEQUENT: ICD-10-CM

## 2020-11-17 DIAGNOSIS — E11.9 DIABETES MELLITUS TYPE 2 WITHOUT RETINOPATHY (HCC): ICD-10-CM

## 2020-11-17 DIAGNOSIS — F32.4 MAJOR DEPRESSIVE DISORDER WITH SINGLE EPISODE, IN PARTIAL REMISSION (HCC): ICD-10-CM

## 2020-11-17 DIAGNOSIS — I10 ESSENTIAL HYPERTENSION: Primary | ICD-10-CM

## 2020-11-17 PROCEDURE — 99443 PR PHYS/QHP TELEPHONE EVALUATION 21-30 MIN: CPT | Performed by: FAMILY MEDICINE

## 2020-11-17 PROCEDURE — G0439 PPPS, SUBSEQ VISIT: HCPCS | Performed by: FAMILY MEDICINE

## 2020-11-17 NOTE — PROGRESS NOTES
The ABCs of the Annual Wellness Visit  Subsequent Medicare Wellness Visit    Chief Complaint   Patient presents with   • Hypertension       Subjective   History of Present Illness:  Emily Haas is a 70 y.o. female who presents for a Subsequent Medicare Wellness Visit.    HEALTH RISK ASSESSMENT    Recent Hospitalizations:  No hospitalization(s) within the last year.    Current Medical Providers:  Patient Care Team:  Fernando Hutton MD as PCP - Guzman Camacho DPM as Consulting Physician (Podiatry)  Kenan Elmore MD as Consulting Physician (Sleep Medicine)    Smoking Status:  Social History     Tobacco Use   Smoking Status Former Smoker   Smokeless Tobacco Never Used   Tobacco Comment    QUIT 30 YEARS AGO       Alcohol Consumption:  Social History     Substance and Sexual Activity   Alcohol Use No       Depression Screen:   PHQ-2/PHQ-9 Depression Screening 11/17/2020   Little interest or pleasure in doing things 0   Feeling down, depressed, or hopeless 0   Trouble falling or staying asleep, or sleeping too much 0   Feeling tired or having little energy 0   Poor appetite or overeating 0   Feeling bad about yourself - or that you are a failure or have let yourself or your family down 0   Trouble concentrating on things, such as reading the newspaper or watching television 0   Moving or speaking so slowly that other people could have noticed. Or the opposite - being so fidgety or restless that you have been moving around a lot more than usual 0   Thoughts that you would be better off dead, or of hurting yourself in some way 0   Total Score 0   If you checked off any problems, how difficult have these problems made it for you to do your work, take care of things at home, or get along with other people? Not difficult at all       Fall Risk Screen:  STEADI Fall Risk Assessment was completed, and patient is at HIGH risk for falls. Assessment completed on:11/12/2020    Health Habits and Functional and  Cognitive Screening:  Functional & Cognitive Status 11/17/2020   Do you have difficulty preparing food and eating? No   Do you have difficulty bathing yourself, getting dressed or grooming yourself? No   Do you have difficulty using the toilet? No   Do you have difficulty moving around from place to place? No   Do you have trouble with steps or getting out of a bed or a chair? No   Current Diet Well Balanced Diet   Dental Exam Up to date   Eye Exam Up to date   Exercise (times per week) 0 times per week   Current Exercises Include No Regular Exercise   Current Exercise Activities Include -   Do you need help using the phone?  No   Are you deaf or do you have serious difficulty hearing?  No   Do you need help with transportation? No   Do you need help shopping? No   Do you need help preparing meals?  No   Do you need help with housework?  No   Do you need help with laundry? No   Do you need help taking your medications? No   Do you need help managing money? No   Do you ever drive or ride in a car without wearing a seat belt? No   Have you felt unusual stress, anger or loneliness in the last month? No   Who do you live with? Spouse   If you need help, do you have trouble finding someone available to you? No   Have you been bothered in the last four weeks by sexual problems? No   Do you have difficulty concentrating, remembering or making decisions? No         Does the patient have evidence of cognitive impairment? No    Asprin use counseling:Does not need ASA (and currently is not on it)    Age-appropriate Screening Schedule:  Refer to the list below for future screening recommendations based on patient's age, sex and/or medical conditions. Orders for these recommended tests are listed in the plan section. The patient has been provided with a written plan.    Health Maintenance   Topic Date Due   • ZOSTER VACCINE (2 of 3) 12/31/2015   • INFLUENZA VACCINE  08/01/2020   • HEMOGLOBIN A1C  08/12/2020   • DIABETIC FOOT  EXAM  02/12/2021   • LIPID PANEL  02/12/2021   • URINE MICROALBUMIN  02/12/2021   • DIABETIC EYE EXAM  06/15/2021   • MAMMOGRAM  08/06/2021   • TDAP/TD VACCINES (2 - Td) 05/05/2026   • COLONOSCOPY  11/17/2027          The following portions of the patient's history were reviewed and updated as appropriate: allergies, current medications, past family history, past medical history, past social history, past surgical history and problem list.    Outpatient Medications Prior to Visit   Medication Sig Dispense Refill   • albuterol (PROVENTIL HFA;VENTOLIN HFA) 108 (90 Base) MCG/ACT inhaler Inhale 2 puffs 4 (Four) Times a Day. 8 g 0   • carvedilol (COREG) 12.5 MG tablet Take 1 tablet by mouth 2 (Two) Times a Day With Meals. 180 tablet 3   • escitalopram (LEXAPRO) 10 MG tablet TAKE 1 TABLET EVERY DAY 90 tablet 3   • glucose blood test strip by Other route 2 (Two) Times a Day. Use as instructed     • guanFACINE (TENEX) 1 MG tablet Take 1 mg by mouth Daily.     • hydrOXYzine (ATARAX) 25 MG tablet TAKE 1 TABLET AT NIGHT AS NEEDED FOR ITCHING/SLEEP (SUSBTITUTED FOR ATARAX) 30 tablet 11   • ketoconazole (NIZORAL) 2 % cream Apply  topically to the appropriate area as directed Every 12 (Twelve) Hours. 60 g 0   • losartan (COZAAR) 100 MG tablet TAKE 1 TABLET EVERY DAY 90 tablet 2   • nystatin (MYCOSTATIN) 927843 UNIT/GM powder Apply  topically to the appropriate area as directed 3 (Three) Times a Day. 45 g 1   • polyethylene glycol (MIRALAX) powder Take 17 g by mouth As Needed. 17gm in 8oz bid      • SYNTHROID 125 MCG tablet Take 1 tablet by mouth Daily. 30 tablet 11     No facility-administered medications prior to visit.        Patient Active Problem List   Diagnosis   • Vitamin D deficiency   • Irritable bowel syndrome   • Insomnia   • Hyperlipidemia   • Generalized anxiety disorder   • Essential hypertension   • Diverticular disease of colon   • Diabetes mellitus type 2, diet-controlled (CMS/HCC)   • Anxiety state   • Acquired  "hypothyroidism   • Asthma   • Periumbilical abdominal pain   • Telangiectasia of limb   • Varicose veins of both lower extremities with pain   • Diabetes mellitus without complication (CMS/HCC)   • Posterior subcapsular polar age-related cataract   • Encounter for screening for malignant neoplasm of colon   • Diverticulosis of large intestine without hemorrhage   • Irritable bowel syndrome with both constipation and diarrhea   • Vaginosis   • Fatty liver   • Diabetes mellitus type 2 without retinopathy (CMS/HCC)   • Depression   • Pure hypercholesterolemia   • Class 1 obesity due to excess calories with serious comorbidity and body mass index (BMI) of 33.0 to 33.9 in adult       Advanced Care Planning:  ACP discussion was held with the patient during this visit. Patient has an advance directive in EMR which is still valid.     Review of Systems    Compared to one year ago, the patient feels her physical health is better.  Compared to one year ago, the patient feels her mental health is the same.    Reviewed chart for potential of high risk medication in the elderly: yes  Reviewed chart for potential of harmful drug interactions in the elderly:yes    Objective         Vitals:    11/17/20 0954   BP: 128/72   Pulse: 62   Weight: 85.3 kg (188 lb)   Height: 165.1 cm (65\")       Body mass index is 31.28 kg/m².  Discussed the patient's BMI with her. The BMI is above average; BMI management plan is completed.    Physical Exam          Assessment/Plan   Medicare Risks and Personalized Health Plan  CMS Preventative Services Quick Reference  Advance Directive Discussion    The above risks/problems have been discussed with the patient.  Pertinent information has been shared with the patient in the After Visit Summary.  Follow up plans and orders are seen below in the Assessment/Plan Section.    There are no diagnoses linked to this encounter.  Follow Up:  No follow-ups on file.     An After Visit Summary and PPPS were given to " the patient.

## 2020-11-17 NOTE — PROGRESS NOTES
Subjective   Emily Haas is a 70 y.o. female.     Hypertension  This is a chronic problem. The current episode started more than 1 year ago. The problem is unchanged. The problem is controlled. Pertinent negatives include no chest pain, orthopnea, palpitations, peripheral edema, PND or shortness of breath.   Hyperlipidemia  This is a chronic problem. The current episode started more than 1 year ago. The problem is uncontrolled. Recent lipid tests were reviewed and are high. Exacerbating diseases include hypothyroidism and obesity. Pertinent negatives include no chest pain, myalgias or shortness of breath.   Hypothyroidism  This is a chronic problem. The current episode started more than 1 year ago. The problem occurs constantly. The problem has been unchanged. Associated symptoms include abdominal pain. Pertinent negatives include no arthralgias, chest pain, fever or myalgias.   Diabetes  She presents for her follow-up diabetic visit. She has type 2 diabetes mellitus. Her disease course has been stable. Pertinent negatives for hypoglycemia include no nervousness/anxiousness. Pertinent negatives for diabetes include no chest pain and no foot paresthesias. Her breakfast blood glucose range is generally 110-130 mg/dl. (112-115) An ACE inhibitor/angiotensin II receptor blocker is being taken.   Depression  Visit Type: follow-up  Patient is not experiencing: depressed mood, excessive worry, insomnia, irritability, nervousness/anxiety, palpitations and shortness of breath.    Obesity  This is a chronic problem. The current episode started more than 1 year ago. The problem occurs constantly. The problem has been gradually improving. Associated symptoms include abdominal pain. Pertinent negatives include no arthralgias, chest pain, fever or myalgias.   Abdominal Pain  This is a recurrent problem. The current episode started 1 to 4 weeks ago. The onset quality is sudden. The problem occurs intermittently. The problem  has been waxing and waning. The pain is located in the LLQ. The quality of the pain is colicky. The abdominal pain does not radiate. Associated symptoms include diarrhea. Pertinent negatives include no arthralgias, dysuria, fever, hematuria or myalgias.        The following portions of the patient's history were reviewed and updated as appropriate: allergies, current medications, past family history, past medical history, past social history, past surgical history and problem list.    Review of Systems   Constitutional: Negative for fever and irritability.   Respiratory: Negative for shortness of breath.    Cardiovascular: Negative for chest pain, palpitations, orthopnea and PND.   Gastrointestinal: Positive for abdominal pain and diarrhea. Negative for blood in stool.   Genitourinary: Negative for dysuria and hematuria.   Musculoskeletal: Negative for arthralgias and myalgias.   Psychiatric/Behavioral: Negative for dysphoric mood. The patient is not nervous/anxious and does not have insomnia.        Objective   Physical Exam   Constitutional: She is oriented to person, place, and time. No distress.   Neurological: She is alert and oriented to person, place, and time.   Psychiatric: Her speech is normal and behavior is normal. Judgment and thought content normal.       Assessment/Plan   Problems Addressed this Visit        Cardiovascular and Mediastinum    Essential hypertension - Primary    Relevant Orders    Comprehensive Metabolic Panel    CBC & Differential    Urinalysis With Microscopic - Urine, Clean Catch    Pure hypercholesterolemia    Relevant Orders    Lipid Panel       Digestive    Class 1 obesity due to excess calories with serious comorbidity and body mass index (BMI) of 33.0 to 33.9 in adult       Endocrine    Acquired hypothyroidism    Relevant Orders    TSH    T4, Free    Diabetes mellitus type 2 without retinopathy (CMS/HCC)    Relevant Orders    Hemoglobin A1c    Microalbumin / Creatinine Urine  Ratio - Urine, Clean Catch       Other    Depression      Other Visit Diagnoses     Left lower quadrant abdominal pain        Medicare annual wellness visit, subsequent          Diagnoses       Codes Comments    Essential hypertension    -  Primary ICD-10-CM: I10  ICD-9-CM: 401.9     Diabetes mellitus type 2 without retinopathy (CMS/HCC)     ICD-10-CM: E11.9  ICD-9-CM: 250.00     Acquired hypothyroidism     ICD-10-CM: E03.9  ICD-9-CM: 244.9     Pure hypercholesterolemia     ICD-10-CM: E78.00  ICD-9-CM: 272.0     Major depressive disorder with single episode, in partial remission (CMS/HCC)     ICD-10-CM: F32.4  ICD-9-CM: 296.25     Class 1 obesity due to excess calories with serious comorbidity and body mass index (BMI) of 31.0 to 31.9 in adult     ICD-10-CM: E66.09, Z68.31  ICD-9-CM: 278.00, V85.31     Left lower quadrant abdominal pain     ICD-10-CM: R10.32  ICD-9-CM: 789.04     Medicare annual wellness visit, subsequent     ICD-10-CM: Z00.00  ICD-9-CM: V70.0       I attest that all information history and review of systems is accurate.    Hypertension continue home carvedilol, Tenex, and losartan.    For diabetes continue diet.    For hyperlipidemia continue.    For depression continue Escitalopram.    For left lower quadrant pain.  Her pain has resolved and was intermittent.  She did have a little diarrhea afterwards.  She not passing blood from the fever.  We will go ahead and check a CBC and urinalysis along with CBC MP already ordered for hypertension.  We discussed doing a work-up.  Her last colonoscopy was 2017.  She has had surgery for what appears to be a twisted bowel in the past.  We will just observe at this time but are open to work-up if it returns.      You have chosen to receive care through a telephone visit. Do you consent to use a telephone visit for your medical care today? Yes  This visit has been rescheduled as a phone visit to comply with patient safety concerns in accordance with [facility]  "recommendations. Total time of discussion was 21 minutes.          Visit Vitals  /72   Pulse 62   Ht 165.1 cm (65\")   Wt 85.3 kg (188 lb)   LMP  (LMP Unknown)   BMI 31.28 kg/m²       Body mass index is 31.28 kg/m².            This document has been electronically signed by Fernando Hutton MD on November 17, 2020 10:10 CST        "

## 2020-11-17 NOTE — PATIENT INSTRUCTIONS

## 2020-11-24 ENCOUNTER — TRANSCRIBE ORDERS (OUTPATIENT)
Dept: LAB | Facility: HOSPITAL | Age: 70
End: 2020-11-24

## 2020-11-24 ENCOUNTER — LAB (OUTPATIENT)
Dept: LAB | Facility: HOSPITAL | Age: 70
End: 2020-11-24

## 2020-11-24 DIAGNOSIS — N39.0 URINARY TRACT INFECTION WITHOUT HEMATURIA, SITE UNSPECIFIED: ICD-10-CM

## 2020-11-24 DIAGNOSIS — E11.9 TYPE 2 DIABETES MELLITUS WITHOUT COMPLICATION, UNSPECIFIED WHETHER LONG TERM INSULIN USE (HCC): ICD-10-CM

## 2020-11-24 DIAGNOSIS — I10 ESSENTIAL (PRIMARY) HYPERTENSION: ICD-10-CM

## 2020-11-24 DIAGNOSIS — N18.30 STAGE 3 CHRONIC KIDNEY DISEASE, UNSPECIFIED WHETHER STAGE 3A OR 3B CKD (HCC): Primary | ICD-10-CM

## 2020-11-24 DIAGNOSIS — E78.5 HYPERLIPIDEMIA, UNSPECIFIED HYPERLIPIDEMIA TYPE: ICD-10-CM

## 2020-11-24 LAB
BASOPHILS # BLD AUTO: 0.06 10*3/MM3 (ref 0–0.2)
BASOPHILS NFR BLD AUTO: 0.5 % (ref 0–1.5)
DEPRECATED RDW RBC AUTO: 44.2 FL (ref 37–54)
EOSINOPHIL # BLD AUTO: 0.06 10*3/MM3 (ref 0–0.4)
EOSINOPHIL NFR BLD AUTO: 0.5 % (ref 0.3–6.2)
ERYTHROCYTE [DISTWIDTH] IN BLOOD BY AUTOMATED COUNT: 13 % (ref 12.3–15.4)
HBA1C MFR BLD: 6.74 % (ref 4.8–5.6)
HCT VFR BLD AUTO: 42.7 % (ref 34–46.6)
HGB BLD-MCNC: 14.2 G/DL (ref 12–15.9)
IMM GRANULOCYTES # BLD AUTO: 0.07 10*3/MM3 (ref 0–0.05)
IMM GRANULOCYTES NFR BLD AUTO: 0.6 % (ref 0–0.5)
LYMPHOCYTES # BLD AUTO: 2.65 10*3/MM3 (ref 0.7–3.1)
LYMPHOCYTES NFR BLD AUTO: 22.3 % (ref 19.6–45.3)
MCH RBC QN AUTO: 31.4 PG (ref 26.6–33)
MCHC RBC AUTO-ENTMCNC: 33.3 G/DL (ref 31.5–35.7)
MCV RBC AUTO: 94.5 FL (ref 79–97)
MONOCYTES # BLD AUTO: 0.85 10*3/MM3 (ref 0.1–0.9)
MONOCYTES NFR BLD AUTO: 7.1 % (ref 5–12)
NEUTROPHILS NFR BLD AUTO: 69 % (ref 42.7–76)
NEUTROPHILS NFR BLD AUTO: 8.22 10*3/MM3 (ref 1.7–7)
NRBC BLD AUTO-RTO: 0 /100 WBC (ref 0–0.2)
PHOSPHATE SERPL-MCNC: 3.2 MG/DL (ref 2.5–4.5)
PLATELET # BLD AUTO: 260 10*3/MM3 (ref 140–450)
PMV BLD AUTO: 10.9 FL (ref 6–12)
PTH-INTACT SERPL-MCNC: 46 PG/ML (ref 15–65)
RBC # BLD AUTO: 4.52 10*6/MM3 (ref 3.77–5.28)
WBC # BLD AUTO: 11.91 10*3/MM3 (ref 3.4–10.8)

## 2020-11-24 PROCEDURE — 84100 ASSAY OF PHOSPHORUS: CPT | Performed by: NURSE PRACTITIONER

## 2020-11-24 PROCEDURE — 83970 ASSAY OF PARATHORMONE: CPT | Performed by: NURSE PRACTITIONER

## 2020-11-24 PROCEDURE — 80061 LIPID PANEL: CPT | Performed by: FAMILY MEDICINE

## 2020-11-24 PROCEDURE — 80053 COMPREHEN METABOLIC PANEL: CPT | Performed by: FAMILY MEDICINE

## 2020-11-24 PROCEDURE — 85025 COMPLETE CBC W/AUTO DIFF WBC: CPT | Performed by: FAMILY MEDICINE

## 2020-11-24 PROCEDURE — 82306 VITAMIN D 25 HYDROXY: CPT | Performed by: NURSE PRACTITIONER

## 2020-11-24 PROCEDURE — 36415 COLL VENOUS BLD VENIPUNCTURE: CPT | Performed by: FAMILY MEDICINE

## 2020-11-24 PROCEDURE — 84439 ASSAY OF FREE THYROXINE: CPT | Performed by: FAMILY MEDICINE

## 2020-11-24 PROCEDURE — 84443 ASSAY THYROID STIM HORMONE: CPT | Performed by: FAMILY MEDICINE

## 2020-11-24 PROCEDURE — 83036 HEMOGLOBIN GLYCOSYLATED A1C: CPT | Performed by: FAMILY MEDICINE

## 2020-11-25 ENCOUNTER — FLU SHOT (OUTPATIENT)
Dept: FAMILY MEDICINE CLINIC | Facility: CLINIC | Age: 70
End: 2020-11-25

## 2020-11-25 DIAGNOSIS — Z23 NEED FOR INFLUENZA VACCINATION: ICD-10-CM

## 2020-11-25 DIAGNOSIS — E03.9 ACQUIRED HYPOTHYROIDISM: Primary | ICD-10-CM

## 2020-11-25 LAB
25(OH)D3 SERPL-MCNC: 30.3 NG/ML (ref 30–100)
ALBUMIN SERPL-MCNC: 3.9 G/DL (ref 3.5–5.2)
ALBUMIN/GLOB SERPL: 1.3 G/DL
ALP SERPL-CCNC: 66 U/L (ref 39–117)
ALT SERPL W P-5'-P-CCNC: 16 U/L (ref 1–33)
ANION GAP SERPL CALCULATED.3IONS-SCNC: 10.6 MMOL/L (ref 5–15)
AST SERPL-CCNC: 18 U/L (ref 1–32)
BILIRUB SERPL-MCNC: 0.4 MG/DL (ref 0–1.2)
BUN SERPL-MCNC: 23 MG/DL (ref 8–23)
BUN/CREAT SERPL: 19.3 (ref 7–25)
CALCIUM SPEC-SCNC: 9.5 MG/DL (ref 8.6–10.5)
CHLORIDE SERPL-SCNC: 102 MMOL/L (ref 98–107)
CHOLEST SERPL-MCNC: 299 MG/DL (ref 0–200)
CO2 SERPL-SCNC: 25.4 MMOL/L (ref 22–29)
CREAT SERPL-MCNC: 1.19 MG/DL (ref 0.57–1)
GFR SERPL CREATININE-BSD FRML MDRD: 45 ML/MIN/1.73
GLOBULIN UR ELPH-MCNC: 3 GM/DL
GLUCOSE SERPL-MCNC: 191 MG/DL (ref 65–99)
HDLC SERPL-MCNC: 64 MG/DL (ref 40–60)
LDLC SERPL CALC-MCNC: 192 MG/DL (ref 0–100)
LDLC/HDLC SERPL: 2.97 {RATIO}
POTASSIUM SERPL-SCNC: 4.6 MMOL/L (ref 3.5–5.2)
PROT SERPL-MCNC: 6.9 G/DL (ref 6–8.5)
SODIUM SERPL-SCNC: 138 MMOL/L (ref 136–145)
T4 FREE SERPL-MCNC: 1.19 NG/DL (ref 0.93–1.7)
TRIGL SERPL-MCNC: 226 MG/DL (ref 0–150)
TSH SERPL DL<=0.05 MIU/L-ACNC: 12.6 UIU/ML (ref 0.27–4.2)
VLDLC SERPL-MCNC: 43 MG/DL (ref 5–40)

## 2020-11-25 PROCEDURE — 90471 IMMUNIZATION ADMIN: CPT | Performed by: FAMILY MEDICINE

## 2020-11-25 PROCEDURE — 90686 IIV4 VACC NO PRSV 0.5 ML IM: CPT | Performed by: FAMILY MEDICINE

## 2020-11-25 NOTE — PROGRESS NOTES
Sh is elevated indicating thyroid is low.  She is on 125 mcg of levothyroxine a day.  Make sure she takes it every morning an hour before breakfast and I will order repeat free T4 and TSH for her to be done in 1 month.  Otherwise, cholesterol is elevated but with her allergies I just recommend low-cholesterol diet.  Renal function and diabetes are stable

## 2020-12-01 ENCOUNTER — LAB (OUTPATIENT)
Dept: LAB | Facility: HOSPITAL | Age: 70
End: 2020-12-01

## 2020-12-01 PROCEDURE — 82570 ASSAY OF URINE CREATININE: CPT | Performed by: FAMILY MEDICINE

## 2020-12-01 PROCEDURE — 84156 ASSAY OF PROTEIN URINE: CPT | Performed by: FAMILY MEDICINE

## 2020-12-01 PROCEDURE — 82043 UR ALBUMIN QUANTITATIVE: CPT | Performed by: FAMILY MEDICINE

## 2020-12-01 PROCEDURE — 81001 URINALYSIS AUTO W/SCOPE: CPT | Performed by: FAMILY MEDICINE

## 2020-12-02 LAB
ALBUMIN UR-MCNC: <1.2 MG/DL
BACTERIA UR QL AUTO: ABNORMAL /HPF
BILIRUB UR QL STRIP: NEGATIVE
CLARITY UR: ABNORMAL
COLOR UR: YELLOW
CREAT UR-MCNC: 146.7 MG/DL
CREAT UR-MCNC: 146.7 MG/DL
GLUCOSE UR STRIP-MCNC: NEGATIVE MG/DL
HGB UR QL STRIP.AUTO: NEGATIVE
HYALINE CASTS UR QL AUTO: ABNORMAL /LPF
KETONES UR QL STRIP: NEGATIVE
LEUKOCYTE ESTERASE UR QL STRIP.AUTO: NEGATIVE
MICROALBUMIN/CREAT UR: NORMAL MG/G{CREAT}
NITRITE UR QL STRIP: NEGATIVE
PH UR STRIP.AUTO: 5.5 [PH] (ref 5–8)
PROT UR QL STRIP: NEGATIVE
PROT UR-MCNC: 12 MG/DL
PROT/CREAT UR: 81.8 MG/G CREA (ref 0–200)
RBC # UR: ABNORMAL /HPF
REF LAB TEST METHOD: ABNORMAL
SP GR UR STRIP: 1.02 (ref 1–1.03)
SQUAMOUS #/AREA URNS HPF: ABNORMAL /HPF
UROBILINOGEN UR QL STRIP: ABNORMAL
WBC UR QL AUTO: ABNORMAL /HPF

## 2020-12-03 ENCOUNTER — TELEPHONE (OUTPATIENT)
Dept: FAMILY MEDICINE CLINIC | Facility: CLINIC | Age: 70
End: 2020-12-03

## 2020-12-03 RX ORDER — CIPROFLOXACIN 500 MG/1
500 TABLET, FILM COATED ORAL 2 TIMES DAILY
Qty: 14 TABLET | Refills: 0 | Status: SHIPPED | OUTPATIENT
Start: 2020-12-03 | End: 2020-12-10

## 2020-12-03 NOTE — TELEPHONE ENCOUNTER
Per Dr. Hutton, Ms. Haas has been called with recent lab results & recommendations.  Continue current medications and follow-up as planned or sooner if any problems.      ----- Message from Fernando Hutton MD sent at 12/3/2020  3:30 PM CST -----  Possible UTI but urine was contaminated.  To be sure I will call her in Cipro 500 twice daily for 7 days to [facility].

## 2020-12-09 DIAGNOSIS — K52.9 CHRONIC DIARRHEA: Primary | ICD-10-CM

## 2020-12-09 DIAGNOSIS — R14.0 ABDOMINAL BLOATING: ICD-10-CM

## 2020-12-21 ENCOUNTER — OFFICE VISIT (OUTPATIENT)
Dept: GASTROENTEROLOGY | Facility: CLINIC | Age: 70
End: 2020-12-21

## 2020-12-21 VITALS
HEART RATE: 67 BPM | HEIGHT: 65 IN | SYSTOLIC BLOOD PRESSURE: 129 MMHG | DIASTOLIC BLOOD PRESSURE: 80 MMHG | BODY MASS INDEX: 32.72 KG/M2 | WEIGHT: 196.4 LBS

## 2020-12-21 DIAGNOSIS — R11.0 NAUSEA: ICD-10-CM

## 2020-12-21 DIAGNOSIS — R10.12 LEFT UPPER QUADRANT ABDOMINAL PAIN: ICD-10-CM

## 2020-12-21 DIAGNOSIS — R10.31 RIGHT LOWER QUADRANT ABDOMINAL PAIN: Primary | ICD-10-CM

## 2020-12-21 DIAGNOSIS — R10.32 LEFT LOWER QUADRANT ABDOMINAL PAIN: ICD-10-CM

## 2020-12-21 DIAGNOSIS — R19.7 DIARRHEA, UNSPECIFIED TYPE: ICD-10-CM

## 2020-12-21 DIAGNOSIS — R14.0 BLOATING: ICD-10-CM

## 2020-12-21 PROCEDURE — 99204 OFFICE O/P NEW MOD 45 MIN: CPT | Performed by: INTERNAL MEDICINE

## 2020-12-21 RX ORDER — DEXTROSE AND SODIUM CHLORIDE 5; .45 G/100ML; G/100ML
30 INJECTION, SOLUTION INTRAVENOUS CONTINUOUS PRN
Status: CANCELLED | OUTPATIENT
Start: 2021-01-14

## 2020-12-21 RX ORDER — DICYCLOMINE HCL 20 MG
20 TABLET ORAL EVERY 6 HOURS
Qty: 120 TABLET | Refills: 6 | Status: SHIPPED | OUTPATIENT
Start: 2020-12-21 | End: 2021-01-20

## 2020-12-21 RX ORDER — ERGOCALCIFEROL 1.25 MG/1
50000 CAPSULE ORAL WEEKLY
COMMUNITY
Start: 2020-11-12

## 2020-12-21 NOTE — PROGRESS NOTES
[facility]      Chief Complaint:   Chief Complaint   Patient presents with   • Abdominal Pain   • Diarrhea       Subjective     HPI:   Ms. Haas is a 70-year-old  female with past medical history of asthma, degenerative joint disease, diverticulosis, hypertension, hyperlipidemia, anxiety, low back pain presenting for evaluation for abdominal pain.  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  Has intermittent bouts of left lower quadrant abdominal pain associated with the bloating, diarrhea, flatulence and nausea for past 2 to 3 months.  Pain radiates to the right lower quadrant.  Denied vomiting, rectal bleeding or weight loss.  Her last colonoscopy in 2017 was consistent with the sigmoid diverticulosis.    Past Medical History:   Past Medical History:   Diagnosis Date   • Abrasion, right lower leg, initial encounter    • Acquired hypothyroidism    • Acromioclavicular joint pain    • Allergic rhinitis    • Anxiety state    • Asthma    • Bleeding disorder (CMS/HCC)    • Cancer (CMS/HCC)    • Degenerative joint disease involving multiple joints    • Diabetes mellitus type 2, diet-controlled (CMS/HCC)    • Diverticular disease of colon    • Diverticulitis of colon    • Essential hypertension    • Generalized anxiety disorder    • Hyperlipidemia    • Ingrowing toenail    • Insomnia    • Irritable bowel syndrome    • Joint pain    • Local infection of the skin and subcutaneous tissue, unspecified    • Low back pain     abn MRI   • Neck pain     abn MRI-post Fusion   • Need for prophylactic vaccination against Streptococcus pneumoniae (pneumococcus)    • Open wound of lower leg    • Pain in right hip    • Peripheral edema    • Screening mammogram, encounter for 06/11/2015   • Vitamin D deficiency        Past Surgical History:  Past Surgical History:   Procedure Laterality Date   • BILATERAL BREAST REDUCTION     • BLADDER SURGERY     • CHOLECYSTECTOMY      • COLON SURGERY     • COLONOSCOPY  03/02/2012   • COLONOSCOPY N/A 11/17/2017    Procedure: COLONOSCOPY;  Surgeon: Cole Quezada MD;  Location: [facility] ENDOSCOPY;  Service:    • ENDOSCOPY  03/02/2012    Colon endoscopy 45017 (1)    Diverticulum in sigmoid colon.    • EXCISION LESION  08/04/2015    Destruction of Benign Lesion (1-14) 19819 (1)     • HYSTERECTOMY     • INJECTION OF MEDICATION  07/08/2014    Celestone (betamethasone) (3)      • INJECTION OF MEDICATION  09/23/2013    Dexamethasone (1)      • INJECTION OF MEDICATION  06/28/2016    Kenalog (8)      • REDUCTION MAMMAPLASTY         Family History:  Family History   Problem Relation Age of Onset   • Diabetes Other    • Heart disease Other    • Cancer Other    • Hypertension Other    • Hypertension Mother    • Heart disease Mother    • Arthritis Mother    • Alzheimer's disease Father    • Diabetes Brother    • Heart disease Brother    • Alzheimer's disease Brother    • Hyperlipidemia Brother    • Kidney disease Brother    • No Known Problems Maternal Grandmother    • No Known Problems Maternal Grandfather    • No Known Problems Paternal Grandmother    • No Known Problems Paternal Grandfather    • Diabetes Daughter    • Hypothyroidism Daughter    • Hypertension Daughter    • Drug abuse Daughter    • No Known Problems Daughter    • Cancer Paternal Aunt 80        pancreatic       Social History:   reports that she has quit smoking. She has never used smokeless tobacco. She reports that she does not drink alcohol or use drugs.    Medications:   Prior to Admission medications    Medication Sig Start Date End Date Taking? Authorizing Provider   carvedilol (COREG) 12.5 MG tablet Take 1 tablet by mouth 2 (Two) Times a Day With Meals. 5/12/20  Yes Fernando Hutton MD   escitalopram (LEXAPRO) 10 MG tablet TAKE 1 TABLET EVERY DAY 9/17/20  Yes Fernando Hutton MD   guanFACINE (TENEX) 1 MG tablet Take 1 mg by mouth Daily. 6/12/17  Yes Provider, MD Jose Alejandro    losartan (COZAAR) 100 MG tablet TAKE 1 TABLET EVERY DAY 9/17/20  Yes Fernando Hutton MD   polyethylene glycol (MIRALAX) powder Take 17 g by mouth As Needed. 17gm in 8oz bid    Yes Jose Alejandro Sommers MD   SYNTHROID 125 MCG tablet Take 1 tablet by mouth Daily. 8/12/20  Yes Fernando Hutton MD   vitamin D (ERGOCALCIFEROL) 1.25 MG (85863 UT) capsule capsule  11/12/20  Yes Jose Alejandro Sommers MD   albuterol (PROVENTIL HFA;VENTOLIN HFA) 108 (90 Base) MCG/ACT inhaler Inhale 2 puffs 4 (Four) Times a Day. 6/21/18   Fernando Hutton MD   dicyclomine (Bentyl) 20 MG tablet Take 1 tablet by mouth Every 6 (Six) Hours for 30 days. 12/21/20 1/20/21  Mauricio Betancourt MD   glucose blood test strip by Other route 2 (Two) Times a Day. Use as instructed    Jose Alejandro Sommers MD   hydrOXYzine (ATARAX) 25 MG tablet TAKE 1 TABLET AT NIGHT AS NEEDED FOR ITCHING/SLEEP (SUSBTITUTED FOR ATARAX) 10/9/18   Fernando Hutton MD   ketoconazole (NIZORAL) 2 % cream Apply  topically to the appropriate area as directed Every 12 (Twelve) Hours. 9/10/18   Katerin Yee APRN   nystatin (MYCOSTATIN) 610975 UNIT/GM powder Apply  topically to the appropriate area as directed 3 (Three) Times a Day. 9/10/18   Katerin Yee APRN       Allergies:  Atenolol, Augmentin [amoxicillin-pot clavulanate], Dilaudid [hydromorphone], Lipitor [atorvastatin], Pravachol [pravastatin], Statins, and Sulfa antibiotics    ROS:    Review of Systems   Constitutional: Negative for chills, fatigue, fever and unexpected weight change.   HENT: Negative for congestion, ear discharge, hearing loss, nosebleeds and sore throat.    Eyes: Negative for pain, discharge and redness.   Respiratory: Negative for cough, chest tightness, shortness of breath and wheezing.    Cardiovascular: Negative for chest pain and palpitations.   Gastrointestinal: Positive for abdominal pain, diarrhea and nausea. Negative for abdominal distention, blood in stool, constipation  "and vomiting.   Endocrine: Negative for cold intolerance, polydipsia, polyphagia and polyuria.   Genitourinary: Negative for dysuria, flank pain, frequency, hematuria and urgency.   Musculoskeletal: Negative for arthralgias, back pain, joint swelling and myalgias.   Skin: Negative for color change, pallor and rash.   Neurological: Negative for tremors, seizures, syncope, weakness and headaches.   Hematological: Negative for adenopathy. Does not bruise/bleed easily.   Psychiatric/Behavioral: Negative for behavioral problems, confusion, dysphoric mood, hallucinations and suicidal ideas. The patient is not nervous/anxious.      Objective     Blood pressure 129/80, pulse 67, height 165.1 cm (65\"), weight 89.1 kg (196 lb 6.4 oz).    Physical Exam  Constitutional:       Appearance: She is well-developed.   HENT:      Head: Normocephalic and atraumatic.   Eyes:      Conjunctiva/sclera: Conjunctivae normal.      Pupils: Pupils are equal, round, and reactive to light.   Neck:      Musculoskeletal: Normal range of motion and neck supple.      Thyroid: No thyromegaly.   Cardiovascular:      Rate and Rhythm: Normal rate and regular rhythm.      Heart sounds: Normal heart sounds. No murmur.   Pulmonary:      Effort: Pulmonary effort is normal.      Breath sounds: Normal breath sounds. No wheezing.   Abdominal:      General: Bowel sounds are normal. There is no distension.      Palpations: Abdomen is soft. There is no mass.      Tenderness: There is no abdominal tenderness.      Hernia: No hernia is present.   Genitourinary:     Comments: No lesions noted  Musculoskeletal: Normal range of motion.         General: No tenderness.   Lymphadenopathy:      Cervical: No cervical adenopathy.   Skin:     General: Skin is warm and dry.      Findings: No rash.   Neurological:      Mental Status: She is alert and oriented to person, place, and time.      Cranial Nerves: No cranial nerve deficit.   Psychiatric:         Thought Content: " Thought content normal.        Extremities: No edema, cyanosis or clubbing.    Assessment/Plan    1.  Bilateral lower quadrant abdominal pain associated with diarrhea rule out inflammatory bowel disease and colorectal neoplasia.  Add Bentyl.  Add high-fiber diet.   Schedule colonoscopy.  2.  Bloating and flatulence, likely due to small intestinal bacterial overgrowth. Add probiotics.  Obtain small bowel fluid aspirate for bacterial cultures.  3.  Nausea rule out gastritis and peptic ulcer disease.  Add Prilosec 40 mg p.o. daily.  Schedule EGD for further evaluation.  4.  Obesity, recommend exercise and diet control.  5.  History of segmental colonic resection  6.  Diverticulosis, high-fiber diet.  Diagnoses and all orders for this visit:    1. Right lower quadrant abdominal pain (Primary)  -     Case Request; Standing  -     dextrose 5 % and sodium chloride 0.45 % infusion  -     Case Request    2. Left upper quadrant abdominal pain    3. Left lower quadrant abdominal pain  -     Case Request; Standing  -     dextrose 5 % and sodium chloride 0.45 % infusion  -     Case Request    4. Diarrhea, unspecified type  -     Case Request; Standing  -     dextrose 5 % and sodium chloride 0.45 % infusion  -     Case Request    5. Nausea  -     Case Request; Standing  -     dextrose 5 % and sodium chloride 0.45 % infusion  -     Case Request    6. Bloating  -     Case Request; Standing  -     dextrose 5 % and sodium chloride 0.45 % infusion  -     Case Request    Other orders  -     Follow Anesthesia Guidelines / Standing Orders; Future  -     Obtain Informed Consent; Future  -     Implement Anesthesia Orders Day of Procedure; Standing  -     Obtain Informed Consent; Standing  -     POC Glucose Once; Standing  -     dicyclomine (Bentyl) 20 MG tablet; Take 1 tablet by mouth Every 6 (Six) Hours for 30 days.  Dispense: 120 tablet; Refill: 6        ESOPHAGOGASTRODUODENOSCOPY (N/A), COLONOSCOPY (N/A)     Diagnosis Plan   1. Right  lower quadrant abdominal pain  Case Request    dextrose 5 % and sodium chloride 0.45 % infusion    Case Request   2. Left upper quadrant abdominal pain     3. Left lower quadrant abdominal pain  Case Request    dextrose 5 % and sodium chloride 0.45 % infusion    Case Request   4. Diarrhea, unspecified type  Case Request    dextrose 5 % and sodium chloride 0.45 % infusion    Case Request   5. Nausea  Case Request    dextrose 5 % and sodium chloride 0.45 % infusion    Case Request   6. Bloating  Case Request    dextrose 5 % and sodium chloride 0.45 % infusion    Case Request       Anticipated Surgical Procedure:  Orders Placed This Encounter   Procedures   • Follow Anesthesia Guidelines / Standing Orders     Standing Status:   Future   • Obtain Informed Consent     Standing Status:   Future     Order Specific Question:   Informed Consent Given For     Answer:   egd and colonoscopy       The risks, benefits, and alternatives of this procedure have been discussed with the patient or the responsible party- the patient understands and agrees to proceed.            This document has been electronically signed by Mauricio Betancourt MD on December 21, 2020 15:28 CST

## 2020-12-30 ENCOUNTER — LAB (OUTPATIENT)
Dept: LAB | Facility: HOSPITAL | Age: 70
End: 2020-12-30

## 2020-12-30 DIAGNOSIS — E03.9 ACQUIRED HYPOTHYROIDISM: ICD-10-CM

## 2020-12-30 LAB
T4 FREE SERPL-MCNC: 2.09 NG/DL (ref 0.93–1.7)
TSH SERPL DL<=0.05 MIU/L-ACNC: 0.12 UIU/ML (ref 0.27–4.2)

## 2020-12-30 PROCEDURE — 84443 ASSAY THYROID STIM HORMONE: CPT

## 2020-12-30 PROCEDURE — 36415 COLL VENOUS BLD VENIPUNCTURE: CPT

## 2020-12-30 PROCEDURE — 84439 ASSAY OF FREE THYROXINE: CPT

## 2020-12-31 ENCOUNTER — TELEPHONE (OUTPATIENT)
Dept: FAMILY MEDICINE CLINIC | Facility: CLINIC | Age: 70
End: 2020-12-31

## 2020-12-31 RX ORDER — LEVOTHYROXINE SODIUM 112 UG/1
112 TABLET ORAL DAILY
Qty: 90 TABLET | Refills: 0 | Status: SHIPPED | OUTPATIENT
Start: 2020-12-31 | End: 2021-05-13 | Stop reason: SDUPTHER

## 2021-01-04 ENCOUNTER — TELEPHONE (OUTPATIENT)
Dept: FAMILY MEDICINE CLINIC | Facility: CLINIC | Age: 71
End: 2021-01-04

## 2021-01-04 NOTE — TELEPHONE ENCOUNTER
PATIENT CALLED STATING THAT HER THYROID MEDICATION WAS CALLED IN AND SHE IS CONFUSED WHY SHE WAS TOLD THAT SHE WAS GOING TO ORDERED LEVOTHYROXINE BUT WAS GIVEN SYNTHROID. SHE WOULD LIKE TO SPEAK WITH A NURSE OR DR CHRISTOPHER ABOUT THIS.    GOOD CALL BACK:  [phone]

## 2021-01-11 ENCOUNTER — LAB (OUTPATIENT)
Dept: LAB | Facility: HOSPITAL | Age: 71
End: 2021-01-11

## 2021-01-11 DIAGNOSIS — Z01.818 PREOP TESTING: Primary | ICD-10-CM

## 2021-01-11 LAB — SARS-COV-2 ORF1AB RESP QL NAA+PROBE: NOT DETECTED

## 2021-01-11 PROCEDURE — C9803 HOPD COVID-19 SPEC COLLECT: HCPCS

## 2021-01-11 PROCEDURE — U0004 COV-19 TEST NON-CDC HGH THRU: HCPCS

## 2021-01-14 ENCOUNTER — HOSPITAL ENCOUNTER (OUTPATIENT)
Facility: HOSPITAL | Age: 71
Setting detail: HOSPITAL OUTPATIENT SURGERY
Discharge: HOME | End: 2021-01-14
Attending: INTERNAL MEDICINE | Admitting: INTERNAL MEDICINE

## 2021-01-14 ENCOUNTER — ANESTHESIA (OUTPATIENT)
Dept: GASTROENTEROLOGY | Facility: HOSPITAL | Age: 71
End: 2021-01-14

## 2021-01-14 ENCOUNTER — ANESTHESIA EVENT (OUTPATIENT)
Dept: GASTROENTEROLOGY | Facility: HOSPITAL | Age: 71
End: 2021-01-14

## 2021-01-14 VITALS
DIASTOLIC BLOOD PRESSURE: 79 MMHG | WEIGHT: 191 LBS | TEMPERATURE: 97.3 F | SYSTOLIC BLOOD PRESSURE: 172 MMHG | HEIGHT: 65 IN | OXYGEN SATURATION: 100 % | HEART RATE: 67 BPM | RESPIRATION RATE: 18 BRPM | BODY MASS INDEX: 31.82 KG/M2

## 2021-01-14 DIAGNOSIS — R11.0 NAUSEA: ICD-10-CM

## 2021-01-14 DIAGNOSIS — R10.31 RIGHT LOWER QUADRANT ABDOMINAL PAIN: ICD-10-CM

## 2021-01-14 DIAGNOSIS — R19.7 DIARRHEA, UNSPECIFIED TYPE: ICD-10-CM

## 2021-01-14 DIAGNOSIS — R10.32 LEFT LOWER QUADRANT ABDOMINAL PAIN: ICD-10-CM

## 2021-01-14 DIAGNOSIS — R14.0 BLOATING: ICD-10-CM

## 2021-01-14 PROCEDURE — 45380 COLONOSCOPY AND BIOPSY: CPT | Performed by: INTERNAL MEDICINE

## 2021-01-14 PROCEDURE — 43239 EGD BIOPSY SINGLE/MULTIPLE: CPT | Performed by: INTERNAL MEDICINE

## 2021-01-14 PROCEDURE — 88305 TISSUE EXAM BY PATHOLOGIST: CPT

## 2021-01-14 PROCEDURE — 25010000002 PROPOFOL 10 MG/ML EMULSION: Performed by: NURSE ANESTHETIST, CERTIFIED REGISTERED

## 2021-01-14 RX ORDER — DEXTROSE AND SODIUM CHLORIDE 5; .45 G/100ML; G/100ML
30 INJECTION, SOLUTION INTRAVENOUS CONTINUOUS PRN
Status: DISCONTINUED | OUTPATIENT
Start: 2021-01-14 | End: 2021-01-14 | Stop reason: HOSPADM

## 2021-01-14 RX ORDER — PROPOFOL 10 MG/ML
VIAL (ML) INTRAVENOUS AS NEEDED
Status: DISCONTINUED | OUTPATIENT
Start: 2021-01-14 | End: 2021-01-14 | Stop reason: SURG

## 2021-01-14 RX ORDER — LIDOCAINE HYDROCHLORIDE 20 MG/ML
INJECTION, SOLUTION EPIDURAL; INFILTRATION; INTRACAUDAL; PERINEURAL AS NEEDED
Status: DISCONTINUED | OUTPATIENT
Start: 2021-01-14 | End: 2021-01-14 | Stop reason: SURG

## 2021-01-14 RX ADMIN — LIDOCAINE HYDROCHLORIDE 100 MG: 20 INJECTION, SOLUTION EPIDURAL; INFILTRATION; INTRACAUDAL; PERINEURAL at 11:32

## 2021-01-14 RX ADMIN — DEXTROSE AND SODIUM CHLORIDE 30 ML/HR: 5; 450 INJECTION, SOLUTION INTRAVENOUS at 10:31

## 2021-01-14 RX ADMIN — PROPOFOL 30 MG: 10 INJECTION, EMULSION INTRAVENOUS at 11:43

## 2021-01-14 RX ADMIN — PROPOFOL 40 MG: 10 INJECTION, EMULSION INTRAVENOUS at 11:37

## 2021-01-14 RX ADMIN — PROPOFOL 80 MG: 10 INJECTION, EMULSION INTRAVENOUS at 11:32

## 2021-01-14 NOTE — ANESTHESIA PREPROCEDURE EVALUATION
Anesthesia Evaluation     Patient summary reviewed and Nursing notes reviewed   no history of anesthetic complications:  NPO Solid Status: > 8 hours  NPO Liquid Status: > 2 hours           Airway   Mallampati: I  TM distance: <3 FB  Neck ROM: full  no difficulty expected  Dental    (+) poor dentition    Pulmonary - normal exam   (+) a smoker Former, asthma,sleep apnea,   Cardiovascular - normal exam    (+) hypertension well controlled, PVD, hyperlipidemia,       Neuro/Psych  (+) psychiatric history Anxiety and Depression,     GI/Hepatic/Renal/Endo    (+) obesity,   liver disease fatty liver disease, diabetes mellitus type 2 well controlled, thyroid problem hypothyroidism    Musculoskeletal     (+) back pain, neck pain,       ROS comment: Degenerative joint disease involving multiple joints  Abdominal    Substance History      OB/GYN          Other   arthritis,    history of cancer                      Anesthesia Plan    ASA 3     MAC     intravenous induction     Anesthetic plan, all risks, benefits, and alternatives have been provided, discussed and informed consent has been obtained with: patient.

## 2021-01-14 NOTE — ANESTHESIA POSTPROCEDURE EVALUATION
Patient: Emily Haas    Procedure Summary     Date: 01/14/21 Room / Location: [facility] ENDOSCOPY 1 / [facility] ENDOSCOPY    Anesthesia Start: 1130 Anesthesia Stop: 1146    Procedures:       ESOPHAGOGASTRODUODENOSCOPY (N/A )      COLONOSCOPY (N/A ) Diagnosis:       Right lower quadrant abdominal pain      Left lower quadrant abdominal pain      Diarrhea, unspecified type      Nausea      Bloating      (Right lower quadrant abdominal pain [R10.31])      (Left lower quadrant abdominal pain [R10.32])      (Diarrhea, unspecified type [R19.7])      (Nausea [R11.0])      (Bloating [R14.0])    Surgeon: Mauricio Betancourt MD Provider: Anay Min CRNA    Anesthesia Type: MAC ASA Status: 3          Anesthesia Type: MAC    Vitals  No vitals data found for the desired time range.          Post Anesthesia Care and Evaluation    Patient location during evaluation: bedside  Patient participation: complete - patient participated  Level of consciousness: awake and awake and alert  Pain score: 0  Pain management: satisfactory to patient  Airway patency: patent  Anesthetic complications: No anesthetic complications  PONV Status: none  Cardiovascular status: acceptable and stable  Respiratory status: acceptable, room air and spontaneous ventilation  Hydration status: acceptable

## 2021-01-19 LAB
LAB AP CASE REPORT: NORMAL
PATH REPORT.FINAL DX SPEC: NORMAL

## 2021-02-12 ENCOUNTER — OFFICE VISIT (OUTPATIENT)
Dept: GASTROENTEROLOGY | Facility: CLINIC | Age: 71
End: 2021-02-12

## 2021-02-12 VITALS
WEIGHT: 199.6 LBS | BODY MASS INDEX: 33.26 KG/M2 | HEART RATE: 74 BPM | DIASTOLIC BLOOD PRESSURE: 84 MMHG | HEIGHT: 65 IN | SYSTOLIC BLOOD PRESSURE: 151 MMHG

## 2021-02-12 DIAGNOSIS — R10.32 LEFT LOWER QUADRANT ABDOMINAL PAIN: Primary | ICD-10-CM

## 2021-02-12 DIAGNOSIS — R19.7 DIARRHEA, UNSPECIFIED TYPE: ICD-10-CM

## 2021-02-12 DIAGNOSIS — R11.0 NAUSEA: ICD-10-CM

## 2021-02-12 PROCEDURE — 99214 OFFICE O/P EST MOD 30 MIN: CPT | Performed by: INTERNAL MEDICINE

## 2021-02-12 RX ORDER — DICYCLOMINE HYDROCHLORIDE 10 MG/1
10 CAPSULE ORAL
Qty: 120 CAPSULE | Refills: 6 | Status: SHIPPED | OUTPATIENT
Start: 2021-02-12 | End: 2021-03-14

## 2021-02-12 RX ORDER — SENNOSIDES 8.6 MG
2 CAPSULE ORAL EVERY 8 HOURS PRN
COMMUNITY

## 2021-02-12 RX ORDER — OMEPRAZOLE 40 MG/1
40 CAPSULE, DELAYED RELEASE ORAL DAILY
Qty: 30 CAPSULE | Refills: 11 | Status: SHIPPED | OUTPATIENT
Start: 2021-02-12

## 2021-02-12 NOTE — PROGRESS NOTES
Chief Complaint   Patient presents with   • EGD/Colonoscopy Performed 01/14/2021     Right Lower Quadrant Pain.  Left Upper Quadrant Abdominal Pain.  Left Lower Quadrant Abdominal Pain.  DIarrhea.  Nausea.  Bloating.         Subjective    Emily Haas is a 71 y.o. female.    History of Present Illness  Patient presented to GI clinic for follow-up visit today.  Has intermittent symptoms of left lower quadrant pain associated with nausea, diarrhea and bloating.  Not taking Bentyl at this time.  Symptoms are worse with food intake.  GERD is well controlled with PPI.  EGD was consistent with hiatal hernia, esophagitis and gastritis.  Colonoscopy was consistent with colocolonic anastomosis and sigmoid colon which appeared to be patent diverticulosis and hemorrhoids.  Path was unremarkable.       The following portions of the patient's history were reviewed and updated as appropriate:   Past Medical History:   Diagnosis Date   • Abrasion, right lower leg, initial encounter    • Acquired hypothyroidism    • Acromioclavicular joint pain    • Allergic rhinitis    • Anxiety state    • Asthma    • Bleeding disorder (CMS/HCC)    • Cancer (CMS/HCC)    • Degenerative joint disease involving multiple joints    • Diabetes mellitus type 2, diet-controlled (CMS/HCC)    • Diverticular disease of colon    • Diverticulitis of colon    • Essential hypertension    • Generalized anxiety disorder    • Hyperlipidemia    • Ingrowing toenail    • Insomnia    • Irritable bowel syndrome    • Joint pain    • Local infection of the skin and subcutaneous tissue, unspecified    • Low back pain     abn MRI   • Neck pain     abn MRI-post Fusion   • Need for prophylactic vaccination against Streptococcus pneumoniae (pneumococcus)    • Open wound of lower leg    • Pain in right hip    • Peripheral edema    • Screening mammogram, encounter for 06/11/2015   • Vitamin D deficiency      Past Surgical History:   Procedure Laterality Date   • BILATERAL  BREAST REDUCTION     • BLADDER SURGERY     • CHOLECYSTECTOMY     • COLON SURGERY     • COLONOSCOPY  2012   • COLONOSCOPY N/A 2017    Procedure: COLONOSCOPY;  Surgeon: Cole Quezada MD;  Location: [facility] ENDOSCOPY;  Service:    • COLONOSCOPY N/A 2021    Procedure: COLONOSCOPY;  Surgeon: Mauricio Betancourt MD;  Location: [facility] ENDOSCOPY;  Service: Gastroenterology;  Laterality: N/A;   • ENDOSCOPY  2012    Colon endoscopy 39603 (1)    Diverticulum in sigmoid colon.    • ENDOSCOPY N/A 2021    Procedure: ESOPHAGOGASTRODUODENOSCOPY;  Surgeon: Mauricio Betancourt MD;  Location: [facility] ENDOSCOPY;  Service: Gastroenterology;  Laterality: N/A;   • EXCISION LESION  2015    Destruction of Benign Lesion (1-14) 54271 (1)     • HYSTERECTOMY     • INJECTION OF MEDICATION  2014    Celestone (betamethasone) (3)      • INJECTION OF MEDICATION  2013    Dexamethasone (1)      • INJECTION OF MEDICATION  2016    Kenalog (8)      • REDUCTION MAMMAPLASTY     • UPPER GASTROINTESTINAL ENDOSCOPY  2021    Per Dr. Mauricio Betancourt, [place], [place].     Family History   Problem Relation Age of Onset   • Diabetes Other    • Heart disease Other    • Cancer Other    • Hypertension Other    • Hypertension Mother    • Heart disease Mother    • Arthritis Mother    • Alzheimer's disease Father    • Diabetes Brother    • Heart disease Brother    • Alzheimer's disease Brother    • Hyperlipidemia Brother    • Kidney disease Brother    • No Known Problems Maternal Grandmother    • No Known Problems Maternal Grandfather    • No Known Problems Paternal Grandmother    • No Known Problems Paternal Grandfather    • Diabetes Daughter    • Hypothyroidism Daughter    • Hypertension Daughter    • Drug abuse Daughter    • No Known Problems Daughter    • Cancer Paternal Aunt 80        pancreatic     OB History        2    Para   2    Term   2            AB        Living           SAB        TAB         Ectopic        Molar        Multiple        Live Births                  Prior to Admission medications    Medication Sig Start Date End Date Taking? Authorizing Provider   acetaminophen (TYLENOL) 650 MG 8 hr tablet Take 2 tablets by mouth Every 8 (Eight) Hours As Needed.   Yes Jose Alejandro Sommers MD   carvedilol (COREG) 12.5 MG tablet Take 1 tablet by mouth 2 (Two) Times a Day With Meals. 5/12/20  Yes Fernando Hutton MD   docusate sodium (COLACE) 50 MG capsule Take 1 capsule by mouth Daily As Needed.   Yes Jose Alejandro Sommers MD   escitalopram (LEXAPRO) 10 MG tablet TAKE 1 TABLET EVERY DAY 9/17/20  Yes Fernando Hutton MD   guanFACINE (TENEX) 1 MG tablet Take 1 mg by mouth Daily. 6/12/17  Yes Jose Alejandro Sommers MD   levothyroxine (Synthroid) 112 MCG tablet Take 1 tablet by mouth Daily. 12/31/20  Yes Fernando Hutton MD   losartan (COZAAR) 100 MG tablet TAKE 1 TABLET EVERY DAY 9/17/20  Yes Fernando Hutton MD   polyethylene glycol (MIRALAX) powder Take 17 g by mouth As Needed. 17gm in 8oz bid    Yes Jose Alejandro Sommers MD   raNITIdine HCl (RANITIDINE 150 MAX STRENGTH PO) Take 1 tablet by mouth Daily As Needed. Over Of The Counter   Yes Jose Alejandro Sommers MD   vitamin D (ERGOCALCIFEROL) 1.25 MG (94610 UT) capsule capsule Take 50,000 Units by mouth 1 (One) Time Per Week. 11/12/20  Yes Jose Alejandro Sommers MD   albuterol (PROVENTIL HFA;VENTOLIN HFA) 108 (90 Base) MCG/ACT inhaler Inhale 2 puffs 4 (Four) Times a Day.  Patient taking differently: Inhale 2 puffs 4 (Four) Times a Day As Needed. 6/21/18   Fernando Hutton MD   dicyclomine (Bentyl) 10 MG capsule Take 1 capsule by mouth 4 (Four) Times a Day Before Meals & at Bedtime for 30 days. 2/12/21 3/14/21  Mauricio Betancourt MD   glucose blood test strip by Other route 2 (Two) Times a Day. Use as instructed    Jose Alejandro Sommers MD   hydrOXYzine (ATARAX) 25 MG tablet TAKE 1 TABLET AT NIGHT AS NEEDED FOR ITCHING/SLEEP (SUSBTITUTED FOR ATARAX)  "10/9/18   Fernando Hutton MD   ketoconazole (NIZORAL) 2 % cream Apply  topically to the appropriate area as directed Every 12 (Twelve) Hours.  Patient taking differently: Apply  topically to the appropriate area as directed Every 12 (Twelve) Hours As Needed. 9/10/18   Katerin Yee APRN   nystatin (MYCOSTATIN) 517263 UNIT/GM powder Apply  topically to the appropriate area as directed 3 (Three) Times a Day.  Patient taking differently: Apply  topically to the appropriate area as directed 3 (Three) Times a Day As Needed. 9/10/18   Katerin Yee APRN   omeprazole (priLOSEC) 40 MG capsule Take 1 capsule by mouth Daily. 2/12/21   Mauricio Betancourt MD     Allergies   Allergen Reactions   • Atenolol Myalgia   • Augmentin [Amoxicillin-Pot Clavulanate] Hives   • Dilaudid [Hydromorphone] Nausea And Vomiting   • Lipitor [Atorvastatin] Myalgia   • Pravachol [Pravastatin] Myalgia   • Statins Other (See Comments)     \"muscle pain \"    • Sulfa Antibiotics Hives     Social History     Socioeconomic History   • Marital status:      Spouse name: Not on file   • Number of children: Not on file   • Years of education: Not on file   • Highest education level: Not on file   Tobacco Use   • Smoking status: Former Smoker   • Smokeless tobacco: Never Used   • Tobacco comment: 02/12/2021 - Patient states she ceased utilization of Cigarettes 34 years prior.   Substance and Sexual Activity   • Alcohol use: No   • Drug use: No   • Sexual activity: Defer       Review of Systems  Review of Systems   Constitutional: Negative for chills, fatigue, fever and unexpected weight change.   HENT: Negative for congestion, ear discharge, hearing loss, nosebleeds and sore throat.    Eyes: Negative for pain, discharge and redness.   Respiratory: Negative for cough, chest tightness, shortness of breath and wheezing.    Cardiovascular: Negative for chest pain and palpitations.   Gastrointestinal: Positive for abdominal pain, diarrhea " "and nausea. Negative for abdominal distention, blood in stool, constipation and vomiting.   Endocrine: Negative for cold intolerance, polydipsia, polyphagia and polyuria.   Genitourinary: Negative for dysuria, flank pain, frequency, hematuria and urgency.   Musculoskeletal: Negative for arthralgias, back pain, joint swelling and myalgias.   Skin: Negative for color change, pallor and rash.   Neurological: Negative for tremors, seizures, syncope, weakness and headaches.   Hematological: Negative for adenopathy. Does not bruise/bleed easily.   Psychiatric/Behavioral: Negative for behavioral problems, confusion, dysphoric mood, hallucinations and suicidal ideas. The patient is not nervous/anxious.         /84   Pulse 74   Ht 165.1 cm (65\")   Wt 90.5 kg (199 lb 9.6 oz)   LMP  (LMP Unknown)   BMI 33.22 kg/m²     Objective    Physical Exam  Constitutional:       Appearance: She is well-developed.   HENT:      Head: Normocephalic and atraumatic.   Eyes:      Conjunctiva/sclera: Conjunctivae normal.      Pupils: Pupils are equal, round, and reactive to light.   Neck:      Musculoskeletal: Normal range of motion and neck supple.      Thyroid: No thyromegaly.   Cardiovascular:      Rate and Rhythm: Normal rate and regular rhythm.      Heart sounds: Normal heart sounds. No murmur.   Pulmonary:      Effort: Pulmonary effort is normal.      Breath sounds: Normal breath sounds. No wheezing.   Abdominal:      General: Bowel sounds are normal. There is no distension.      Palpations: Abdomen is soft. There is no mass.      Tenderness: There is no abdominal tenderness.      Hernia: No hernia is present.   Genitourinary:     Comments: No lesions noted  Musculoskeletal: Normal range of motion.         General: No tenderness.   Lymphadenopathy:      Cervical: No cervical adenopathy.   Skin:     General: Skin is warm and dry.      Findings: No rash.   Neurological:      Mental Status: She is alert and oriented to person, " place, and time.      Cranial Nerves: No cranial nerve deficit.   Psychiatric:         Thought Content: Thought content normal.       Admission on 01/14/2021, Discharged on 01/14/2021   Component Date Value Ref Range Status   • Case Report 01/14/2021    Final                    Value:Surgical Pathology Report                         Case: [id number]                                  Authorizing Provider:  Mauricio Betancourt MD      Collected:           01/14/2021 11:47 AM          Ordering Location:     [facility]             Received:            01/15/2021 06:59 AM                                 [facility]                                                     Pathologist:           Anthony Howard MD                                                           Specimens:   1) - Small Intestine, Duodenum                                                                      2) - Gastric, Antrum                                                                                3) - Esophagus, EGJ                                                                                 4) - Small Intestine, Ileum, TI                                                                     5) - Large Intestine, colonic mucosa                                                      • Final Diagnosis 01/14/2021    Final                    Value:This result contains rich text formatting which cannot be displayed here.     Assessment/Plan    No diagnosis found..   1.  Left lower quadrant abdominal pain associated with diarrhea likely due to irritable bowel syndrome.  Could also be due to adhesions due to previous colonic surgery..  resume Bentyl.  Add high-fiber diet.  CT abdomen pelvis if continues.  2. Nausea likely due to reactive gastropathy.  Continue Prilosec 40 mg p.o. daily.    Gastric emptying scan if symptoms continue.  3.  Obesity BMI 33.22, recommend exercise and diet control.  4.  History of segmental colonic  resection  5.  Diverticulosis, high-fiber diet.  Orders placed during this encounter include:  No orders of the defined types were placed in this encounter.      * Surgery not found *    Review and/or summary of lab tests, radiology, procedures, medications. Review and summary of old records and obtaining of history. The risks and benefits of my recommendations, as well as other treatment options were discussed with the patient today. Questions were answered.    New Medications Ordered This Visit   Medications   • dicyclomine (Bentyl) 10 MG capsule     Sig: Take 1 capsule by mouth 4 (Four) Times a Day Before Meals & at Bedtime for 30 days.     Dispense:  120 capsule     Refill:  6   • omeprazole (priLOSEC) 40 MG capsule     Sig: Take 1 capsule by mouth Daily.     Dispense:  30 capsule     Refill:  11       Follow-up: Return in about 1 month (around 3/12/2021).               Results for orders placed or performed during the hospital encounter of 01/14/21   Tissue Pathology Exam    Specimen: A: Small Intestine, Duodenum; Tissue    B: Gastric, Antrum; Tissue    C: Esophagus; Tissue    D: Small Intestine, Ileum; Tissue    E: Large Intestine; Tissue   Result Value Ref Range    Case Report       Surgical Pathology Report                         Case: [id number]                                  Authorizing Provider:  Mauricio Betancourt MD      Collected:           01/14/2021 11:47 AM          Ordering Location[facility]     [facility]             Received:            01/15/2021 06:59 AM                                 [facility]                                                     Pathologist:           Anthony Howard MD                                                           Specimens:   1) - Small Intestine, Duodenum                                                                      2) - Gastric, Antrum                                                                                3) - Esophagus, EGJ                                                                                  4) - Small Intestine, Ileum, TI                                                                     5) - Large Intestine, colonic mucosa                                                       Final Diagnosis       SEE SCANNED REPORT       Results for orders placed or performed in visit on 01/11/21   COVID-19,[facility], NP/OP SWAB IN UTM/VTM/[facility]SPORT MEDIA,24 HR TAT - Swab, Nasopharynx    Specimen: Nasopharynx; Swab   Result Value Ref Range    COVID19 Not Detected Not Detected - Ref. Range   Results for orders placed or performed in visit on 12/30/20   TSH    Specimen: Blood   Result Value Ref Range    TSH 0.119 (L) 0.270 - 4.200 uIU/mL   T4, Free    Specimen: Blood   Result Value Ref Range    Free T4 2.09 (H) 0.93 - 1.70 ng/dL   Results for orders placed or performed in visit on 11/24/20   Protein / Creatinine Ratio, Urine - Urine, Clean Catch    Specimen: Urine, Clean Catch   Result Value Ref Range    Protein/Creatinine Ratio, Urine 81.8 0.0 - 200.0 mg/G Crea    Creatinine, Urine 146.7 mg/dL    Total Protein, Urine 12.0 mg/dL   Vitamin D 25 Hydroxy    Specimen: Blood   Result Value Ref Range    25 Hydroxy, Vitamin D 30.3 30.0 - 100.0 ng/ml   Phosphorus    Specimen: Blood   Result Value Ref Range    Phosphorus 3.2 2.5 - 4.5 mg/dL   PTH, Intact    Specimen: Blood   Result Value Ref Range    PTH, Intact 46.0 15.0 - 65.0 pg/mL   Results for orders placed or performed in visit on 11/17/20   Urinalysis, Microscopic Only - Urine, Clean Catch    Specimen: Urine, Clean Catch   Result Value Ref Range    RBC, UA 3-5 (A) None Seen, 0-2 /HPF    WBC, UA 3-5 (A) None Seen, 0-2 /HPF    Bacteria, UA 2+ (A) None Seen /HPF    Squamous Epithelial Cells, UA 13-20 (A) None Seen, 0-2 /HPF    Hyaline Casts, UA 0-2 None Seen /LPF    Methodology Automated Microscopy    CBC Auto Differential    Specimen: Blood   Result Value Ref Range    WBC  11.91 (H) 3.40 - 10.80 10*3/mm3    RBC 4.52 3.77 - 5.28 10*6/mm3    Hemoglobin 14.2 12.0 - 15.9 g/dL    Hematocrit 42.7 34.0 - 46.6 %    MCV 94.5 79.0 - 97.0 fL    MCH 31.4 26.6 - 33.0 pg    MCHC 33.3 31.5 - 35.7 g/dL    RDW 13.0 12.3 - 15.4 %    RDW-SD 44.2 37.0 - 54.0 fl    MPV 10.9 6.0 - 12.0 fL    Platelets 260 140 - 450 10*3/mm3    Neutrophil % 69.0 42.7 - 76.0 %    Lymphocyte % 22.3 19.6 - 45.3 %    Monocyte % 7.1 5.0 - 12.0 %    Eosinophil % 0.5 0.3 - 6.2 %    Basophil % 0.5 0.0 - 1.5 %    Immature Grans % 0.6 (H) 0.0 - 0.5 %    Neutrophils, Absolute 8.22 (H) 1.70 - 7.00 10*3/mm3    Lymphocytes, Absolute 2.65 0.70 - 3.10 10*3/mm3    Monocytes, Absolute 0.85 0.10 - 0.90 10*3/mm3    Eosinophils, Absolute 0.06 0.00 - 0.40 10*3/mm3    Basophils, Absolute 0.06 0.00 - 0.20 10*3/mm3    Immature Grans, Absolute 0.07 (H) 0.00 - 0.05 10*3/mm3    nRBC 0.0 0.0 - 0.2 /100 WBC   Microalbumin / Creatinine Urine Ratio - Urine, Clean Catch    Specimen: Urine, Clean Catch   Result Value Ref Range    Microalbumin/Creatinine Ratio      Creatinine, Urine 146.7 mg/dL    Microalbumin, Urine <1.2 mg/dL   Urinalysis without microscopic (no culture) - Urine, Clean Catch    Specimen: Urine, Clean Catch   Result Value Ref Range    Color, UA Yellow Yellow, Straw    Appearance, UA Cloudy (A) Clear    pH, UA 5.5 5.0 - 8.0    Specific Gravity, UA 1.021 1.005 - 1.030    Glucose, UA Negative Negative    Ketones, UA Negative Negative    Bilirubin, UA Negative Negative    Blood, UA Negative Negative    Protein, UA Negative Negative    Leuk Esterase, UA Negative Negative    Nitrite, UA Negative Negative    Urobilinogen, UA 0.2 E.U./dL 0.2 - 1.0 E.U./dL   TSH    Specimen: Blood   Result Value Ref Range    TSH 12.600 (H) 0.270 - 4.200 uIU/mL   T4, Free    Specimen: Blood   Result Value Ref Range    Free T4 1.19 0.93 - 1.70 ng/dL   Hemoglobin A1c    Specimen: Blood   Result Value Ref Range    Hemoglobin A1C 6.74 (H) 4.80 - 5.60 %   Lipid Panel     Specimen: Blood   Result Value Ref Range    Total Cholesterol 299 (H) 0 - 200 mg/dL    Triglycerides 226 (H) 0 - 150 mg/dL    HDL Cholesterol 64 (H) 40 - 60 mg/dL    LDL Cholesterol  192 (H) 0 - 100 mg/dL    VLDL Cholesterol 43 (H) 5 - 40 mg/dL    LDL/HDL Ratio 2.97    Comprehensive Metabolic Panel    Specimen: Blood   Result Value Ref Range    Glucose 191 (H) 65 - 99 mg/dL    BUN 23 8 - 23 mg/dL    Creatinine 1.19 (H) 0.57 - 1.00 mg/dL    Sodium 138 136 - 145 mmol/L    Potassium 4.6 3.5 - 5.2 mmol/L    Chloride 102 98 - 107 mmol/L    CO2 25.4 22.0 - 29.0 mmol/L    Calcium 9.5 8.6 - 10.5 mg/dL    Total Protein 6.9 6.0 - 8.5 g/dL    Albumin 3.90 3.50 - 5.20 g/dL    ALT (SGPT) 16 1 - 33 U/L    AST (SGOT) 18 1 - 32 U/L    Alkaline Phosphatase 66 39 - 117 U/L    Total Bilirubin 0.4 0.0 - 1.2 mg/dL    eGFR Non African Amer 45 (L) >60 mL/min/1.73    Globulin 3.0 gm/dL    A/G Ratio 1.3 g/dL    BUN/Creatinine Ratio 19.3 7.0 - 25.0    Anion Gap 10.6 5.0 - 15.0 mmol/L   Results for orders placed or performed in visit on 05/21/20   Vitamin D 25 Hydroxy    Specimen: Blood   Result Value Ref Range    25 Hydroxy, Vitamin D 14.1 (L) 30.0 - 100.0 ng/ml   Renal Function Panel    Specimen: Blood   Result Value Ref Range    Glucose 123 (H) 65 - 99 mg/dL    BUN 22 8 - 23 mg/dL    Creatinine 1.12 (H) 0.57 - 1.00 mg/dL    Sodium 138 136 - 145 mmol/L    Potassium 4.6 3.5 - 5.2 mmol/L    Chloride 102 98 - 107 mmol/L    CO2 24.8 22.0 - 29.0 mmol/L    Calcium 9.4 8.6 - 10.5 mg/dL    Albumin 4.20 3.50 - 5.20 g/dL    Phosphorus 2.2 (L) 2.5 - 4.5 mg/dL    Anion Gap 11.2 5.0 - 15.0 mmol/L    BUN/Creatinine Ratio 19.6 7.0 - 25.0    eGFR Non African Amer 48 (L) >60 mL/min/1.73   Results for orders placed or performed in visit on 03/30/20   Duplex Venous Lower Extremity - Bilateral CAR   Result Value Ref Range    Right Common Femoral Spont Y     Right Common Femoral Phasic Y     Right Common Femoral Augment Y     Right Common Femoral Competent Y      Right Common Femoral Compress C     Right Saphenofemoral Junction Spont Y     Right Saphenofemoral Junction Phasic Y     Right Saphenofemoral Junction Augment Y     Right Saphenofemoral Junction Competent Y     Right Saphenofemoral Junction Compress C     Right Profunda Femoral Spont Y     Right Profunda Femoral Phasic Y     Right Profunda Femoral Augment Y     Right Profunda Femoral Competent Y     Right Profunda Femoral Compress C     Right Proximal Femoral Spont Y     Right Proximal Femoral Phasic Y     Right Proximal Femoral Augment Y     Right Proximal Femoral Competent Y     Right Proximal Femoral Compress C     Right Mid Femoral Spont Y     Right Mid Femoral Phasic Y     Right Mid Femoral Augment Y     Right Mid Femoral Competent Y     Right Mid Femoral Compress C     Right Distal Femoral Spont Y     Right Distal Femoral Phasic Y     Right Distal Femoral Augment Y     Right Distal Femoral Competent Y     Right Distal Femoral Compress C     Right Popliteal Spont Y     Right Popliteal Phasic Y     Right Popliteal Augment Y     Right Popliteal Competent Y     Right Popliteal Compress C     Right Posterior Tibial Augment Y     Right Posterior Tibial Competent Y     Right Posterior Tibial Compress C     Right Peroneal Augment Y     Right Peroneal Competent Y     Right Peroneal Compress C     Right GastronemiusSoleal Compress C     Right Greater Saph AK Spont Y     Right Greater Saph AK Phasic Y     Right Greater Saph AK Augment Y     Right Greater Saph AK Competent N     Right Greater Saph AK Compress C     Right Greater Saph BK Augment Y     Right Greater Saph BK Competent Y     Right Greater Saph BK Compress C     Right Lesser Saph Augment Y     Right Lesser Saph Competent Y     Right Lesser Saph Compress C     Left Common Femoral Spont Y     Left Common Femoral Phasic Y     Left Common Femoral Augment Y     Left Common Femoral Competent Y     Left Common Femoral Compress C     Left Saphenofemoral Junction  Spont Y     Left Saphenofemoral Junction Phasic Y     Left Saphenofemoral Junction Augment Y     Left Saphenofemoral Junction Competent Y     Left Saphenofemoral Junction Compress C     Left Profunda Femoral Spont Y     Left Profunda Femoral Phasic Y     Left Profunda Femoral Augment Y     Left Profunda Femoral Competent Y     Left Profunda Femoral Compress C     Left Proximal Femoral Spont Y     Left Proximal Femoral Phasic Y     Left Proximal Femoral Augment Y     Left Proximal Femoral Competent Y     Left Proximal Femoral Compress C     Left Mid Femoral Spont Y     Left Mid Femoral Phasic Y     Left Mid Femoral Augment Y     Left Mid Femoral Competent Y     Left Mid Femoral Compress C     Left Distal Femoral Spont Y     Left Distal Femoral Phasic Y     Left Distal Femoral Augment Y     Left Distal Femoral Competent Y     Left Distal Femoral Compress C     Left Popliteal Spont Y     Left Popliteal Phasic Y     Left Popliteal Augment Y     Left Popliteal Competent Y     Left Popliteal Compress C     Left Posterior Tibial Augment Y     Left Posterior Tibial Competent Y     Left Posterior Tibial Compress C     Left Peroneal Augment Y     Left Peroneal Competent Y     Left Peroneal Compress C     Left GastronemiusSoleal Compress C     Left Greater Saph AK Spont Y     Left Greater Saph AK Phasic Y     Left Greater Saph AK Augment Y     Left Greater Saph AK Competent Y     Left Greater Saph AK Compress C     Left Greater Saph BK Augment Y     Left Greater Saph BK Competent Y     Left Greater Saph BK Compress C     Left Lesser Saph Augment Y     Left Lesser Saph Competent Y     Left Lesser Saph Compress C      *Note: Due to a large number of results and/or encounters for the requested time period, some results have not been displayed. A complete set of results can be found in Results Review.         This document has been electronically signed by Mauricio Betancourt MD on February 12, 2021 12:55 CST

## 2021-02-12 NOTE — PATIENT INSTRUCTIONS
MyPlate from USDA    MyPlate is an outline of a general healthy diet based on the 2010 Dietary Guidelines for Americans, from the U.[place] Department of Agriculture (USDA). It sets guidelines for how much food you should eat from each food group based on your age, sex, and level of physical activity.  What are tips for following MyPlate?  To follow MyPlate recommendations:  · Eat a wide variety of fruits and vegetables, grains, and protein foods.  · Serve smaller portions and eat less food throughout the day.  · Limit portion sizes to avoid overeating.  · Enjoy your food.  · Get at least 150 minutes of exercise every week. This is about 30 minutes each day, 5 or more days per week.  It can be difficult to have every meal look like MyPlate. Think about MyPlate as eating guidelines for an entire day, rather than each individual meal.  Fruits and vegetables  · Make half of your plate fruits and vegetables.  · Eat many different colors of fruits and vegetables each day.  · For a 2,000 calorie daily food plan, eat:  ? 2½ cups of vegetables every day.  ? 2 cups of fruit every day.  · 1 cup is equal to:  ? 1 cup raw or cooked vegetables.  ? 1 cup raw fruit.  ? 1 medium-sized orange, apple, or banana.  ? 1 cup 100% fruit or vegetable juice.  ? 2 cups raw leafy greens, such as lettuce, spinach, or kale.  ? ½ cup dried fruit.  Grains  · One fourth of your plate should be grains.  · Make at least half of the grains you eat each day whole grains.  · For a 2,000 calorie daily food plan, eat 6 oz of grains every day.  · 1 oz is equal to:  ? 1 slice bread.  ? 1 cup cereal.  ? ½ cup cooked rice, cereal, or pasta.  Protein  · One fourth of your plate should be protein.  · Eat a wide variety of protein foods, including meat, poultry, fish, eggs, beans, nuts, and tofu.  · For a 2,000 calorie daily food plan, eat 5½ oz of protein every day.  · 1 oz is equal to:  ? 1 oz meat, poultry, or fish.  ? ¼ cup cooked beans.  ? 1 egg.  ? ½ oz nuts  or seeds.  ? 1 Tbsp peanut butter.  Dairy  · Drink fat-free or low-fat (1%) milk.  · Eat or drink dairy as a side to meals.  · For a 2,000 calorie daily food plan, eat or drink 3 cups of dairy every day.  · 1 cup is equal to:  ? 1 cup milk, yogurt, cottage cheese, or soy milk (soy beverage).  ? 2 oz processed cheese.  ? 1½ oz natural cheese.  Fats, oils, salt, and sugars  · Only small amounts of oils are recommended.  · Avoid foods that are high in calories and low in nutritional value (empty calories), like foods high in fat or added sugars.  · Choose foods that are low in salt (sodium). Choose foods that have less than 140 milligrams (mg) of sodium per serving.  · Drink water instead of sugary drinks. Drink enough water each day to keep your urine pale yellow.  Where to find support  · Work with your health care provider or a nutrition specialist (dietitian) to develop a customized eating plan that is right for you.  · Download an itzel (mobile application) to help you track your daily food intake.  Where to find more information  · Go to ChooseMyPlate.gov for more information.  Summary  · MyPlate is a general guideline for healthy eating from the USDA. It is based on the 2010 Dietary Guidelines for Americans.  · In general, fruits and vegetables should take up ½ of your plate, grains should take up ¼ of your plate, and protein should take up ¼ of your plate.  This information is not intended to replace advice given to you by your health care provider. Make sure you discuss any questions you have with your health care provider.  Document Revised: 05/21/2020 Document Reviewed: 03/19/2018  Elsevier Patient Education © 2020 [employer].

## 2021-02-17 ENCOUNTER — LAB (OUTPATIENT)
Dept: LAB | Facility: HOSPITAL | Age: 71
End: 2021-02-17

## 2021-02-17 ENCOUNTER — OFFICE VISIT (OUTPATIENT)
Dept: FAMILY MEDICINE CLINIC | Facility: CLINIC | Age: 71
End: 2021-02-17

## 2021-02-17 VITALS
SYSTOLIC BLOOD PRESSURE: 140 MMHG | HEIGHT: 65 IN | WEIGHT: 197 LBS | OXYGEN SATURATION: 98 % | HEART RATE: 79 BPM | DIASTOLIC BLOOD PRESSURE: 98 MMHG | BODY MASS INDEX: 32.82 KG/M2

## 2021-02-17 DIAGNOSIS — M25.552 LEFT HIP PAIN: ICD-10-CM

## 2021-02-17 DIAGNOSIS — I10 ESSENTIAL HYPERTENSION: ICD-10-CM

## 2021-02-17 DIAGNOSIS — F32.5 MAJOR DEPRESSIVE DISORDER WITH SINGLE EPISODE, IN FULL REMISSION (HCC): ICD-10-CM

## 2021-02-17 DIAGNOSIS — E03.9 ACQUIRED HYPOTHYROIDISM: ICD-10-CM

## 2021-02-17 DIAGNOSIS — M19.90 ARTHRITIS: ICD-10-CM

## 2021-02-17 DIAGNOSIS — E03.9 ACQUIRED HYPOTHYROIDISM: Primary | ICD-10-CM

## 2021-02-17 DIAGNOSIS — Z76.89 ENCOUNTER TO ESTABLISH CARE: ICD-10-CM

## 2021-02-17 DIAGNOSIS — E78.5 HYPERLIPIDEMIA, UNSPECIFIED HYPERLIPIDEMIA TYPE: ICD-10-CM

## 2021-02-17 DIAGNOSIS — E11.9 DIABETES MELLITUS TYPE 2, DIET-CONTROLLED (HCC): ICD-10-CM

## 2021-02-17 LAB
ALBUMIN SERPL-MCNC: 4.4 G/DL (ref 3.5–5.2)
ALBUMIN/GLOB SERPL: 1.5 G/DL
ALP SERPL-CCNC: 78 U/L (ref 39–117)
ALT SERPL W P-5'-P-CCNC: 9 U/L (ref 1–33)
ANION GAP SERPL CALCULATED.3IONS-SCNC: 10.8 MMOL/L (ref 5–15)
AST SERPL-CCNC: 16 U/L (ref 1–32)
BASOPHILS # BLD AUTO: 0.07 10*3/MM3 (ref 0–0.2)
BASOPHILS NFR BLD AUTO: 0.7 % (ref 0–1.5)
BILIRUB SERPL-MCNC: 0.5 MG/DL (ref 0–1.2)
BUN SERPL-MCNC: 21 MG/DL (ref 8–23)
BUN/CREAT SERPL: 19.1 (ref 7–25)
CALCIUM SPEC-SCNC: 10.4 MG/DL (ref 8.6–10.5)
CHLORIDE SERPL-SCNC: 101 MMOL/L (ref 98–107)
CO2 SERPL-SCNC: 27.2 MMOL/L (ref 22–29)
CREAT SERPL-MCNC: 1.1 MG/DL (ref 0.57–1)
DEPRECATED RDW RBC AUTO: 40.6 FL (ref 37–54)
EOSINOPHIL # BLD AUTO: 0.16 10*3/MM3 (ref 0–0.4)
EOSINOPHIL NFR BLD AUTO: 1.5 % (ref 0.3–6.2)
ERYTHROCYTE [DISTWIDTH] IN BLOOD BY AUTOMATED COUNT: 12 % (ref 12.3–15.4)
GFR SERPL CREATININE-BSD FRML MDRD: 49 ML/MIN/1.73
GLOBULIN UR ELPH-MCNC: 2.9 GM/DL
GLUCOSE SERPL-MCNC: 95 MG/DL (ref 65–99)
HCT VFR BLD AUTO: 43.4 % (ref 34–46.6)
HGB BLD-MCNC: 14.6 G/DL (ref 12–15.9)
IMM GRANULOCYTES # BLD AUTO: 0.05 10*3/MM3 (ref 0–0.05)
IMM GRANULOCYTES NFR BLD AUTO: 0.5 % (ref 0–0.5)
LYMPHOCYTES # BLD AUTO: 2.65 10*3/MM3 (ref 0.7–3.1)
LYMPHOCYTES NFR BLD AUTO: 25 % (ref 19.6–45.3)
MCH RBC QN AUTO: 31.4 PG (ref 26.6–33)
MCHC RBC AUTO-ENTMCNC: 33.6 G/DL (ref 31.5–35.7)
MCV RBC AUTO: 93.3 FL (ref 79–97)
MONOCYTES # BLD AUTO: 0.7 10*3/MM3 (ref 0.1–0.9)
MONOCYTES NFR BLD AUTO: 6.6 % (ref 5–12)
NEUTROPHILS NFR BLD AUTO: 6.98 10*3/MM3 (ref 1.7–7)
NEUTROPHILS NFR BLD AUTO: 65.7 % (ref 42.7–76)
NRBC BLD AUTO-RTO: 0 /100 WBC (ref 0–0.2)
PLATELET # BLD AUTO: 281 10*3/MM3 (ref 140–450)
PMV BLD AUTO: 11 FL (ref 6–12)
POTASSIUM SERPL-SCNC: 4.4 MMOL/L (ref 3.5–5.2)
PROT SERPL-MCNC: 7.3 G/DL (ref 6–8.5)
RBC # BLD AUTO: 4.65 10*6/MM3 (ref 3.77–5.28)
SODIUM SERPL-SCNC: 139 MMOL/L (ref 136–145)
TSH SERPL DL<=0.05 MIU/L-ACNC: 3.59 UIU/ML (ref 0.27–4.2)
WBC # BLD AUTO: 10.61 10*3/MM3 (ref 3.4–10.8)

## 2021-02-17 PROCEDURE — 96372 THER/PROPH/DIAG INJ SC/IM: CPT | Performed by: FAMILY MEDICINE

## 2021-02-17 PROCEDURE — 99214 OFFICE O/P EST MOD 30 MIN: CPT | Performed by: FAMILY MEDICINE

## 2021-02-17 PROCEDURE — 80053 COMPREHEN METABOLIC PANEL: CPT

## 2021-02-17 PROCEDURE — 84443 ASSAY THYROID STIM HORMONE: CPT

## 2021-02-17 PROCEDURE — 36415 COLL VENOUS BLD VENIPUNCTURE: CPT

## 2021-02-17 PROCEDURE — 85025 COMPLETE CBC W/AUTO DIFF WBC: CPT

## 2021-02-17 RX ORDER — LEVOTHYROXINE SODIUM 112 MCG
112 TABLET ORAL DAILY
Qty: 30 TABLET | Refills: 2 | Status: SHIPPED | OUTPATIENT
Start: 2021-02-17 | End: 2021-02-17

## 2021-02-17 RX ORDER — LEVOTHYROXINE SODIUM 112 MCG
112 TABLET ORAL DAILY
Qty: 30 TABLET | Refills: 2 | Status: SHIPPED | OUTPATIENT
Start: 2021-02-17 | End: 2021-03-19

## 2021-02-17 RX ORDER — EZETIMIBE 10 MG/1
10 TABLET ORAL DAILY
Qty: 30 TABLET | Refills: 2 | Status: SHIPPED | OUTPATIENT
Start: 2021-02-17 | End: 2021-08-10 | Stop reason: SINTOL

## 2021-02-17 RX ORDER — TRIAMCINOLONE ACETONIDE 40 MG/ML
80 INJECTION, SUSPENSION INTRA-ARTICULAR; INTRAMUSCULAR ONCE
Status: COMPLETED | OUTPATIENT
Start: 2021-02-17 | End: 2021-02-17

## 2021-02-17 RX ADMIN — TRIAMCINOLONE ACETONIDE 80 MG: 40 INJECTION, SUSPENSION INTRA-ARTICULAR; INTRAMUSCULAR at 13:25

## 2021-02-17 NOTE — PATIENT INSTRUCTIONS
Hip Exercises  Ask your health care provider which exercises are safe for you. Do exercises exactly as told by your health care provider and adjust them as directed. It is normal to feel mild stretching, pulling, tightness, or discomfort as you do these exercises. Stop right away if you feel sudden pain or your pain gets worse. Do not begin these exercises until told by your health care provider.  Stretching and range-of-motion exercises  These exercises warm up your muscles and joints and improve the movement and flexibility of your hip. These exercises also help to relieve pain, numbness, and tingling. You may be asked to limit your range of motion if you had a hip replacement. Talk to your health care provider about these restrictions.  Hamstrings, supine    1. Lie on your back (supine position).  2. Loop a belt or towel over the ball of your left / right foot. The ball of your foot is on the walking surface, right under your toes.  3. Straighten your left / right knee and slowly pull on the belt or towel to raise your leg until you feel a gentle stretch behind your knee (hamstring).  ? Do not let your knee bend while you do this.  ? Keep your other leg flat on the floor.  4. Hold this position for __________ seconds.  5. Slowly return your leg to the starting position.  Repeat __________ times. Complete this exercise __________ times a day.  Hip rotation    1. Lie on your back on a firm surface.  2. With your left / right hand, gently pull your left / right knee toward the shoulder that is on the same side of the body. Stop when your knee is pointing toward the ceiling.  3. Hold your left / right ankle with your other hand.  4. Keeping your knee steady, gently pull your left / right ankle toward your other shoulder until you feel a stretch in your buttocks.  ? Keep your hips and shoulders firmly planted while you do this stretch.  5. Hold this position for __________ seconds.  Repeat __________ times. Complete  this exercise __________ times a day.  Seated stretch  This exercise is sometimes called hamstrings and adductors stretch.  1. Sit on the floor with your legs stretched wide. Keep your knees straight during this exercise.  2. Keeping your head and back in a straight line, bend at your waist to reach for your left foot (position A). You should feel a stretch in your right inner thigh (adductors).  3. Hold this position for __________ seconds. Then slowly return to the upright position.  4. Keeping your head and back in a straight line, bend at your waist to reach forward (position B). You should feel a stretch behind both of your thighs and knees (hamstrings).  5. Hold this position for __________ seconds. Then slowly return to the upright position.  6. Keeping your head and back in a straight line, bend at your waist to reach for your right foot (position C). You should feel a stretch in your left inner thigh (adductors).  7. Hold this position for __________ seconds. Then slowly return to the upright position.  Repeat __________ times. Complete this exercise __________ times a day.  Lunge  This exercise stretches the muscles of the hip (hip flexors).  1. Place your left / right knee on the floor and bend your other knee so that is directly over your ankle. You should be half-kneeling.  2. Keep good posture with your head over your shoulders.  3. Tighten your buttocks to point your tailbone downward. This will prevent your back from arching too much.  4. You should feel a gentle stretch in the front of your left / right thigh and hip. If you do not feel a stretch, slide your other foot forward slightly and then slowly lunge forward with your chest up until your knee once again lines up over your ankle.  ? Make sure your tailbone continues to point downward.  5. Hold this position for __________ seconds.  6. Slowly return to the starting position.  Repeat __________ times. Complete this exercise __________ times a  day.  Strengthening exercises  These exercises build strength and endurance in your hip. Endurance is the ability to use your muscles for a long time, even after they get tired.  Bridge  This exercise strengthens the muscles of your hip (hip extensors).  1. Lie on your back on a firm surface with your knees bent and your feet flat on the floor.  2. Tighten your buttocks muscles and lift your bottom off the floor until the trunk of your body and your hips are level with your thighs.  ? Do not arch your back.  ? You should feel the muscles working in your buttocks and the back of your thighs. If you do not feel these muscles, slide your feet 1-2 inches (2.5-5 cm) farther away from your buttocks.  3. Hold this position for __________ seconds.  4. Slowly lower your hips to the starting position.  5. Let your muscles relax completely between repetitions.  Repeat __________ times. Complete this exercise __________ times a day.  Straight leg raises, side-lying  This exercise strengthens the muscles that move the hip joint away from the center of the body (hip abductors).  1. Lie on your side with your left / right leg in the top position. Lie so your head, shoulder, hip, and knee line up. You may bend your bottom knee slightly to help you balance.  2. Roll your hips slightly forward, so your hips are stacked directly over each other and your left / right knee is facing forward.  3. Leading with your heel, lift your top leg 4-6 inches (10-15 cm). You should feel the muscles in your top hip lifting.  ? Do not let your foot drift forward.  ? Do not let your knee roll toward the ceiling.  4. Hold this position for __________ seconds.  5. Slowly return to the starting position.  6. Let your muscles relax completely between repetitions.  Repeat __________ times. Complete this exercise __________ times a day.  Straight leg raises, side-lying  This exercise strengthens the muscles that move the hip joint toward the center of the  body (hip adductors).  1. Lie on your side with your left / right leg in the bottom position. Lie so your head, shoulder, hip, and knee line up. You may place your upper foot in front to help you balance.  2. Roll your hips slightly forward, so your hips are stacked directly over each other and your left / right knee is facing forward.  3. Tense the muscles in your inner thigh and lift your bottom leg 4-6 inches (10-15 cm).  4. Hold this position for __________ seconds.  5. Slowly return to the starting position.  6. Let your muscles relax completely between repetitions.  Repeat __________ times. Complete this exercise __________ times a day.  Straight leg raises, supine  This exercise strengthens the muscles in the front of your thigh (quadriceps).  1. Lie on your back (supine position) with your left / right leg extended and your other knee bent.  2. Tense the muscles in the front of your left / right thigh. You should see your kneecap slide up or see increased dimpling just above your knee.  3. Keep these muscles tight as you raise your leg 4-6 inches (10-15 cm) off the floor. Do not let your knee bend.  4. Hold this position for __________ seconds.  5. Keep these muscles tense as you lower your leg.  6. Relax the muscles slowly and completely between repetitions.  Repeat __________ times. Complete this exercise __________ times a day.  Hip abductors, standing  This exercise strengthens the muscles that move the leg and hip joint away from the center of the body (hip abductors).  1. Tie one end of a rubber exercise band or tubing to a secure surface, such as a chair, table, or pole.  2. Loop the other end of the band or tubing around your left / right ankle.  3. Keeping your ankle with the band or tubing directly opposite the secured end, step away until there is tension in the tubing or band. Hold on to a chair, table, or pole as needed for balance.  4. Lift your left / right leg out to your side. While you do  this:  ? Keep your back upright.  ? Keep your shoulders over your hips.  ? Keep your toes pointing forward.  ? Make sure to use your hip muscles to slowly lift your leg. Do not tip your body or forcefully lift your leg.  5. Hold this position for __________ seconds.  6. Slowly return to the starting position.  Repeat __________ times. Complete this exercise __________ times a day.  Squats  This exercise strengthens the muscles in the front of your thigh (quadriceps).  1.  a door frame so your feet and knees are in line with the frame. You may place your hands on the frame for balance.  2. Slowly bend your knees and lower your hips like you are going to sit in a chair.  ? Keep your lower legs in a straight-up-and-down position.  ? Do not let your hips go lower than your knees.  ? Do not bend your knees lower than told by your health care provider.  ? If your hip pain increases, do not bend as low.  3. Hold this position for ___________ seconds.  4. Slowly push with your legs to return to standing. Do not use your hands to pull yourself to standing.  Repeat __________ times. Complete this exercise __________ times a day.  This information is not intended to replace advice given to you by your health care provider. Make sure you discuss any questions you have with your health care provider.  Document Revised: 07/23/2020 Document Reviewed: 10/29/2019  [employer] Patient Education © 2020 [employer]      Ezetimibe Tablets  What is this medicine?  EZETIMIBE (ez ET i mibe) blocks the absorption of cholesterol from the stomach. It can help lower blood cholesterol for patients who are at risk of getting heart disease or a stroke. It is only for patients whose cholesterol level is not controlled by diet.  This medicine may be used for other purposes; ask your health care provider or pharmacist if you have questions.  COMMON BRAND NAME(S): Zetia  What should I tell my health care provider before I take this  medicine?  They need to know if you have any of these conditions:  · liver disease  · an unusual or allergic reaction to ezetimibe, medicines, foods, dyes, or preservatives  · pregnant or trying to get pregnant  · breast-feeding  How should I use this medicine?  Take this medicine by mouth with a glass of water. Follow the directions on the prescription label. This medicine can be taken with or without food. Take your doses at regular intervals. Do not take your medicine more often than directed.  Talk to your pediatrician regarding the use of this medicine in children. Special care may be needed.  Overdosage: If you think you have taken too much of this medicine contact a poison control center or emergency room at once.  NOTE: This medicine is only for you. Do not share this medicine with others.  What if I miss a dose?  If you miss a dose, take it as soon as you can. If it is almost time for your next dose, take only that dose. Do not take double or extra doses.  What may interact with this medicine?  Do not take this medicine with any of the following medications:  · fenofibrate  · gemfibrozil  This medicine may also interact with the following medications:  · antacids  · cyclosporine  · herbal medicines like red yeast rice  · other medicines to lower cholesterol or triglycerides  This list may not describe all possible interactions. Give your health care provider a list of all the medicines, herbs, non-prescription drugs, or dietary supplements you use. Also tell them if you smoke, drink alcohol, or use illegal drugs. Some items may interact with your medicine.  What should I watch for while using this medicine?  Visit your doctor or health care professional for regular checks on your progress. You will need to have your cholesterol levels checked. If you are also taking some other cholesterol medicines, you will also need to have tests to make sure your liver is working properly.  Tell your doctor or health  care professional if you get any unexplained muscle pain, tenderness, or weakness, especially if you also have a fever and tiredness.  You need to follow a low-cholesterol, low-fat diet while you are taking this medicine. This will decrease your risk of getting heart and blood vessel disease. Exercising and avoiding alcohol and smoking can also help. Ask your doctor or dietician for advice.  What side effects may I notice from receiving this medicine?  Side effects that you should report to your doctor or health care professional as soon as possible:  · allergic reactions like skin rash, itching or hives, swelling of the face, lips, or tongue  · dark yellow or brown urine  · unusually weak or tired  · yellowing of the skin or eyes  Side effects that usually do not require medical attention (report to your doctor or health care professional if they continue or are bothersome):  · diarrhea  · dizziness  · headache  · stomach upset or pain  This list may not describe all possible side effects. Call your doctor for medical advice about side effects. You may report side effects to FDA at 4-946-FDA-4491.  Where should I keep my medicine?  Keep out of the reach of children.  Store at room temperature between 15 and 30 degrees C (59 and 86 degrees F). Protect from moisture. Keep container tightly closed. Throw away any unused medicine after the expiration date.  NOTE: This sheet is a summary. It may not cover all possible information. If you have questions about this medicine, talk to your doctor, pharmacist, or health care provider.  © 2020 Elsevier/Gold Standard (2013-06-24 15:39:09)

## 2021-02-17 NOTE — PROGRESS NOTES
"Chief Complaint  Establish Care and Hypertension    Subjective          Emily Haas presents to [facility]  History of Present Illness    Patient presents today to establish care.    Hypertension - Not had medication this morning.  Forgets medications sometimes.  Follows with cardiology.  Currently taking losartan 100 mg daily, guanfacine 1 mg daily and carvedilol 12.5 mg twice daily.    Diabetes - Checks sugar once a week, Fasting is normally .  She is not currently taking any medications.  Diabetes is diet controlled.    Hypothyroidism -  She notes that she was sent generic medication by accident, so has not been taking this tablet but working to create the dose with Synthroid tablets that she has at home.  She is taking 3/4ths of a Synthroid 150 mcg daily - On Synthroid 112.5 mcg daily (37.5 + 75) for one month.  Dose adjustment was made a few weeks ago. Patient needs new Synthroid prescription sent in.    Left hip pain - Patient notes that she has had left hip pain.  Has had intermittent left leg weakness for the last year, but this pain was new.  It was a sharp pain in her groin.  Has not had any episodes like this before.  No episodes since.  She says that she has the most difficulty with walking up steps to her home.    Patient also has obstructive sleep apnea and follows with nephrology for chronic kidney disease.       Objective   Vital Signs:   /98   Pulse 79   Ht 165.1 cm (65\")   Wt 89.4 kg (197 lb)   SpO2 98%   BMI 32.78 kg/m²     Physical Exam  Vitals signs reviewed.   Constitutional:       General: She is not in acute distress.     Appearance: She is well-developed.   HENT:      Head: Normocephalic and atraumatic.   Neck:      Musculoskeletal: Neck supple.   Cardiovascular:      Rate and Rhythm: Normal rate and regular rhythm.      Heart sounds: Normal heart sounds. No murmur.   Pulmonary:      Effort: Pulmonary effort is normal. No respiratory " distress.      Breath sounds: Normal breath sounds. No wheezing or rales.   Abdominal:      Palpations: Abdomen is soft.      Tenderness: There is no abdominal tenderness.   Musculoskeletal:      Left hip: She exhibits decreased strength (with hip flexion). She exhibits normal range of motion, no tenderness and no bony tenderness.   Skin:     General: Skin is warm and dry.      Findings: No rash.   Neurological:      Mental Status: She is alert and oriented to person, place, and time.        Result Review :   The following data was reviewed by: Bharati Love MD on 02/17/2021:  Common labs    Common Labsle 5/21/20 11/24/20 11/24/20 11/24/20 11/24/20 12/1/20     1214 1214 1509 1509    Glucose 123 (A)   191 (A)     BUN 22   23     Creatinine 1.12 (A)   1.19 (A)     eGFR Non  Am 48 (A)   45 (A)     Sodium 138   138     Potassium 4.6   4.6     Chloride 102   102     Calcium 9.4   9.5     Albumin 4.20   3.90     Total Bilirubin    0.4     Alkaline Phosphatase    66     AST (SGOT)    18     ALT (SGPT)    16     WBC  11.91 (A)       Hemoglobin  14.2       Hematocrit  42.7       Platelets  260       Total Cholesterol     299 (A)    Triglycerides     226 (A)    HDL Cholesterol     64 (A)    LDL Cholesterol      192 (A)    Hemoglobin A1C   6.74 (A)      Microalbumin, Urine      <1.2   (A) Abnormal value            Data reviewed: Consultant notes Gastroenterology office note from 2/12/2021 reviewed   Colonoscopy and EGD reports from 1/14/2021 reviewed         Assessment and Plan    Diagnoses and all orders for this visit:    1. Acquired hypothyroidism (Primary)  -     Discontinue: Synthroid 112 MCG tablet; Take 1 tablet by mouth Daily.  Dispense: 30 tablet; Refill: 2  -     TSH; Future  -     Synthroid 112 MCG tablet; Take 1 tablet by mouth Daily.  Dispense: 30 tablet; Refill: 2    2. Left hip pain  -     XR Hip With or Without Pelvis 4 View Left  -     triamcinolone acetonide (KENALOG-40) injection 80 mg    3.  Arthritis  -     triamcinolone acetonide (KENALOG-40) injection 80 mg    4. Essential hypertension  -     Comprehensive Metabolic Panel; Future  -     CBC & Differential; Future    5. Hyperlipidemia, unspecified hyperlipidemia type  -     ezetimibe (Zetia) 10 MG tablet; Take 1 tablet by mouth Daily.  Dispense: 30 tablet; Refill: 2    6. Major depressive disorder with single episode, in full remission (CMS/[facility])    7. Diabetes mellitus type 2, diet-controlled (CMS/[facility])    8. Encounter to establish care        Patient presents today to establish care.  She is overall doing well.  Patient had to cut her tablets to make her current dose of Synthroid around 112.5 mcg daily due to some issues with generic medication being delivered instead.  Discussed that her day-to-day totals were probably varying quite a bit.  Will send Synthroid 112 mcg tablets to the pharmacy, and patient should start taking this instead.  Will check TSH today, but will probably not make any changes to medication until she has been on new dose for at least 4 to 6 weeks.  Patient has left hip pain, longstanding but acute sharp pains are new.  Will get x-ray of the left hip to determine degree of arthritis.  Left leg weakness present on exam today, but patient says this is chronic.  Will provide Kenalog injection today.  Blood pressure elevated in the office.  Patient notes that she forgot to take her medications this morning.  Patient has difficulties with remembering blood pressure medications at times.  Discussed ways to increase her compliance with reminders and/or alarms.  She voiced understanding.  Patient says blood pressure is good at home when she takes her medication.  Will check CBC and CMP today.  Patient has not tolerated statin medications in the past for hyperlipidemia.  Given that last LDL was greater than 190, would recommend trial of Zetia.  Risks and benefits of this medication discussed.  Additional information provided patient  handout.  Patient feels like her depression symptoms are well controlled.  She would like to go off of Lexapro at this time.  Advised weaning to Lexapro 5 mg daily for at least 2 weeks, if she still feels that she is doing okay patient will discontinue this medication.  Continue following diabetic diet.  Last hemoglobin A1c less than 7%.     Follow Up   Return in 3 months (on 5/17/2021) for Recheck.  Patient was given instructions and counseling regarding her condition or for health maintenance advice. Please see specific information pulled into the AVS if appropriate.           This document has been electronically signed by Bharati Love MD

## 2021-02-18 ENCOUNTER — TELEPHONE (OUTPATIENT)
Dept: FAMILY MEDICINE CLINIC | Facility: CLINIC | Age: 71
End: 2021-02-18

## 2021-02-18 NOTE — TELEPHONE ENCOUNTER
Let patient know that x-ray left hip showed mild arthritis.  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  TSH is normal, continue Synthroid 112.5 mcg daily.  CMP shows stable kidney function.  CBC okay.  Thanks, Pio

## 2021-02-22 NOTE — TELEPHONE ENCOUNTER
Per Dr. Spear, Ms. Haas has been called with recent lab and x-ray results & recommendations.  Continue current medications and follow-up as planned or sooner if any problems.

## 2021-03-09 ENCOUNTER — IMMUNIZATION (OUTPATIENT)
Dept: VACCINE CLINIC | Facility: HOSPITAL | Age: 71
End: 2021-03-09

## 2021-03-09 PROCEDURE — 91300 HC SARSCOV02 VAC 30MCG/0.3ML IM: CPT | Performed by: NURSE PRACTITIONER

## 2021-03-09 PROCEDURE — 0001A: CPT | Performed by: NURSE PRACTITIONER

## 2021-03-17 ENCOUNTER — OFFICE VISIT (OUTPATIENT)
Dept: GASTROENTEROLOGY | Facility: CLINIC | Age: 71
End: 2021-03-17

## 2021-03-17 DIAGNOSIS — K21.9 GASTROESOPHAGEAL REFLUX DISEASE, UNSPECIFIED WHETHER ESOPHAGITIS PRESENT: Primary | ICD-10-CM

## 2021-03-17 PROCEDURE — 99442 PR PHYS/QHP TELEPHONE EVALUATION 11-20 MIN: CPT | Performed by: INTERNAL MEDICINE

## 2021-03-17 RX ORDER — FAMOTIDINE 40 MG/1
40 TABLET, FILM COATED ORAL DAILY
Qty: 30 TABLET | Refills: 5 | Status: SHIPPED | OUTPATIENT
Start: 2021-03-17 | End: 2021-04-16

## 2021-03-19 PROCEDURE — 87635 SARS-COV-2 COVID-19 AMP PRB: CPT | Performed by: NURSE PRACTITIONER

## 2021-03-22 ENCOUNTER — HOSPITAL ENCOUNTER (INPATIENT)
Facility: HOSPITAL | Age: 71
LOS: 14 days | Discharge: HOME HEALTH | End: 2021-04-06
Attending: FAMILY MEDICINE | Admitting: INTERNAL MEDICINE

## 2021-03-22 ENCOUNTER — APPOINTMENT (OUTPATIENT)
Dept: GENERAL RADIOLOGY | Facility: HOSPITAL | Age: 71
End: 2021-03-22

## 2021-03-22 DIAGNOSIS — Z78.9 IMPAIRED MOBILITY AND ADLS: ICD-10-CM

## 2021-03-22 DIAGNOSIS — Z74.09 IMPAIRED MOBILITY AND ADLS: ICD-10-CM

## 2021-03-22 DIAGNOSIS — J96.01 ACUTE RESPIRATORY FAILURE WITH HYPOXIA (HCC): ICD-10-CM

## 2021-03-22 DIAGNOSIS — R06.00 DYSPNEA, UNSPECIFIED TYPE: ICD-10-CM

## 2021-03-22 DIAGNOSIS — J12.82 PNEUMONIA DUE TO COVID-19 VIRUS: Primary | ICD-10-CM

## 2021-03-22 DIAGNOSIS — Z74.09 IMPAIRED FUNCTIONAL MOBILITY, BALANCE, GAIT, AND ENDURANCE: ICD-10-CM

## 2021-03-22 DIAGNOSIS — R50.9 FEVER, UNSPECIFIED FEVER CAUSE: ICD-10-CM

## 2021-03-22 DIAGNOSIS — U07.1 PNEUMONIA DUE TO COVID-19 VIRUS: Primary | ICD-10-CM

## 2021-03-22 PROBLEM — E11.9 DIABETES MELLITUS: Chronic | Status: ACTIVE | Noted: 2017-04-05

## 2021-03-22 LAB
ALBUMIN SERPL-MCNC: 3 G/DL (ref 3.5–5.2)
ALBUMIN/GLOB SERPL: 0.9 G/DL
ALP SERPL-CCNC: 55 U/L (ref 39–117)
ALT SERPL W P-5'-P-CCNC: 14 U/L (ref 1–33)
ANION GAP SERPL CALCULATED.3IONS-SCNC: 11 MMOL/L (ref 5–15)
AST SERPL-CCNC: 23 U/L (ref 1–32)
BACTERIA UR QL AUTO: ABNORMAL /HPF
BASOPHILS # BLD AUTO: 0.02 10*3/MM3 (ref 0–0.2)
BASOPHILS NFR BLD AUTO: 0.3 % (ref 0–1.5)
BILIRUB SERPL-MCNC: 0.3 MG/DL (ref 0–1.2)
BILIRUB UR QL STRIP: NEGATIVE
BUN SERPL-MCNC: 13 MG/DL (ref 8–23)
BUN/CREAT SERPL: 10.5 (ref 7–25)
CALCIUM SPEC-SCNC: 9 MG/DL (ref 8.6–10.5)
CHLORIDE SERPL-SCNC: 98 MMOL/L (ref 98–107)
CK SERPL-CCNC: 44 U/L (ref 20–180)
CLARITY UR: ABNORMAL
CO2 SERPL-SCNC: 25 MMOL/L (ref 22–29)
COLOR UR: YELLOW
CREAT SERPL-MCNC: 1.24 MG/DL (ref 0.57–1)
D-LACTATE SERPL-SCNC: 1.4 MMOL/L (ref 0.5–2)
DEPRECATED RDW RBC AUTO: 42.7 FL (ref 37–54)
EOSINOPHIL # BLD AUTO: 0.01 10*3/MM3 (ref 0–0.4)
EOSINOPHIL NFR BLD AUTO: 0.1 % (ref 0.3–6.2)
ERYTHROCYTE [DISTWIDTH] IN BLOOD BY AUTOMATED COUNT: 12.3 % (ref 12.3–15.4)
GFR SERPL CREATININE-BSD FRML MDRD: 43 ML/MIN/1.73
GLOBULIN UR ELPH-MCNC: 3.5 GM/DL
GLUCOSE BLDC GLUCOMTR-MCNC: 160 MG/DL (ref 70–130)
GLUCOSE BLDC GLUCOMTR-MCNC: 178 MG/DL (ref 70–130)
GLUCOSE BLDC GLUCOMTR-MCNC: 229 MG/DL (ref 70–130)
GLUCOSE SERPL-MCNC: 175 MG/DL (ref 65–99)
GLUCOSE UR STRIP-MCNC: NEGATIVE MG/DL
HCT VFR BLD AUTO: 40.7 % (ref 34–46.6)
HGB BLD-MCNC: 13.9 G/DL (ref 12–15.9)
HGB UR QL STRIP.AUTO: NEGATIVE
HOLD SPECIMEN: NORMAL
HYALINE CASTS UR QL AUTO: ABNORMAL /LPF
IMM GRANULOCYTES # BLD AUTO: 0.05 10*3/MM3 (ref 0–0.05)
IMM GRANULOCYTES NFR BLD AUTO: 0.6 % (ref 0–0.5)
KETONES UR QL STRIP: ABNORMAL
LEUKOCYTE ESTERASE UR QL STRIP.AUTO: NEGATIVE
LIPASE SERPL-CCNC: 10 U/L (ref 13–60)
LYMPHOCYTES # BLD AUTO: 1.15 10*3/MM3 (ref 0.7–3.1)
LYMPHOCYTES NFR BLD AUTO: 14.4 % (ref 19.6–45.3)
MAGNESIUM SERPL-MCNC: 1.7 MG/DL (ref 1.6–2.4)
MCH RBC QN AUTO: 31.8 PG (ref 26.6–33)
MCHC RBC AUTO-ENTMCNC: 34.2 G/DL (ref 31.5–35.7)
MCV RBC AUTO: 93.1 FL (ref 79–97)
MONOCYTES # BLD AUTO: 0.24 10*3/MM3 (ref 0.1–0.9)
MONOCYTES NFR BLD AUTO: 3 % (ref 5–12)
NEUTROPHILS NFR BLD AUTO: 6.5 10*3/MM3 (ref 1.7–7)
NEUTROPHILS NFR BLD AUTO: 81.6 % (ref 42.7–76)
NITRITE UR QL STRIP: NEGATIVE
NRBC BLD AUTO-RTO: 0 /100 WBC (ref 0–0.2)
NT-PROBNP SERPL-MCNC: 224.1 PG/ML (ref 0–900)
PH UR STRIP.AUTO: 5.5 [PH] (ref 5–9)
PLATELET # BLD AUTO: 99 10*3/MM3 (ref 140–450)
PMV BLD AUTO: 12.3 FL (ref 6–12)
POTASSIUM SERPL-SCNC: 4.2 MMOL/L (ref 3.5–5.2)
PROT SERPL-MCNC: 6.5 G/DL (ref 6–8.5)
PROT UR QL STRIP: ABNORMAL
RBC # BLD AUTO: 4.37 10*6/MM3 (ref 3.77–5.28)
RBC # UR: ABNORMAL /HPF
REF LAB TEST METHOD: ABNORMAL
SODIUM SERPL-SCNC: 134 MMOL/L (ref 136–145)
SP GR UR STRIP: 1.02 (ref 1–1.03)
SQUAMOUS #/AREA URNS HPF: ABNORMAL /HPF
UROBILINOGEN UR QL STRIP: ABNORMAL
WBC # BLD AUTO: 7.97 10*3/MM3 (ref 3.4–10.8)
WBC UR QL AUTO: ABNORMAL /HPF
WHOLE BLOOD HOLD SPECIMEN: NORMAL

## 2021-03-22 PROCEDURE — 85025 COMPLETE CBC W/AUTO DIFF WBC: CPT | Performed by: FAMILY MEDICINE

## 2021-03-22 PROCEDURE — 82962 GLUCOSE BLOOD TEST: CPT

## 2021-03-22 PROCEDURE — 83735 ASSAY OF MAGNESIUM: CPT | Performed by: FAMILY MEDICINE

## 2021-03-22 PROCEDURE — 63710000001 DEXAMETHASONE PER 0.25 MG: Performed by: INTERNAL MEDICINE

## 2021-03-22 PROCEDURE — 80053 COMPREHEN METABOLIC PANEL: CPT | Performed by: FAMILY MEDICINE

## 2021-03-22 PROCEDURE — 83605 ASSAY OF LACTIC ACID: CPT | Performed by: FAMILY MEDICINE

## 2021-03-22 PROCEDURE — 99284 EMERGENCY DEPT VISIT MOD MDM: CPT

## 2021-03-22 PROCEDURE — G0378 HOSPITAL OBSERVATION PER HR: HCPCS

## 2021-03-22 PROCEDURE — 83690 ASSAY OF LIPASE: CPT | Performed by: FAMILY MEDICINE

## 2021-03-22 PROCEDURE — 87040 BLOOD CULTURE FOR BACTERIA: CPT | Performed by: FAMILY MEDICINE

## 2021-03-22 PROCEDURE — 87040 BLOOD CULTURE FOR BACTERIA: CPT | Performed by: INTERNAL MEDICINE

## 2021-03-22 PROCEDURE — 81001 URINALYSIS AUTO W/SCOPE: CPT | Performed by: FAMILY MEDICINE

## 2021-03-22 PROCEDURE — XW033E5 INTRODUCTION OF REMDESIVIR ANTI-INFECTIVE INTO PERIPHERAL VEIN, PERCUTANEOUS APPROACH, NEW TECHNOLOGY GROUP 5: ICD-10-PCS | Performed by: INTERNAL MEDICINE

## 2021-03-22 PROCEDURE — 25010000002 ENOXAPARIN PER 10 MG: Performed by: INTERNAL MEDICINE

## 2021-03-22 PROCEDURE — 71045 X-RAY EXAM CHEST 1 VIEW: CPT

## 2021-03-22 PROCEDURE — 82550 ASSAY OF CK (CPK): CPT | Performed by: FAMILY MEDICINE

## 2021-03-22 PROCEDURE — 36415 COLL VENOUS BLD VENIPUNCTURE: CPT | Performed by: INTERNAL MEDICINE

## 2021-03-22 PROCEDURE — 83880 ASSAY OF NATRIURETIC PEPTIDE: CPT | Performed by: FAMILY MEDICINE

## 2021-03-22 RX ORDER — SODIUM CHLORIDE 0.9 % (FLUSH) 0.9 %
10 SYRINGE (ML) INJECTION AS NEEDED
Status: DISCONTINUED | OUTPATIENT
Start: 2021-03-22 | End: 2021-04-06 | Stop reason: HOSPADM

## 2021-03-22 RX ORDER — LOSARTAN POTASSIUM 50 MG/1
100 TABLET ORAL DAILY
Status: DISCONTINUED | OUTPATIENT
Start: 2021-03-22 | End: 2021-04-06 | Stop reason: HOSPADM

## 2021-03-22 RX ORDER — LEVOTHYROXINE SODIUM 112 UG/1
112 TABLET ORAL EVERY MORNING
Status: DISCONTINUED | OUTPATIENT
Start: 2021-03-22 | End: 2021-04-06 | Stop reason: HOSPADM

## 2021-03-22 RX ORDER — SODIUM CHLORIDE 9 MG/ML
125 INJECTION, SOLUTION INTRAVENOUS CONTINUOUS
Status: DISCONTINUED | OUTPATIENT
Start: 2021-03-22 | End: 2021-03-22

## 2021-03-22 RX ORDER — DEXAMETHASONE SODIUM PHOSPHATE 4 MG/ML
6 INJECTION, SOLUTION INTRA-ARTICULAR; INTRALESIONAL; INTRAMUSCULAR; INTRAVENOUS; SOFT TISSUE EVERY 12 HOURS
Status: DISCONTINUED | OUTPATIENT
Start: 2021-03-22 | End: 2021-03-22 | Stop reason: CLARIF

## 2021-03-22 RX ORDER — CARVEDILOL 12.5 MG/1
12.5 TABLET ORAL 2 TIMES DAILY WITH MEALS
Status: DISCONTINUED | OUTPATIENT
Start: 2021-03-22 | End: 2021-04-06 | Stop reason: HOSPADM

## 2021-03-22 RX ORDER — GUANFACINE 1 MG/1
1 TABLET ORAL DAILY
Status: DISCONTINUED | OUTPATIENT
Start: 2021-03-22 | End: 2021-04-06 | Stop reason: HOSPADM

## 2021-03-22 RX ORDER — AMLODIPINE BESYLATE 5 MG/1
5 TABLET ORAL DAILY
COMMUNITY
End: 2022-02-11

## 2021-03-22 RX ORDER — ACETAMINOPHEN 500 MG
1000 TABLET ORAL ONCE
Status: COMPLETED | OUTPATIENT
Start: 2021-03-22 | End: 2021-03-22

## 2021-03-22 RX ORDER — ONDANSETRON 4 MG/1
4 TABLET, ORALLY DISINTEGRATING ORAL EVERY 6 HOURS PRN
Status: DISCONTINUED | OUTPATIENT
Start: 2021-03-22 | End: 2021-04-06 | Stop reason: HOSPADM

## 2021-03-22 RX ORDER — ACETAMINOPHEN 325 MG/1
650 TABLET ORAL EVERY 6 HOURS PRN
Status: DISCONTINUED | OUTPATIENT
Start: 2021-03-22 | End: 2021-04-06 | Stop reason: HOSPADM

## 2021-03-22 RX ORDER — ESCITALOPRAM OXALATE 10 MG/1
10 TABLET ORAL DAILY
Status: DISCONTINUED | OUTPATIENT
Start: 2021-03-22 | End: 2021-04-06 | Stop reason: HOSPADM

## 2021-03-22 RX ORDER — FAMOTIDINE 40 MG/1
40 TABLET, FILM COATED ORAL DAILY
Status: DISCONTINUED | OUTPATIENT
Start: 2021-03-22 | End: 2021-04-06 | Stop reason: HOSPADM

## 2021-03-22 RX ORDER — AMLODIPINE BESYLATE 5 MG/1
5 TABLET ORAL
Status: DISCONTINUED | OUTPATIENT
Start: 2021-03-22 | End: 2021-04-06 | Stop reason: HOSPADM

## 2021-03-22 RX ORDER — SODIUM CHLORIDE 0.9 % (FLUSH) 0.9 %
10 SYRINGE (ML) INJECTION EVERY 12 HOURS SCHEDULED
Status: DISCONTINUED | OUTPATIENT
Start: 2021-03-22 | End: 2021-04-06 | Stop reason: HOSPADM

## 2021-03-22 RX ORDER — SODIUM CHLORIDE 9 MG/ML
75 INJECTION, SOLUTION INTRAVENOUS CONTINUOUS
Status: DISCONTINUED | OUTPATIENT
Start: 2021-03-22 | End: 2021-03-24

## 2021-03-22 RX ORDER — SUCRALFATE 1 G/1
1 TABLET ORAL 4 TIMES DAILY
COMMUNITY
End: 2021-05-07

## 2021-03-22 RX ADMIN — DEXAMETHASONE 6 MG: 4 TABLET ORAL at 16:12

## 2021-03-22 RX ADMIN — REMDESIVIR 200 MG: 100 INJECTION, POWDER, LYOPHILIZED, FOR SOLUTION INTRAVENOUS at 16:12

## 2021-03-22 RX ADMIN — CARVEDILOL 12.5 MG: 12.5 TABLET, FILM COATED ORAL at 17:25

## 2021-03-22 RX ADMIN — ENOXAPARIN SODIUM 40 MG: 40 INJECTION SUBCUTANEOUS at 16:12

## 2021-03-22 RX ADMIN — LOSARTAN POTASSIUM 100 MG: 50 TABLET, FILM COATED ORAL at 16:12

## 2021-03-22 RX ADMIN — SODIUM CHLORIDE 75 ML/HR: 9 INJECTION, SOLUTION INTRAVENOUS at 21:49

## 2021-03-22 RX ADMIN — AMLODIPINE BESYLATE 5 MG: 5 TABLET ORAL at 16:12

## 2021-03-22 RX ADMIN — LEVOTHYROXINE SODIUM 112 MCG: 112 TABLET ORAL at 16:12

## 2021-03-22 RX ADMIN — FAMOTIDINE 40 MG: 40 TABLET, FILM COATED ORAL at 16:12

## 2021-03-22 RX ADMIN — ESCITALOPRAM OXALATE 10 MG: 10 TABLET ORAL at 16:12

## 2021-03-22 RX ADMIN — GUANFACINE 1 MG: 1 TABLET ORAL at 16:12

## 2021-03-22 RX ADMIN — SODIUM CHLORIDE 125 ML/HR: 900 INJECTION, SOLUTION INTRAVENOUS at 09:50

## 2021-03-22 RX ADMIN — ACETAMINOPHEN 1000 MG: 500 TABLET ORAL at 09:50

## 2021-03-23 LAB
ALBUMIN SERPL-MCNC: 2.7 G/DL (ref 3.5–5.2)
ALBUMIN/GLOB SERPL: 0.8 G/DL
ALP SERPL-CCNC: 52 U/L (ref 39–117)
ALT SERPL W P-5'-P-CCNC: 13 U/L (ref 1–33)
ANION GAP SERPL CALCULATED.3IONS-SCNC: 10 MMOL/L (ref 5–15)
AST SERPL-CCNC: 22 U/L (ref 1–32)
BASOPHILS # BLD AUTO: 0 10*3/MM3 (ref 0–0.2)
BASOPHILS NFR BLD AUTO: 0 % (ref 0–1.5)
BILIRUB CONJ SERPL-MCNC: <0.2 MG/DL (ref 0–0.3)
BILIRUB SERPL-MCNC: 0.2 MG/DL (ref 0–1.2)
BUN SERPL-MCNC: 20 MG/DL (ref 8–23)
BUN/CREAT SERPL: 19.4 (ref 7–25)
CALCIUM SPEC-SCNC: 8.7 MG/DL (ref 8.6–10.5)
CHLORIDE SERPL-SCNC: 106 MMOL/L (ref 98–107)
CO2 SERPL-SCNC: 22 MMOL/L (ref 22–29)
CREAT SERPL-MCNC: 1.03 MG/DL (ref 0.57–1)
DEPRECATED RDW RBC AUTO: 41.4 FL (ref 37–54)
EOSINOPHIL # BLD AUTO: 0 10*3/MM3 (ref 0–0.4)
EOSINOPHIL NFR BLD AUTO: 0 % (ref 0.3–6.2)
ERYTHROCYTE [DISTWIDTH] IN BLOOD BY AUTOMATED COUNT: 12.4 % (ref 12.3–15.4)
GFR SERPL CREATININE-BSD FRML MDRD: 53 ML/MIN/1.73
GLOBULIN UR ELPH-MCNC: 3.4 GM/DL
GLUCOSE BLDC GLUCOMTR-MCNC: 214 MG/DL (ref 70–130)
GLUCOSE BLDC GLUCOMTR-MCNC: 214 MG/DL (ref 70–130)
GLUCOSE BLDC GLUCOMTR-MCNC: 219 MG/DL (ref 70–130)
GLUCOSE SERPL-MCNC: 196 MG/DL (ref 65–99)
HCT VFR BLD AUTO: 37.7 % (ref 34–46.6)
HGB BLD-MCNC: 12.9 G/DL (ref 12–15.9)
IMM GRANULOCYTES # BLD AUTO: 0.04 10*3/MM3 (ref 0–0.05)
IMM GRANULOCYTES NFR BLD AUTO: 0.9 % (ref 0–0.5)
LDH SERPL-CCNC: 311 U/L (ref 135–214)
LYMPHOCYTES # BLD AUTO: 0.56 10*3/MM3 (ref 0.7–3.1)
LYMPHOCYTES NFR BLD AUTO: 12.1 % (ref 19.6–45.3)
MCH RBC QN AUTO: 31.5 PG (ref 26.6–33)
MCHC RBC AUTO-ENTMCNC: 34.2 G/DL (ref 31.5–35.7)
MCV RBC AUTO: 92 FL (ref 79–97)
MONOCYTES # BLD AUTO: 0.1 10*3/MM3 (ref 0.1–0.9)
MONOCYTES NFR BLD AUTO: 2.2 % (ref 5–12)
NEUTROPHILS NFR BLD AUTO: 3.92 10*3/MM3 (ref 1.7–7)
NEUTROPHILS NFR BLD AUTO: 84.8 % (ref 42.7–76)
NRBC BLD AUTO-RTO: 0 /100 WBC (ref 0–0.2)
PLATELET # BLD AUTO: 104 10*3/MM3 (ref 140–450)
PMV BLD AUTO: 11.8 FL (ref 6–12)
POTASSIUM SERPL-SCNC: 4.2 MMOL/L (ref 3.5–5.2)
PROCALCITONIN SERPL-MCNC: 0.46 NG/ML (ref 0–0.25)
PROT SERPL-MCNC: 6.1 G/DL (ref 6–8.5)
RBC # BLD AUTO: 4.1 10*6/MM3 (ref 3.77–5.28)
SODIUM SERPL-SCNC: 138 MMOL/L (ref 136–145)
WBC # BLD AUTO: 4.62 10*3/MM3 (ref 3.4–10.8)

## 2021-03-23 PROCEDURE — 84145 PROCALCITONIN (PCT): CPT | Performed by: INTERNAL MEDICINE

## 2021-03-23 PROCEDURE — 82962 GLUCOSE BLOOD TEST: CPT

## 2021-03-23 PROCEDURE — 63710000001 DEXAMETHASONE PER 0.25 MG: Performed by: INTERNAL MEDICINE

## 2021-03-23 PROCEDURE — 82248 BILIRUBIN DIRECT: CPT | Performed by: INTERNAL MEDICINE

## 2021-03-23 PROCEDURE — 85025 COMPLETE CBC W/AUTO DIFF WBC: CPT | Performed by: INTERNAL MEDICINE

## 2021-03-23 PROCEDURE — 25010000002 ENOXAPARIN PER 10 MG: Performed by: INTERNAL MEDICINE

## 2021-03-23 PROCEDURE — 83615 LACTATE (LD) (LDH) ENZYME: CPT | Performed by: INTERNAL MEDICINE

## 2021-03-23 PROCEDURE — 80053 COMPREHEN METABOLIC PANEL: CPT | Performed by: INTERNAL MEDICINE

## 2021-03-23 RX ADMIN — FAMOTIDINE 40 MG: 40 TABLET, FILM COATED ORAL at 08:54

## 2021-03-23 RX ADMIN — AMLODIPINE BESYLATE 5 MG: 5 TABLET ORAL at 08:54

## 2021-03-23 RX ADMIN — REMDESIVIR 100 MG: 100 INJECTION, POWDER, LYOPHILIZED, FOR SOLUTION INTRAVENOUS at 17:46

## 2021-03-23 RX ADMIN — CARVEDILOL 12.5 MG: 12.5 TABLET, FILM COATED ORAL at 08:53

## 2021-03-23 RX ADMIN — CARVEDILOL 12.5 MG: 12.5 TABLET, FILM COATED ORAL at 17:45

## 2021-03-23 RX ADMIN — DEXAMETHASONE 6 MG: 4 TABLET ORAL at 08:53

## 2021-03-23 RX ADMIN — DEXAMETHASONE 6 MG: 4 TABLET ORAL at 22:01

## 2021-03-23 RX ADMIN — ESCITALOPRAM OXALATE 10 MG: 10 TABLET ORAL at 08:54

## 2021-03-23 RX ADMIN — LOSARTAN POTASSIUM 100 MG: 50 TABLET, FILM COATED ORAL at 08:53

## 2021-03-23 RX ADMIN — GUANFACINE 1 MG: 1 TABLET ORAL at 08:54

## 2021-03-23 RX ADMIN — ENOXAPARIN SODIUM 40 MG: 40 INJECTION SUBCUTANEOUS at 15:06

## 2021-03-23 RX ADMIN — LEVOTHYROXINE SODIUM 112 MCG: 112 TABLET ORAL at 06:07

## 2021-03-24 PROBLEM — J96.01 ACUTE RESPIRATORY FAILURE WITH HYPOXIA: Status: ACTIVE | Noted: 2021-03-24

## 2021-03-24 LAB
ALBUMIN SERPL-MCNC: 2.5 G/DL (ref 3.5–5.2)
ALP SERPL-CCNC: 48 U/L (ref 39–117)
ALT SERPL W P-5'-P-CCNC: 13 U/L (ref 1–33)
ARTERIAL PATENCY WRIST A: ABNORMAL
AST SERPL-CCNC: 19 U/L (ref 1–32)
ATMOSPHERIC PRESS: 743 MMHG
BASE EXCESS BLDA CALC-SCNC: -5 MMOL/L (ref 0–2)
BDY SITE: ABNORMAL
BILIRUB CONJ SERPL-MCNC: <0.2 MG/DL (ref 0–0.3)
BILIRUB INDIRECT SERPL-MCNC: ABNORMAL MG/DL
BILIRUB SERPL-MCNC: 0.2 MG/DL (ref 0–1.2)
GLUCOSE BLDC GLUCOMTR-MCNC: 203 MG/DL (ref 70–130)
GLUCOSE BLDC GLUCOMTR-MCNC: 261 MG/DL (ref 70–130)
GLUCOSE BLDC GLUCOMTR-MCNC: 306 MG/DL (ref 70–130)
GLUCOSE BLDC GLUCOMTR-MCNC: 336 MG/DL (ref 70–130)
HCO3 BLDA-SCNC: 18.5 MMOL/L (ref 20–26)
INHALED O2 CONCENTRATION: 100 %
Lab: ABNORMAL
MODALITY: ABNORMAL
PCO2 BLDA: 29.2 MM HG (ref 35–45)
PH BLDA: 7.41 PH UNITS (ref 7.35–7.45)
PO2 BLDA: 87.5 MM HG (ref 83–108)
PROT SERPL-MCNC: 5.7 G/DL (ref 6–8.5)
SAO2 % BLDCOA: 97.3 % (ref 94–99)
VENTILATOR MODE: ABNORMAL
WHOLE BLOOD HOLD SPECIMEN: NORMAL

## 2021-03-24 PROCEDURE — 63710000001 DEXAMETHASONE PER 0.25 MG: Performed by: INTERNAL MEDICINE

## 2021-03-24 PROCEDURE — 25010000002 ENOXAPARIN PER 10 MG: Performed by: INTERNAL MEDICINE

## 2021-03-24 PROCEDURE — 36600 WITHDRAWAL OF ARTERIAL BLOOD: CPT

## 2021-03-24 PROCEDURE — 82962 GLUCOSE BLOOD TEST: CPT

## 2021-03-24 PROCEDURE — 97165 OT EVAL LOW COMPLEX 30 MIN: CPT

## 2021-03-24 PROCEDURE — 25010000002 FUROSEMIDE PER 20 MG: Performed by: INTERNAL MEDICINE

## 2021-03-24 PROCEDURE — 80076 HEPATIC FUNCTION PANEL: CPT | Performed by: INTERNAL MEDICINE

## 2021-03-24 PROCEDURE — 82803 BLOOD GASES ANY COMBINATION: CPT

## 2021-03-24 RX ORDER — FUROSEMIDE 10 MG/ML
20 INJECTION INTRAMUSCULAR; INTRAVENOUS EVERY 12 HOURS
Status: DISCONTINUED | OUTPATIENT
Start: 2021-03-25 | End: 2021-03-25

## 2021-03-24 RX ORDER — FUROSEMIDE 10 MG/ML
40 INJECTION INTRAMUSCULAR; INTRAVENOUS ONCE
Status: COMPLETED | OUTPATIENT
Start: 2021-03-24 | End: 2021-03-24

## 2021-03-24 RX ADMIN — LEVOTHYROXINE SODIUM 112 MCG: 112 TABLET ORAL at 06:10

## 2021-03-24 RX ADMIN — AMLODIPINE BESYLATE 5 MG: 5 TABLET ORAL at 09:04

## 2021-03-24 RX ADMIN — SODIUM CHLORIDE, PRESERVATIVE FREE 10 ML: 5 INJECTION INTRAVENOUS at 21:49

## 2021-03-24 RX ADMIN — FAMOTIDINE 40 MG: 40 TABLET, FILM COATED ORAL at 09:04

## 2021-03-24 RX ADMIN — CARVEDILOL 12.5 MG: 12.5 TABLET, FILM COATED ORAL at 16:45

## 2021-03-24 RX ADMIN — CARVEDILOL 12.5 MG: 12.5 TABLET, FILM COATED ORAL at 09:04

## 2021-03-24 RX ADMIN — LOSARTAN POTASSIUM 100 MG: 50 TABLET, FILM COATED ORAL at 09:04

## 2021-03-24 RX ADMIN — DEXAMETHASONE 6 MG: 4 TABLET ORAL at 21:24

## 2021-03-24 RX ADMIN — GUANFACINE 1 MG: 1 TABLET ORAL at 09:04

## 2021-03-24 RX ADMIN — DEXAMETHASONE 6 MG: 4 TABLET ORAL at 09:04

## 2021-03-24 RX ADMIN — SODIUM CHLORIDE 75 ML/HR: 9 INJECTION, SOLUTION INTRAVENOUS at 01:30

## 2021-03-24 RX ADMIN — ENOXAPARIN SODIUM 40 MG: 40 INJECTION SUBCUTANEOUS at 15:20

## 2021-03-24 RX ADMIN — FUROSEMIDE 40 MG: 10 INJECTION, SOLUTION INTRAMUSCULAR; INTRAVENOUS at 14:29

## 2021-03-24 RX ADMIN — REMDESIVIR 100 MG: 100 INJECTION, POWDER, LYOPHILIZED, FOR SOLUTION INTRAVENOUS at 16:45

## 2021-03-24 RX ADMIN — ESCITALOPRAM OXALATE 10 MG: 10 TABLET ORAL at 09:04

## 2021-03-25 ENCOUNTER — APPOINTMENT (OUTPATIENT)
Dept: GENERAL RADIOLOGY | Facility: HOSPITAL | Age: 71
End: 2021-03-25

## 2021-03-25 LAB
ALBUMIN SERPL-MCNC: 2.6 G/DL (ref 3.5–5.2)
ALP SERPL-CCNC: 55 U/L (ref 39–117)
ALT SERPL W P-5'-P-CCNC: 14 U/L (ref 1–33)
ANION GAP SERPL CALCULATED.3IONS-SCNC: 12 MMOL/L (ref 5–15)
AST SERPL-CCNC: 20 U/L (ref 1–32)
BASOPHILS # BLD AUTO: 0.02 10*3/MM3 (ref 0–0.2)
BASOPHILS NFR BLD AUTO: 0.2 % (ref 0–1.5)
BILIRUB CONJ SERPL-MCNC: <0.2 MG/DL (ref 0–0.3)
BILIRUB INDIRECT SERPL-MCNC: ABNORMAL MG/DL
BILIRUB SERPL-MCNC: 0.2 MG/DL (ref 0–1.2)
BUN SERPL-MCNC: 30 MG/DL (ref 8–23)
BUN/CREAT SERPL: 25.2 (ref 7–25)
CALCIUM SPEC-SCNC: 8.6 MG/DL (ref 8.6–10.5)
CHLORIDE SERPL-SCNC: 107 MMOL/L (ref 98–107)
CO2 SERPL-SCNC: 20 MMOL/L (ref 22–29)
CREAT SERPL-MCNC: 1.19 MG/DL (ref 0.57–1)
DEPRECATED RDW RBC AUTO: 39.8 FL (ref 37–54)
EOSINOPHIL # BLD AUTO: 0.02 10*3/MM3 (ref 0–0.4)
EOSINOPHIL NFR BLD AUTO: 0.2 % (ref 0.3–6.2)
ERYTHROCYTE [DISTWIDTH] IN BLOOD BY AUTOMATED COUNT: 12.4 % (ref 12.3–15.4)
GFR SERPL CREATININE-BSD FRML MDRD: 45 ML/MIN/1.73
GLUCOSE BLDC GLUCOMTR-MCNC: 264 MG/DL (ref 70–130)
GLUCOSE BLDC GLUCOMTR-MCNC: 327 MG/DL (ref 70–130)
GLUCOSE SERPL-MCNC: 296 MG/DL (ref 65–99)
HCT VFR BLD AUTO: 37.4 % (ref 34–46.6)
HGB BLD-MCNC: 13.3 G/DL (ref 12–15.9)
IMM GRANULOCYTES # BLD AUTO: 0.16 10*3/MM3 (ref 0–0.05)
IMM GRANULOCYTES NFR BLD AUTO: 1.3 % (ref 0–0.5)
LYMPHOCYTES # BLD AUTO: 0.68 10*3/MM3 (ref 0.7–3.1)
LYMPHOCYTES NFR BLD AUTO: 5.5 % (ref 19.6–45.3)
MCH RBC QN AUTO: 31.8 PG (ref 26.6–33)
MCHC RBC AUTO-ENTMCNC: 35.6 G/DL (ref 31.5–35.7)
MCV RBC AUTO: 89.5 FL (ref 79–97)
MONOCYTES # BLD AUTO: 0.42 10*3/MM3 (ref 0.1–0.9)
MONOCYTES NFR BLD AUTO: 3.4 % (ref 5–12)
NEUTROPHILS NFR BLD AUTO: 11.09 10*3/MM3 (ref 1.7–7)
NEUTROPHILS NFR BLD AUTO: 89.4 % (ref 42.7–76)
NRBC BLD AUTO-RTO: 0 /100 WBC (ref 0–0.2)
PLATELET # BLD AUTO: 164 10*3/MM3 (ref 140–450)
PMV BLD AUTO: 12.1 FL (ref 6–12)
POTASSIUM SERPL-SCNC: 4 MMOL/L (ref 3.5–5.2)
PROT SERPL-MCNC: 5.7 G/DL (ref 6–8.5)
RBC # BLD AUTO: 4.18 10*6/MM3 (ref 3.77–5.28)
SODIUM SERPL-SCNC: 139 MMOL/L (ref 136–145)
WBC # BLD AUTO: 12.39 10*3/MM3 (ref 3.4–10.8)

## 2021-03-25 PROCEDURE — 63710000001 DEXAMETHASONE PER 0.25 MG: Performed by: INTERNAL MEDICINE

## 2021-03-25 PROCEDURE — 97162 PT EVAL MOD COMPLEX 30 MIN: CPT

## 2021-03-25 PROCEDURE — 71045 X-RAY EXAM CHEST 1 VIEW: CPT

## 2021-03-25 PROCEDURE — 85025 COMPLETE CBC W/AUTO DIFF WBC: CPT | Performed by: INTERNAL MEDICINE

## 2021-03-25 PROCEDURE — 25010000002 FUROSEMIDE PER 20 MG: Performed by: INTERNAL MEDICINE

## 2021-03-25 PROCEDURE — 63710000001 INSULIN DETEMIR PER 5 UNITS: Performed by: INTERNAL MEDICINE

## 2021-03-25 PROCEDURE — 82962 GLUCOSE BLOOD TEST: CPT

## 2021-03-25 PROCEDURE — 94799 UNLISTED PULMONARY SVC/PX: CPT

## 2021-03-25 PROCEDURE — 25010000002 ENOXAPARIN PER 10 MG: Performed by: INTERNAL MEDICINE

## 2021-03-25 PROCEDURE — 97535 SELF CARE MNGMENT TRAINING: CPT

## 2021-03-25 PROCEDURE — 80076 HEPATIC FUNCTION PANEL: CPT | Performed by: INTERNAL MEDICINE

## 2021-03-25 PROCEDURE — 63710000001 INSULIN ASPART PER 5 UNITS: Performed by: INTERNAL MEDICINE

## 2021-03-25 PROCEDURE — 94760 N-INVAS EAR/PLS OXIMETRY 1: CPT

## 2021-03-25 PROCEDURE — 80048 BASIC METABOLIC PNL TOTAL CA: CPT | Performed by: INTERNAL MEDICINE

## 2021-03-25 RX ORDER — FUROSEMIDE 10 MG/ML
20 INJECTION INTRAMUSCULAR; INTRAVENOUS DAILY
Status: DISCONTINUED | OUTPATIENT
Start: 2021-03-25 | End: 2021-03-25

## 2021-03-25 RX ADMIN — DEXAMETHASONE 6 MG: 4 TABLET ORAL at 09:20

## 2021-03-25 RX ADMIN — LOSARTAN POTASSIUM 100 MG: 50 TABLET, FILM COATED ORAL at 09:20

## 2021-03-25 RX ADMIN — DEXAMETHASONE 6 MG: 4 TABLET ORAL at 21:54

## 2021-03-25 RX ADMIN — INSULIN DETEMIR 20 UNITS: 100 INJECTION, SOLUTION SUBCUTANEOUS at 21:53

## 2021-03-25 RX ADMIN — LEVOTHYROXINE SODIUM 112 MCG: 112 TABLET ORAL at 06:12

## 2021-03-25 RX ADMIN — SODIUM CHLORIDE, PRESERVATIVE FREE 10 ML: 5 INJECTION INTRAVENOUS at 22:14

## 2021-03-25 RX ADMIN — INSULIN ASPART 20 UNITS: 100 INJECTION, SOLUTION INTRAVENOUS; SUBCUTANEOUS at 17:26

## 2021-03-25 RX ADMIN — CARVEDILOL 12.5 MG: 12.5 TABLET, FILM COATED ORAL at 17:19

## 2021-03-25 RX ADMIN — ENOXAPARIN SODIUM 40 MG: 40 INJECTION SUBCUTANEOUS at 15:27

## 2021-03-25 RX ADMIN — FUROSEMIDE 20 MG: 10 INJECTION, SOLUTION INTRAMUSCULAR; INTRAVENOUS at 09:20

## 2021-03-25 RX ADMIN — ESCITALOPRAM OXALATE 10 MG: 10 TABLET ORAL at 09:20

## 2021-03-25 RX ADMIN — FAMOTIDINE 40 MG: 40 TABLET, FILM COATED ORAL at 09:19

## 2021-03-25 RX ADMIN — CARVEDILOL 12.5 MG: 12.5 TABLET, FILM COATED ORAL at 09:19

## 2021-03-25 RX ADMIN — GUANFACINE 1 MG: 1 TABLET ORAL at 09:19

## 2021-03-25 RX ADMIN — AMLODIPINE BESYLATE 5 MG: 5 TABLET ORAL at 09:20

## 2021-03-25 RX ADMIN — REMDESIVIR 100 MG: 100 INJECTION, POWDER, LYOPHILIZED, FOR SOLUTION INTRAVENOUS at 17:19

## 2021-03-25 RX ADMIN — ACETAMINOPHEN 650 MG: 325 TABLET ORAL at 15:41

## 2021-03-25 RX ADMIN — SODIUM CHLORIDE, PRESERVATIVE FREE 10 ML: 5 INJECTION INTRAVENOUS at 09:21

## 2021-03-26 LAB
ANION GAP SERPL CALCULATED.3IONS-SCNC: 11 MMOL/L (ref 5–15)
BASOPHILS # BLD AUTO: 0.02 10*3/MM3 (ref 0–0.2)
BASOPHILS NFR BLD AUTO: 0.2 % (ref 0–1.5)
BUN SERPL-MCNC: 34 MG/DL (ref 8–23)
BUN/CREAT SERPL: 30.6 (ref 7–25)
CALCIUM SPEC-SCNC: 9 MG/DL (ref 8.6–10.5)
CHLORIDE SERPL-SCNC: 104 MMOL/L (ref 98–107)
CO2 SERPL-SCNC: 26 MMOL/L (ref 22–29)
CREAT SERPL-MCNC: 1.11 MG/DL (ref 0.57–1)
DEPRECATED RDW RBC AUTO: 40 FL (ref 37–54)
EOSINOPHIL # BLD AUTO: 0 10*3/MM3 (ref 0–0.4)
EOSINOPHIL NFR BLD AUTO: 0 % (ref 0.3–6.2)
ERYTHROCYTE [DISTWIDTH] IN BLOOD BY AUTOMATED COUNT: 12.1 % (ref 12.3–15.4)
GFR SERPL CREATININE-BSD FRML MDRD: 48 ML/MIN/1.73
GLUCOSE SERPL-MCNC: 294 MG/DL (ref 65–99)
HCT VFR BLD AUTO: 38.7 % (ref 34–46.6)
HGB BLD-MCNC: 13.1 G/DL (ref 12–15.9)
IMM GRANULOCYTES # BLD AUTO: 0.15 10*3/MM3 (ref 0–0.05)
IMM GRANULOCYTES NFR BLD AUTO: 1.2 % (ref 0–0.5)
LYMPHOCYTES # BLD AUTO: 0.8 10*3/MM3 (ref 0.7–3.1)
LYMPHOCYTES NFR BLD AUTO: 6.5 % (ref 19.6–45.3)
MCH RBC QN AUTO: 30.7 PG (ref 26.6–33)
MCHC RBC AUTO-ENTMCNC: 33.9 G/DL (ref 31.5–35.7)
MCV RBC AUTO: 90.6 FL (ref 79–97)
MONOCYTES # BLD AUTO: 0.4 10*3/MM3 (ref 0.1–0.9)
MONOCYTES NFR BLD AUTO: 3.2 % (ref 5–12)
NEUTROPHILS NFR BLD AUTO: 10.96 10*3/MM3 (ref 1.7–7)
NEUTROPHILS NFR BLD AUTO: 88.9 % (ref 42.7–76)
NRBC BLD AUTO-RTO: 0 /100 WBC (ref 0–0.2)
PLATELET # BLD AUTO: 222 10*3/MM3 (ref 140–450)
PMV BLD AUTO: 11.1 FL (ref 6–12)
POTASSIUM SERPL-SCNC: 3.5 MMOL/L (ref 3.5–5.2)
RBC # BLD AUTO: 4.27 10*6/MM3 (ref 3.77–5.28)
SODIUM SERPL-SCNC: 141 MMOL/L (ref 136–145)
WBC # BLD AUTO: 12.33 10*3/MM3 (ref 3.4–10.8)

## 2021-03-26 PROCEDURE — 85025 COMPLETE CBC W/AUTO DIFF WBC: CPT | Performed by: INTERNAL MEDICINE

## 2021-03-26 PROCEDURE — 97530 THERAPEUTIC ACTIVITIES: CPT

## 2021-03-26 PROCEDURE — 63710000001 INSULIN DETEMIR PER 5 UNITS: Performed by: INTERNAL MEDICINE

## 2021-03-26 PROCEDURE — 25010000002 ENOXAPARIN PER 10 MG: Performed by: INTERNAL MEDICINE

## 2021-03-26 PROCEDURE — 82962 GLUCOSE BLOOD TEST: CPT

## 2021-03-26 PROCEDURE — 63710000001 DEXAMETHASONE PER 0.25 MG: Performed by: INTERNAL MEDICINE

## 2021-03-26 PROCEDURE — 80048 BASIC METABOLIC PNL TOTAL CA: CPT | Performed by: INTERNAL MEDICINE

## 2021-03-26 PROCEDURE — 97535 SELF CARE MNGMENT TRAINING: CPT

## 2021-03-26 RX ADMIN — LEVOTHYROXINE SODIUM 112 MCG: 112 TABLET ORAL at 06:17

## 2021-03-26 RX ADMIN — ESCITALOPRAM OXALATE 10 MG: 10 TABLET ORAL at 08:23

## 2021-03-26 RX ADMIN — INSULIN DETEMIR 30 UNITS: 100 INJECTION, SOLUTION SUBCUTANEOUS at 20:30

## 2021-03-26 RX ADMIN — SODIUM CHLORIDE, PRESERVATIVE FREE 10 ML: 5 INJECTION INTRAVENOUS at 08:26

## 2021-03-26 RX ADMIN — ENOXAPARIN SODIUM 40 MG: 40 INJECTION SUBCUTANEOUS at 15:00

## 2021-03-26 RX ADMIN — AMLODIPINE BESYLATE 5 MG: 5 TABLET ORAL at 08:23

## 2021-03-26 RX ADMIN — LOSARTAN POTASSIUM 100 MG: 50 TABLET, FILM COATED ORAL at 08:23

## 2021-03-26 RX ADMIN — CARVEDILOL 12.5 MG: 12.5 TABLET, FILM COATED ORAL at 17:47

## 2021-03-26 RX ADMIN — FAMOTIDINE 40 MG: 40 TABLET, FILM COATED ORAL at 08:23

## 2021-03-26 RX ADMIN — CARVEDILOL 12.5 MG: 12.5 TABLET, FILM COATED ORAL at 08:24

## 2021-03-26 RX ADMIN — DEXAMETHASONE 6 MG: 4 TABLET ORAL at 08:23

## 2021-03-26 RX ADMIN — BARICITINIB 2 MG: 2 TABLET, FILM COATED ORAL at 22:10

## 2021-03-26 RX ADMIN — GUANFACINE 1 MG: 1 TABLET ORAL at 08:23

## 2021-03-26 RX ADMIN — DEXAMETHASONE 6 MG: 4 TABLET ORAL at 20:27

## 2021-03-26 RX ADMIN — REMDESIVIR 100 MG: 100 INJECTION, POWDER, LYOPHILIZED, FOR SOLUTION INTRAVENOUS at 17:47

## 2021-03-27 LAB
BACTERIA SPEC AEROBE CULT: NORMAL
BACTERIA SPEC AEROBE CULT: NORMAL
GLUCOSE BLDC GLUCOMTR-MCNC: 281 MG/DL (ref 70–130)
GLUCOSE BLDC GLUCOMTR-MCNC: 301 MG/DL (ref 70–130)
GLUCOSE BLDC GLUCOMTR-MCNC: 308 MG/DL (ref 70–130)
GLUCOSE BLDC GLUCOMTR-MCNC: 311 MG/DL (ref 70–130)
GLUCOSE BLDC GLUCOMTR-MCNC: 336 MG/DL (ref 70–130)
GLUCOSE BLDC GLUCOMTR-MCNC: 391 MG/DL (ref 70–130)
GLUCOSE BLDC GLUCOMTR-MCNC: 403 MG/DL (ref 70–130)
GLUCOSE BLDC GLUCOMTR-MCNC: 427 MG/DL (ref 70–130)

## 2021-03-27 PROCEDURE — XW0DXM6 INTRODUCTION OF BARICITINIB INTO MOUTH AND PHARYNX, EXTERNAL APPROACH, NEW TECHNOLOGY GROUP 6: ICD-10-PCS | Performed by: INTERNAL MEDICINE

## 2021-03-27 PROCEDURE — 25010000002 ENOXAPARIN PER 10 MG: Performed by: INTERNAL MEDICINE

## 2021-03-27 PROCEDURE — 63710000001 INSULIN ASPART PER 5 UNITS: Performed by: INTERNAL MEDICINE

## 2021-03-27 PROCEDURE — 63710000001 INSULIN DETEMIR PER 5 UNITS: Performed by: INTERNAL MEDICINE

## 2021-03-27 PROCEDURE — 94760 N-INVAS EAR/PLS OXIMETRY 1: CPT

## 2021-03-27 PROCEDURE — 94799 UNLISTED PULMONARY SVC/PX: CPT

## 2021-03-27 PROCEDURE — 82962 GLUCOSE BLOOD TEST: CPT

## 2021-03-27 PROCEDURE — 63710000001 DEXAMETHASONE PER 0.25 MG: Performed by: INTERNAL MEDICINE

## 2021-03-27 PROCEDURE — 97530 THERAPEUTIC ACTIVITIES: CPT

## 2021-03-27 RX ORDER — NICOTINE POLACRILEX 4 MG
15 LOZENGE BUCCAL
Status: DISCONTINUED | OUTPATIENT
Start: 2021-03-27 | End: 2021-03-27 | Stop reason: SDUPTHER

## 2021-03-27 RX ORDER — DEXTROSE MONOHYDRATE 25 G/50ML
25 INJECTION, SOLUTION INTRAVENOUS
Status: DISCONTINUED | OUTPATIENT
Start: 2021-03-27 | End: 2021-04-06 | Stop reason: HOSPADM

## 2021-03-27 RX ORDER — NICOTINE POLACRILEX 4 MG
15 LOZENGE BUCCAL
Status: DISCONTINUED | OUTPATIENT
Start: 2021-03-27 | End: 2021-04-06 | Stop reason: HOSPADM

## 2021-03-27 RX ORDER — DEXTROSE MONOHYDRATE 25 G/50ML
25 INJECTION, SOLUTION INTRAVENOUS
Status: DISCONTINUED | OUTPATIENT
Start: 2021-03-27 | End: 2021-03-27 | Stop reason: SDUPTHER

## 2021-03-27 RX ADMIN — SODIUM CHLORIDE, PRESERVATIVE FREE 10 ML: 5 INJECTION INTRAVENOUS at 20:38

## 2021-03-27 RX ADMIN — SODIUM CHLORIDE, PRESERVATIVE FREE 10 ML: 5 INJECTION INTRAVENOUS at 08:57

## 2021-03-27 RX ADMIN — ENOXAPARIN SODIUM 40 MG: 40 INJECTION SUBCUTANEOUS at 15:30

## 2021-03-27 RX ADMIN — DEXAMETHASONE 6 MG: 4 TABLET ORAL at 08:57

## 2021-03-27 RX ADMIN — BARICITINIB 2 MG: 2 TABLET, FILM COATED ORAL at 20:37

## 2021-03-27 RX ADMIN — REMDESIVIR 100 MG: 100 INJECTION, POWDER, LYOPHILIZED, FOR SOLUTION INTRAVENOUS at 18:29

## 2021-03-27 RX ADMIN — ESCITALOPRAM OXALATE 10 MG: 10 TABLET ORAL at 08:57

## 2021-03-27 RX ADMIN — AMLODIPINE BESYLATE 5 MG: 5 TABLET ORAL at 08:57

## 2021-03-27 RX ADMIN — INSULIN DETEMIR 30 UNITS: 100 INJECTION, SOLUTION SUBCUTANEOUS at 20:52

## 2021-03-27 RX ADMIN — INSULIN ASPART 7 UNITS: 100 INJECTION, SOLUTION INTRAVENOUS; SUBCUTANEOUS at 17:59

## 2021-03-27 RX ADMIN — LEVOTHYROXINE SODIUM 112 MCG: 112 TABLET ORAL at 06:37

## 2021-03-27 RX ADMIN — CARVEDILOL 12.5 MG: 12.5 TABLET, FILM COATED ORAL at 17:59

## 2021-03-27 RX ADMIN — DEXAMETHASONE 6 MG: 4 TABLET ORAL at 20:38

## 2021-03-27 RX ADMIN — GUANFACINE 1 MG: 1 TABLET ORAL at 08:57

## 2021-03-27 RX ADMIN — FAMOTIDINE 40 MG: 40 TABLET, FILM COATED ORAL at 08:57

## 2021-03-27 RX ADMIN — CARVEDILOL 12.5 MG: 12.5 TABLET, FILM COATED ORAL at 08:57

## 2021-03-27 RX ADMIN — LOSARTAN POTASSIUM 100 MG: 50 TABLET, FILM COATED ORAL at 08:57

## 2021-03-27 RX ADMIN — INSULIN ASPART 7 UNITS: 100 INJECTION, SOLUTION INTRAVENOUS; SUBCUTANEOUS at 11:42

## 2021-03-28 LAB
ANION GAP SERPL CALCULATED.3IONS-SCNC: 9 MMOL/L (ref 5–15)
BASOPHILS # BLD AUTO: 0.02 10*3/MM3 (ref 0–0.2)
BASOPHILS NFR BLD AUTO: 0.2 % (ref 0–1.5)
BUN SERPL-MCNC: 39 MG/DL (ref 8–23)
BUN/CREAT SERPL: 36.1 (ref 7–25)
CALCIUM SPEC-SCNC: 8.6 MG/DL (ref 8.6–10.5)
CHLORIDE SERPL-SCNC: 103 MMOL/L (ref 98–107)
CO2 SERPL-SCNC: 26 MMOL/L (ref 22–29)
CREAT SERPL-MCNC: 1.08 MG/DL (ref 0.57–1)
DEPRECATED RDW RBC AUTO: 39.3 FL (ref 37–54)
EOSINOPHIL # BLD AUTO: 0 10*3/MM3 (ref 0–0.4)
EOSINOPHIL NFR BLD AUTO: 0 % (ref 0.3–6.2)
ERYTHROCYTE [DISTWIDTH] IN BLOOD BY AUTOMATED COUNT: 12 % (ref 12.3–15.4)
GFR SERPL CREATININE-BSD FRML MDRD: 50 ML/MIN/1.73
GLUCOSE BLDC GLUCOMTR-MCNC: 226 MG/DL (ref 70–130)
GLUCOSE BLDC GLUCOMTR-MCNC: 293 MG/DL (ref 70–130)
GLUCOSE SERPL-MCNC: 296 MG/DL (ref 65–99)
HCT VFR BLD AUTO: 39.1 % (ref 34–46.6)
HGB BLD-MCNC: 13.4 G/DL (ref 12–15.9)
IMM GRANULOCYTES # BLD AUTO: 0.25 10*3/MM3 (ref 0–0.05)
IMM GRANULOCYTES NFR BLD AUTO: 2.1 % (ref 0–0.5)
LYMPHOCYTES # BLD AUTO: 0.75 10*3/MM3 (ref 0.7–3.1)
LYMPHOCYTES NFR BLD AUTO: 6.2 % (ref 19.6–45.3)
MCH RBC QN AUTO: 30.7 PG (ref 26.6–33)
MCHC RBC AUTO-ENTMCNC: 34.3 G/DL (ref 31.5–35.7)
MCV RBC AUTO: 89.5 FL (ref 79–97)
MONOCYTES # BLD AUTO: 0.31 10*3/MM3 (ref 0.1–0.9)
MONOCYTES NFR BLD AUTO: 2.5 % (ref 5–12)
NEUTROPHILS NFR BLD AUTO: 10.85 10*3/MM3 (ref 1.7–7)
NEUTROPHILS NFR BLD AUTO: 89 % (ref 42.7–76)
NRBC BLD AUTO-RTO: 0 /100 WBC (ref 0–0.2)
PLATELET # BLD AUTO: 249 10*3/MM3 (ref 140–450)
PMV BLD AUTO: 10.3 FL (ref 6–12)
POTASSIUM SERPL-SCNC: 3.6 MMOL/L (ref 3.5–5.2)
RBC # BLD AUTO: 4.37 10*6/MM3 (ref 3.77–5.28)
SODIUM SERPL-SCNC: 138 MMOL/L (ref 136–145)
WBC # BLD AUTO: 12.18 10*3/MM3 (ref 3.4–10.8)

## 2021-03-28 PROCEDURE — 94799 UNLISTED PULMONARY SVC/PX: CPT

## 2021-03-28 PROCEDURE — 63710000001 DEXAMETHASONE PER 0.25 MG: Performed by: INTERNAL MEDICINE

## 2021-03-28 PROCEDURE — 25010000002 ENOXAPARIN PER 10 MG: Performed by: INTERNAL MEDICINE

## 2021-03-28 PROCEDURE — 63710000001 INSULIN ASPART PER 5 UNITS: Performed by: INTERNAL MEDICINE

## 2021-03-28 PROCEDURE — 82962 GLUCOSE BLOOD TEST: CPT

## 2021-03-28 PROCEDURE — 63710000001 INSULIN DETEMIR PER 5 UNITS: Performed by: INTERNAL MEDICINE

## 2021-03-28 PROCEDURE — 97110 THERAPEUTIC EXERCISES: CPT

## 2021-03-28 PROCEDURE — 85025 COMPLETE CBC W/AUTO DIFF WBC: CPT | Performed by: INTERNAL MEDICINE

## 2021-03-28 PROCEDURE — 80048 BASIC METABOLIC PNL TOTAL CA: CPT | Performed by: INTERNAL MEDICINE

## 2021-03-28 PROCEDURE — 94760 N-INVAS EAR/PLS OXIMETRY 1: CPT

## 2021-03-28 RX ADMIN — CARVEDILOL 12.5 MG: 12.5 TABLET, FILM COATED ORAL at 10:07

## 2021-03-28 RX ADMIN — CARVEDILOL 12.5 MG: 12.5 TABLET, FILM COATED ORAL at 18:15

## 2021-03-28 RX ADMIN — FAMOTIDINE 40 MG: 40 TABLET, FILM COATED ORAL at 09:32

## 2021-03-28 RX ADMIN — ENOXAPARIN SODIUM 40 MG: 40 INJECTION SUBCUTANEOUS at 16:39

## 2021-03-28 RX ADMIN — DEXAMETHASONE 6 MG: 4 TABLET ORAL at 19:43

## 2021-03-28 RX ADMIN — SODIUM CHLORIDE, PRESERVATIVE FREE 10 ML: 5 INJECTION INTRAVENOUS at 19:44

## 2021-03-28 RX ADMIN — INSULIN ASPART 6 UNITS: 100 INJECTION, SOLUTION INTRAVENOUS; SUBCUTANEOUS at 11:35

## 2021-03-28 RX ADMIN — DEXAMETHASONE 6 MG: 4 TABLET ORAL at 09:32

## 2021-03-28 RX ADMIN — BARICITINIB 2 MG: 2 TABLET, FILM COATED ORAL at 20:36

## 2021-03-28 RX ADMIN — REMDESIVIR 100 MG: 100 INJECTION, POWDER, LYOPHILIZED, FOR SOLUTION INTRAVENOUS at 16:38

## 2021-03-28 RX ADMIN — INSULIN ASPART 6 UNITS: 100 INJECTION, SOLUTION INTRAVENOUS; SUBCUTANEOUS at 16:38

## 2021-03-28 RX ADMIN — LOSARTAN POTASSIUM 100 MG: 50 TABLET, FILM COATED ORAL at 09:32

## 2021-03-28 RX ADMIN — ESCITALOPRAM OXALATE 10 MG: 10 TABLET ORAL at 09:33

## 2021-03-28 RX ADMIN — LEVOTHYROXINE SODIUM 112 MCG: 112 TABLET ORAL at 03:09

## 2021-03-28 RX ADMIN — INSULIN DETEMIR 40 UNITS: 100 INJECTION, SOLUTION SUBCUTANEOUS at 20:36

## 2021-03-28 RX ADMIN — AMLODIPINE BESYLATE 5 MG: 5 TABLET ORAL at 09:32

## 2021-03-28 RX ADMIN — GUANFACINE 1 MG: 1 TABLET ORAL at 09:32

## 2021-03-28 RX ADMIN — INSULIN ASPART 6 UNITS: 100 INJECTION, SOLUTION INTRAVENOUS; SUBCUTANEOUS at 09:31

## 2021-03-28 RX ADMIN — SODIUM CHLORIDE, PRESERVATIVE FREE 10 ML: 5 INJECTION INTRAVENOUS at 11:36

## 2021-03-29 LAB
ALBUMIN SERPL-MCNC: 2.5 G/DL (ref 3.5–5.2)
ALP SERPL-CCNC: 51 U/L (ref 39–117)
ALT SERPL W P-5'-P-CCNC: 13 U/L (ref 1–33)
AST SERPL-CCNC: 17 U/L (ref 1–32)
BILIRUB CONJ SERPL-MCNC: <0.2 MG/DL (ref 0–0.3)
BILIRUB INDIRECT SERPL-MCNC: ABNORMAL MG/DL
BILIRUB SERPL-MCNC: 0.4 MG/DL (ref 0–1.2)
CREAT SERPL-MCNC: 0.95 MG/DL (ref 0.57–1)
GFR SERPL CREATININE-BSD FRML MDRD: 58 ML/MIN/1.73
GLUCOSE BLDC GLUCOMTR-MCNC: 272 MG/DL (ref 70–130)
GLUCOSE BLDC GLUCOMTR-MCNC: 280 MG/DL (ref 70–130)
GLUCOSE BLDC GLUCOMTR-MCNC: 288 MG/DL (ref 70–130)
GLUCOSE BLDC GLUCOMTR-MCNC: 302 MG/DL (ref 70–130)
GLUCOSE BLDC GLUCOMTR-MCNC: 311 MG/DL (ref 70–130)
HOLD SPECIMEN: NORMAL
PROT SERPL-MCNC: 5.5 G/DL (ref 6–8.5)
WHOLE BLOOD HOLD SPECIMEN: NORMAL

## 2021-03-29 PROCEDURE — 94640 AIRWAY INHALATION TREATMENT: CPT

## 2021-03-29 PROCEDURE — 63710000001 DEXAMETHASONE PER 0.25 MG: Performed by: INTERNAL MEDICINE

## 2021-03-29 PROCEDURE — 97535 SELF CARE MNGMENT TRAINING: CPT

## 2021-03-29 PROCEDURE — 80076 HEPATIC FUNCTION PANEL: CPT | Performed by: INTERNAL MEDICINE

## 2021-03-29 PROCEDURE — 25010000002 ENOXAPARIN PER 10 MG: Performed by: INTERNAL MEDICINE

## 2021-03-29 PROCEDURE — 82565 ASSAY OF CREATININE: CPT | Performed by: INTERNAL MEDICINE

## 2021-03-29 PROCEDURE — 63710000001 INSULIN ASPART PER 5 UNITS: Performed by: STUDENT IN AN ORGANIZED HEALTH CARE EDUCATION/TRAINING PROGRAM

## 2021-03-29 PROCEDURE — 63710000001 INSULIN DETEMIR PER 5 UNITS: Performed by: INTERNAL MEDICINE

## 2021-03-29 PROCEDURE — 63710000001 INSULIN ASPART PER 5 UNITS: Performed by: INTERNAL MEDICINE

## 2021-03-29 PROCEDURE — 97110 THERAPEUTIC EXERCISES: CPT

## 2021-03-29 PROCEDURE — 97530 THERAPEUTIC ACTIVITIES: CPT

## 2021-03-29 PROCEDURE — 82962 GLUCOSE BLOOD TEST: CPT

## 2021-03-29 RX ORDER — BUDESONIDE AND FORMOTEROL FUMARATE DIHYDRATE 160; 4.5 UG/1; UG/1
2 AEROSOL RESPIRATORY (INHALATION)
Status: DISCONTINUED | OUTPATIENT
Start: 2021-03-29 | End: 2021-04-06 | Stop reason: HOSPADM

## 2021-03-29 RX ORDER — ALBUTEROL SULFATE 90 UG/1
2 AEROSOL, METERED RESPIRATORY (INHALATION)
Status: DISCONTINUED | OUTPATIENT
Start: 2021-03-29 | End: 2021-04-06 | Stop reason: HOSPADM

## 2021-03-29 RX ADMIN — GUANFACINE 1 MG: 1 TABLET ORAL at 08:45

## 2021-03-29 RX ADMIN — REMDESIVIR 100 MG: 100 INJECTION, POWDER, LYOPHILIZED, FOR SOLUTION INTRAVENOUS at 17:00

## 2021-03-29 RX ADMIN — ENOXAPARIN SODIUM 40 MG: 40 INJECTION SUBCUTANEOUS at 16:47

## 2021-03-29 RX ADMIN — DEXAMETHASONE 6 MG: 4 TABLET ORAL at 20:25

## 2021-03-29 RX ADMIN — SODIUM CHLORIDE, PRESERVATIVE FREE 10 ML: 5 INJECTION INTRAVENOUS at 20:25

## 2021-03-29 RX ADMIN — FAMOTIDINE 40 MG: 40 TABLET, FILM COATED ORAL at 08:45

## 2021-03-29 RX ADMIN — LOSARTAN POTASSIUM 100 MG: 50 TABLET, FILM COATED ORAL at 08:45

## 2021-03-29 RX ADMIN — CARVEDILOL 12.5 MG: 12.5 TABLET, FILM COATED ORAL at 17:00

## 2021-03-29 RX ADMIN — SODIUM CHLORIDE, PRESERVATIVE FREE 10 ML: 5 INJECTION INTRAVENOUS at 08:45

## 2021-03-29 RX ADMIN — BARICITINIB 2 MG: 2 TABLET, FILM COATED ORAL at 20:25

## 2021-03-29 RX ADMIN — AMLODIPINE BESYLATE 5 MG: 5 TABLET ORAL at 08:45

## 2021-03-29 RX ADMIN — INSULIN DETEMIR 40 UNITS: 100 INJECTION, SOLUTION SUBCUTANEOUS at 21:19

## 2021-03-29 RX ADMIN — BUDESONIDE AND FORMOTEROL FUMARATE DIHYDRATE 2 PUFF: 160; 4.5 AEROSOL RESPIRATORY (INHALATION) at 20:05

## 2021-03-29 RX ADMIN — DEXAMETHASONE 6 MG: 4 TABLET ORAL at 08:45

## 2021-03-29 RX ADMIN — ALBUTEROL SULFATE 2 PUFF: 90 AEROSOL, METERED RESPIRATORY (INHALATION) at 20:05

## 2021-03-29 RX ADMIN — CARVEDILOL 12.5 MG: 12.5 TABLET, FILM COATED ORAL at 08:45

## 2021-03-29 RX ADMIN — LEVOTHYROXINE SODIUM 112 MCG: 112 TABLET ORAL at 04:30

## 2021-03-29 RX ADMIN — INSULIN ASPART 7 UNITS: 100 INJECTION, SOLUTION INTRAVENOUS; SUBCUTANEOUS at 11:04

## 2021-03-29 RX ADMIN — ESCITALOPRAM OXALATE 10 MG: 10 TABLET ORAL at 08:45

## 2021-03-29 RX ADMIN — INSULIN ASPART 12 UNITS: 100 INJECTION, SOLUTION INTRAVENOUS; SUBCUTANEOUS at 17:00

## 2021-03-29 RX ADMIN — INSULIN ASPART 6 UNITS: 100 INJECTION, SOLUTION INTRAVENOUS; SUBCUTANEOUS at 08:45

## 2021-03-30 LAB
ALBUMIN SERPL-MCNC: 2.5 G/DL (ref 3.5–5.2)
ALBUMIN/GLOB SERPL: 1 G/DL
ALP SERPL-CCNC: 49 U/L (ref 39–117)
ALT SERPL W P-5'-P-CCNC: 12 U/L (ref 1–33)
ANION GAP SERPL CALCULATED.3IONS-SCNC: 7 MMOL/L (ref 5–15)
AST SERPL-CCNC: 13 U/L (ref 1–32)
BASOPHILS # BLD AUTO: 0.02 10*3/MM3 (ref 0–0.2)
BASOPHILS NFR BLD AUTO: 0.1 % (ref 0–1.5)
BILIRUB SERPL-MCNC: 0.5 MG/DL (ref 0–1.2)
BUN SERPL-MCNC: 40 MG/DL (ref 8–23)
BUN/CREAT SERPL: 40.8 (ref 7–25)
CALCIUM SPEC-SCNC: 8.5 MG/DL (ref 8.6–10.5)
CHLORIDE SERPL-SCNC: 104 MMOL/L (ref 98–107)
CK SERPL-CCNC: 39 U/L (ref 20–180)
CO2 SERPL-SCNC: 25 MMOL/L (ref 22–29)
CREAT SERPL-MCNC: 0.98 MG/DL (ref 0.57–1)
CRP SERPL-MCNC: 0.47 MG/DL (ref 0–0.5)
D-DIMER, QUANTITATIVE (MAD,POW, STR): 1337 NG/ML (FEU) (ref 0–470)
DEPRECATED RDW RBC AUTO: 38.3 FL (ref 37–54)
EOSINOPHIL # BLD AUTO: 0 10*3/MM3 (ref 0–0.4)
EOSINOPHIL NFR BLD AUTO: 0 % (ref 0.3–6.2)
ERYTHROCYTE [DISTWIDTH] IN BLOOD BY AUTOMATED COUNT: 11.9 % (ref 12.3–15.4)
FERRITIN SERPL-MCNC: 904.1 NG/ML (ref 13–150)
FIBRINOGEN PPP-MCNC: 411 MG/DL (ref 228–514)
GFR SERPL CREATININE-BSD FRML MDRD: 56 ML/MIN/1.73
GLOBULIN UR ELPH-MCNC: 2.5 GM/DL
GLUCOSE BLDC GLUCOMTR-MCNC: 217 MG/DL (ref 70–130)
GLUCOSE BLDC GLUCOMTR-MCNC: 237 MG/DL (ref 70–130)
GLUCOSE BLDC GLUCOMTR-MCNC: 261 MG/DL (ref 70–130)
GLUCOSE BLDC GLUCOMTR-MCNC: 300 MG/DL (ref 70–130)
GLUCOSE SERPL-MCNC: 246 MG/DL (ref 65–99)
HCT VFR BLD AUTO: 38.1 % (ref 34–46.6)
HGB BLD-MCNC: 13.2 G/DL (ref 12–15.9)
IMM GRANULOCYTES # BLD AUTO: 0.19 10*3/MM3 (ref 0–0.05)
IMM GRANULOCYTES NFR BLD AUTO: 1.4 % (ref 0–0.5)
LDH SERPL-CCNC: 322 U/L (ref 135–214)
LYMPHOCYTES # BLD AUTO: 0.55 10*3/MM3 (ref 0.7–3.1)
LYMPHOCYTES NFR BLD AUTO: 3.9 % (ref 19.6–45.3)
MCH RBC QN AUTO: 30.8 PG (ref 26.6–33)
MCHC RBC AUTO-ENTMCNC: 34.6 G/DL (ref 31.5–35.7)
MCV RBC AUTO: 88.8 FL (ref 79–97)
MONOCYTES # BLD AUTO: 0.33 10*3/MM3 (ref 0.1–0.9)
MONOCYTES NFR BLD AUTO: 2.4 % (ref 5–12)
NEUTROPHILS NFR BLD AUTO: 12.88 10*3/MM3 (ref 1.7–7)
NEUTROPHILS NFR BLD AUTO: 92.2 % (ref 42.7–76)
NRBC BLD AUTO-RTO: 0 /100 WBC (ref 0–0.2)
PLATELET # BLD AUTO: 224 10*3/MM3 (ref 140–450)
PMV BLD AUTO: 10.6 FL (ref 6–12)
POTASSIUM SERPL-SCNC: 3.7 MMOL/L (ref 3.5–5.2)
PROT SERPL-MCNC: 5 G/DL (ref 6–8.5)
RBC # BLD AUTO: 4.29 10*6/MM3 (ref 3.77–5.28)
SODIUM SERPL-SCNC: 136 MMOL/L (ref 136–145)
WBC # BLD AUTO: 13.97 10*3/MM3 (ref 3.4–10.8)

## 2021-03-30 PROCEDURE — 94799 UNLISTED PULMONARY SVC/PX: CPT

## 2021-03-30 PROCEDURE — 82962 GLUCOSE BLOOD TEST: CPT

## 2021-03-30 PROCEDURE — 85025 COMPLETE CBC W/AUTO DIFF WBC: CPT | Performed by: STUDENT IN AN ORGANIZED HEALTH CARE EDUCATION/TRAINING PROGRAM

## 2021-03-30 PROCEDURE — 94760 N-INVAS EAR/PLS OXIMETRY 1: CPT

## 2021-03-30 PROCEDURE — 86140 C-REACTIVE PROTEIN: CPT | Performed by: STUDENT IN AN ORGANIZED HEALTH CARE EDUCATION/TRAINING PROGRAM

## 2021-03-30 PROCEDURE — 97110 THERAPEUTIC EXERCISES: CPT

## 2021-03-30 PROCEDURE — 82550 ASSAY OF CK (CPK): CPT | Performed by: STUDENT IN AN ORGANIZED HEALTH CARE EDUCATION/TRAINING PROGRAM

## 2021-03-30 PROCEDURE — 80053 COMPREHEN METABOLIC PANEL: CPT | Performed by: STUDENT IN AN ORGANIZED HEALTH CARE EDUCATION/TRAINING PROGRAM

## 2021-03-30 PROCEDURE — 83615 LACTATE (LD) (LDH) ENZYME: CPT | Performed by: STUDENT IN AN ORGANIZED HEALTH CARE EDUCATION/TRAINING PROGRAM

## 2021-03-30 PROCEDURE — 85379 FIBRIN DEGRADATION QUANT: CPT | Performed by: STUDENT IN AN ORGANIZED HEALTH CARE EDUCATION/TRAINING PROGRAM

## 2021-03-30 PROCEDURE — 85384 FIBRINOGEN ACTIVITY: CPT | Performed by: STUDENT IN AN ORGANIZED HEALTH CARE EDUCATION/TRAINING PROGRAM

## 2021-03-30 PROCEDURE — 97530 THERAPEUTIC ACTIVITIES: CPT

## 2021-03-30 PROCEDURE — 63710000001 DEXAMETHASONE PER 0.25 MG: Performed by: INTERNAL MEDICINE

## 2021-03-30 PROCEDURE — 82728 ASSAY OF FERRITIN: CPT | Performed by: STUDENT IN AN ORGANIZED HEALTH CARE EDUCATION/TRAINING PROGRAM

## 2021-03-30 PROCEDURE — 63710000001 INSULIN DETEMIR PER 5 UNITS: Performed by: INTERNAL MEDICINE

## 2021-03-30 PROCEDURE — 25010000002 ENOXAPARIN PER 10 MG: Performed by: INTERNAL MEDICINE

## 2021-03-30 PROCEDURE — 63710000001 INSULIN ASPART PER 5 UNITS: Performed by: STUDENT IN AN ORGANIZED HEALTH CARE EDUCATION/TRAINING PROGRAM

## 2021-03-30 RX ADMIN — AMLODIPINE BESYLATE 5 MG: 5 TABLET ORAL at 08:26

## 2021-03-30 RX ADMIN — CARVEDILOL 12.5 MG: 12.5 TABLET, FILM COATED ORAL at 16:56

## 2021-03-30 RX ADMIN — CARVEDILOL 12.5 MG: 12.5 TABLET, FILM COATED ORAL at 08:27

## 2021-03-30 RX ADMIN — SODIUM CHLORIDE, PRESERVATIVE FREE 10 ML: 5 INJECTION INTRAVENOUS at 08:27

## 2021-03-30 RX ADMIN — GUANFACINE 1 MG: 1 TABLET ORAL at 08:27

## 2021-03-30 RX ADMIN — ALBUTEROL SULFATE 2 PUFF: 90 AEROSOL, METERED RESPIRATORY (INHALATION) at 20:21

## 2021-03-30 RX ADMIN — INSULIN ASPART 8 UNITS: 100 INJECTION, SOLUTION INTRAVENOUS; SUBCUTANEOUS at 10:39

## 2021-03-30 RX ADMIN — INSULIN DETEMIR 40 UNITS: 100 INJECTION, SOLUTION SUBCUTANEOUS at 20:36

## 2021-03-30 RX ADMIN — LOSARTAN POTASSIUM 100 MG: 50 TABLET, FILM COATED ORAL at 08:27

## 2021-03-30 RX ADMIN — ESCITALOPRAM OXALATE 10 MG: 10 TABLET ORAL at 08:26

## 2021-03-30 RX ADMIN — BUDESONIDE AND FORMOTEROL FUMARATE DIHYDRATE 2 PUFF: 160; 4.5 AEROSOL RESPIRATORY (INHALATION) at 20:21

## 2021-03-30 RX ADMIN — INSULIN ASPART 12 UNITS: 100 INJECTION, SOLUTION INTRAVENOUS; SUBCUTANEOUS at 16:56

## 2021-03-30 RX ADMIN — DEXAMETHASONE 6 MG: 4 TABLET ORAL at 20:35

## 2021-03-30 RX ADMIN — SODIUM CHLORIDE, PRESERVATIVE FREE 10 ML: 5 INJECTION INTRAVENOUS at 20:36

## 2021-03-30 RX ADMIN — FAMOTIDINE 40 MG: 40 TABLET, FILM COATED ORAL at 08:27

## 2021-03-30 RX ADMIN — BUDESONIDE AND FORMOTEROL FUMARATE DIHYDRATE 2 PUFF: 160; 4.5 AEROSOL RESPIRATORY (INHALATION) at 08:03

## 2021-03-30 RX ADMIN — ENOXAPARIN SODIUM 40 MG: 40 INJECTION SUBCUTANEOUS at 15:33

## 2021-03-30 RX ADMIN — INSULIN ASPART 8 UNITS: 100 INJECTION, SOLUTION INTRAVENOUS; SUBCUTANEOUS at 08:27

## 2021-03-30 RX ADMIN — LEVOTHYROXINE SODIUM 112 MCG: 112 TABLET ORAL at 06:59

## 2021-03-30 RX ADMIN — DEXAMETHASONE 6 MG: 4 TABLET ORAL at 08:27

## 2021-03-30 RX ADMIN — ALBUTEROL SULFATE 2 PUFF: 90 AEROSOL, METERED RESPIRATORY (INHALATION) at 08:03

## 2021-03-30 RX ADMIN — REMDESIVIR 100 MG: 100 INJECTION, POWDER, LYOPHILIZED, FOR SOLUTION INTRAVENOUS at 16:56

## 2021-03-30 RX ADMIN — BARICITINIB 2 MG: 2 TABLET, FILM COATED ORAL at 20:36

## 2021-03-30 RX ADMIN — ALBUTEROL SULFATE 2 PUFF: 90 AEROSOL, METERED RESPIRATORY (INHALATION) at 11:53

## 2021-03-31 LAB
ALBUMIN SERPL-MCNC: 2.6 G/DL (ref 3.5–5.2)
ALBUMIN/GLOB SERPL: 1 G/DL
ALP SERPL-CCNC: 51 U/L (ref 39–117)
ALT SERPL W P-5'-P-CCNC: 14 U/L (ref 1–33)
ANION GAP SERPL CALCULATED.3IONS-SCNC: 6 MMOL/L (ref 5–15)
AST SERPL-CCNC: 12 U/L (ref 1–32)
BASOPHILS # BLD AUTO: 0.01 10*3/MM3 (ref 0–0.2)
BASOPHILS NFR BLD AUTO: 0.1 % (ref 0–1.5)
BILIRUB SERPL-MCNC: 0.5 MG/DL (ref 0–1.2)
BUN SERPL-MCNC: 42 MG/DL (ref 8–23)
BUN/CREAT SERPL: 38.9 (ref 7–25)
CALCIUM SPEC-SCNC: 8.6 MG/DL (ref 8.6–10.5)
CHLORIDE SERPL-SCNC: 106 MMOL/L (ref 98–107)
CK SERPL-CCNC: 43 U/L (ref 20–180)
CO2 SERPL-SCNC: 28 MMOL/L (ref 22–29)
CREAT SERPL-MCNC: 1.08 MG/DL (ref 0.57–1)
CRP SERPL-MCNC: 0.36 MG/DL (ref 0–0.5)
DEPRECATED RDW RBC AUTO: 38.8 FL (ref 37–54)
EOSINOPHIL # BLD AUTO: 0 10*3/MM3 (ref 0–0.4)
EOSINOPHIL NFR BLD AUTO: 0 % (ref 0.3–6.2)
ERYTHROCYTE [DISTWIDTH] IN BLOOD BY AUTOMATED COUNT: 11.9 % (ref 12.3–15.4)
FERRITIN SERPL-MCNC: 987.6 NG/ML (ref 13–150)
GFR SERPL CREATININE-BSD FRML MDRD: 50 ML/MIN/1.73
GLOBULIN UR ELPH-MCNC: 2.5 GM/DL
GLUCOSE BLDC GLUCOMTR-MCNC: 188 MG/DL (ref 70–130)
GLUCOSE BLDC GLUCOMTR-MCNC: 222 MG/DL (ref 70–130)
GLUCOSE BLDC GLUCOMTR-MCNC: 235 MG/DL (ref 70–130)
GLUCOSE BLDC GLUCOMTR-MCNC: 300 MG/DL (ref 70–130)
GLUCOSE SERPL-MCNC: 223 MG/DL (ref 65–99)
HCT VFR BLD AUTO: 39.6 % (ref 34–46.6)
HGB BLD-MCNC: 13.5 G/DL (ref 12–15.9)
IMM GRANULOCYTES # BLD AUTO: 0.11 10*3/MM3 (ref 0–0.05)
IMM GRANULOCYTES NFR BLD AUTO: 0.9 % (ref 0–0.5)
LYMPHOCYTES # BLD AUTO: 0.46 10*3/MM3 (ref 0.7–3.1)
LYMPHOCYTES NFR BLD AUTO: 3.6 % (ref 19.6–45.3)
MCH RBC QN AUTO: 30.8 PG (ref 26.6–33)
MCHC RBC AUTO-ENTMCNC: 34.1 G/DL (ref 31.5–35.7)
MCV RBC AUTO: 90.2 FL (ref 79–97)
MONOCYTES # BLD AUTO: 0.29 10*3/MM3 (ref 0.1–0.9)
MONOCYTES NFR BLD AUTO: 2.3 % (ref 5–12)
NEUTROPHILS NFR BLD AUTO: 11.99 10*3/MM3 (ref 1.7–7)
NEUTROPHILS NFR BLD AUTO: 93.1 % (ref 42.7–76)
NRBC BLD AUTO-RTO: 0 /100 WBC (ref 0–0.2)
PLATELET # BLD AUTO: 206 10*3/MM3 (ref 140–450)
PMV BLD AUTO: 10.5 FL (ref 6–12)
POTASSIUM SERPL-SCNC: 4.9 MMOL/L (ref 3.5–5.2)
PROT SERPL-MCNC: 5.1 G/DL (ref 6–8.5)
RBC # BLD AUTO: 4.39 10*6/MM3 (ref 3.77–5.28)
SODIUM SERPL-SCNC: 140 MMOL/L (ref 136–145)
WBC # BLD AUTO: 12.86 10*3/MM3 (ref 3.4–10.8)

## 2021-03-31 PROCEDURE — 63710000001 DEXAMETHASONE PER 0.25 MG: Performed by: INTERNAL MEDICINE

## 2021-03-31 PROCEDURE — 97110 THERAPEUTIC EXERCISES: CPT

## 2021-03-31 PROCEDURE — 82550 ASSAY OF CK (CPK): CPT | Performed by: STUDENT IN AN ORGANIZED HEALTH CARE EDUCATION/TRAINING PROGRAM

## 2021-03-31 PROCEDURE — 94760 N-INVAS EAR/PLS OXIMETRY 1: CPT

## 2021-03-31 PROCEDURE — 97535 SELF CARE MNGMENT TRAINING: CPT

## 2021-03-31 PROCEDURE — 94799 UNLISTED PULMONARY SVC/PX: CPT

## 2021-03-31 PROCEDURE — 99222 1ST HOSP IP/OBS MODERATE 55: CPT | Performed by: PSYCHIATRY & NEUROLOGY

## 2021-03-31 PROCEDURE — 25010000002 ENOXAPARIN PER 10 MG: Performed by: INTERNAL MEDICINE

## 2021-03-31 PROCEDURE — 97530 THERAPEUTIC ACTIVITIES: CPT

## 2021-03-31 PROCEDURE — 82728 ASSAY OF FERRITIN: CPT | Performed by: STUDENT IN AN ORGANIZED HEALTH CARE EDUCATION/TRAINING PROGRAM

## 2021-03-31 PROCEDURE — 63710000001 INSULIN DETEMIR PER 5 UNITS: Performed by: INTERNAL MEDICINE

## 2021-03-31 PROCEDURE — 63710000001 INSULIN ASPART PER 5 UNITS: Performed by: STUDENT IN AN ORGANIZED HEALTH CARE EDUCATION/TRAINING PROGRAM

## 2021-03-31 PROCEDURE — 80053 COMPREHEN METABOLIC PANEL: CPT | Performed by: STUDENT IN AN ORGANIZED HEALTH CARE EDUCATION/TRAINING PROGRAM

## 2021-03-31 PROCEDURE — 86140 C-REACTIVE PROTEIN: CPT | Performed by: STUDENT IN AN ORGANIZED HEALTH CARE EDUCATION/TRAINING PROGRAM

## 2021-03-31 PROCEDURE — 82962 GLUCOSE BLOOD TEST: CPT

## 2021-03-31 PROCEDURE — 85025 COMPLETE CBC W/AUTO DIFF WBC: CPT | Performed by: STUDENT IN AN ORGANIZED HEALTH CARE EDUCATION/TRAINING PROGRAM

## 2021-03-31 PROCEDURE — 94664 DEMO&/EVAL PT USE INHALER: CPT

## 2021-03-31 RX ORDER — ARIPIPRAZOLE 2 MG/1
2 TABLET ORAL DAILY
Status: DISCONTINUED | OUTPATIENT
Start: 2021-04-01 | End: 2021-04-06 | Stop reason: HOSPADM

## 2021-03-31 RX ADMIN — INSULIN DETEMIR 40 UNITS: 100 INJECTION, SOLUTION SUBCUTANEOUS at 21:05

## 2021-03-31 RX ADMIN — SODIUM CHLORIDE, PRESERVATIVE FREE 10 ML: 5 INJECTION INTRAVENOUS at 21:02

## 2021-03-31 RX ADMIN — INSULIN ASPART 8 UNITS: 100 INJECTION, SOLUTION INTRAVENOUS; SUBCUTANEOUS at 11:20

## 2021-03-31 RX ADMIN — INSULIN ASPART 8 UNITS: 100 INJECTION, SOLUTION INTRAVENOUS; SUBCUTANEOUS at 17:30

## 2021-03-31 RX ADMIN — DEXAMETHASONE 6 MG: 4 TABLET ORAL at 21:02

## 2021-03-31 RX ADMIN — LOSARTAN POTASSIUM 100 MG: 50 TABLET, FILM COATED ORAL at 08:21

## 2021-03-31 RX ADMIN — CARVEDILOL 12.5 MG: 12.5 TABLET, FILM COATED ORAL at 08:21

## 2021-03-31 RX ADMIN — REMDESIVIR 100 MG: 100 INJECTION, POWDER, LYOPHILIZED, FOR SOLUTION INTRAVENOUS at 17:30

## 2021-03-31 RX ADMIN — DEXAMETHASONE 6 MG: 4 TABLET ORAL at 08:21

## 2021-03-31 RX ADMIN — ESCITALOPRAM OXALATE 10 MG: 10 TABLET ORAL at 08:21

## 2021-03-31 RX ADMIN — AMLODIPINE BESYLATE 5 MG: 5 TABLET ORAL at 08:21

## 2021-03-31 RX ADMIN — LEVOTHYROXINE SODIUM 112 MCG: 112 TABLET ORAL at 06:24

## 2021-03-31 RX ADMIN — ENOXAPARIN SODIUM 40 MG: 40 INJECTION SUBCUTANEOUS at 15:16

## 2021-03-31 RX ADMIN — BUDESONIDE AND FORMOTEROL FUMARATE DIHYDRATE 2 PUFF: 160; 4.5 AEROSOL RESPIRATORY (INHALATION) at 20:23

## 2021-03-31 RX ADMIN — GUANFACINE 1 MG: 1 TABLET ORAL at 08:21

## 2021-03-31 RX ADMIN — SODIUM CHLORIDE, PRESERVATIVE FREE 10 ML: 5 INJECTION INTRAVENOUS at 08:21

## 2021-03-31 RX ADMIN — ALBUTEROL SULFATE 2 PUFF: 90 AEROSOL, METERED RESPIRATORY (INHALATION) at 20:23

## 2021-03-31 RX ADMIN — INSULIN ASPART 4 UNITS: 100 INJECTION, SOLUTION INTRAVENOUS; SUBCUTANEOUS at 07:51

## 2021-03-31 RX ADMIN — FAMOTIDINE 40 MG: 40 TABLET, FILM COATED ORAL at 08:21

## 2021-03-31 RX ADMIN — ALBUTEROL SULFATE 2 PUFF: 90 AEROSOL, METERED RESPIRATORY (INHALATION) at 07:00

## 2021-03-31 RX ADMIN — ALBUTEROL SULFATE 2 PUFF: 90 AEROSOL, METERED RESPIRATORY (INHALATION) at 11:54

## 2021-03-31 RX ADMIN — BUDESONIDE AND FORMOTEROL FUMARATE DIHYDRATE 2 PUFF: 160; 4.5 AEROSOL RESPIRATORY (INHALATION) at 07:00

## 2021-03-31 RX ADMIN — BARICITINIB 2 MG: 2 TABLET, FILM COATED ORAL at 21:03

## 2021-03-31 RX ADMIN — CARVEDILOL 12.5 MG: 12.5 TABLET, FILM COATED ORAL at 17:30

## 2021-04-01 LAB
ALBUMIN SERPL-MCNC: 2.5 G/DL (ref 3.5–5.2)
ALBUMIN/GLOB SERPL: 1 G/DL
ALP SERPL-CCNC: 48 U/L (ref 39–117)
ALT SERPL W P-5'-P-CCNC: 13 U/L (ref 1–33)
ANION GAP SERPL CALCULATED.3IONS-SCNC: 8 MMOL/L (ref 5–15)
AST SERPL-CCNC: 12 U/L (ref 1–32)
BASOPHILS # BLD AUTO: 0.02 10*3/MM3 (ref 0–0.2)
BASOPHILS NFR BLD AUTO: 0.1 % (ref 0–1.5)
BILIRUB SERPL-MCNC: 0.5 MG/DL (ref 0–1.2)
BUN SERPL-MCNC: 42 MG/DL (ref 8–23)
BUN/CREAT SERPL: 43.3 (ref 7–25)
CALCIUM SPEC-SCNC: 8.4 MG/DL (ref 8.6–10.5)
CHLORIDE SERPL-SCNC: 103 MMOL/L (ref 98–107)
CK SERPL-CCNC: 38 U/L (ref 20–180)
CO2 SERPL-SCNC: 25 MMOL/L (ref 22–29)
CREAT SERPL-MCNC: 0.97 MG/DL (ref 0.57–1)
CRP SERPL-MCNC: <0.3 MG/DL (ref 0–0.5)
D-DIMER, QUANTITATIVE (MAD,POW, STR): 1254 NG/ML (FEU) (ref 0–470)
DEPRECATED RDW RBC AUTO: 38.4 FL (ref 37–54)
EOSINOPHIL # BLD AUTO: 0 10*3/MM3 (ref 0–0.4)
EOSINOPHIL NFR BLD AUTO: 0 % (ref 0.3–6.2)
ERYTHROCYTE [DISTWIDTH] IN BLOOD BY AUTOMATED COUNT: 12 % (ref 12.3–15.4)
FERRITIN SERPL-MCNC: 1039 NG/ML (ref 13–150)
FIBRINOGEN PPP-MCNC: 321 MG/DL (ref 228–514)
GFR SERPL CREATININE-BSD FRML MDRD: 57 ML/MIN/1.73
GLOBULIN UR ELPH-MCNC: 2.5 GM/DL
GLUCOSE BLDC GLUCOMTR-MCNC: 191 MG/DL (ref 70–130)
GLUCOSE BLDC GLUCOMTR-MCNC: 232 MG/DL (ref 70–130)
GLUCOSE BLDC GLUCOMTR-MCNC: 248 MG/DL (ref 70–130)
GLUCOSE SERPL-MCNC: 234 MG/DL (ref 65–99)
HCT VFR BLD AUTO: 38.2 % (ref 34–46.6)
HGB BLD-MCNC: 13.3 G/DL (ref 12–15.9)
HOLD SPECIMEN: NORMAL
IMM GRANULOCYTES # BLD AUTO: 0.14 10*3/MM3 (ref 0–0.05)
IMM GRANULOCYTES NFR BLD AUTO: 1 % (ref 0–0.5)
LDH SERPL-CCNC: 322 U/L (ref 135–214)
LYMPHOCYTES # BLD AUTO: 0.47 10*3/MM3 (ref 0.7–3.1)
LYMPHOCYTES NFR BLD AUTO: 3.2 % (ref 19.6–45.3)
MCH RBC QN AUTO: 30.9 PG (ref 26.6–33)
MCHC RBC AUTO-ENTMCNC: 34.8 G/DL (ref 31.5–35.7)
MCV RBC AUTO: 88.8 FL (ref 79–97)
MONOCYTES # BLD AUTO: 0.31 10*3/MM3 (ref 0.1–0.9)
MONOCYTES NFR BLD AUTO: 2.1 % (ref 5–12)
NEUTROPHILS NFR BLD AUTO: 13.68 10*3/MM3 (ref 1.7–7)
NEUTROPHILS NFR BLD AUTO: 93.6 % (ref 42.7–76)
NRBC BLD AUTO-RTO: 0 /100 WBC (ref 0–0.2)
PLATELET # BLD AUTO: 172 10*3/MM3 (ref 140–450)
PMV BLD AUTO: 10.7 FL (ref 6–12)
POTASSIUM SERPL-SCNC: 4.1 MMOL/L (ref 3.5–5.2)
PROT SERPL-MCNC: 5 G/DL (ref 6–8.5)
RBC # BLD AUTO: 4.3 10*6/MM3 (ref 3.77–5.28)
SODIUM SERPL-SCNC: 136 MMOL/L (ref 136–145)
WBC # BLD AUTO: 14.62 10*3/MM3 (ref 3.4–10.8)

## 2021-04-01 PROCEDURE — 97530 THERAPEUTIC ACTIVITIES: CPT

## 2021-04-01 PROCEDURE — 25010000002 ENOXAPARIN PER 10 MG: Performed by: INTERNAL MEDICINE

## 2021-04-01 PROCEDURE — 82962 GLUCOSE BLOOD TEST: CPT

## 2021-04-01 PROCEDURE — 94799 UNLISTED PULMONARY SVC/PX: CPT

## 2021-04-01 PROCEDURE — 85379 FIBRIN DEGRADATION QUANT: CPT | Performed by: STUDENT IN AN ORGANIZED HEALTH CARE EDUCATION/TRAINING PROGRAM

## 2021-04-01 PROCEDURE — 83615 LACTATE (LD) (LDH) ENZYME: CPT | Performed by: STUDENT IN AN ORGANIZED HEALTH CARE EDUCATION/TRAINING PROGRAM

## 2021-04-01 PROCEDURE — 82550 ASSAY OF CK (CPK): CPT | Performed by: STUDENT IN AN ORGANIZED HEALTH CARE EDUCATION/TRAINING PROGRAM

## 2021-04-01 PROCEDURE — 80053 COMPREHEN METABOLIC PANEL: CPT | Performed by: STUDENT IN AN ORGANIZED HEALTH CARE EDUCATION/TRAINING PROGRAM

## 2021-04-01 PROCEDURE — 63710000001 INSULIN DETEMIR PER 5 UNITS: Performed by: INTERNAL MEDICINE

## 2021-04-01 PROCEDURE — 85025 COMPLETE CBC W/AUTO DIFF WBC: CPT | Performed by: STUDENT IN AN ORGANIZED HEALTH CARE EDUCATION/TRAINING PROGRAM

## 2021-04-01 PROCEDURE — 86140 C-REACTIVE PROTEIN: CPT | Performed by: STUDENT IN AN ORGANIZED HEALTH CARE EDUCATION/TRAINING PROGRAM

## 2021-04-01 PROCEDURE — 82728 ASSAY OF FERRITIN: CPT | Performed by: STUDENT IN AN ORGANIZED HEALTH CARE EDUCATION/TRAINING PROGRAM

## 2021-04-01 PROCEDURE — 85384 FIBRINOGEN ACTIVITY: CPT | Performed by: STUDENT IN AN ORGANIZED HEALTH CARE EDUCATION/TRAINING PROGRAM

## 2021-04-01 PROCEDURE — 63710000001 INSULIN ASPART PER 5 UNITS: Performed by: STUDENT IN AN ORGANIZED HEALTH CARE EDUCATION/TRAINING PROGRAM

## 2021-04-01 PROCEDURE — 97110 THERAPEUTIC EXERCISES: CPT

## 2021-04-01 PROCEDURE — 63710000001 DEXAMETHASONE PER 0.25 MG: Performed by: INTERNAL MEDICINE

## 2021-04-01 PROCEDURE — 97535 SELF CARE MNGMENT TRAINING: CPT

## 2021-04-01 RX ADMIN — INSULIN ASPART 8 UNITS: 100 INJECTION, SOLUTION INTRAVENOUS; SUBCUTANEOUS at 17:19

## 2021-04-01 RX ADMIN — CARVEDILOL 12.5 MG: 12.5 TABLET, FILM COATED ORAL at 08:43

## 2021-04-01 RX ADMIN — BARICITINIB 2 MG: 2 TABLET, FILM COATED ORAL at 21:08

## 2021-04-01 RX ADMIN — AMLODIPINE BESYLATE 5 MG: 5 TABLET ORAL at 08:42

## 2021-04-01 RX ADMIN — INSULIN DETEMIR 40 UNITS: 100 INJECTION, SOLUTION SUBCUTANEOUS at 21:08

## 2021-04-01 RX ADMIN — CARVEDILOL 12.5 MG: 12.5 TABLET, FILM COATED ORAL at 17:19

## 2021-04-01 RX ADMIN — ALBUTEROL SULFATE 2 PUFF: 90 AEROSOL, METERED RESPIRATORY (INHALATION) at 15:10

## 2021-04-01 RX ADMIN — GUANFACINE 1 MG: 1 TABLET ORAL at 08:42

## 2021-04-01 RX ADMIN — BUDESONIDE AND FORMOTEROL FUMARATE DIHYDRATE 2 PUFF: 160; 4.5 AEROSOL RESPIRATORY (INHALATION) at 20:06

## 2021-04-01 RX ADMIN — FAMOTIDINE 40 MG: 40 TABLET, FILM COATED ORAL at 08:42

## 2021-04-01 RX ADMIN — DEXAMETHASONE 6 MG: 4 TABLET ORAL at 08:42

## 2021-04-01 RX ADMIN — ALBUTEROL SULFATE 2 PUFF: 90 AEROSOL, METERED RESPIRATORY (INHALATION) at 12:10

## 2021-04-01 RX ADMIN — INSULIN ASPART 4 UNITS: 100 INJECTION, SOLUTION INTRAVENOUS; SUBCUTANEOUS at 08:42

## 2021-04-01 RX ADMIN — LOSARTAN POTASSIUM 100 MG: 50 TABLET, FILM COATED ORAL at 08:42

## 2021-04-01 RX ADMIN — SODIUM CHLORIDE, PRESERVATIVE FREE 10 ML: 5 INJECTION INTRAVENOUS at 20:29

## 2021-04-01 RX ADMIN — ALBUTEROL SULFATE 2 PUFF: 90 AEROSOL, METERED RESPIRATORY (INHALATION) at 08:11

## 2021-04-01 RX ADMIN — SODIUM CHLORIDE, PRESERVATIVE FREE 10 ML: 5 INJECTION INTRAVENOUS at 08:44

## 2021-04-01 RX ADMIN — BUDESONIDE AND FORMOTEROL FUMARATE DIHYDRATE 2 PUFF: 160; 4.5 AEROSOL RESPIRATORY (INHALATION) at 08:12

## 2021-04-01 RX ADMIN — ALBUTEROL SULFATE 2 PUFF: 90 AEROSOL, METERED RESPIRATORY (INHALATION) at 20:06

## 2021-04-01 RX ADMIN — INSULIN ASPART 8 UNITS: 100 INJECTION, SOLUTION INTRAVENOUS; SUBCUTANEOUS at 11:39

## 2021-04-01 RX ADMIN — ESCITALOPRAM OXALATE 10 MG: 10 TABLET ORAL at 08:42

## 2021-04-01 RX ADMIN — ARIPIPRAZOLE 2 MG: 2 TABLET ORAL at 08:42

## 2021-04-01 RX ADMIN — LEVOTHYROXINE SODIUM 112 MCG: 112 TABLET ORAL at 05:40

## 2021-04-01 RX ADMIN — ENOXAPARIN SODIUM 40 MG: 40 INJECTION SUBCUTANEOUS at 14:55

## 2021-04-01 NOTE — PROGRESS NOTES
[facility] Medicine Services  INPATIENT PROGRESS NOTE    Length of Stay: 9  Date of Admission: 3/22/2021  Primary Care Physician: Bharati Love MD    Subjective   Chief Complaint: shortness of breath   HPI:  71 year old admitted for acute hypoxic respiratory failure secondary to COVID.    No new complaints.  No acute overnight events.   Oxygen requirements have decreased to 8L    Review of Systems   Constitutional: Positive for fatigue. Negative for chills, diaphoresis and fever.   HENT: Negative for sneezing and sore throat.    Eyes: Negative for pain and discharge.   Respiratory: Positive for cough and shortness of breath.    Gastrointestinal: Negative for constipation, diarrhea, nausea and vomiting.   Endocrine: Negative for cold intolerance and heat intolerance.   Genitourinary: Negative for difficulty urinating, dysuria, frequency and urgency.   Musculoskeletal: Negative for arthralgias and myalgias.   Skin: Negative for color change and pallor.   Allergic/Immunologic: Negative for environmental allergies and food allergies.   Neurological: Positive for weakness. Negative for dizziness and syncope.   Psychiatric/Behavioral: Negative for confusion and sleep disturbance.      All pertinent negatives and positives are as above. All other systems have been reviewed and are negative unless otherwise stated.     Objective    Temp:  [96 °F (35.6 °C)-97.4 °F (36.3 °C)] 97.4 °F (36.3 °C)  Heart Rate:  [61-96] 70  Resp:  [18-20] 20  BP: (137-151)/(66-80) 145/67    Physical Exam  Vitals reviewed.   Constitutional:       General: She is not in acute distress.     Appearance: She is well-developed.   HENT:      Head: Normocephalic and atraumatic.      Nose: Nose normal.   Eyes:      Conjunctiva/sclera: Conjunctivae normal.   Cardiovascular:      Rate and Rhythm: Normal rate and regular rhythm.   Pulmonary:      Effort: Pulmonary effort is normal. No respiratory distress.       Breath sounds: Decreased breath sounds present. No wheezing or rales.   Musculoskeletal:         General: Normal range of motion.      Cervical back: Normal range of motion and neck supple.   Skin:     General: Skin is warm and dry.   Neurological:      Mental Status: She is alert and oriented to person, place, and time.   Psychiatric:         Behavior: Behavior normal.             Results Review:  I have reviewed the labs, radiology results, and diagnostic studies.    Laboratory Data:   Results from last 7 days   Lab Units 04/01/21  0635 03/31/21  0622 03/30/21  0652   SODIUM mmol/L 136 140 136   POTASSIUM mmol/L 4.1 4.9 3.7   CHLORIDE mmol/L 103 106 104   CO2 mmol/L 25.0 28.0 25.0   BUN mg/dL 42* 42* 40*   CREATININE mg/dL 0.97 1.08* 0.98   GLUCOSE mg/dL 234* 223* 246*   CALCIUM mg/dL 8.4* 8.6 8.5*   BILIRUBIN mg/dL 0.5 0.5 0.5   ALK PHOS U/L 48 51 49   ALT (SGPT) U/L 13 14 12   AST (SGOT) U/L 12 12 13   ANION GAP mmol/L 8.0 6.0 7.0     Estimated Creatinine Clearance: 59.1 mL/min (by C-G formula based on SCr of 0.97 mg/dL).          Results from last 7 days   Lab Units 04/01/21  0635 03/31/21  0622 03/30/21  0652 03/28/21  0715 03/26/21  0630   WBC 10*3/mm3 14.62* 12.86* 13.97* 12.18* 12.33*   HEMOGLOBIN g/dL 13.3 13.5 13.2 13.4 13.1   HEMATOCRIT % 38.2 39.6 38.1 39.1 38.7   PLATELETS 10*3/mm3 172 206 224 249 222           Culture Data:   No results found for: BLOODCX  No results found for: URINECX  No results found for: RESPCX  No results found for: WOUNDCX  No results found for: STOOLCX  No components found for: BODYFLD    Radiology Data:   Imaging Results (Last 24 Hours)     ** No results found for the last 24 hours. **          I have reviewed the patient current medications.     Assessment/Plan     Active Hospital Problems    Diagnosis    • **Pneumonia due to COVID-19 virus    • Acute respiratory failure with hypoxia (CMS/HCC)    • CKD (chronic kidney disease) stage 3, GFR 30-59 ml/min (CMS/HCC)    •  Depression    • Diabetes mellitus (CMS/HCC)    • Essential hypertension    • Generalized anxiety disorder        Plan:      1. Acute Respiratory Failure with hypoxia, secondary to COVID 19  · Continue supplemental oxygen, now on 8L, wean as tolerated   · Remdesivir Day 10  · Decadron 6mg BID Day 10, wean to 6 daily   · Baricitinib Day 7  · Bronchodilators   · Psych consult for depression - augment w/ Abilify     2. CKD  · Creatinine stable    3. DM   · Levemir 40 units  · Increase SSI to high dose while on steroids     4. Hypertension   · Coreg  · Norvasc  · Losartan    DVT ppx: Lovenox  GI ppx: Pepcid     Updated daughter.         Discharge Planning: will be here several more days         This document has been electronically signed by Dalila Serrano MD on April 1, 2021 10:35 CDT

## 2021-04-01 NOTE — THERAPY TREATMENT NOTE
Patient Name: Emily Haas  : 1950    MRN: [id number]                              Today's Date: 2021     PT Treatment  Admit Date: 3/22/2021    Visit Dx:     ICD-10-CM ICD-9-CM   1. Pneumonia due to COVID-19 virus  U07.1 480.8    J12.82 079.89   2. Fever, unspecified fever cause  R50.9 780.60   3. Dyspnea, unspecified type  R06.00 786.09   4. Impaired mobility and ADLs  Z74.09 V49.89    Z78.9    5. Impaired functional mobility, balance, gait, and endurance  Z74.09 V49.89     Patient Active Problem List   Diagnosis   • Vitamin D deficiency   • Irritable bowel syndrome   • Insomnia   • Hyperlipidemia   • Generalized anxiety disorder   • Essential hypertension   • Diverticular disease of colon   • Diabetes mellitus type 2, diet-controlled (CMS/HCC)   • Anxiety state   • Acquired hypothyroidism   • Asthma   • Periumbilical abdominal pain   • Telangiectasia of limb   • Varicose veins of both lower extremities with pain   • Diabetes mellitus (CMS/HCC)   • Posterior subcapsular polar age-related cataract   • Encounter for screening for malignant neoplasm of colon   • Diverticulosis of large intestine without hemorrhage   • Irritable bowel syndrome with both constipation and diarrhea   • Vaginosis   • Fatty liver   • Diabetes mellitus type 2 without retinopathy (CMS/HCC)   • Depression   • Pure hypercholesterolemia   • Class 1 obesity due to excess calories with serious comorbidity and body mass index (BMI) of 33.0 to 33.9 in adult   • Right lower quadrant abdominal pain   • Left lower quadrant abdominal pain   • Diarrhea   • Nausea   • Bloating   • Pneumonia due to COVID-19 virus   • CKD (chronic kidney disease) stage 3, GFR 30-59 ml/min (CMS/HCC)   • Acute respiratory failure with hypoxia (CMS/HCC)     Past Medical History:   Diagnosis Date   • Anxiety    • Chronic bronchitis (CMS/HCC)    • CKD (chronic kidney disease) stage 3, GFR 30-59 ml/min (CMS/HCC)    • Diabetes mellitus (CMS/HCC)    • Diverticulitis     • Hyperlipidemia    • Hypertension    • Hypothyroidism    • Irritable bowel syndrome    • Obesity    • Osteoarthritis    • Vitamin D deficiency      Past Surgical History:   Procedure Laterality Date   • BILATERAL BREAST REDUCTION     • CHOLECYSTECTOMY     • COLONOSCOPY N/A 11/17/2017   • COLONOSCOPY N/A 1/14/2021   • ENDOSCOPY N/A 1/14/2021   • HYSTERECTOMY       General Information     Row Name 04/01/21 1630          Physical Therapy Time and Intention    Document Type  therapy note (daily note)  -GB     Mode of Treatment  physical therapy;individual therapy  -     Row Name 04/01/21 1630          General Information    Patient Profile Reviewed  yes  -GB     Existing Precautions/Restrictions  fall;oxygen therapy device and L/min  -GB       User Key  (r) = Recorded By, (t) = Taken By, (c) = Cosigned By    Initials Name Provider Type    Florecita Todd, PT Physical Therapist        Mobility     Row Name 04/01/21 1638          Bed Mobility    Bed Mobility  bed mobility (all) activities;rolling left;rolling right  -GB     Rolling Left [place] (Bed Mobility)  modified independence  -GB     Rolling Right [place] (Bed Mobility)  modified independence  -GB     Supine-Sit [place] (Bed Mobility)  modified independence  -     Assistive Device (Bed Mobility)  bed rails;head of bed elevated  -GB     Comment (Bed Mobility)  taught pt how to do sidelying for lung drainiage;each time she had drop in sats, 88% w/ turn R, 93% w; turn L; recovery each side less than 2 min w/out distress and w/ mild cough; instruced her to use this post Upright in chair or bed ;  -GB     Row Name 04/01/21 1638          Bed-Chair Transfer    Bed-Chair [place] (Transfers)  standby assist;1 person assist  -     Assistive Device (Bed-Chair Transfers)  walker, front-wheeled  -GB     Row Name 04/01/21 1638          Sit-Stand Transfer    Sit-Stand [place] (Transfers)  modified independence;supervision  -      Assistive Device (Sit-Stand Transfers)  walker, front-wheeled  -GB     Row Name 04/01/21 1638          Gait/Stairs (Locomotion)    [place] Level (Gait)  modified independence;contact guard;supervision  -     Assistive Device (Gait)  walker, front-wheeled  -     Distance in Feet (Gait)  3 ft fwd, 3 ft back w/ drop in sats to upper/mid 80s; returned to sidelying R for recovery breathing in side lying w/ 2 min recovey  -       User Key  (r) = Recorded By, (t) = Taken By, (c) = Cosigned By    Initials Name Provider Type    Florecita Todd PT Physical Therapist        Obj/Interventions     Row Name 04/01/21 1649          Motor Skills    Therapeutic Exercise  ankle;knee  -     Row Name 04/01/21 1649          Knee (Therapeutic Exercise)    Knee AROM (Therapeutic Exercise)  bilateral;LAQ (long arc quad);10 repetitions  -     Row Name 04/01/21 1649          Ankle (Therapeutic Exercise)    Ankle AROM (Therapeutic Exercise)  bilateral;dorsiflexion;plantarflexion;10 repetitions;3 sets  -     Row Name 04/01/21 1649          Balance    Balance Assessment  sitting static balance;sitting dynamic balance  -     Static Sitting Balance  WFL  -     Dynamic Sitting Balance  WFL  -GB       User Key  (r) = Recorded By, (t) = Taken By, (c) = Cosigned By    Initials Name Provider Type    Florecita Todd PT Physical Therapist        Goals/Plan     Row Name 04/01/21 1652          Bed Mobility Goal 1 (PT)    Activity/Assistive Device (Bed Mobility Goal 1, PT)  sit to supine/supine to sit  -GB     [place] Level/Cues Needed (Bed Mobility Goal 1, PT)  independent  -GB     Time Frame (Bed Mobility Goal 1, PT)  by discharge  -GB     Strategies/Barriers (Bed Mobility Goal 1, PT)  HOB flat, no bed rails.  -GB     Progress/Outcomes (Bed Mobility Goal 1, PT)  goal partially met;good progress toward goal;continuing progress toward goal  -GB     Row Name 04/01/21 1652          Transfer Goal 1 (PT)     Activity/Assistive Device (Transfer Goal 1, PT)  sit-to-stand/stand-to-sit;bed-to-chair/chair-to-bed  -GB     [place] Level/Cues Needed (Transfer Goal 1, PT)  independent  -GB     Time Frame (Transfer Goal 1, PT)  by discharge  -GB     Strategies/Barriers (Transfers Goal 1, PT)  Maintain SpO2 >90%.  -GB     Progress/Outcome (Transfer Goal 1, PT)  goal not met  -GB     Row Name 04/01/21 1652          Gait Training Goal 1 (PT)    Activity/Assistive Device (Gait Training Goal 1, PT)  gait (walking locomotion)  -GB     [place] Level (Gait Training Goal 1, PT)  independent  -GB     Distance (Gait Training Goal 1, PT)  30'x3.  -GB     Time Frame (Gait Training Goal 1, PT)  by discharge  -GB     Strategies/Barriers (Gait Training Goal 1, PT)  Maintain SpO2 >90%.  -GB     Progress/Outcome (Gait Training Goal 1, PT)  goal not met  -GB       User Key  (r) = Recorded By, (t) = Taken By, (c) = Cosigned By    Initials Name Provider Type    lForecita Todd, PT Physical Therapist        Clinical Impression     Row Name 04/01/21 1632          Pain Scale: Numbers Pre/Post-Treatment    Pretreatment Pain Rating  0/10 - no pain  -GB     Posttreatment Pain Rating  0/10 - no pain  -GB     Row Name 04/01/21 1632          Plan of Care Review    Plan of Care Reviewed With  patient  -GB     Progress  improving  -GB     Row Name 04/01/21 1632          Therapy Assessment/Plan (PT)    Rehab Potential (PT)  good, to achieve stated therapy goals  -GB     Criteria for Skilled Interventions Met (PT)  yes  -GB     Row Name 04/01/21 1632          Vital Signs    Pretreatment Heart Rate (beats/min)  67  -GB     Intratreatment Heart Rate (beats/min)  78  -GB     Posttreatment Heart Rate (beats/min)  72  -GB     Pre SpO2 (%)  95  -GB     O2 Delivery Pre Treatment  hi-flow  -GB     Intra SpO2 (%)  86 sitting 86% ini, gradual to low 90s;  -GB     O2 Delivery Intra Treatment  hi-flow  -GB     Post SpO2 (%)  97  -GB     O2 Delivery  Post Treatment  hi-flow  -GB     Pre Patient Position  Sitting in bed  -GB     Intra Patient Position  Standing  -GB     Post Patient Position  Side Lying  -GB     Recovery Time  gait 3 ft fwd,3 backl was 88% with recovery in  -GB     Row Name 04/01/21 1632          Positioning and Restraints    Pre-Treatment Position  in bed  -GB     Post Treatment Position  bed  -GB     In Bed  patient within staff view;call light within reach;exit alarm on;supine  -GB       User Key  (r) = Recorded By, (t) = Taken By, (c) = Cosigned By    Initials Name Provider Type    Florecita Todd PT Physical Therapist        Outcome Measures     Row Name 04/01/21 1705          How much help from another person do you currently need...    Turning from your back to your side while in flat bed without using bedrails?  4  -GB     Moving from lying on back to sitting on the side of a flat bed without bedrails?  3  -GB     Moving to and from a bed to a chair (including a wheelchair)?  3  -GB     Standing up from a chair using your arms (e.g., wheelchair, bedside chair)?  3  -GB     Climbing 3-5 steps with a railing?  2  -GB     To walk in hospital room?  3  -GB     AM-PAC 6 Clicks Score (PT)  18  -GB       User Key  (r) = Recorded By, (t) = Taken By, (c) = Cosigned By    Initials Name Provider Type    Florecita Todd PT Physical Therapist        Physical Therapy Education                 Title: PT OT SLP Therapies (In Progress)     Topic: Physical Therapy (In Progress)     Point: Mobility training (Done)     Learning Progress Summary           Patient Acceptance, D,E, MARVIN,TIFF,NR by KRISTINE at 4/1/2021 1706    Comment: breathing ex for urms up in hale, arms down exhale w/ humming; sidelying allen and how to realize sats w/ SOA ; encouraged call for assist for OOB    Acceptance, E, MARVIN by MIC at 3/25/2021 1600    Comment: Educated on PT POC and goals. Educated on proper breating technique with exersion and avoiding valsalva  maneuver.                   Point: Home exercise program (Done)     Learning Progress Summary           Patient Acceptance, D,E, VU,DU,NR by  at 4/1/2021 1706    Comment: breathing ex for urms up in hale, arms down exhale w/ humming; sidelying allen and how to realize sats w/ SOA ; encouraged call for assist for OOB                   Point: Body mechanics (Not Started)     Learner Progress:  Not documented in this visit.          Point: Precautions (Done)     Learning Progress Summary           Patient Acceptance, D,E, VU,DU,NR by  at 4/1/2021 1706    Comment: breathing ex for urms up in hale, arms down exhale w/ humming; sidelying allen and how to realize sats w/ SOA ; encouraged call for assist for OOB                               User Key     Initials Effective Dates Name Provider Type Discipline     04/03/18 -  Florecita Wood, PT Physical Therapist PT    CZ 04/03/18 -  Kar López, PT Physical Therapist PT              PT Recommendation and Plan     Plan of Care Reviewed With: patient  Progress: improving  Outcome Summary: Pt alert and wiling to learn breathing ex and positioning, trying sidelying to each side and feeling better once she has been there and sats have been in mid 90s.She had brief drop in sats each sidelying but second side w less than 1 min recovery. Pt did sup to sit, sit to stand w/ supervision to CGA then gait short distance inroom of 3 ft fwd/back then return to bed. She also demonostrated learning for inhale ex w/ UE raised, then exhale w/ humming lowering; She showed good ex technique and sats recovery w/ these ex.  May need rehab to home pending progress but currenlty working toward home w/ assist;     Time Calculation:   PT Charges     Row Name 04/01/21 1700             Time Calculation    Start Time  1630  -GB      Stop Time  1712  -GB      Time Calculation (min)  42 min  -GB      PT Received On  04/01/21  -GB         Timed Charges    51041 - PT Therapeutic Activity  Minutes  15  -GB      01351 - PT Self Care/Mgmt Minutes  27  -GB        User Key  (r) = Recorded By, (t) = Taken By, (c) = Cosigned By    Initials Name Provider Type    Florecita Todd, PT Physical Therapist        Therapy Charges for Today     Code Description Service Date Service Provider Modifiers Qty    74505866006 HC PT THERAPEUTIC ACT EA 15 MIN 4/1/2021 Florecita Wood, PT GP 1    48838005352 HC PT SELF CARE/MGMT/TRAIN EA 15 MIN 4/1/2021 Florecita Wood, PT GP 2          PT G-Codes  Outcome Measure Options: AM-PAC 6 Clicks Daily Activity (OT)  AM-PAC 6 Clicks Score (PT): 18  AM-PAC 6 Clicks Score (OT): 18    Florecita Wood, NATHAN  4/1/2021

## 2021-04-01 NOTE — THERAPY TREATMENT NOTE
Patient Name: Emily Haas  : 1950    MRN: [id number]                              Today's Date: 2021       Admit Date: 3/22/2021    Visit Dx:     ICD-10-CM ICD-9-CM   1. Pneumonia due to COVID-19 virus  U07.1 480.8    J12.82 079.89   2. Fever, unspecified fever cause  R50.9 780.60   3. Dyspnea, unspecified type  R06.00 786.09   4. Impaired mobility and ADLs  Z74.09 V49.89    Z78.9    5. Impaired functional mobility, balance, gait, and endurance  Z74.09 V49.89     Patient Active Problem List   Diagnosis   • Vitamin D deficiency   • Irritable bowel syndrome   • Insomnia   • Hyperlipidemia   • Generalized anxiety disorder   • Essential hypertension   • Diverticular disease of colon   • Diabetes mellitus type 2, diet-controlled (CMS/HCC)   • Anxiety state   • Acquired hypothyroidism   • Asthma   • Periumbilical abdominal pain   • Telangiectasia of limb   • Varicose veins of both lower extremities with pain   • Diabetes mellitus (CMS/HCC)   • Posterior subcapsular polar age-related cataract   • Encounter for screening for malignant neoplasm of colon   • Diverticulosis of large intestine without hemorrhage   • Irritable bowel syndrome with both constipation and diarrhea   • Vaginosis   • Fatty liver   • Diabetes mellitus type 2 without retinopathy (CMS/HCC)   • Depression   • Pure hypercholesterolemia   • Class 1 obesity due to excess calories with serious comorbidity and body mass index (BMI) of 33.0 to 33.9 in adult   • Right lower quadrant abdominal pain   • Left lower quadrant abdominal pain   • Diarrhea   • Nausea   • Bloating   • Pneumonia due to COVID-19 virus   • CKD (chronic kidney disease) stage 3, GFR 30-59 ml/min (CMS/HCC)   • Acute respiratory failure with hypoxia (CMS/HCC)     Past Medical History:   Diagnosis Date   • Anxiety    • Chronic bronchitis (CMS/HCC)    • CKD (chronic kidney disease) stage 3, GFR 30-59 ml/min (CMS/HCC)    • Diabetes mellitus (CMS/HCC)    • Diverticulitis    •  Hyperlipidemia    • Hypertension    • Hypothyroidism    • Irritable bowel syndrome    • Obesity    • Osteoarthritis    • Vitamin D deficiency      Past Surgical History:   Procedure Laterality Date   • BILATERAL BREAST REDUCTION     • CHOLECYSTECTOMY     • COLONOSCOPY N/A 11/17/2017   • COLONOSCOPY N/A 1/14/2021   • ENDOSCOPY N/A 1/14/2021   • HYSTERECTOMY       General Information     [facility] Name 04/01/21 1332          OT Time and Intention    Document Type  therapy note (daily note)  -     Mode of Treatment  individual therapy;occupational therapy  -[facility] Name 04/01/21 1332          General Information    Patient Profile Reviewed  yes  -     Existing Precautions/Restrictions  fall;oxygen therapy device and L/min  -[facility] Name 04/01/21 1332          Cognition    Orientation Status (Cognition)  oriented x 4  -[facility] Name 04/01/21 1332          Safety Issues, Functional Mobility    Impairments Affecting Function (Mobility)  strength;shortness of breath;endurance/activity tolerance  -       User Key  (r) = Recorded By, (t) = Taken By, (c) = Cosigned By    Initials Name Provider Type     Juwan Pemberton OT Occupational Therapist          Mobility/ADL's    No documentation.       Obj/Interventions     [facility] Name 04/01/21 1332          Shoulder (Therapeutic Exercise)    Shoulder (Therapeutic Exercise)  strengthening exercise  -     Shoulder Strengthening (Therapeutic Exercise)  2 sets;red;resistance band  -[facility] Name 04/01/21 1332          Elbow/Forearm (Therapeutic Exercise)    Elbow/Forearm (Therapeutic Exercise)  strengthening exercise  -     Elbow/Forearm Strengthening (Therapeutic Exercise)  2 sets;red;resistance band  -[facility] Name 04/01/21 1332          Wrist (Therapeutic Exercise)    Wrist (Therapeutic Exercise)  strengthening exercise  -     Wrist Strengthening (Therapeutic Exercise)  2 sets;red;resistance band  -[facility] Name 04/01/21 1332          Therapeutic Exercise     Therapeutic Exercise  shoulder;elbow/forearm;wrist  -       User Key  (r) = Recorded By, (t) = Taken By, (c) = Cosigned By    Initials Name Provider Type     Juwan Pemberton, TONIA Occupational Therapist        Goals/Plan     Row Name 04/01/21 1332          Transfer Goal 1 (OT)    Activity/Assistive Device (Transfer Goal 1, OT)  sit-to-stand/stand-to-sit;bed-to-chair/chair-to-bed;toilet  -     [place] Level/Cues Needed (Transfer Goal 1, OT)  modified independence  -     Time Frame (Transfer Goal 1, OT)  long term goal (LTG);by discharge  -     Progress/Outcome (Transfer Goal 1, OT)  goal not met  -     Row Name 04/01/21 1332          Bathing Goal 1 (OT)    Activity/Device (Bathing Goal 1, OT)  bathing skills, all  -     [place] Level/Cues Needed (Bathing Goal 1, OT)  set-up required  -     Time Frame (Bathing Goal 1, OT)  long term goal (LTG);by discharge  -     Progress/Outcomes (Bathing Goal 1, OT)  goal not met  -[facility] Name 04/01/21 1332          Toileting Goal 1 (OT)    Activity/Device (Toileting Goal 1, OT)  toileting skills, all  -     [place] Level/Cues Needed (Toileting Goal 1, OT)  modified independence  -     Time Frame (Toileting Goal 1, OT)  by discharge  -     Progress/Outcome (Toileting Goal 1, OT)  goal not met  -[facility] Name 04/01/21 1332          ROM Goal 1 (OT)    ROM Goal 1 (OT)  Pt will tolerate 20 min of functional/therapeutic activities with O2=/> 88%.  -     Time Frame (ROM Goal 1, OT)  long term goal (LTG);by discharge  -     Progress/Outcome (ROM Goal 1, OT)  goal not met  -[facility] Name 04/01/21 1332          Strength Goal 1 (OT)    Strength Goal 1 (OT)  Pt will be independent in BUE strengthening HEP  -     Time Frame (Strength Goal 1, OT)  long term goal (LTG);by discharge  -     Progress/Outcome (Strength Goal 1, OT)  goal not met  -       User Key  (r) = Recorded By, (t) = Taken By, (c) = Cosigned By    Initials Name Provider Type      Juwan Pemberton, TONIA Occupational Therapist        Clinical Impression     Row Name 04/01/21 1332          Pain Assessment    Additional Documentation  Pain Scale: Numbers Pre/Post-Treatment (Group)  -     Row Name 04/01/21 1332          Pain Scale: Numbers Pre/Post-Treatment    Pretreatment Pain Rating  0/10 - no pain  -     Posttreatment Pain Rating  0/10 - no pain  -     Row Name 04/01/21 1332          Plan of Care Review    Plan of Care Reviewed With  patient  -     Outcome Summary  OT Treatment completed on this date. Pt pleasant and agreeable to therapy. Pt sitting up in chair upon arrival to room. Pt denies completing ADL's, but agreeable to participate in ther ex. 2 x 15 BUE ther completed with use of red t band to improve BUE strength/endurance needed for the completion of functional activities, bed mobility, and transfers. No goals met at this time. Pt in chair with all needs met and all items within reach.  -[facility] Name 04/01/21 6723          Therapy Plan Review/Discharge Plan (OT)    Anticipated Discharge Disposition (OT)  home with assist  -[facility] Name 04/01/21 2818          Vital Signs    Pretreatment Heart Rate (beats/min)  73  -JH     Intratreatment Heart Rate (beats/min)  73  -JH     Posttreatment Heart Rate (beats/min)  73  -     Pre SpO2 (%)  91  -     O2 Delivery Pre Treatment  hi-flow  -     Intra SpO2 (%)  85  -JH     O2 Delivery Intra Treatment  hi-flow  -     Post SpO2 (%)  91  -     O2 Delivery Post Treatment  hi-flow  -     Pre Patient Position  Sitting  -     Intra Patient Position  Sitting  -     Post Patient Position  Sitting  -     Recovery Time  1-2 minutes with deep breathing techniques education  -[facility] Name 04/01/21 5573          Positioning and Restraints    Pre-Treatment Position  sitting in chair/recliner  -     Post Treatment Position  chair  -     In Chair  reclined;call light within reach;encouraged to call for assist;exit alarm  on;notified nsg  -       User Key  (r) = Recorded By, (t) = Taken By, (c) = Cosigned By    Initials Name Provider Type    Juwan Barakat OT Occupational Therapist        Outcome Measures     Row Name 04/01/21 1332          How much help from another is currently needed...    Putting on and taking off regular lower body clothing?  2  -JH     Bathing (including washing, rinsing, and drying)  2  -JH     Toileting (which includes using toilet bed pan or urinal)  3  -JH     Putting on and taking off regular upper body clothing  3  -JH     Taking care of personal grooming (such as brushing teeth)  4  -JH     Eating meals  4  -     AM-PAC 6 Clicks Score (OT)  18  -     Row Name 04/01/21 1332          Functional Assessment    Outcome Measure Options  AM-PAC 6 Clicks Daily Activity (OT)  -       User Key  (r) = Recorded By, (t) = Taken By, (c) = Cosigned By    Initials Name Provider Type    Juwan Barakat OT Occupational Therapist        Occupational Therapy Education                 Title: PT OT SLP Therapies (In Progress)     Topic: Occupational Therapy (Done)     Point: ADL training (Done)     Description:   Instruct learner(s) on proper safety adaptation and remediation techniques during self care or transfers.   Instruct in proper use of assistive devices.              Learning Progress Summary           Patient Acceptance, E,TB, VU by  at 3/26/2021 1429    Acceptance, E, VU by AS at 3/25/2021 0853    Comment: ADL training, transfer training, PLB, safety precautions    Acceptance, E, VU by AS at 3/24/2021 1413    Comment: role of OT, OT POC, ADL training, transfer training                   Point: Home exercise program (Done)     Description:   Instruct learner(s) on appropriate technique for monitoring, assisting and/or progressing therapeutic exercises/activities.              Learning Progress Summary           Patient Acceptance, E,TB, VU by LW at 3/26/2021 1429                   Point: Precautions  (Done)     Description:   Instruct learner(s) on prescribed precautions during self-care and functional transfers.              Learning Progress Summary           Patient Acceptance, E, VU by ME at 3/29/2021 1136    Comment: Educated on OT and POC. Educated to call for assistance. Educated on safety precautions. Educated on pursed lip breathing technique.    Acceptance, E,TB, VU by LW at 3/26/2021 1429    Acceptance, E, VU by AS at 3/25/2021 0853    Comment: ADL training, transfer training, PLB, safety precautions    Acceptance, E, VU by AS at 3/24/2021 1413    Comment: role of OT, OT POC, ADL training, transfer training                   Point: Body mechanics (Done)     Description:   Instruct learner(s) on proper positioning and spine alignment during self-care, functional mobility activities and/or exercises.              Learning Progress Summary           Patient Acceptance, E, VU by ME at 3/29/2021 1136    Comment: Educated on OT and POC. Educated to call for assistance. Educated on safety precautions. Educated on pursed lip breathing technique.    Acceptance, E,TB, VU by LW at 3/26/2021 1429                               User Key     Initials Effective Dates Name Provider Type Discipline    LW 03/07/18 -  Emily Jack WILD/L Occupational Therapy Assistant OT    AS 03/18/21 -  Jael Lopez, OT Occupational Therapist OT    ME 08/24/20 -  Joe Perry OTR/L Occupational Therapist OT              OT Recommendation and Plan     Plan of Care Review  Plan of Care Reviewed With: patient  Outcome Summary: OT Treatment completed on this date. Pt pleasant and agreeable to therapy. Pt sitting up in chair upon arrival to room. Pt denies completing ADL's, but agreeable to participate in ther ex. 2 x 15 BUE ther completed with use of red t band to improve BUE strength/endurance needed for the completion of functional activities, bed mobility, and transfers. No goals met at this time. Pt in chair with all needs  met and all items within reach.     Time Calculation:   Time Calculation- OT     Row Name 04/01/21 1431             Time Calculation-     OT Start Time  1332  -      OT Stop Time  1356  -      OT Time Calculation (min)  24 min  -      OT Received On  04/01/21  -        User Key  (r) = Recorded By, (t) = Taken By, (c) = Cosigned By    Initials Name Provider Type     Juwan Pemberton OT Occupational Therapist        Therapy Charges for Today     Code Description Service Date Service Provider Modifiers Qty    [id number]  OT THER PROC EA 15 MIN 4/1/2021 Juwan Pemberton OT GO 2               Juwan Pemberton OT  4/1/2021

## 2021-04-01 NOTE — PLAN OF CARE
Goal Outcome Evaluation:  Plan of Care Reviewed With: patient  Progress: improving  Outcome Summary: Pt alert and wiling to learn breathing ex and positioning, trying sidelying to each side and feeling better once she has been there and sats have been in mid 90s.She had brief drop in sats each sidelying but second side w less than 1 min recovery. Pt did sup to sit, sit to stand w/ supervision to CGA then gait short distance inroom of 3 ft fwd/back then return to bed. She also demonostrated learning for inhale ex w/ UE raised, then exhale w/ humming lowering; She showed good ex technique and sats recovery w/ these ex.  May need rehab to home pending progress but currenlty working toward home w/ assist;

## 2021-04-01 NOTE — PLAN OF CARE
Goal Outcome Evaluation:  Plan of Care Reviewed With: patient     Outcome Summary: OT Treatment completed on this date. Pt pleasant and agreeable to therapy. Pt sitting up in chair upon arrival to room. Pt denies completing ADL's, but agreeable to participate in ther ex. 2 x 15 BUE ther completed with use of red t band to improve BUE strength/endurance needed for the completion of functional activities, bed mobility, and transfers. No goals met at this time. Pt in chair with all needs met and all items within reach.

## 2021-04-01 NOTE — PLAN OF CARE
Goal Outcome Evaluation:     Progress: no change  Outcome Summary: vss. no complaints of pain at this time. oxygen remains on 8L high-flow nasal cannula. pt resting in between care. will continue to monitor.

## 2021-04-01 NOTE — PLAN OF CARE
Goal Outcome Evaluation:  Plan of Care Reviewed With: patient  Progress: no change   Patient resting comfortably. No complaints noted. VSS.

## 2021-04-02 LAB
ALBUMIN SERPL-MCNC: 2.4 G/DL (ref 3.5–5.2)
ALBUMIN/GLOB SERPL: 0.9 G/DL
ALP SERPL-CCNC: 43 U/L (ref 39–117)
ALT SERPL W P-5'-P-CCNC: 14 U/L (ref 1–33)
ANION GAP SERPL CALCULATED.3IONS-SCNC: 6 MMOL/L (ref 5–15)
AST SERPL-CCNC: 14 U/L (ref 1–32)
BASOPHILS # BLD AUTO: 0.02 10*3/MM3 (ref 0–0.2)
BASOPHILS NFR BLD AUTO: 0.1 % (ref 0–1.5)
BILIRUB SERPL-MCNC: 0.6 MG/DL (ref 0–1.2)
BUN SERPL-MCNC: 38 MG/DL (ref 8–23)
BUN/CREAT SERPL: 46.3 (ref 7–25)
CALCIUM SPEC-SCNC: 8.6 MG/DL (ref 8.6–10.5)
CHLORIDE SERPL-SCNC: 103 MMOL/L (ref 98–107)
CK SERPL-CCNC: 72 U/L (ref 20–180)
CO2 SERPL-SCNC: 25 MMOL/L (ref 22–29)
CREAT SERPL-MCNC: 0.82 MG/DL (ref 0.57–1)
CRP SERPL-MCNC: <0.3 MG/DL (ref 0–0.5)
DEPRECATED RDW RBC AUTO: 37.3 FL (ref 37–54)
EOSINOPHIL # BLD AUTO: 0 10*3/MM3 (ref 0–0.4)
EOSINOPHIL NFR BLD AUTO: 0 % (ref 0.3–6.2)
ERYTHROCYTE [DISTWIDTH] IN BLOOD BY AUTOMATED COUNT: 11.7 % (ref 12.3–15.4)
FERRITIN SERPL-MCNC: 1241 NG/ML (ref 13–150)
GFR SERPL CREATININE-BSD FRML MDRD: 69 ML/MIN/1.73
GLOBULIN UR ELPH-MCNC: 2.7 GM/DL
GLUCOSE BLDC GLUCOMTR-MCNC: 227 MG/DL (ref 70–130)
GLUCOSE BLDC GLUCOMTR-MCNC: 249 MG/DL (ref 70–130)
GLUCOSE BLDC GLUCOMTR-MCNC: 278 MG/DL (ref 70–130)
GLUCOSE BLDC GLUCOMTR-MCNC: 296 MG/DL (ref 70–130)
GLUCOSE SERPL-MCNC: 211 MG/DL (ref 65–99)
HCT VFR BLD AUTO: 39 % (ref 34–46.6)
HGB BLD-MCNC: 13.5 G/DL (ref 12–15.9)
IMM GRANULOCYTES # BLD AUTO: 0.09 10*3/MM3 (ref 0–0.05)
IMM GRANULOCYTES NFR BLD AUTO: 0.6 % (ref 0–0.5)
LYMPHOCYTES # BLD AUTO: 0.44 10*3/MM3 (ref 0.7–3.1)
LYMPHOCYTES NFR BLD AUTO: 3.1 % (ref 19.6–45.3)
MCH RBC QN AUTO: 30.8 PG (ref 26.6–33)
MCHC RBC AUTO-ENTMCNC: 34.6 G/DL (ref 31.5–35.7)
MCV RBC AUTO: 88.8 FL (ref 79–97)
MONOCYTES # BLD AUTO: 0.42 10*3/MM3 (ref 0.1–0.9)
MONOCYTES NFR BLD AUTO: 3 % (ref 5–12)
NEUTROPHILS NFR BLD AUTO: 13.2 10*3/MM3 (ref 1.7–7)
NEUTROPHILS NFR BLD AUTO: 93.2 % (ref 42.7–76)
NRBC BLD AUTO-RTO: 0 /100 WBC (ref 0–0.2)
PLATELET # BLD AUTO: 180 10*3/MM3 (ref 140–450)
PMV BLD AUTO: 11.6 FL (ref 6–12)
POTASSIUM SERPL-SCNC: 4.6 MMOL/L (ref 3.5–5.2)
PROT SERPL-MCNC: 5.1 G/DL (ref 6–8.5)
RBC # BLD AUTO: 4.39 10*6/MM3 (ref 3.77–5.28)
SODIUM SERPL-SCNC: 134 MMOL/L (ref 136–145)
WBC # BLD AUTO: 14.17 10*3/MM3 (ref 3.4–10.8)

## 2021-04-02 PROCEDURE — 94799 UNLISTED PULMONARY SVC/PX: CPT

## 2021-04-02 PROCEDURE — 80053 COMPREHEN METABOLIC PANEL: CPT | Performed by: STUDENT IN AN ORGANIZED HEALTH CARE EDUCATION/TRAINING PROGRAM

## 2021-04-02 PROCEDURE — 82962 GLUCOSE BLOOD TEST: CPT

## 2021-04-02 PROCEDURE — 97535 SELF CARE MNGMENT TRAINING: CPT

## 2021-04-02 PROCEDURE — 25010000002 ENOXAPARIN PER 10 MG: Performed by: INTERNAL MEDICINE

## 2021-04-02 PROCEDURE — 94762 N-INVAS EAR/PLS OXIMTRY CONT: CPT

## 2021-04-02 PROCEDURE — 94760 N-INVAS EAR/PLS OXIMETRY 1: CPT

## 2021-04-02 PROCEDURE — 86140 C-REACTIVE PROTEIN: CPT | Performed by: STUDENT IN AN ORGANIZED HEALTH CARE EDUCATION/TRAINING PROGRAM

## 2021-04-02 PROCEDURE — 97116 GAIT TRAINING THERAPY: CPT

## 2021-04-02 PROCEDURE — 63710000001 INSULIN DETEMIR PER 5 UNITS: Performed by: INTERNAL MEDICINE

## 2021-04-02 PROCEDURE — 63710000001 INSULIN ASPART PER 5 UNITS: Performed by: STUDENT IN AN ORGANIZED HEALTH CARE EDUCATION/TRAINING PROGRAM

## 2021-04-02 PROCEDURE — 82728 ASSAY OF FERRITIN: CPT | Performed by: STUDENT IN AN ORGANIZED HEALTH CARE EDUCATION/TRAINING PROGRAM

## 2021-04-02 PROCEDURE — 85025 COMPLETE CBC W/AUTO DIFF WBC: CPT | Performed by: STUDENT IN AN ORGANIZED HEALTH CARE EDUCATION/TRAINING PROGRAM

## 2021-04-02 PROCEDURE — 82550 ASSAY OF CK (CPK): CPT | Performed by: STUDENT IN AN ORGANIZED HEALTH CARE EDUCATION/TRAINING PROGRAM

## 2021-04-02 PROCEDURE — 63710000001 DEXAMETHASONE PER 0.25 MG: Performed by: STUDENT IN AN ORGANIZED HEALTH CARE EDUCATION/TRAINING PROGRAM

## 2021-04-02 PROCEDURE — 97530 THERAPEUTIC ACTIVITIES: CPT

## 2021-04-02 RX ORDER — LANOLIN ALCOHOL/MO/W.PET/CERES
3 CREAM (GRAM) TOPICAL NIGHTLY
Status: DISCONTINUED | OUTPATIENT
Start: 2021-04-02 | End: 2021-04-06 | Stop reason: HOSPADM

## 2021-04-02 RX ADMIN — ALBUTEROL SULFATE 2 PUFF: 90 AEROSOL, METERED RESPIRATORY (INHALATION) at 16:11

## 2021-04-02 RX ADMIN — ALBUTEROL SULFATE 2 PUFF: 90 AEROSOL, METERED RESPIRATORY (INHALATION) at 11:30

## 2021-04-02 RX ADMIN — LEVOTHYROXINE SODIUM 112 MCG: 112 TABLET ORAL at 06:04

## 2021-04-02 RX ADMIN — ESCITALOPRAM OXALATE 10 MG: 10 TABLET ORAL at 09:30

## 2021-04-02 RX ADMIN — MELATONIN 3 MG: 3 TAB ORAL at 21:52

## 2021-04-02 RX ADMIN — INSULIN ASPART 8 UNITS: 100 INJECTION, SOLUTION INTRAVENOUS; SUBCUTANEOUS at 09:29

## 2021-04-02 RX ADMIN — ARIPIPRAZOLE 2 MG: 2 TABLET ORAL at 09:30

## 2021-04-02 RX ADMIN — INSULIN DETEMIR 40 UNITS: 100 INJECTION, SOLUTION SUBCUTANEOUS at 21:52

## 2021-04-02 RX ADMIN — ENOXAPARIN SODIUM 40 MG: 40 INJECTION SUBCUTANEOUS at 17:11

## 2021-04-02 RX ADMIN — BARICITINIB 2 MG: 2 TABLET, FILM COATED ORAL at 21:52

## 2021-04-02 RX ADMIN — CARVEDILOL 12.5 MG: 12.5 TABLET, FILM COATED ORAL at 09:30

## 2021-04-02 RX ADMIN — GUANFACINE 1 MG: 1 TABLET ORAL at 09:30

## 2021-04-02 RX ADMIN — DEXAMETHASONE 6 MG: 2 TABLET ORAL at 09:29

## 2021-04-02 RX ADMIN — BUDESONIDE AND FORMOTEROL FUMARATE DIHYDRATE 2 PUFF: 160; 4.5 AEROSOL RESPIRATORY (INHALATION) at 21:11

## 2021-04-02 RX ADMIN — SODIUM CHLORIDE, PRESERVATIVE FREE 10 ML: 5 INJECTION INTRAVENOUS at 09:30

## 2021-04-02 RX ADMIN — INSULIN ASPART 12 UNITS: 100 INJECTION, SOLUTION INTRAVENOUS; SUBCUTANEOUS at 12:07

## 2021-04-02 RX ADMIN — ALBUTEROL SULFATE 2 PUFF: 90 AEROSOL, METERED RESPIRATORY (INHALATION) at 21:11

## 2021-04-02 RX ADMIN — AMLODIPINE BESYLATE 5 MG: 5 TABLET ORAL at 09:29

## 2021-04-02 RX ADMIN — ALBUTEROL SULFATE 2 PUFF: 90 AEROSOL, METERED RESPIRATORY (INHALATION) at 07:47

## 2021-04-02 RX ADMIN — INSULIN ASPART 8 UNITS: 100 INJECTION, SOLUTION INTRAVENOUS; SUBCUTANEOUS at 17:11

## 2021-04-02 RX ADMIN — FAMOTIDINE 40 MG: 40 TABLET, FILM COATED ORAL at 09:30

## 2021-04-02 RX ADMIN — CARVEDILOL 12.5 MG: 12.5 TABLET, FILM COATED ORAL at 17:10

## 2021-04-02 RX ADMIN — LOSARTAN POTASSIUM 100 MG: 50 TABLET, FILM COATED ORAL at 09:30

## 2021-04-02 RX ADMIN — BUDESONIDE AND FORMOTEROL FUMARATE DIHYDRATE 2 PUFF: 160; 4.5 AEROSOL RESPIRATORY (INHALATION) at 07:47

## 2021-04-02 NOTE — PLAN OF CARE
Goal Outcome Evaluation:  Plan of Care Reviewed With: patient     Outcome Summary: OT treatment complete. pt was pleasant and cooperative throughout. requesting to wash hair. pt is supervision for bed mobility. pt was min A for pan washing hair. CGA for sit to stand and bedside commode transfers with no AD. toileting completed with set up and CGA. pt shaved face with electric razor and with set up. set up for face washing. pt was going to complete bathing but states that she is too fatigued at this time to do that and that she will do that later. no goals met. continue per OT POC.

## 2021-04-02 NOTE — PLAN OF CARE
Goal Outcome Evaluation:  Plan of Care Reviewed With: patient  Progress: improving  Outcome Summary: PT treatment included with pt working on gait eder w. FWMARIONW,  Kaylan at bedside, bed mobility w/ CGA 1; Her lowest sats were 90% post exertion and recovery to 94 or above was less than 2 min; SHe has hx falls and difficulty sit to stand so will start workig on strength and watch sats rather than focus on sats alone as she is showing ex eder well enough to work more large muscle groups for strength and aerobic.  Pt concerned re; legs feeling weak and hx falls; Rec swing bed setting for cont therapy as appropriate.

## 2021-04-02 NOTE — PROGRESS NOTES
[facility] Medicine Services  INPATIENT PROGRESS NOTE    Length of Stay: 10  Date of Admission: 3/22/2021  Primary Care Physician: Bharati Love MD    Subjective   Chief Complaint: shortness of breath   HPI:  71 year old admitted for acute hypoxic respiratory failure secondary to COVID.    Feeling a little better today    Tried to wean down to 5L yesterday. Had to increase to 12L, now back to 9L    Having some trouble sleeping.    Review of Systems   Constitutional: Positive for fatigue. Negative for chills, diaphoresis and fever.   HENT: Negative for sneezing and sore throat.    Eyes: Negative for pain and discharge.   Respiratory: Positive for shortness of breath. Negative for cough.    Gastrointestinal: Negative for constipation, diarrhea, nausea and vomiting.   Endocrine: Negative for cold intolerance and heat intolerance.   Genitourinary: Negative for difficulty urinating, dysuria, frequency and urgency.   Musculoskeletal: Negative for arthralgias and myalgias.   Skin: Negative for color change and pallor.   Allergic/Immunologic: Negative for environmental allergies and food allergies.   Neurological: Positive for weakness. Negative for dizziness and syncope.   Psychiatric/Behavioral: Positive for sleep disturbance. Negative for confusion.      All pertinent negatives and positives are as above. All other systems have been reviewed and are negative unless otherwise stated.     Objective    Temp:  [96.3 °F (35.7 °C)-97 °F (36.1 °C)] 97 °F (36.1 °C)  Heart Rate:  [58-91] 80  Resp:  [16-18] 18  BP: (122-148)/(60-87) 130/60    Physical Exam  Vitals reviewed.   Constitutional:       General: She is not in acute distress.     Appearance: She is well-developed.   HENT:      Head: Normocephalic and atraumatic.      Nose: Nose normal.   Eyes:      Conjunctiva/sclera: Conjunctivae normal.   Cardiovascular:      Rate and Rhythm: Normal rate and regular rhythm.   Pulmonary:       Effort: Pulmonary effort is normal. No respiratory distress.      Breath sounds: Decreased breath sounds present. No wheezing or rales.   Musculoskeletal:         General: Normal range of motion.      Cervical back: Normal range of motion and neck supple.   Skin:     General: Skin is warm and dry.   Neurological:      Mental Status: She is alert and oriented to person, place, and time.   Psychiatric:         Behavior: Behavior normal.             Results Review:  I have reviewed the labs, radiology results, and diagnostic studies.    Laboratory Data:   Results from last 7 days   Lab Units 04/02/21  0608 04/01/21  0635 03/31/21  0622   SODIUM mmol/L 134* 136 140   POTASSIUM mmol/L 4.6 4.1 4.9   CHLORIDE mmol/L 103 103 106   CO2 mmol/L 25.0 25.0 28.0   BUN mg/dL 38* 42* 42*   CREATININE mg/dL 0.82 0.97 1.08*   GLUCOSE mg/dL 211* 234* 223*   CALCIUM mg/dL 8.6 8.4* 8.6   BILIRUBIN mg/dL 0.6 0.5 0.5   ALK PHOS U/L 43 48 51   ALT (SGPT) U/L 14 13 14   AST (SGOT) U/L 14 12 12   ANION GAP mmol/L 6.0 8.0 6.0     Estimated Creatinine Clearance: 69.9 mL/min (by C-G formula based on SCr of 0.82 mg/dL).          Results from last 7 days   Lab Units 04/02/21  0608 04/01/21  0635 03/31/21  0622 03/30/21  0652 03/28/21  0715   WBC 10*3/mm3 14.17* 14.62* 12.86* 13.97* 12.18*   HEMOGLOBIN g/dL 13.5 13.3 13.5 13.2 13.4   HEMATOCRIT % 39.0 38.2 39.6 38.1 39.1   PLATELETS 10*3/mm3 180 172 206 224 249           Culture Data:   No results found for: BLOODCX  No results found for: URINECX  No results found for: RESPCX  No results found for: WOUNDCX  No results found for: STOOLCX  No components found for: BODYFLD    Radiology Data:   Imaging Results (Last 24 Hours)     ** No results found for the last 24 hours. **          I have reviewed the patient current medications.     Assessment/Plan     Active Hospital Problems    Diagnosis    • **Pneumonia due to COVID-19 virus    • Acute respiratory failure with hypoxia (CMS/HCC)    • CKD  (chronic kidney disease) stage 3, GFR 30-59 ml/min (CMS/HCC)    • Depression    • Diabetes mellitus (CMS/[facility])    • Essential hypertension    • Generalized anxiety disorder        Plan:      1. Acute Respiratory Failure with hypoxia, secondary to COVID 19  · Continue supplemental oxygen, now on 9L, wean as tolerated   · Remdesivir completed   · Decadron 6mg daily   · Baricitinib Day 8  · Bronchodilators   · Psych consult for depression - augment w/ Abilify. Add melatonin for insomnia.     2. CKD  · Creatinine stable    3. DM   · Levemir 40 units  · Increase SSI to high dose while on steroids     4. Hypertension   · Coreg  · Norvasc  · Losartan    DVT ppx: Lovenox  GI ppx: Pepcid     Updated daughter.         Discharge Planning: will be here several more days         This document has been electronically signed by Dalila Serrano MD on April 2, 2021 13:21 CDT

## 2021-04-02 NOTE — THERAPY TREATMENT NOTE
Patient Name: Emily Haas  : 1950    MRN: [id number]                              Today's Date: 2021       Admit Date: 3/22/2021    Visit Dx:     ICD-10-CM ICD-9-CM   1. Pneumonia due to COVID-19 virus  U07.1 480.8    J12.82 079.89   2. Fever, unspecified fever cause  R50.9 780.60   3. Dyspnea, unspecified type  R06.00 786.09   4. Impaired mobility and ADLs  Z74.09 V49.89    Z78.9    5. Impaired functional mobility, balance, gait, and endurance  Z74.09 V49.89     Patient Active Problem List   Diagnosis   • Vitamin D deficiency   • Irritable bowel syndrome   • Insomnia   • Hyperlipidemia   • Generalized anxiety disorder   • Essential hypertension   • Diverticular disease of colon   • Diabetes mellitus type 2, diet-controlled (CMS/HCC)   • Anxiety state   • Acquired hypothyroidism   • Asthma   • Periumbilical abdominal pain   • Telangiectasia of limb   • Varicose veins of both lower extremities with pain   • Diabetes mellitus (CMS/HCC)   • Posterior subcapsular polar age-related cataract   • Encounter for screening for malignant neoplasm of colon   • Diverticulosis of large intestine without hemorrhage   • Irritable bowel syndrome with both constipation and diarrhea   • Vaginosis   • Fatty liver   • Diabetes mellitus type 2 without retinopathy (CMS/HCC)   • Depression   • Pure hypercholesterolemia   • Class 1 obesity due to excess calories with serious comorbidity and body mass index (BMI) of 33.0 to 33.9 in adult   • Right lower quadrant abdominal pain   • Left lower quadrant abdominal pain   • Diarrhea   • Nausea   • Bloating   • Pneumonia due to COVID-19 virus   • CKD (chronic kidney disease) stage 3, GFR 30-59 ml/min (CMS/HCC)   • Acute respiratory failure with hypoxia (CMS/HCC)     Past Medical History:   Diagnosis Date   • Anxiety    • Chronic bronchitis (CMS/HCC)    • CKD (chronic kidney disease) stage 3, GFR 30-59 ml/min (CMS/HCC)    • Diabetes mellitus (CMS/HCC)    • Diverticulitis    •  Hyperlipidemia    • Hypertension    • Hypothyroidism    • Irritable bowel syndrome    • Obesity    • Osteoarthritis    • Vitamin D deficiency      Past Surgical History:   Procedure Laterality Date   • BILATERAL BREAST REDUCTION     • CHOLECYSTECTOMY     • COLONOSCOPY N/A 11/17/2017   • COLONOSCOPY N/A 1/14/2021   • ENDOSCOPY N/A 1/14/2021   • HYSTERECTOMY       General Information     Row Name 04/02/21 1028          OT Time and Intention    Document Type  therapy note (daily note)  -ME     Mode of Treatment  individual therapy;occupational therapy  -ME     Row Name 04/02/21 1028          General Information    Patient Profile Reviewed  yes  -ME     Existing Precautions/Restrictions  fall;oxygen therapy device and L/min  -ME     Row Name 04/02/21 1028          Cognition    Orientation Status (Cognition)  oriented x 4  -ME     Row Name 04/02/21 1028          Safety Issues, Functional Mobility    Safety Issues Affecting Function (Mobility)  safety precaution awareness;safety precautions follow-through/compliance;awareness of need for assistance  -ME     Impairments Affecting Function (Mobility)  balance;endurance/activity tolerance;shortness of breath;strength  -ME       User Key  (r) = Recorded By, (t) = Taken By, (c) = Cosigned By    Initials Name Provider Type    ME Joe Perry OTR/L Occupational Therapist          Mobility/ADL's     Row Name 04/02/21 1028          Bed Mobility    Bed Mobility  supine-sit;sit-supine  -ME     Supine-Sit [place] (Bed Mobility)  supervision  -ME     Sit-Supine [place] (Bed Mobility)  supervision  -ME     Assistive Device (Bed Mobility)  bed rails;head of bed elevated  -ME     Row Name 04/02/21 1028          Transfers    Transfers  sit-stand transfer;toilet transfer  -ME     Sit-Stand [place] (Transfers)  contact guard  -ME     [place] Level (Toilet Transfer)  contact guard  -ME     Assistive Device (Toilet Transfer)  commode, bedside without drop arms  -ME      Row Name 04/02/21 1028          Sit-Stand Transfer    Assistive Device (Sit-Stand Transfers)  other (see comments) none  -ME     Row Name 04/02/21 1028          Toilet Transfer    Type (Toilet Transfer)  sit-stand;stand-sit  -ME     Row Name 04/02/21 1028          Activities of Daily Living    BADL Assessment/Intervention  toileting;grooming  -ME     Row Name 04/02/21 1028          Toileting Assessment/Training    [place] Level (Toileting)  toileting skills;contact guard assist  -ME     Assistive Devices (Toileting)  commode, bedside without drop arms  -ME     Position (Toileting)  unsupported sitting;unsupported standing  -ME     Row Name 04/02/21 1028          Bathing Assessment/Intervention    Comment (Bathing)  was going to attempt bath this morning but pt states she is too fatigued and would like to rest  -ME     Row Name 04/02/21 1028          Grooming Assessment/Training    [place] Level (Grooming)  hair care, combing/brushing;minimum assist (75% patient effort);shave face;wash face, hands;set up;standby assist  -ME     Position (Grooming)  edge of bed sitting  -ME       User Key  (r) = Recorded By, (t) = Taken By, (c) = Cosigned By    Initials Name Provider Type    ME Joe Perry OTR/L Occupational Therapist        Obj/Interventions     Row Name 04/02/21 1028          Balance    Balance Assessment  sitting static balance;sitting dynamic balance;standing static balance;standing dynamic balance  -ME     Static Sitting Balance  WFL  -ME     Dynamic Sitting Balance  WFL  -ME     Static Standing Balance  mild impairment  -ME     Dynamic Standing Balance  mild impairment;moderate impairment  -ME       User Key  (r) = Recorded By, (t) = Taken By, (c) = Cosigned By    Initials Name Provider Type    Joe Morris, OTR/L Occupational Therapist        Goals/Plan     Row Name 04/02/21 1028          Transfer Goal 1 (OT)    Activity/Assistive Device (Transfer Goal 1, OT)   sit-to-stand/stand-to-sit;bed-to-chair/chair-to-bed;toilet  -ME     [place] Level/Cues Needed (Transfer Goal 1, OT)  modified independence  -ME     Time Frame (Transfer Goal 1, OT)  long term goal (LTG);by discharge  -ME     Progress/Outcome (Transfer Goal 1, OT)  goal not met  -ME     Row Name 04/02/21 1028          Bathing Goal 1 (OT)    Activity/Device (Bathing Goal 1, OT)  bathing skills, all  -ME     [place] Level/Cues Needed (Bathing Goal 1, OT)  set-up required  -ME     Time Frame (Bathing Goal 1, OT)  long term goal (LTG);by discharge  -ME     Progress/Outcomes (Bathing Goal 1, OT)  goal not met  -ME     Row Name 04/02/21 1028          Toileting Goal 1 (OT)    Activity/Device (Toileting Goal 1, OT)  toileting skills, all  -ME     [place] Level/Cues Needed (Toileting Goal 1, OT)  modified independence  -ME     Time Frame (Toileting Goal 1, OT)  by discharge  -ME     Progress/Outcome (Toileting Goal 1, OT)  goal not met  -ME     Row Name 04/02/21 1028          ROM Goal 1 (OT)    ROM Goal 1 (OT)  Pt will tolerate 20 min of functional/therapeutic activities with O2=/> 88%.  -ME     Time Frame (ROM Goal 1, OT)  long term goal (LTG);by discharge  -ME     Progress/Outcome (ROM Goal 1, OT)  goal not met  -ME     Row Name 04/02/21 1028          Strength Goal 1 (OT)    Strength Goal 1 (OT)  Pt will be independent in BUE strengthening HEP  -ME     Time Frame (Strength Goal 1, OT)  long term goal (LTG);by discharge  -ME     Progress/Outcome (Strength Goal 1, OT)  goal not met  -ME       User Key  (r) = Recorded By, (t) = Taken By, (c) = Cosigned By    Initials Name Provider Type    Joe Morris OTR/L Occupational Therapist        Clinical Impression     Row Name 04/02/21 1028          Pain Assessment    Additional Documentation  Pain Scale: Numbers Pre/Post-Treatment (Group)  -ME     Row Name 04/02/21 1028          Pain Scale: Numbers Pre/Post-Treatment    Pretreatment Pain Rating  0/10 - no pain   -ME     Posttreatment Pain Rating  0/10 - no pain  -ME     Row Name 04/02/21 1028          Plan of Care Review    Plan of Care Reviewed With  patient  -[facility] Name 04/02/21 1028          Therapy Assessment/Plan (OT)    Therapy Frequency (OT)  other (see comments) 5-7 days per week  -ME     Row Name 04/02/21 1028          Therapy Plan Review/Discharge Plan (OT)    Anticipated Discharge Disposition (OT)  home with assist  -ME     Row Name 04/02/21 1028          Vital Signs    Pre Systolic BP Rehab  145  -ME     Pre Treatment Diastolic BP  69  -ME     Pretreatment Heart Rate (beats/min)  75  -ME     Posttreatment Heart Rate (beats/min)  78  -ME     Pre SpO2 (%)  96 on 10 L  -ME     O2 Delivery Pre Treatment  hi-flow  -ME     Intra SpO2 (%)  92  -ME     Post SpO2 (%)  94  -ME     O2 Delivery Post Treatment  hi-flow  -ME     Pre Patient Position  Supine  -ME     Intra Patient Position  Sitting  -ME     Post Patient Position  Supine  -[facility] Name 04/02/21 1028          Positioning and Restraints    Pre-Treatment Position  in bed  -ME     Post Treatment Position  bed  -ME     In Bed  notified nsg;fowlers;call light within reach;encouraged to call for assist;exit alarm on  -ME       User Key  (r) = Recorded By, (t) = Taken By, (c) = Cosigned By    Initials Name Provider Type    Joe Morris OTR/L Occupational Therapist        Outcome Measures     Row Name 04/02/21 1028          How much help from another is currently needed...    Putting on and taking off regular lower body clothing?  2  -ME     Bathing (including washing, rinsing, and drying)  2  -ME     Toileting (which includes using toilet bed pan or urinal)  3  -ME     Putting on and taking off regular upper body clothing  3  -ME     Taking care of personal grooming (such as brushing teeth)  4  -ME     Eating meals  4  -ME     AM-PAC 6 Clicks Score (OT)  18  -[facility] Name 04/02/21 1028          Functional Assessment    Outcome Measure Options  AM-PAC 6  Clicks Daily Activity (OT)  -ME       User Key  (r) = Recorded By, (t) = Taken By, (c) = Cosigned By    Initials Name Provider Type    Joe Morris OTR/L Occupational Therapist        Occupational Therapy Education                 Title: PT OT SLP Therapies (In Progress)     Topic: Occupational Therapy (Done)     Point: ADL training (Done)     Description:   Instruct learner(s) on proper safety adaptation and remediation techniques during self care or transfers.   Instruct in proper use of assistive devices.              Learning Progress Summary           Patient Acceptance, E,TB, VU by LW at 3/26/2021 1429    Acceptance, E, VU by AS at 3/25/2021 0853    Comment: ADL training, transfer training, PLB, safety precautions    Acceptance, E, VU by AS at 3/24/2021 1413    Comment: role of OT, OT POC, ADL training, transfer training                   Point: Home exercise program (Done)     Description:   Instruct learner(s) on appropriate technique for monitoring, assisting and/or progressing therapeutic exercises/activities.              Learning Progress Summary           Patient Acceptance, E,TB, VU by LW at 3/26/2021 1429                   Point: Precautions (Done)     Description:   Instruct learner(s) on prescribed precautions during self-care and functional transfers.              Learning Progress Summary           Patient Acceptance, E, VU by ME at 3/29/2021 1136    Comment: Educated on OT and POC. Educated to call for assistance. Educated on safety precautions. Educated on pursed lip breathing technique.    Acceptance, E,TB, VU by LW at 3/26/2021 1429    Acceptance, E, VU by AS at 3/25/2021 0853    Comment: ADL training, transfer training, PLB, safety precautions    Acceptance, E, VU by AS at 3/24/2021 1413    Comment: role of OT, OT POC, ADL training, transfer training                   Point: Body mechanics (Done)     Description:   Instruct learner(s) on proper positioning and spine alignment during  self-care, functional mobility activities and/or exercises.              Learning Progress Summary           Patient Acceptance, E, VU by ME at 3/29/2021 1136    Comment: Educated on OT and POC. Educated to call for assistance. Educated on safety precautions. Educated on pursed lip breathing technique.    Acceptance, E,TB, VU by  at 3/26/2021 1429                               User Key     Initials Effective Dates Name Provider Type Discipline     03/07/18 -  Emily Jack WILD/L Occupational Therapy Assistant OT    AS 03/18/21 -  Jael Lopez, OT Occupational Therapist OT    ME 08/24/20 -  Joe Perry OTR/L Occupational Therapist OT              OT Recommendation and Plan  Therapy Frequency (OT): other (see comments) (5-7 days per week)  Plan of Care Review  Plan of Care Reviewed With: patient  Outcome Summary: OT treatment complete. pt was pleasant and cooperative throughout. requesting to wash hair. pt is supervision for bed mobility. pt was min A for pan washing hair. CGA for sit to stand and bedside commode transfers with no AD. toileting completed with set up and CGA. pt shaved face with electric razor and with set up. set up for face washing. pt was going to complete bathing but states that she is too fatigued at this time to do that and that she will do that later. no goals met. continue per OT POC.     Time Calculation:   Time Calculation- OT     Row Name 04/02/21 1302             Time Calculation- OT    OT Start Time  1028  -ME      OT Stop Time  1106  -ME      OT Time Calculation (min)  38 min  -ME      OT Received On  04/02/21  -ME        User Key  (r) = Recorded By, (t) = Taken By, (c) = Cosigned By    Initials Name Provider Type    ME Joe Perry OTMARION/L Occupational Therapist        Therapy Charges for Today     Code Description Service Date Service Provider Modifiers Qty    97102551524 HC OT SELF CARE/MGMT/TRAIN EA 15 MIN 4/2/2021 Joe Perry OTR/L GO 3               Joe  TONIA PerryR/MOMO  4/2/2021

## 2021-04-02 NOTE — PLAN OF CARE
Goal Outcome Evaluation:     Progress: improving  Outcome Summary: VSS still at 12L after being turned down last night, no new complaints noted she is sleeping well.

## 2021-04-02 NOTE — THERAPY TREATMENT NOTE
Acute Care - Physical Therapy Initial Evaluation  [facility]     Patient Name: Emily Haas  : 1950  MRN: [id number]  Today's Date: 2021           PT Assessment (last 12 hours)      PT Evaluation and Treatment     Row Name 21 1320          Physical Therapy Time and Intention    Subjective Information  complains of sleepy  -GB     Document Type  therapy note (daily note)  -GB     Mode of Treatment  individual therapy;physical therapy  -GB     Patient Effort  good  -     Row Name 21 1320          General Information    Patient Profile Reviewed  yes  -GB     Existing Precautions/Restrictions  fall;oxygen therapy device and L/min  -     Row Name 21 1320          Pain Scale: Numbers Pre/Post-Treatment    Pretreatment Pain Rating  0/10 - no pain  -GB     Posttreatment Pain Rating  0/10 - no pain  -     Row Name 21 1320          Bed Mobility    Bed Mobility  bed mobility (all) activities  -     All Activities, [place] (Bed Mobility)  modified independence  -     Assistive Device (Bed Mobility)  bed rails;head of bed elevated  -     Row Name 21 1320          Transfers    Sit-Stand [place] (Transfers)  supervision  -     Stand-Sit [place] (Transfers)  supervision  -     Row Name 21 1320          Sit-Stand Transfer    Assistive Device (Sit-Stand Transfers)  walker, front-wheeled  -GB     Row Name 21 1320          Stand-Sit Transfer    Assistive Device (Stand-Sit Transfers)  walker, front-wheeled  -GB     Row Name 21 1320          Gait/Stairs (Locomotion)    Gait/Stairs Locomotion  gait/ambulation independence;gait/ambulation assistive device;distance ambulated;gait pattern;gait deviations  -     [place] Level (Gait)  modified independence;supervision  -     Assistive Device (Gait)  walker, front-wheeled  -GB     Distance in Feet (Gait)  42 ft total first gait;  24 ft; 3 ft 3 ft;  -GB     Pattern (Gait)  step-to  -GB      Deviations/Abnormal Patterns (Gait)  gait speed decreased;stride length decreased;nadya decreased  -GB     Row Name 04/02/21 1320          Plan of Care Review    Plan of Care Reviewed With  patient  -GB     Outcome Summary  PT treatment included with pt working on gait eder w. FWRW,  Tinetti at bedside, bed mobility w/ CGA 1; Her lowest sats were 90% post exertion and recovery to 94 or above was less than 2 min; SHe has hx falls and difficulty sit to stand so will start workig on strength and watch sats rather than focus on sats alone as she is showing ex eder well enough to work more large muscle groups for strength and aerobic.  Pt concerned re; legs feeling weak and hx falls; Rec swing bed setting for cont therapy as appropriate.   -     Row Name 04/02/21 1320          Vital Signs    Pretreatment Heart Rate (beats/min)  93 post gait 14 ft   -GB     Intratreatment Heart Rate (beats/min)  73 sitting2 min post gait of 42 ft   -GB     Posttreatment Heart Rate (beats/min)  76  -GB     Pre SpO2 (%)  93  -GB     O2 Delivery Pre Treatment  hi-flow  -GB     Intra SpO2 (%)  98  -GB     O2 Delivery Intra Treatment  hi-flow  -GB     Post SpO2 (%)  92 post gait 24 ft w/ FWRW   -GB     Pre Patient Position  Standing  -GB     Intra Patient Position  Sitting  -GB     Post Patient Position  Standing  -GB     Recovery Time  less than 2 min to recover f rom 92% to 97%; sats 98% HR 64 post supine from LE ex and testing  -     Row Name 04/02/21 1320          Positioning and Restraints    Pre-Treatment Position  in bed  -GB     Post Treatment Position  bed  -GB     In Bed  call light within reach;encouraged to call for assist;exit alarm on;patient within staff view;side rails up x2  -GB       User Key  (r) = Recorded By, (t) = Taken By, (c) = Cosigned By    Initials Name Provider Type    Florecita Todd, PT Physical Therapist        Physical Therapy Education                 Title: PT OT SLP Therapies (In Progress)      Topic: Physical Therapy (In Progress)     Point: Mobility training (Done)     Learning Progress Summary           Patient Acceptance, D,E, VU,DU,NR by KRISTINE at 4/1/2021 1706    Comment: breathing ex for urms up in hale, arms down exhale w/ humming; sidelying allen and how to realize sats w/ SOA ; encouraged call for assist for OOB    Acceptance, E, VU by MIC at 3/25/2021 1600    Comment: Educated on PT POC and goals. Educated on proper breating technique with exersion and avoiding valsalva maneuver.                   Point: Home exercise program (Done)     Learning Progress Summary           Patient Acceptance, D,E, VU,DU,NR by KRISTINE at 4/1/2021 1706    Comment: breathing ex for urms up in hale, arms down exhale w/ humming; sidelying allen and how to realize sats w/ SOA ; encouraged call for assist for OOB                   Point: Body mechanics (Not Started)     Learner Progress:  Not documented in this visit.          Point: Precautions (Done)     Learning Progress Summary           Patient Acceptance, D,E, VU,DU,NR by KRISTINE at 4/1/2021 1706    Comment: breathing ex for urms up in hale, arms down exhale w/ humming; sidelying allen and how to realize sats w/ SOA ; encouraged call for assist for OOB                               User Key     Initials Effective Dates Name Provider Type Discipline    KRISTINE 04/03/18 -  Florecita Wood, PT Physical Therapist PT    MIC 04/03/18 -  Kar López, PT Physical Therapist PT              PT Recommendation and Plan  Anticipated Discharge Disposition (PT): home with 24/7 care, home with assist, home with home health  Therapy Frequency (PT):  (5-7 d/w)  Plan of Care Reviewed With: patient  Progress: improving  Outcome Summary: PT treatment included with pt working on gait eder w. FWRW,  [place] at bedside, bed mobility w/ CGA 1; Her lowest sats were 90% post exertion and recovery to 94 or above was less than 2 min; SHe has hx falls and difficulty sit to stand so will start workig on  "strength and watch sats rather than focus on sats alone as she is showing ex eder well enough to work more large muscle groups for strength and aerobic.  Pt concerned re; legs feeling weak and hx falls; Rec swing bed setting for cont therapy as appropriate.   Outcome Measures     Row Name 04/02/21 1300             Tinetti Assessment    T[place] Assessment  yes  -GB      Sitting Balance  1  -GB      Arises  1  -GB      Attempts to Rise  1  -GB      Immediate Standing Balance (first 5 sec)  1  -GB      Standing Balance  1  -GB      Sternal Nudge (feet close together)  2  -GB      Eyes Closed (feet close together)  1  -GB      Turning 360 Degrees- Steps  0  -GB      Turning 360 Degrees- Steadiness  0  -GB      Sitting Down  0  -GB      Tinetti Balance Score  8  -GB      Gait Initiation (immediate after told \"go\")  0  -GB      Step Length- Right Swing  0  -GB      Step Length- Left Swing  0  -GB      Foot Clearance- Right Foot  1  -GB      Foot Clearance- Left Foot  1  -GB      Step Symmetry  1  -GB      Step Continuity  1  -GB      Path (excursion)  1  -GB      Trunk  2  -GB      Base of Support  1  -GB      Gait Score  8  -GB      Tinetti Total Score  16  -GB      Tinetti Assistive Device  rolling walker  -GB         Functional Assessment    Outcome Measure Options  Tinetti  -GB        User Key  (r) = Recorded By, (t) = Taken By, (c) = Cosigned By    Initials Name Provider Type    Florecita Todd PT Physical Therapist           Time Calculation:   PT Charges     Row Name 04/02/21 1320             Time Calculation    Start Time  1320  -GB      Stop Time  1400  -GB      Time Calculation (min)  40 min  -GB      PT Received On  04/02/21  -GB         Timed Charges    39638 - Gait Training Minutes   25  -GB      99835 - PT Therapeutic Activity Minutes  15  -GB        User Key  (r) = Recorded By, (t) = Taken By, (c) = Cosigned By    Initials Name Provider Type    Florecita Todd PT Physical " Therapist        Therapy Charges for Today     Code Description Service Date Service Provider Modifiers Qty    23417200917  PT THERAPEUTIC ACT EA 15 MIN 4/1/2021 Florecita Wood, PT GP 1    08369961448  PT SELF CARE/MGMT/TRAIN EA 15 MIN 4/1/2021 Florecita Wood, PT GP 2    88973673461 HC GAIT TRAINING EA 15 MIN 4/2/2021 Florecita Wood, PT GP 2    28290438818 HC PT THERAPEUTIC ACT EA 15 MIN 4/2/2021 Florecita Wood, PT GP 1          PT G-Codes  Outcome Measure Options: Tinetti  AM-PAC 6 Clicks Score (PT): 17  AM-PAC 6 Clicks Score (OT): 18  Tinetti Total Score: 16    Florecita Wood, PT  4/2/2021

## 2021-04-02 NOTE — PROGRESS NOTES
Adult Nutrition  Assessment    Patient Name:  Emily Haas  YOB: 1950  MRN: [id number]  Admit Date:  3/22/2021    Assessment Date:  4/2/2021    Comments:  Pt continues treatment for acute respiratory failure r/t +Covid 19. Remdesivir completed. Baricitinib and decadron continue. Requiring 9L NC today. Alb 2.4L. ADA diet- intakes >75% x all meals. Admit wt 195# and currenlty 199#. Spoke w/ pt over phone- she requests vanilla BGC- added at dinner. RD to follow hospital course.     Electronically signed by:  Nicole Grayson RD  04/02/21 14:28 CDT

## 2021-04-03 LAB
ALBUMIN SERPL-MCNC: 2.7 G/DL (ref 3.5–5.2)
ALBUMIN/GLOB SERPL: 1.1 G/DL
ALP SERPL-CCNC: 48 U/L (ref 39–117)
ALT SERPL W P-5'-P-CCNC: 17 U/L (ref 1–33)
ANION GAP SERPL CALCULATED.3IONS-SCNC: 6 MMOL/L (ref 5–15)
AST SERPL-CCNC: 15 U/L (ref 1–32)
BASOPHILS # BLD AUTO: 0.01 10*3/MM3 (ref 0–0.2)
BASOPHILS NFR BLD AUTO: 0.1 % (ref 0–1.5)
BILIRUB SERPL-MCNC: 0.6 MG/DL (ref 0–1.2)
BUN SERPL-MCNC: 42 MG/DL (ref 8–23)
BUN/CREAT SERPL: 42.9 (ref 7–25)
CALCIUM SPEC-SCNC: 8.8 MG/DL (ref 8.6–10.5)
CHLORIDE SERPL-SCNC: 103 MMOL/L (ref 98–107)
CK SERPL-CCNC: 59 U/L (ref 20–180)
CO2 SERPL-SCNC: 26 MMOL/L (ref 22–29)
CREAT SERPL-MCNC: 0.98 MG/DL (ref 0.57–1)
CRP SERPL-MCNC: <0.3 MG/DL (ref 0–0.5)
D-DIMER, QUANTITATIVE (MAD,POW, STR): 901 NG/ML (FEU) (ref 0–470)
DEPRECATED RDW RBC AUTO: 38.3 FL (ref 37–54)
EOSINOPHIL # BLD AUTO: 0 10*3/MM3 (ref 0–0.4)
EOSINOPHIL NFR BLD AUTO: 0 % (ref 0.3–6.2)
ERYTHROCYTE [DISTWIDTH] IN BLOOD BY AUTOMATED COUNT: 11.7 % (ref 12.3–15.4)
FERRITIN SERPL-MCNC: 1380 NG/ML (ref 13–150)
FIBRINOGEN PPP-MCNC: 321 MG/DL (ref 228–514)
GFR SERPL CREATININE-BSD FRML MDRD: 56 ML/MIN/1.73
GLOBULIN UR ELPH-MCNC: 2.4 GM/DL
GLUCOSE BLDC GLUCOMTR-MCNC: 229 MG/DL (ref 70–130)
GLUCOSE BLDC GLUCOMTR-MCNC: 240 MG/DL (ref 70–130)
GLUCOSE SERPL-MCNC: 278 MG/DL (ref 65–99)
HCT VFR BLD AUTO: 39.7 % (ref 34–46.6)
HGB BLD-MCNC: 13.5 G/DL (ref 12–15.9)
IMM GRANULOCYTES # BLD AUTO: 0.11 10*3/MM3 (ref 0–0.05)
IMM GRANULOCYTES NFR BLD AUTO: 0.8 % (ref 0–0.5)
LDH SERPL-CCNC: 384 U/L (ref 135–214)
LYMPHOCYTES # BLD AUTO: 0.34 10*3/MM3 (ref 0.7–3.1)
LYMPHOCYTES NFR BLD AUTO: 2.4 % (ref 19.6–45.3)
MCH RBC QN AUTO: 30.5 PG (ref 26.6–33)
MCHC RBC AUTO-ENTMCNC: 34 G/DL (ref 31.5–35.7)
MCV RBC AUTO: 89.6 FL (ref 79–97)
MONOCYTES # BLD AUTO: 0.43 10*3/MM3 (ref 0.1–0.9)
MONOCYTES NFR BLD AUTO: 3.1 % (ref 5–12)
NEUTROPHILS NFR BLD AUTO: 13.11 10*3/MM3 (ref 1.7–7)
NEUTROPHILS NFR BLD AUTO: 93.6 % (ref 42.7–76)
NRBC BLD AUTO-RTO: 0 /100 WBC (ref 0–0.2)
PLATELET # BLD AUTO: 171 10*3/MM3 (ref 140–450)
PMV BLD AUTO: 11.5 FL (ref 6–12)
POTASSIUM SERPL-SCNC: 4.8 MMOL/L (ref 3.5–5.2)
PROT SERPL-MCNC: 5.1 G/DL (ref 6–8.5)
RBC # BLD AUTO: 4.43 10*6/MM3 (ref 3.77–5.28)
SODIUM SERPL-SCNC: 135 MMOL/L (ref 136–145)
WBC # BLD AUTO: 14 10*3/MM3 (ref 3.4–10.8)

## 2021-04-03 PROCEDURE — 97116 GAIT TRAINING THERAPY: CPT

## 2021-04-03 PROCEDURE — 82728 ASSAY OF FERRITIN: CPT | Performed by: STUDENT IN AN ORGANIZED HEALTH CARE EDUCATION/TRAINING PROGRAM

## 2021-04-03 PROCEDURE — 94760 N-INVAS EAR/PLS OXIMETRY 1: CPT

## 2021-04-03 PROCEDURE — 97530 THERAPEUTIC ACTIVITIES: CPT

## 2021-04-03 PROCEDURE — 25010000002 ENOXAPARIN PER 10 MG: Performed by: INTERNAL MEDICINE

## 2021-04-03 PROCEDURE — 82550 ASSAY OF CK (CPK): CPT | Performed by: STUDENT IN AN ORGANIZED HEALTH CARE EDUCATION/TRAINING PROGRAM

## 2021-04-03 PROCEDURE — 63710000001 INSULIN DETEMIR PER 5 UNITS: Performed by: INTERNAL MEDICINE

## 2021-04-03 PROCEDURE — 83615 LACTATE (LD) (LDH) ENZYME: CPT | Performed by: STUDENT IN AN ORGANIZED HEALTH CARE EDUCATION/TRAINING PROGRAM

## 2021-04-03 PROCEDURE — 82962 GLUCOSE BLOOD TEST: CPT

## 2021-04-03 PROCEDURE — 85025 COMPLETE CBC W/AUTO DIFF WBC: CPT | Performed by: STUDENT IN AN ORGANIZED HEALTH CARE EDUCATION/TRAINING PROGRAM

## 2021-04-03 PROCEDURE — 63710000001 DEXAMETHASONE PER 0.25 MG: Performed by: STUDENT IN AN ORGANIZED HEALTH CARE EDUCATION/TRAINING PROGRAM

## 2021-04-03 PROCEDURE — 97110 THERAPEUTIC EXERCISES: CPT

## 2021-04-03 PROCEDURE — 86140 C-REACTIVE PROTEIN: CPT | Performed by: STUDENT IN AN ORGANIZED HEALTH CARE EDUCATION/TRAINING PROGRAM

## 2021-04-03 PROCEDURE — 94664 DEMO&/EVAL PT USE INHALER: CPT

## 2021-04-03 PROCEDURE — 85384 FIBRINOGEN ACTIVITY: CPT | Performed by: STUDENT IN AN ORGANIZED HEALTH CARE EDUCATION/TRAINING PROGRAM

## 2021-04-03 PROCEDURE — 99232 SBSQ HOSP IP/OBS MODERATE 35: CPT | Performed by: PSYCHIATRY & NEUROLOGY

## 2021-04-03 PROCEDURE — 63710000001 INSULIN ASPART PER 5 UNITS: Performed by: STUDENT IN AN ORGANIZED HEALTH CARE EDUCATION/TRAINING PROGRAM

## 2021-04-03 PROCEDURE — 80053 COMPREHEN METABOLIC PANEL: CPT | Performed by: STUDENT IN AN ORGANIZED HEALTH CARE EDUCATION/TRAINING PROGRAM

## 2021-04-03 PROCEDURE — 94799 UNLISTED PULMONARY SVC/PX: CPT

## 2021-04-03 PROCEDURE — 85379 FIBRIN DEGRADATION QUANT: CPT | Performed by: STUDENT IN AN ORGANIZED HEALTH CARE EDUCATION/TRAINING PROGRAM

## 2021-04-03 RX ADMIN — MELATONIN 3 MG: 3 TAB ORAL at 20:30

## 2021-04-03 RX ADMIN — ARIPIPRAZOLE 2 MG: 2 TABLET ORAL at 09:04

## 2021-04-03 RX ADMIN — INSULIN DETEMIR 40 UNITS: 100 INJECTION, SOLUTION SUBCUTANEOUS at 20:31

## 2021-04-03 RX ADMIN — BARICITINIB 2 MG: 2 TABLET, FILM COATED ORAL at 20:30

## 2021-04-03 RX ADMIN — BUDESONIDE AND FORMOTEROL FUMARATE DIHYDRATE 2 PUFF: 160; 4.5 AEROSOL RESPIRATORY (INHALATION) at 20:34

## 2021-04-03 RX ADMIN — FAMOTIDINE 40 MG: 40 TABLET, FILM COATED ORAL at 09:04

## 2021-04-03 RX ADMIN — ENOXAPARIN SODIUM 40 MG: 40 INJECTION SUBCUTANEOUS at 14:35

## 2021-04-03 RX ADMIN — INSULIN ASPART 12 UNITS: 100 INJECTION, SOLUTION INTRAVENOUS; SUBCUTANEOUS at 07:35

## 2021-04-03 RX ADMIN — INSULIN ASPART 12 UNITS: 100 INJECTION, SOLUTION INTRAVENOUS; SUBCUTANEOUS at 11:44

## 2021-04-03 RX ADMIN — INSULIN ASPART 12 UNITS: 100 INJECTION, SOLUTION INTRAVENOUS; SUBCUTANEOUS at 18:21

## 2021-04-03 RX ADMIN — SODIUM CHLORIDE, PRESERVATIVE FREE 10 ML: 5 INJECTION INTRAVENOUS at 09:05

## 2021-04-03 RX ADMIN — AMLODIPINE BESYLATE 5 MG: 5 TABLET ORAL at 09:04

## 2021-04-03 RX ADMIN — ESCITALOPRAM OXALATE 10 MG: 10 TABLET ORAL at 09:04

## 2021-04-03 RX ADMIN — ENOXAPARIN SODIUM 40 MG: 40 INJECTION SUBCUTANEOUS at 18:20

## 2021-04-03 RX ADMIN — CARVEDILOL 12.5 MG: 12.5 TABLET, FILM COATED ORAL at 09:04

## 2021-04-03 RX ADMIN — BUDESONIDE AND FORMOTEROL FUMARATE DIHYDRATE 2 PUFF: 160; 4.5 AEROSOL RESPIRATORY (INHALATION) at 07:43

## 2021-04-03 RX ADMIN — ALBUTEROL SULFATE 2 PUFF: 90 AEROSOL, METERED RESPIRATORY (INHALATION) at 07:43

## 2021-04-03 RX ADMIN — ALBUTEROL SULFATE 2 PUFF: 90 AEROSOL, METERED RESPIRATORY (INHALATION) at 11:21

## 2021-04-03 RX ADMIN — ALBUTEROL SULFATE 2 PUFF: 90 AEROSOL, METERED RESPIRATORY (INHALATION) at 20:34

## 2021-04-03 RX ADMIN — GUANFACINE 1 MG: 1 TABLET ORAL at 09:04

## 2021-04-03 RX ADMIN — LOSARTAN POTASSIUM 100 MG: 50 TABLET, FILM COATED ORAL at 09:04

## 2021-04-03 RX ADMIN — CARVEDILOL 12.5 MG: 12.5 TABLET, FILM COATED ORAL at 18:20

## 2021-04-03 RX ADMIN — SODIUM CHLORIDE, PRESERVATIVE FREE 10 ML: 5 INJECTION INTRAVENOUS at 21:00

## 2021-04-03 RX ADMIN — DEXAMETHASONE 6 MG: 2 TABLET ORAL at 09:04

## 2021-04-03 RX ADMIN — LEVOTHYROXINE SODIUM 112 MCG: 112 TABLET ORAL at 04:34

## 2021-04-03 NOTE — CONSULTS
"Adult Nutrition  Assessment    Patient Name:  Emily Haas  YOB: 1950  MRN: [id number]  Admit Date:  3/22/2021    Assessment Date:  4/3/2021    Comments: Received message that pt dislikes turkey sausage, and Boost Glucose Control \"bothering stomach\". Wants oatmeal for breakfast. Will honor food prefs and discontinue BGC.         Anthropometrics     Row Name 04/03/21 0500          Anthropometrics    Weight  91.3 kg (201 lb 3.2 oz)                         Electronically signed by:  Patricia Montano RD  04/03/21 09:12 CDT  "

## 2021-04-03 NOTE — PLAN OF CARE
Goal Outcome Evaluation:  Plan of Care Reviewed With: patient  Progress: no change  Outcome Summary: Pt resting between care. VSS with 8L high-flow. Will continue to monitor.

## 2021-04-03 NOTE — THERAPY TREATMENT NOTE
Acute Care - Physical Therapy Treatment Note  [facility]     Patient Name: Emily Haas  : 1950  MRN: [id number]  Today's Date: 4/3/2021           PT Assessment (last 12 hours)      PT Evaluation and Treatment     Row Name 21 1445          Physical Therapy Time and Intention    Subjective Information  no complaints  -RW     Document Type  therapy note (daily note)  -RW     Mode of Treatment  physical therapy  -RW     Patient Effort  good  -RW     Row Name 21 1445          General Information    Patient Profile Reviewed  yes  -RW     Existing Precautions/Restrictions  fall;oxygen therapy device and L/min  -RW     Row Name 21 1445          Cognition    Affect/Mental Status (Cognitive)  WFL  -RW     Orientation Status (Cognition)  oriented to  -     Row Name 21 1445          Pain Scale: Numbers Pre/Post-Treatment    Pretreatment Pain Rating  0/10 - no pain  -RW     Posttreatment Pain Rating  0/10 - no pain  -     Row Name 21 1445          Bed Mobility    Bed Mobility  bed mobility (all) activities  -RW     Supine-Sit Mills (Bed Mobility)  standby assist  -RW     Sit-Supine Mills (Bed Mobility)  standby assist  -RW     Assistive Device (Bed Mobility)  bed rails;head of bed elevated  -     Row Name 21 1445          Transfers    Sit-Stand Mills (Transfers)  supervision;contact guard  -RW     Stand-Sit Mills (Transfers)  supervision;contact guard  -     Row Name 21 1445          Sit-Stand Transfer    Assistive Device (Sit-Stand Transfers)  walker, front-wheeled  -     Row Name 21 1445          Stand-Sit Transfer    Assistive Device (Stand-Sit Transfers)  walker, front-wheeled  -     Row Name 21 1445          Gait/Stairs (Locomotion)    Gait/Stairs Locomotion  gait/ambulation independence;gait/ambulation assistive device;distance ambulated;gait pattern;gait deviations  -RW     Mills Level (Gait)  modified  independence;supervision  -RW     Assistive Device (Gait)  walker, front-wheeled  -RW     Distance in Feet (Gait)  16  -RW     Pattern (Gait)  step-to  -RW     Deviations/Abnormal Patterns (Gait)  gait speed decreased;stride length decreased;nadya decreased  -RW     Row Name 04/03/21 1445          Hip (Therapeutic Exercise)    Hip (Therapeutic Exercise)  strengthening exercise  -RW     Hip Strengthening (Therapeutic Exercise)  marching while seated;10 repetitions  -RW     Row Name 04/03/21 1445          Knee (Therapeutic Exercise)    Knee AROM (Therapeutic Exercise)  LAQ (long arc quad);hamstring curls;10 repetitions;5 repetitions  -RW     Row Name 04/03/21 1445          Ankle (Therapeutic Exercise)    Ankle AROM (Therapeutic Exercise)  dorsiflexion;plantarflexion;10 repetitions;5 repetitions  -RW     Row Name 04/03/21 1445          Vital Signs    Pre SpO2 (%)  96  -RW     O2 Delivery Pre Treatment  supplemental O2  -RW     Intra SpO2 (%)  90  -RW     O2 Delivery Intra Treatment  supplemental O2  -RW     Post SpO2 (%)  98  -RW     O2 Delivery Post Treatment  supplemental O2  -RW     Row Name 04/03/21 1445          Bed Mobility Goal 1 (PT)    Activity/Assistive Device (Bed Mobility Goal 1, PT)  sit to supine/supine to sit  -RW     Carter Level/Cues Needed (Bed Mobility Goal 1, PT)  independent  -RW     Time Frame (Bed Mobility Goal 1, PT)  by discharge  -RW     Strategies/Barriers (Bed Mobility Goal 1, PT)  HOB flat, no bed rails.  -RW     Progress/Outcomes (Bed Mobility Goal 1, PT)  goal partially met;good progress toward goal;continuing progress toward goal  -RW     Row Name 04/03/21 1445          Transfer Goal 1 (PT)    Activity/Assistive Device (Transfer Goal 1, PT)  sit-to-stand/stand-to-sit;bed-to-chair/chair-to-bed  -RW     Carter Level/Cues Needed (Transfer Goal 1, PT)  independent  -RW     Time Frame (Transfer Goal 1, PT)  by discharge  -RW     Strategies/Barriers (Transfers Goal 1, PT)   Maintain SpO2 >90%.  -RW     Progress/Outcome (Transfer Goal 1, PT)  goal not met  -RW     Row Name 04/03/21 1445          Gait Training Goal 1 (PT)    Activity/Assistive Device (Gait Training Goal 1, PT)  gait (walking locomotion)  -RW     [place] Level (Gait Training Goal 1, PT)  independent  -RW     Distance (Gait Training Goal 1, PT)  30'x3.  -RW     Time Frame (Gait Training Goal 1, PT)  by discharge  -RW     Strategies/Barriers (Gait Training Goal 1, PT)  Maintain SpO2 >90%.  -RW     Progress/Outcome (Gait Training Goal 1, PT)  goal partially met  -RW     Row Name 04/03/21 1445          Patient Education Goal (PT)    Activity (Patient Education Goal, PT)  inidep in using breathing ex to raise sat levels  -RW     [place]/Cues/Accuracy (Memory Goal 2, PT)  demonstrates adequately  -RW     Time Frame (Patient Education Goal, PT)  by discharge  -RW     Progress/Outcome (Patient Education Goal, PT)  (S) goal met  -RW     Row Name 04/03/21 144          Positioning and Restraints    Pre-Treatment Position  in bed  -RW     Post Treatment Position  bed  -RW       User Key  (r) = Recorded By, (t) = Taken By, (c) = Cosigned By    Initials Name Provider Type    RW Solomon Chacon, PTA Physical Therapy Assistant        Physical Therapy Education                 Title: PT OT SLP Therapies (In Progress)     Topic: Physical Therapy (In Progress)     Point: Mobility training (Done)     Learning Progress Summary           Patient Acceptance, D,E, VU,DU,NR by GB at 4/1/2021 1706    Comment: breathing ex for urms up in hale, arms down exhale w/ humming; sidelying allen and how to realize sats w/ SOA ; encouraged call for assist for OOB    Acceptance, E, VU by CZ at 3/25/2021 1600    Comment: Educated on PT POC and goals. Educated on proper breating technique with exersion and avoiding valsalva maneuver.                   Point: Home exercise program (Done)     Learning Progress Summary           Patient Acceptance, D,E,  "TIFF WONG,NR by  at 4/1/2021 1706    Comment: breathing ex for urms up in hale, arms down exhale w/ humming; sidelying allen and how to realize sats w/ SOA ; encouraged call for assist for OOB                   Point: Body mechanics (Not Started)     Learner Progress:  Not documented in this visit.          Point: Precautions (Done)     Learning Progress Summary           Patient Acceptance, D,E, MARVIN,TIFF,NR by  at 4/1/2021 1706    Comment: breathing ex for urms up in hale, arms down exhale w/ humming; sidelying allen and how to realize sats w/ SOA ; encouraged call for assist for OOB                               User Key     Initials Effective Dates Name Provider Type Discipline     04/03/18 -  Florecita Wood, PT Physical Therapist PT    CZ 04/03/18 -  Kar López, PT Physical Therapist PT              PT Recommendation and Plan  Anticipated Discharge Disposition (PT): home with assist  Therapy Frequency (PT): other (see comments) (5-7 days/week)  Plan of Care Reviewed With: patient  Progress: improving  Outcome Summary: pts o2 sats stayed in 90's most of treatment but drops with bed mobility and gt. breathing ex recovers fairly quick. sba with all transfers at [facility] for gt in room. seated therex performed.cont with mob  Outcome Measures     Row Name 04/02/21 1300             Tinetti Assessment    Tinetti Assessment  yes  -GB      Sitting Balance  1  -GB      Arises  1  -GB      Attempts to Rise  1  -GB      Immediate Standing Balance (first 5 sec)  1  -GB      Standing Balance  1  -GB      Sternal Nudge (feet close together)  2  -GB      Eyes Closed (feet close together)  1  -GB      Turning 360 Degrees- Steps  0  -GB      Turning 360 Degrees- Steadiness  0  -GB      Sitting Down  0  -GB      Tinetti Balance Score  8  -GB      Gait Initiation (immediate after told \"go\")  0  -GB      Step Length- Right Swing  0  -GB      Step Length- Left Swing  0  -GB      Foot Clearance- Right Foot  1  -GB      Foot " Clearance- Left Foot  1  -GB      Step Symmetry  1  -GB      Step Continuity  1  -GB      Path (excursion)  1  -GB      Trunk  2  -GB      Base of Support  1  -GB      Gait Score  8  -GB      Tinetti Total Score  16  -GB      Tinetti Assistive Device  rolling walker  -GB         Functional Assessment    Outcome Measure Options  Tinetti  -GB        User Key  (r) = Recorded By, (t) = Taken By, (c) = Cosigned By    Initials Name Provider Type    GB Florecita Wood, PT Physical Therapist           Time Calculation:   PT Charges     Row Name 04/03/21 1600             Time Calculation    Start Time  1445  -RW      Stop Time  1530  -RW      Time Calculation (min)  45 min  -RW         Time Calculation- PT    Total Timed Code Minutes- PT  45 minute(s)  -RW        User Key  (r) = Recorded By, (t) = Taken By, (c) = Cosigned By    Initials Name Provider Type    RW Solomon Chacon PTA Physical Therapy Assistant        Therapy Charges for Today     Code Description Service Date Service Provider Modifiers Qty    55514502348 HC GAIT TRAINING EA 15 MIN 4/3/2021 Solomon Chacon, ARTIS GP 1    13633114342 HC PT THER PROC EA 15 MIN 4/3/2021 Solomon Chacon, ARTIS GP 1    51221188097 HC PT THERAPEUTIC ACT EA 15 MIN 4/3/2021 Solomon Chacon, ARTIS GP 1          PT G-Codes  Outcome Measure Options: Tinetti  AM-PAC 6 Clicks Score (PT): 17  AM-PAC 6 Clicks Score (OT): 18  Tinetti Total Score: 16    Solomon Chacon PTA  4/3/2021

## 2021-04-03 NOTE — PLAN OF CARE
Goal Outcome Evaluation:  Plan of Care Reviewed With: patient  Progress: improving  Outcome Summary: pts o2 sats stayed in 90's most of treatment but drops with bed mobility and gt. breathing ex recovers fairly quick. sba with all transfers at [facility] for gt in room. seated therex performed.cont with mob

## 2021-04-03 NOTE — PLAN OF CARE
Goal Outcome Evaluation:  Plan of Care Reviewed With: patient  Progress: improving  Outcome Summary: vss, no distress, no complaints, attempting to wean o2.

## 2021-04-03 NOTE — PROGRESS NOTES
[facility] Medicine Services  INPATIENT PROGRESS NOTE    Length of Stay: 11  Date of Admission: 3/22/2021  Primary Care Physician: Bharati Love MD    Subjective   Chief Complaint: shortness of breath   HPI:  71 year old admitted for acute hypoxic respiratory failure secondary to COVID.    Feels the same today. No new complaints. Feels like the Abilify is helping.     Able to wean to 8L    Having some trouble sleeping.    Review of Systems   Constitutional: Positive for fatigue. Negative for chills, diaphoresis and fever.   HENT: Negative for sneezing and sore throat.    Eyes: Negative for pain and discharge.   Respiratory: Positive for shortness of breath. Negative for cough.    Gastrointestinal: Negative for constipation, diarrhea, nausea and vomiting.   Endocrine: Negative for cold intolerance and heat intolerance.   Genitourinary: Negative for difficulty urinating, dysuria, frequency and urgency.   Musculoskeletal: Negative for arthralgias and myalgias.   Skin: Negative for color change and pallor.   Allergic/Immunologic: Negative for environmental allergies and food allergies.   Neurological: Positive for weakness. Negative for dizziness and syncope.   Psychiatric/Behavioral: Positive for sleep disturbance. Negative for confusion.      All pertinent negatives and positives are as above. All other systems have been reviewed and are negative unless otherwise stated.     Objective    Temp:  [97.8 °F (36.6 °C)-98.1 °F (36.7 °C)] 98.1 °F (36.7 °C)  Heart Rate:  [56-90] 66  Resp:  [18-20] 18  BP: (131-150)/(65-74) 150/72    Physical Exam  Vitals reviewed.   Constitutional:       General: She is not in acute distress.     Appearance: She is well-developed.   HENT:      Head: Normocephalic and atraumatic.      Nose: Nose normal.   Eyes:      Conjunctiva/sclera: Conjunctivae normal.   Cardiovascular:      Rate and Rhythm: Normal rate and regular rhythm.   Pulmonary:       Effort: Pulmonary effort is normal. No respiratory distress.      Breath sounds: Decreased breath sounds present. No wheezing or rales.   Musculoskeletal:         General: Normal range of motion.      Cervical back: Normal range of motion and neck supple.   Skin:     General: Skin is warm and dry.   Neurological:      Mental Status: She is alert and oriented to person, place, and time.   Psychiatric:         Behavior: Behavior normal.             Results Review:  I have reviewed the labs, radiology results, and diagnostic studies.    Laboratory Data:   Results from last 7 days   Lab Units 04/03/21  0605 04/02/21  0608 04/01/21  0635   SODIUM mmol/L 135* 134* 136   POTASSIUM mmol/L 4.8 4.6 4.1   CHLORIDE mmol/L 103 103 103   CO2 mmol/L 26.0 25.0 25.0   BUN mg/dL 42* 38* 42*   CREATININE mg/dL 0.98 0.82 0.97   GLUCOSE mg/dL 278* 211* 234*   CALCIUM mg/dL 8.8 8.6 8.4*   BILIRUBIN mg/dL 0.6 0.6 0.5   ALK PHOS U/L 48 43 48   ALT (SGPT) U/L 17 14 13   AST (SGOT) U/L 15 14 12   ANION GAP mmol/L 6.0 6.0 8.0     Estimated Creatinine Clearance: 58.8 mL/min (by C-G formula based on SCr of 0.98 mg/dL).          Results from last 7 days   Lab Units 04/03/21  0605 04/02/21  0608 04/01/21  0635 03/31/21  0622 03/30/21  0652   WBC 10*3/mm3 14.00* 14.17* 14.62* 12.86* 13.97*   HEMOGLOBIN g/dL 13.5 13.5 13.3 13.5 13.2   HEMATOCRIT % 39.7 39.0 38.2 39.6 38.1   PLATELETS 10*3/mm3 171 180 172 206 224           Culture Data:   No results found for: BLOODCX  No results found for: URINECX  No results found for: RESPCX  No results found for: WOUNDCX  No results found for: STOOLCX  No components found for: BODYFLD    Radiology Data:   Imaging Results (Last 24 Hours)     ** No results found for the last 24 hours. **          I have reviewed the patient current medications.     Assessment/Plan     Active Hospital Problems    Diagnosis    • **Pneumonia due to COVID-19 virus    • Acute respiratory failure with hypoxia (CMS/HCC)    • CKD (chronic  kidney disease) stage 3, GFR 30-59 ml/min (CMS/[facility])    • Depression    • Diabetes mellitus (CMS/[facility])    • Essential hypertension    • Generalized anxiety disorder        Plan:      1. Acute Respiratory Failure with hypoxia, secondary to COVID 19  · Continue supplemental oxygen, now on 8L, wean as tolerated   · Remdesivir completed   · Decadron 6mg daily   · Baricitinib Day 9  · Bronchodilators   · Psych consult for depression - augment w/ Abilify and noted improvement. Added melatonin for insomnia.     2. CKD  · Creatinine stable    3. DM   · Levemir 40 units  · SSI high dose while on steroids     4. Hypertension   · Coreg  · Norvasc  · Losartan    DVT ppx: Lovenox  GI ppx: Pepcid             Discharge Planning: will be here several more days         This document has been electronically signed by Dalila Serrano MD on April 3, 2021 10:53 CDT

## 2021-04-03 NOTE — PROGRESS NOTES
Psychiatry & Behavioral Health Inpatient Consult Progress Note                                      4/3/2021    HD: #11   Legal: Vol    Referring Provider: Dr Serrano    Reason for Consultation & CC: Depression       Subjective --  Ms. Emily Haas is a 71 y.o. female who was seen on the 4W unit.      Patient is seen through her window using her interim phone.  She is pleasant.  Engaged well.  Makes good eye contact through the door.  She reports improving mood.  Feels like the Abilify has been helpful.  No adverse effects.  She denies any SI or HI.  Denies any nihilism.  States that she feels, reasonably, stuck in her room and is looking forward to be able to go home to spend time with her family.      Review of Systems:  --Neuro: Denies headache  --GI: Denies stomachache  --MS: Denies muscle ache  --General: Denies dizziness.      Objective   Objective --    Vital Signs:  Temp:  [96.4 °F (35.8 °C)-98.1 °F (36.7 °C)] 96.9 °F (36.1 °C)  Heart Rate:  [56-75] 73  Resp:  [18-20] 18  BP: (126-151)/(65-74) 126/65      Physical Exam:   -General Appearance:  alert, interactive and cooperative  -Hygiene:  Adequate   -Gait & Station:  Deferred, in bed  -Musculoskeletal:  No tremors or abnormal involuntary movements and No atrophy noted (overall limited from view given COVID restrictions)  -Pulm: unlaboured    Mental Status Exam:   --Cooperation:  Cooperative  --Eye Contact:  Fair  --Psychomotor Behavior:  Appropriate  --Mood:  Improving  --Affect:  mood-congruent  --Speech:  Normal r/r/v  --Thought Process:  Coherent and Linear, Logical  --Associations: Goal Directed and No Looseness of Associations  --Themes:  Hope for Future and Self Improvement  --Thought Content:     --Mood congruent   --Suicidal:  Denies    --Homicidal:  Denies   --Hallucinations:  Denies and Not appearing to respond to internal stimuli at time of my interview   --Delusion:  None noted/overt and No overt psychotic  overlay  --Cognitive Functioning:   --Consciousness: awake and alert, Orientation:  Person, Place, Time and Situation and Attention:  Adequate  --Reliability:  fair  --Insight:  Fair  --Judgment:  Without Gross Impairment  --Impulse Control:  Without Gross Impairment      Diagnostic Data --  Lab Results (last 24 hours)     Procedure Component Value Units Date/Time    POC Glucose Once [[id number]]  (Abnormal) Collected: 04/03/21 0655    Specimen: Blood Updated: 04/03/21 0724     Glucose 229 mg/dL      Comment: RN NotifiedOperator: [id number] LEATHA Tarango ID: [id number]       C-reactive Protein [[id number]]  (Normal) Collected: 04/03/21 0605    Specimen: Blood Updated: 04/03/21 0657     C-Reactive Protein <0.30 mg/dL     D-dimer, Quantitative [[id number]]  (Abnormal) Collected: 04/03/21 0605    Specimen: Blood Updated: 04/03/21 0647     D-Dimer, Quantitative 901 ng/mL (FEU)     Narrative:      Dimer values <500 ng/ml FEU are FDA approved as aid in diagnosis of deep venous thrombosis and pulmonary embolism.  This test should not be used in an exclusion strategy with pretest probability alone.    A recent guideline regarding diagnosis for pulmonary thromboembolism recommends an adjusted exclusion criterion of age x 10 ng/ml FEU for patients >50 years of age ([employer] Intern Med 2015; 163: 701-711).      Fibrinogen [[id number]]  (Normal) Collected: 04/03/21 0605    Specimen: Blood Updated: 04/03/21 0647     Fibrinogen 321 mg/dL     Ferritin [[id number]]  (Abnormal) Collected: 04/03/21 0605    Specimen: Blood Updated: 04/03/21 0646     Ferritin 1,380.00 ng/mL     Narrative:      Results may be falsely decreased if patient taking Biotin.      Comprehensive Metabolic Panel [[id number]]  (Abnormal) Collected: 04/03/21 0605    Specimen: Blood Updated: 04/03/21 0643     Glucose 278 mg/dL      BUN 42 mg/dL      Creatinine 0.98 mg/dL      Sodium 135 mmol/L      Potassium 4.8 mmol/L      Chloride 103 mmol/L      CO2 26.0 mmol/L       Calcium 8.8 mg/dL      Total Protein 5.1 g/dL      Albumin 2.70 g/dL      ALT (SGPT) 17 U/L      AST (SGOT) 15 U/L      Alkaline Phosphatase 48 U/L      Total Bilirubin 0.6 mg/dL      eGFR Non African Amer 56 mL/min/1.73      Globulin 2.4 gm/dL      A/G Ratio 1.1 g/dL      BUN/Creatinine Ratio 42.9     Anion Gap 6.0 mmol/L     Narrative:      GFR Normal >60  Chronic Kidney Disease <60  Kidney Failure <15      Lactate Dehydrogenase [[id number]]  (Abnormal) Collected: 04/03/21 0605    Specimen: Blood Updated: 04/03/21 0643      U/L      Comment: Specimen hemolyzed.  Results may be affected.       CK [[id number]]  (Normal) Collected: 04/03/21 0605    Specimen: Blood Updated: 04/03/21 0643     Creatine Kinase 59 U/L     CBC & Differential [[id number]]  (Abnormal) Collected: 04/03/21 0605    Specimen: Blood Updated: 04/03/21 0624    Narrative:      The following orders were created for panel order CBC & Differential.  Procedure                               Abnormality         Status                     ---------                               -----------         ------                     CBC Auto Differential[[id number]]        Abnormal            Final result                 Please view results for these tests on the individual orders.    CBC Auto Differential [[id number]]  (Abnormal) Collected: 04/03/21 0605    Specimen: Blood Updated: 04/03/21 0624     WBC 14.00 10*3/mm3      RBC 4.43 10*6/mm3      Hemoglobin 13.5 g/dL      Hematocrit 39.7 %      MCV 89.6 fL      MCH 30.5 pg      MCHC 34.0 g/dL      RDW 11.7 %      RDW-SD 38.3 fl      MPV 11.5 fL      Platelets 171 10*3/mm3      Neutrophil % 93.6 %      Lymphocyte % 2.4 %      Monocyte % 3.1 %      Eosinophil % 0.0 %      Basophil % 0.1 %      Immature Grans % 0.8 %      Neutrophils, Absolute 13.11 10*3/mm3      Lymphocytes, Absolute 0.34 10*3/mm3      Monocytes, Absolute 0.43 10*3/mm3      Eosinophils, Absolute 0.00 10*3/mm3      Basophils, Absolute 0.01 10*3/mm3       Immature Grans, Absolute 0.11 10*3/mm3      nRBC 0.0 /100 WBC     POC Glucose Once [[id number]]  (Abnormal) Collected: 04/02/21 2148    Specimen: Blood Updated: 04/02/21 2345     Glucose 296 mg/dL      Comment: RN NotifiedOperator: [id number] KAYLEE Thomas ID: [id number]             Imaging Results (Last 24 Hours)     ** No results found for the last 24 hours. **            Medications:   Scheduled Meds:albuterol sulfate HFA, 2 puff, Inhalation, 4x Daily - RT  amLODIPine, 5 mg, Oral, Q24H  ARIPiprazole, 2 mg, Oral, Daily  baricitinib, 2 mg, Oral, Daily  budesonide-formoterol, 2 puff, Inhalation, BID - RT  carvedilol, 12.5 mg, Oral, BID With Meals  dexamethasone, 6 mg, Oral, Daily With Breakfast  enoxaparin, 40 mg, Subcutaneous, Q24H  escitalopram, 10 mg, Oral, Daily  famotidine, 40 mg, Oral, Daily  guanFACINE, 1 mg, Oral, Daily  insulin aspart, 0-24 Units, Subcutaneous, TID AC  insulin detemir, 40 Units, Subcutaneous, Nightly  levothyroxine, 112 mcg, Oral, QAM  losartan, 100 mg, Oral, Daily  melatonin, 3 mg, Oral, Nightly  sodium chloride, 10 mL, Intravenous, Q12H      Continuous Infusions:   PRN Meds:.•  acetaminophen  •  dextrose  •  dextrose  •  glucagon (human recombinant)  •  HYDROcod Polst-CPM Polst ER  •  ondansetron ODT  •  sodium chloride      Assessment:     Pneumonia due to COVID-19 virus    Generalized anxiety disorder    Essential hypertension    Diabetes mellitus (CMS/[facility])    Depression    CKD (chronic kidney disease) stage 3, GFR 30-59 ml/min (CMS/[facility])    Acute respiratory failure with hypoxia (CMS/[facility])        DIAGNOSTIC IMPRESSION: Pleasant 71-year-old female seen for depressive spectrum disorder and anxiety spectrum disorder.  Responded well to pharmacotherapeutic interventions.      Treatment Plan & Recommendations:  --Plan to continue Lexapro 10 mg daily for mood  -Plan to continue Abilify 2 mg daily for augmentation of depression.  -Will need outpatient behavioral health follow-up from  the primary team (placed CM consult to this effect).      -->All questions answered for the patient.    --> Impression and recommendations discussed with nursing staff.      Psychiatry sign off given behavioral improvements.  Thank you for this consult.  Please contact me with any further questions or concerns.     Cy Boudreaux II, MD  Psychiatry & Behavioral Sciences  04/03/21 @ 18:17 CDT  Dictated using Dragon.

## 2021-04-04 LAB
ALBUMIN SERPL-MCNC: 2.6 G/DL (ref 3.5–5.2)
ALBUMIN/GLOB SERPL: 1 G/DL
ALP SERPL-CCNC: 42 U/L (ref 39–117)
ALT SERPL W P-5'-P-CCNC: 18 U/L (ref 1–33)
ANION GAP SERPL CALCULATED.3IONS-SCNC: 7 MMOL/L (ref 5–15)
AST SERPL-CCNC: 13 U/L (ref 1–32)
BASOPHILS # BLD AUTO: 0.01 10*3/MM3 (ref 0–0.2)
BASOPHILS NFR BLD AUTO: 0.1 % (ref 0–1.5)
BILIRUB SERPL-MCNC: 0.7 MG/DL (ref 0–1.2)
BUN SERPL-MCNC: 35 MG/DL (ref 8–23)
BUN/CREAT SERPL: 38.5 (ref 7–25)
CALCIUM SPEC-SCNC: 8.8 MG/DL (ref 8.6–10.5)
CHLORIDE SERPL-SCNC: 102 MMOL/L (ref 98–107)
CK SERPL-CCNC: 52 U/L (ref 20–180)
CO2 SERPL-SCNC: 24 MMOL/L (ref 22–29)
CREAT SERPL-MCNC: 0.91 MG/DL (ref 0.57–1)
CRP SERPL-MCNC: <0.3 MG/DL (ref 0–0.5)
DEPRECATED RDW RBC AUTO: 38.1 FL (ref 37–54)
EOSINOPHIL # BLD AUTO: 0 10*3/MM3 (ref 0–0.4)
EOSINOPHIL NFR BLD AUTO: 0 % (ref 0.3–6.2)
ERYTHROCYTE [DISTWIDTH] IN BLOOD BY AUTOMATED COUNT: 11.7 % (ref 12.3–15.4)
FERRITIN SERPL-MCNC: 1384 NG/ML (ref 13–150)
GFR SERPL CREATININE-BSD FRML MDRD: 61 ML/MIN/1.73
GLOBULIN UR ELPH-MCNC: 2.5 GM/DL
GLUCOSE BLDC GLUCOMTR-MCNC: 224 MG/DL (ref 70–130)
GLUCOSE BLDC GLUCOMTR-MCNC: 226 MG/DL (ref 70–130)
GLUCOSE BLDC GLUCOMTR-MCNC: 265 MG/DL (ref 70–130)
GLUCOSE BLDC GLUCOMTR-MCNC: 328 MG/DL (ref 70–130)
GLUCOSE SERPL-MCNC: 213 MG/DL (ref 65–99)
HCT VFR BLD AUTO: 39.2 % (ref 34–46.6)
HGB BLD-MCNC: 13.3 G/DL (ref 12–15.9)
IMM GRANULOCYTES # BLD AUTO: 0.11 10*3/MM3 (ref 0–0.05)
IMM GRANULOCYTES NFR BLD AUTO: 0.8 % (ref 0–0.5)
LYMPHOCYTES # BLD AUTO: 0.44 10*3/MM3 (ref 0.7–3.1)
LYMPHOCYTES NFR BLD AUTO: 3 % (ref 19.6–45.3)
MCH RBC QN AUTO: 30.4 PG (ref 26.6–33)
MCHC RBC AUTO-ENTMCNC: 33.9 G/DL (ref 31.5–35.7)
MCV RBC AUTO: 89.5 FL (ref 79–97)
MONOCYTES # BLD AUTO: 0.48 10*3/MM3 (ref 0.1–0.9)
MONOCYTES NFR BLD AUTO: 3.3 % (ref 5–12)
NEUTROPHILS NFR BLD AUTO: 13.52 10*3/MM3 (ref 1.7–7)
NEUTROPHILS NFR BLD AUTO: 92.8 % (ref 42.7–76)
NRBC BLD AUTO-RTO: 0 /100 WBC (ref 0–0.2)
PLATELET # BLD AUTO: 174 10*3/MM3 (ref 140–450)
PMV BLD AUTO: 11.7 FL (ref 6–12)
POTASSIUM SERPL-SCNC: 4.6 MMOL/L (ref 3.5–5.2)
PROT SERPL-MCNC: 5.1 G/DL (ref 6–8.5)
RBC # BLD AUTO: 4.38 10*6/MM3 (ref 3.77–5.28)
SODIUM SERPL-SCNC: 133 MMOL/L (ref 136–145)
WBC # BLD AUTO: 14.56 10*3/MM3 (ref 3.4–10.8)

## 2021-04-04 PROCEDURE — 97535 SELF CARE MNGMENT TRAINING: CPT

## 2021-04-04 PROCEDURE — 82550 ASSAY OF CK (CPK): CPT | Performed by: STUDENT IN AN ORGANIZED HEALTH CARE EDUCATION/TRAINING PROGRAM

## 2021-04-04 PROCEDURE — 85025 COMPLETE CBC W/AUTO DIFF WBC: CPT | Performed by: STUDENT IN AN ORGANIZED HEALTH CARE EDUCATION/TRAINING PROGRAM

## 2021-04-04 PROCEDURE — 25010000002 ENOXAPARIN PER 10 MG: Performed by: INTERNAL MEDICINE

## 2021-04-04 PROCEDURE — 80053 COMPREHEN METABOLIC PANEL: CPT | Performed by: STUDENT IN AN ORGANIZED HEALTH CARE EDUCATION/TRAINING PROGRAM

## 2021-04-04 PROCEDURE — 94799 UNLISTED PULMONARY SVC/PX: CPT

## 2021-04-04 PROCEDURE — 63710000001 DEXAMETHASONE PER 0.25 MG: Performed by: STUDENT IN AN ORGANIZED HEALTH CARE EDUCATION/TRAINING PROGRAM

## 2021-04-04 PROCEDURE — 82728 ASSAY OF FERRITIN: CPT | Performed by: STUDENT IN AN ORGANIZED HEALTH CARE EDUCATION/TRAINING PROGRAM

## 2021-04-04 PROCEDURE — 97110 THERAPEUTIC EXERCISES: CPT

## 2021-04-04 PROCEDURE — 63710000001 INSULIN ASPART PER 5 UNITS: Performed by: STUDENT IN AN ORGANIZED HEALTH CARE EDUCATION/TRAINING PROGRAM

## 2021-04-04 PROCEDURE — 86140 C-REACTIVE PROTEIN: CPT | Performed by: STUDENT IN AN ORGANIZED HEALTH CARE EDUCATION/TRAINING PROGRAM

## 2021-04-04 PROCEDURE — 94760 N-INVAS EAR/PLS OXIMETRY 1: CPT

## 2021-04-04 PROCEDURE — 82962 GLUCOSE BLOOD TEST: CPT

## 2021-04-04 PROCEDURE — 94664 DEMO&/EVAL PT USE INHALER: CPT

## 2021-04-04 PROCEDURE — 63710000001 INSULIN DETEMIR PER 5 UNITS: Performed by: INTERNAL MEDICINE

## 2021-04-04 RX ADMIN — ENOXAPARIN SODIUM 40 MG: 40 INJECTION SUBCUTANEOUS at 18:04

## 2021-04-04 RX ADMIN — ALBUTEROL SULFATE 2 PUFF: 90 AEROSOL, METERED RESPIRATORY (INHALATION) at 19:24

## 2021-04-04 RX ADMIN — BUDESONIDE AND FORMOTEROL FUMARATE DIHYDRATE 2 PUFF: 160; 4.5 AEROSOL RESPIRATORY (INHALATION) at 19:24

## 2021-04-04 RX ADMIN — ESCITALOPRAM OXALATE 10 MG: 10 TABLET ORAL at 08:43

## 2021-04-04 RX ADMIN — DEXAMETHASONE 6 MG: 2 TABLET ORAL at 08:43

## 2021-04-04 RX ADMIN — ALBUTEROL SULFATE 2 PUFF: 90 AEROSOL, METERED RESPIRATORY (INHALATION) at 08:07

## 2021-04-04 RX ADMIN — GUANFACINE 1 MG: 1 TABLET ORAL at 08:43

## 2021-04-04 RX ADMIN — INSULIN ASPART 8 UNITS: 100 INJECTION, SOLUTION INTRAVENOUS; SUBCUTANEOUS at 08:42

## 2021-04-04 RX ADMIN — BARICITINIB 2 MG: 2 TABLET, FILM COATED ORAL at 22:10

## 2021-04-04 RX ADMIN — INSULIN ASPART 8 UNITS: 100 INJECTION, SOLUTION INTRAVENOUS; SUBCUTANEOUS at 18:03

## 2021-04-04 RX ADMIN — BUDESONIDE AND FORMOTEROL FUMARATE DIHYDRATE 2 PUFF: 160; 4.5 AEROSOL RESPIRATORY (INHALATION) at 08:07

## 2021-04-04 RX ADMIN — SODIUM CHLORIDE, PRESERVATIVE FREE 10 ML: 5 INJECTION INTRAVENOUS at 08:43

## 2021-04-04 RX ADMIN — CARVEDILOL 12.5 MG: 12.5 TABLET, FILM COATED ORAL at 08:43

## 2021-04-04 RX ADMIN — LOSARTAN POTASSIUM 100 MG: 50 TABLET, FILM COATED ORAL at 08:43

## 2021-04-04 RX ADMIN — INSULIN ASPART 8 UNITS: 100 INJECTION, SOLUTION INTRAVENOUS; SUBCUTANEOUS at 11:30

## 2021-04-04 RX ADMIN — FAMOTIDINE 40 MG: 40 TABLET, FILM COATED ORAL at 08:43

## 2021-04-04 RX ADMIN — ALBUTEROL SULFATE 2 PUFF: 90 AEROSOL, METERED RESPIRATORY (INHALATION) at 12:13

## 2021-04-04 RX ADMIN — ARIPIPRAZOLE 2 MG: 2 TABLET ORAL at 08:43

## 2021-04-04 RX ADMIN — LEVOTHYROXINE SODIUM 112 MCG: 112 TABLET ORAL at 06:10

## 2021-04-04 RX ADMIN — CARVEDILOL 12.5 MG: 12.5 TABLET, FILM COATED ORAL at 18:04

## 2021-04-04 RX ADMIN — INSULIN DETEMIR 40 UNITS: 100 INJECTION, SOLUTION SUBCUTANEOUS at 22:14

## 2021-04-04 RX ADMIN — AMLODIPINE BESYLATE 5 MG: 5 TABLET ORAL at 08:43

## 2021-04-04 RX ADMIN — MELATONIN 3 MG: 3 TAB ORAL at 22:11

## 2021-04-04 NOTE — PROGRESS NOTES
[facility] Medicine Services  INPATIENT PROGRESS NOTE    Length of Stay: 12  Date of Admission: 3/22/2021  Primary Care Physician: Bharati Love MD    Subjective   Chief Complaint: shortness of breath   HPI:  71 year old admitted for acute hypoxic respiratory failure secondary to COVID.    She is feeling better today. Able to wean to 5L.     Review of Systems   Constitutional: Positive for fatigue. Negative for chills, diaphoresis and fever.   HENT: Negative for sneezing and sore throat.    Eyes: Negative for pain and discharge.   Respiratory: Negative for cough and shortness of breath.    Gastrointestinal: Negative for constipation, diarrhea, nausea and vomiting.   Endocrine: Negative for cold intolerance and heat intolerance.   Genitourinary: Negative for difficulty urinating, dysuria, frequency and urgency.   Musculoskeletal: Negative for arthralgias and myalgias.   Skin: Negative for color change and pallor.   Allergic/Immunologic: Negative for environmental allergies and food allergies.   Neurological: Positive for weakness. Negative for dizziness and syncope.   Psychiatric/Behavioral: Negative for confusion and sleep disturbance.      All pertinent negatives and positives are as above. All other systems have been reviewed and are negative unless otherwise stated.     Objective    Temp:  [96.3 °F (35.7 °C)-97.6 °F (36.4 °C)] 97.6 °F (36.4 °C)  Heart Rate:  [60-74] 69  Resp:  [18-20] 18  BP: (126-144)/(65-74) 136/65    Physical Exam  Vitals reviewed.   Constitutional:       General: She is not in acute distress.     Appearance: She is well-developed.   HENT:      Head: Normocephalic and atraumatic.      Nose: Nose normal.   Eyes:      Conjunctiva/sclera: Conjunctivae normal.   Cardiovascular:      Rate and Rhythm: Normal rate and regular rhythm.   Pulmonary:      Effort: Pulmonary effort is normal. No respiratory distress.      Breath sounds: Decreased breath  sounds present. No wheezing or rales.   Musculoskeletal:         General: Normal range of motion.      Cervical back: Normal range of motion and neck supple.   Skin:     General: Skin is warm and dry.   Neurological:      Mental Status: She is alert and oriented to person, place, and time.   Psychiatric:         Behavior: Behavior normal.             Results Review:  I have reviewed the labs, radiology results, and diagnostic studies.    Laboratory Data:   Results from last 7 days   Lab Units 04/04/21  0608 04/03/21  0605 04/02/21  0608   SODIUM mmol/L 133* 135* 134*   POTASSIUM mmol/L 4.6 4.8 4.6   CHLORIDE mmol/L 102 103 103   CO2 mmol/L 24.0 26.0 25.0   BUN mg/dL 35* 42* 38*   CREATININE mg/dL 0.91 0.98 0.82   GLUCOSE mg/dL 213* 278* 211*   CALCIUM mg/dL 8.8 8.8 8.6   BILIRUBIN mg/dL 0.7 0.6 0.6   ALK PHOS U/L 42 48 43   ALT (SGPT) U/L 18 17 14   AST (SGOT) U/L 13 15 14   ANION GAP mmol/L 7.0 6.0 6.0     Estimated Creatinine Clearance: 63.3 mL/min (by C-G formula based on SCr of 0.91 mg/dL).          Results from last 7 days   Lab Units 04/04/21  0608 04/03/21  0605 04/02/21  0608 04/01/21  0635 03/31/21  0622   WBC 10*3/mm3 14.56* 14.00* 14.17* 14.62* 12.86*   HEMOGLOBIN g/dL 13.3 13.5 13.5 13.3 13.5   HEMATOCRIT % 39.2 39.7 39.0 38.2 39.6   PLATELETS 10*3/mm3 174 171 180 172 206           Culture Data:   No results found for: BLOODCX  No results found for: URINECX  No results found for: RESPCX  No results found for: WOUNDCX  No results found for: STOOLCX  No components found for: BODYFLD    Radiology Data:   Imaging Results (Last 24 Hours)     ** No results found for the last 24 hours. **          I have reviewed the patient current medications.     Assessment/Plan     Active Hospital Problems    Diagnosis    • **Pneumonia due to COVID-19 virus    • Acute respiratory failure with hypoxia (CMS/HCC)    • CKD (chronic kidney disease) stage 3, GFR 30-59 ml/min (CMS/HCC)    • Depression    • Diabetes mellitus  (CMS/[facility])    • Essential hypertension    • Generalized anxiety disorder        Plan:      1. Acute Respiratory Failure with hypoxia, secondary to COVID 19  · Continue supplemental oxygen, now on 5L, wean as tolerated   · Remdesivir completed   · Decadron 6mg daily   · Baricitinib Day 10  · Bronchodilators   · Psych consult for depression - augment w/ Abilify and noted improvement. Added melatonin for insomnia.     2. CKD  · Creatinine stable    3. DM   · Levemir 40 units  · SSI high dose while on steroids     4. Hypertension   · Coreg  · Norvasc  · Losartan    DVT ppx: Lovenox  GI ppx: Pepcid             Discharge Planning: will try to wean to 2-3L of oxygen, may be able to be discharged on oxygen in the next few days         This document has been electronically signed by Dalila Serrano MD on April 4, 2021 12:15 CDT

## 2021-04-04 NOTE — THERAPY TREATMENT NOTE
Patient Name: Emily Haas  : 1950    MRN: [id number]                              Today's Date: 2021       Admit Date: 3/22/2021    Visit Dx:     ICD-10-CM ICD-9-CM   1. Pneumonia due to COVID-19 virus  U07.1 480.8    J12.82 079.89   2. Fever, unspecified fever cause  R50.9 780.60   3. Dyspnea, unspecified type  R06.00 786.09   4. Impaired mobility and ADLs  Z74.09 V49.89    Z78.9    5. Impaired functional mobility, balance, gait, and endurance  Z74.09 V49.89     Patient Active Problem List   Diagnosis   • Vitamin D deficiency   • Irritable bowel syndrome   • Insomnia   • Hyperlipidemia   • Generalized anxiety disorder   • Essential hypertension   • Diverticular disease of colon   • Diabetes mellitus type 2, diet-controlled (CMS/HCC)   • Anxiety state   • Acquired hypothyroidism   • Asthma   • Periumbilical abdominal pain   • Telangiectasia of limb   • Varicose veins of both lower extremities with pain   • Diabetes mellitus (CMS/HCC)   • Posterior subcapsular polar age-related cataract   • Encounter for screening for malignant neoplasm of colon   • Diverticulosis of large intestine without hemorrhage   • Irritable bowel syndrome with both constipation and diarrhea   • Vaginosis   • Fatty liver   • Diabetes mellitus type 2 without retinopathy (CMS/HCC)   • Depression   • Pure hypercholesterolemia   • Class 1 obesity due to excess calories with serious comorbidity and body mass index (BMI) of 33.0 to 33.9 in adult   • Right lower quadrant abdominal pain   • Left lower quadrant abdominal pain   • Diarrhea   • Nausea   • Bloating   • Pneumonia due to COVID-19 virus   • CKD (chronic kidney disease) stage 3, GFR 30-59 ml/min (CMS/HCC)   • Acute respiratory failure with hypoxia (CMS/HCC)     Past Medical History:   Diagnosis Date   • Anxiety    • Chronic bronchitis (CMS/HCC)    • CKD (chronic kidney disease) stage 3, GFR 30-59 ml/min (CMS/HCC)    • Diabetes mellitus (CMS/HCC)    • Diverticulitis    •  Hyperlipidemia    • Hypertension    • Hypothyroidism    • Irritable bowel syndrome    • Obesity    • Osteoarthritis    • Vitamin D deficiency      Past Surgical History:   Procedure Laterality Date   • BILATERAL BREAST REDUCTION     • CHOLECYSTECTOMY     • COLONOSCOPY N/A 11/17/2017   • COLONOSCOPY N/A 1/14/2021   • ENDOSCOPY N/A 1/14/2021   • HYSTERECTOMY       General Information     Row Name 04/04/21 0930          OT Time and Intention    Document Type  therapy note (daily note)  -BB     Mode of Treatment  individual therapy;occupational therapy  -BB     Row Name 04/04/21 0930          General Information    Patient Profile Reviewed  yes  -BB     Existing Precautions/Restrictions  fall;oxygen therapy device and L/min  -BB     Row Name 04/04/21 0930          Cognition    Orientation Status (Cognition)  oriented x 4  -BB     Row Name 04/04/21 0930          Safety Issues, Functional Mobility    Impairments Affecting Function (Mobility)  balance;endurance/activity tolerance;shortness of breath;strength  -BB       User Key  (r) = Recorded By, (t) = Taken By, (c) = Cosigned By    Initials Name Provider Type    BB Venecia Toussaint COTA/L Occupational Therapy Assistant          Mobility/ADL's     Row Name 04/04/21 0930          Bed Mobility    Bed Mobility  bed mobility (all) activities  -BB     Supine-Sit [place] (Bed Mobility)  standby assist  -BB     Sit-Supine [place] (Bed Mobility)  standby assist  -BB     Assistive Device (Bed Mobility)  bed rails;head of bed elevated  -BB     Row Name 04/04/21 0930          Transfers    Transfers  sit-stand transfer;toilet transfer  -BB     Sit-Stand [place] (Transfers)  supervision;contact guard  -BB     [place] Level (Toilet Transfer)  contact guard  -BB     Assistive Device (Toilet Transfer)  commode, bedside without drop arms  -BB     Row Name 04/04/21 0930          Sit-Stand Transfer    Assistive Device (Sit-Stand Transfers)  walker, front-wheeled  -BB      Row Name 04/04/21 0930          Toilet Transfer    Type (Toilet Transfer)  sit-stand;stand-sit  -BB     Row Name 04/04/21 0930          Functional Mobility    Functional Mobility- Ind. Level  contact guard assist  -BB     Row Name 04/04/21 0930          Toileting Assessment/Training    [place] Level (Toileting)  toileting skills;contact guard assist  -BB     Assistive Devices (Toileting)  commode, bedside without drop arms  -BB     Position (Toileting)  unsupported sitting;unsupported standing  -BB     Row Name 04/04/21 0930          Upper Body Dressing Assessment/Training    [place] Level (Upper Body Dressing)  -- hospital gown  -BB     Row Name 04/04/21 0930          Grooming Assessment/Training    [place] Level (Grooming)  hair care, combing/brushing;wash face, hands;set up;modified independence  -BB     Position (Grooming)  edge of bed sitting  -BB     Row Name 04/04/21 0930          Lower Body Dressing Assessment/Training    [place] Level (Lower Body Dressing)  doff;don;socks;dependent (less than 25% patient effort)  -BB     Position (Lower Body Dressing)  supine  -BB       User Key  (r) = Recorded By, (t) = Taken By, (c) = Cosigned By    Initials Name Provider Type    BB Venecia Toussaint COTA/L Occupational Therapy Assistant        Obj/Interventions     Row Name 04/04/21 0930          Range of Motion Comprehensive    General Range of Motion  bilateral upper extremity ROM WNL  -BB     Row Name 04/04/21 0930          Strength Comprehensive (MMT)    General Manual Muscle Testing (MMT) Assessment  other (see comments)  -BB     Row Name 04/04/21 0930          Shoulder (Therapeutic Exercise)    Shoulder AROM (Therapeutic Exercise)  bilateral over head, across chest, diagonals, punch outs and pull ups with red t-band  -BB     Row Name 04/04/21 0930          Elbow/Forearm (Therapeutic Exercise)    Elbow/Forearm AROM (Therapeutic Exercise)  bilateral all exercises 1x15 reps  -BB       User  Key  (r) = Recorded By, (t) = Taken By, (c) = Cosigned By    Initials Name Provider Type    Venecia Mattson COTA/L Occupational Therapy Assistant        Goals/Plan     Row Name 04/04/21 0930          Transfer Goal 1 (OT)    Activity/Assistive Device (Transfer Goal 1, OT)  sit-to-stand/stand-to-sit;bed-to-chair/chair-to-bed;toilet  -BB     [place] Level/Cues Needed (Transfer Goal 1, OT)  modified independence  -BB     Time Frame (Transfer Goal 1, OT)  long term goal (LTG);by discharge  -BB     Progress/Outcome (Transfer Goal 1, OT)  goal not met  -BB     Row Name 04/04/21 0930          Bathing Goal 1 (OT)    Activity/Device (Bathing Goal 1, OT)  bathing skills, all  -BB     [place] Level/Cues Needed (Bathing Goal 1, OT)  set-up required  -BB     Time Frame (Bathing Goal 1, OT)  long term goal (LTG);by discharge  -BB     Progress/Outcomes (Bathing Goal 1, OT)  goal not met  -BB     Row Name 04/04/21 0930          Toileting Goal 1 (OT)    Activity/Device (Toileting Goal 1, OT)  toileting skills, all  -BB     [place] Level/Cues Needed (Toileting Goal 1, OT)  modified independence  -BB     Time Frame (Toileting Goal 1, OT)  by discharge  -BB     Progress/Outcome (Toileting Goal 1, OT)  goal not met  -BB     Row Name 04/04/21 0930          ROM Goal 1 (OT)    ROM Goal 1 (OT)  Pt will tolerate 20 min of functional/therapeutic activities with O2=/> 88%.  -BB     Time Frame (ROM Goal 1, OT)  long term goal (LTG);by discharge  -BB     Progress/Outcome (ROM Goal 1, OT)  goal not met  -BB     Row Name 04/04/21 0930          Strength Goal 1 (OT)    Strength Goal 1 (OT)  Pt will be independent in BUE strengthening HEP  -BB     Time Frame (Strength Goal 1, OT)  long term goal (LTG);by discharge  -BB     Progress/Outcome (Strength Goal 1, OT)  goal not met  -BB       User Key  (r) = Recorded By, (t) = Taken By, (c) = Cosigned By    Initials Name Provider Type    Venecia Mattson J, JUNITO/MOMO Occupational  Therapy Assistant        Clinical Impression     Row Name 04/04/21 0930          Pain Scale: Numbers Pre/Post-Treatment    Pretreatment Pain Rating  0/10 - no pain  -BB     Posttreatment Pain Rating  0/10 - no pain  -BB     Row Name 04/04/21 0930          Plan of Care Review    Plan of Care Reviewed With  patient  -BB     Progress  improving  -BB     Outcome Summary  Pt agreed to OT this am. Pt performed B UE exercises with red t-band 1 set x15 reps. Pt CGA for bed<>BSC. No new goals met this tx.Continue OT POC  -BB     Row Name 04/04/21 0930          Therapy Assessment/Plan (OT)    Rehab Potential (OT)  good, to achieve stated therapy goals  -BB     Criteria for Skilled Therapeutic Interventions Met (OT)  yes;meets criteria;skilled treatment is necessary  -BB     Therapy Frequency (OT)  other (see comments) 5-7 days per week  -BB     Row Name 04/04/21 0930          Therapy Plan Review/Discharge Plan (OT)    Anticipated Discharge Disposition (OT)  home with assist  -BB     Row Name 04/04/21 0930          Vital Signs    Pretreatment Heart Rate (beats/min)  83  -BB     Intratreatment Heart Rate (beats/min)  78  -BB     Posttreatment Heart Rate (beats/min)  76  -BB     Pre SpO2 (%)  96  -BB     O2 Delivery Pre Treatment  supplemental O2  -BB     Intra SpO2 (%)  93  -BB     O2 Delivery Intra Treatment  supplemental O2  -BB     Post SpO2 (%)  97  -BB     O2 Delivery Post Treatment  supplemental O2  -BB     Pre Patient Position  Supine  -BB     Intra Patient Position  Sitting  -BB     Post Patient Position  Supine  -BB     Row Name 04/04/21 0930          Positioning and Restraints    Pre-Treatment Position  in bed  -BB     Post Treatment Position  bed  -BB     In Bed  fowlers;call light within reach;encouraged to call for assist;exit alarm on;patient within staff view  -BB       User Key  (r) = Recorded By, (t) = Taken By, (c) = Cosigned By    Initials Name Provider Type    Venecia Mattson COTA/MOMO Occupational  Therapy Assistant        Outcome Measures     Row Name 04/04/21 0930          How much help from another is currently needed...    Putting on and taking off regular lower body clothing?  2  -BB     Bathing (including washing, rinsing, and drying)  2  -BB     Toileting (which includes using toilet bed pan or urinal)  3  -BB     Putting on and taking off regular upper body clothing  3  -BB     Taking care of personal grooming (such as brushing teeth)  4  -BB     Eating meals  4  -BB     AM-PAC 6 Clicks Score (OT)  18  -BB       User Key  (r) = Recorded By, (t) = Taken By, (c) = Cosigned By    Initials Name Provider Type    BB Venecia Toussaint COTA/L Occupational Therapy Assistant        Occupational Therapy Education                 Title: PT OT SLP Therapies (In Progress)     Topic: Occupational Therapy (Done)     Point: ADL training (Done)     Description:   Instruct learner(s) on proper safety adaptation and remediation techniques during self care or transfers.   Instruct in proper use of assistive devices.              Learning Progress Summary           Patient Acceptance, E,TB, VU by LW at 3/26/2021 1429    Acceptance, E, VU by AS at 3/25/2021 0853    Comment: ADL training, transfer training, PLB, safety precautions    Acceptance, E, VU by AS at 3/24/2021 1413    Comment: role of OT, OT POC, ADL training, transfer training                   Point: Home exercise program (Done)     Description:   Instruct learner(s) on appropriate technique for monitoring, assisting and/or progressing therapeutic exercises/activities.              Learning Progress Summary           Patient Acceptance, E,TB, VU by LW at 3/26/2021 1429                   Point: Precautions (Done)     Description:   Instruct learner(s) on prescribed precautions during self-care and functional transfers.              Learning Progress Summary           Patient Acceptance, E, VU by ME at 3/29/2021 1136    Comment: Educated on OT and POC. Educated  to call for assistance. Educated on safety precautions. Educated on pursed lip breathing technique.    Acceptance, E,TB, VU by LW at 3/26/2021 1429    Acceptance, E, VU by AS at 3/25/2021 0853    Comment: ADL training, transfer training, PLB, safety precautions    Acceptance, E, VU by AS at 3/24/2021 1413    Comment: role of OT, OT POC, ADL training, transfer training                   Point: Body mechanics (Done)     Description:   Instruct learner(s) on proper positioning and spine alignment during self-care, functional mobility activities and/or exercises.              Learning Progress Summary           Patient Acceptance, E, VU by ME at 3/29/2021 1136    Comment: Educated on OT and POC. Educated to call for assistance. Educated on safety precautions. Educated on pursed lip breathing technique.    Acceptance, E,TB, VU by LW at 3/26/2021 1429                               User Key     Initials Effective Dates Name Provider Type Discipline    LW 03/07/18 -  Emily Jack COTA/L Occupational Therapy Assistant OT    AS 03/18/21 -  Jael Lopez OT Occupational Therapist OT    ME 08/24/20 -  Joe Perry OTR/L Occupational Therapist OT              OT Recommendation and Plan  Therapy Frequency (OT): other (see comments) (5-7 days per week)  Plan of Care Review  Plan of Care Reviewed With: patient  Progress: improving  Outcome Summary: Pt agreed to OT this am. Pt performed B UE exercises with red t-band 1 set x15 reps. Pt CGA for bed<>BSC. No new goals met this tx.Continue OT POC     Time Calculation:   Time Calculation- OT     Row Name 04/04/21 1356             Time Calculation- OT    OT Start Time  0930  -BB      OT Stop Time  1009  -BB      OT Time Calculation (min)  39 min  -BB      Total Timed Code Minutes- OT  39 minute(s)  -BB      OT Received On  04/04/21  -BB        User Key  (r) = Recorded By, (t) = Taken By, (c) = Cosigned By    Initials Name Provider Type    BB Venecia Toussaint COTA/MOMO  Occupational Therapy Assistant        Therapy Charges for Today     Code Description Service Date Service Provider Modifiers Qty    30082927552 HC OT SELF CARE/MGMT/TRAIN EA 15 MIN 4/4/2021 Venecia Toussaint COTA/L GO 1    30745987952 HC OT THER PROC EA 15 MIN 4/4/2021 Venecia Toussaint COTA/L GO 2               JUNITO Aguayo/MOMO  4/4/2021

## 2021-04-04 NOTE — PLAN OF CARE
Problem: Adult Inpatient Plan of Care  Goal: Plan of Care Review  Flowsheets  Taken 4/4/2021 1355  Progress: improving  Plan of Care Reviewed With: patient  Taken 4/4/2021 0930  Progress: improving  Plan of Care Reviewed With: patient  Outcome Summary: Pt agreed to OT this am. Pt performed B UE exercises with red t-band 1 set x15 reps. Pt CGA for bed<>BSC. No new goals met this tx.Continue OT POC   Goal Outcome Evaluation:  Plan of Care Reviewed With: patient  Progress: improving  Outcome Summary: Pt agreed to OT this am. Pt performed B UE exercises with red t-band 1 set x15 reps. Pt CGA for bed<>BSC. No new goals met this tx.Continue OT POC

## 2021-04-04 NOTE — PLAN OF CARE
Goal Outcome Evaluation:     Progress: improving  Outcome Summary: VSS, she is sleeping well no complaints at this time.

## 2021-04-05 LAB
ALBUMIN SERPL-MCNC: 2.5 G/DL (ref 3.5–5.2)
ALBUMIN/GLOB SERPL: 1 G/DL
ALP SERPL-CCNC: 42 U/L (ref 39–117)
ALT SERPL W P-5'-P-CCNC: 20 U/L (ref 1–33)
ANION GAP SERPL CALCULATED.3IONS-SCNC: 6 MMOL/L (ref 5–15)
AST SERPL-CCNC: 15 U/L (ref 1–32)
BASOPHILS # BLD AUTO: 0.02 10*3/MM3 (ref 0–0.2)
BASOPHILS NFR BLD AUTO: 0.1 % (ref 0–1.5)
BILIRUB SERPL-MCNC: 0.7 MG/DL (ref 0–1.2)
BUN SERPL-MCNC: 31 MG/DL (ref 8–23)
BUN/CREAT SERPL: 40.3 (ref 7–25)
CALCIUM SPEC-SCNC: 8.7 MG/DL (ref 8.6–10.5)
CHLORIDE SERPL-SCNC: 100 MMOL/L (ref 98–107)
CK SERPL-CCNC: 112 U/L (ref 20–180)
CO2 SERPL-SCNC: 25 MMOL/L (ref 22–29)
CREAT SERPL-MCNC: 0.77 MG/DL (ref 0.57–1)
CRP SERPL-MCNC: <0.3 MG/DL (ref 0–0.5)
D-DIMER, QUANTITATIVE (MAD,POW, STR): 807 NG/ML (FEU) (ref 0–470)
DEPRECATED RDW RBC AUTO: 37.6 FL (ref 37–54)
EOSINOPHIL # BLD AUTO: 0 10*3/MM3 (ref 0–0.4)
EOSINOPHIL NFR BLD AUTO: 0 % (ref 0.3–6.2)
ERYTHROCYTE [DISTWIDTH] IN BLOOD BY AUTOMATED COUNT: 11.7 % (ref 12.3–15.4)
FERRITIN SERPL-MCNC: 1432 NG/ML (ref 13–150)
FIBRINOGEN PPP-MCNC: 261 MG/DL (ref 228–514)
GFR SERPL CREATININE-BSD FRML MDRD: 74 ML/MIN/1.73
GLOBULIN UR ELPH-MCNC: 2.5 GM/DL
GLUCOSE BLDC GLUCOMTR-MCNC: 162 MG/DL (ref 70–130)
GLUCOSE BLDC GLUCOMTR-MCNC: 187 MG/DL (ref 70–130)
GLUCOSE BLDC GLUCOMTR-MCNC: 272 MG/DL (ref 70–130)
GLUCOSE BLDC GLUCOMTR-MCNC: 315 MG/DL (ref 70–130)
GLUCOSE SERPL-MCNC: 212 MG/DL (ref 65–99)
HCT VFR BLD AUTO: 38.3 % (ref 34–46.6)
HGB BLD-MCNC: 13.4 G/DL (ref 12–15.9)
IMM GRANULOCYTES # BLD AUTO: 0.14 10*3/MM3 (ref 0–0.05)
IMM GRANULOCYTES NFR BLD AUTO: 0.9 % (ref 0–0.5)
LDH SERPL-CCNC: 347 U/L (ref 135–214)
LYMPHOCYTES # BLD AUTO: 0.52 10*3/MM3 (ref 0.7–3.1)
LYMPHOCYTES NFR BLD AUTO: 3.4 % (ref 19.6–45.3)
MCH RBC QN AUTO: 31 PG (ref 26.6–33)
MCHC RBC AUTO-ENTMCNC: 35 G/DL (ref 31.5–35.7)
MCV RBC AUTO: 88.7 FL (ref 79–97)
MONOCYTES # BLD AUTO: 0.57 10*3/MM3 (ref 0.1–0.9)
MONOCYTES NFR BLD AUTO: 3.7 % (ref 5–12)
NEUTROPHILS NFR BLD AUTO: 14.04 10*3/MM3 (ref 1.7–7)
NEUTROPHILS NFR BLD AUTO: 91.9 % (ref 42.7–76)
NRBC BLD AUTO-RTO: 0 /100 WBC (ref 0–0.2)
PLATELET # BLD AUTO: 172 10*3/MM3 (ref 140–450)
PMV BLD AUTO: 11.2 FL (ref 6–12)
POTASSIUM SERPL-SCNC: 4.9 MMOL/L (ref 3.5–5.2)
PROT SERPL-MCNC: 5 G/DL (ref 6–8.5)
RBC # BLD AUTO: 4.32 10*6/MM3 (ref 3.77–5.28)
SODIUM SERPL-SCNC: 131 MMOL/L (ref 136–145)
WBC # BLD AUTO: 15.29 10*3/MM3 (ref 3.4–10.8)

## 2021-04-05 PROCEDURE — 82550 ASSAY OF CK (CPK): CPT | Performed by: STUDENT IN AN ORGANIZED HEALTH CARE EDUCATION/TRAINING PROGRAM

## 2021-04-05 PROCEDURE — 83615 LACTATE (LD) (LDH) ENZYME: CPT | Performed by: STUDENT IN AN ORGANIZED HEALTH CARE EDUCATION/TRAINING PROGRAM

## 2021-04-05 PROCEDURE — 94760 N-INVAS EAR/PLS OXIMETRY 1: CPT

## 2021-04-05 PROCEDURE — 63710000001 INSULIN DETEMIR PER 5 UNITS: Performed by: INTERNAL MEDICINE

## 2021-04-05 PROCEDURE — 94799 UNLISTED PULMONARY SVC/PX: CPT

## 2021-04-05 PROCEDURE — 80053 COMPREHEN METABOLIC PANEL: CPT | Performed by: STUDENT IN AN ORGANIZED HEALTH CARE EDUCATION/TRAINING PROGRAM

## 2021-04-05 PROCEDURE — 97116 GAIT TRAINING THERAPY: CPT

## 2021-04-05 PROCEDURE — 86140 C-REACTIVE PROTEIN: CPT | Performed by: STUDENT IN AN ORGANIZED HEALTH CARE EDUCATION/TRAINING PROGRAM

## 2021-04-05 PROCEDURE — 82962 GLUCOSE BLOOD TEST: CPT

## 2021-04-05 PROCEDURE — 25010000002 ENOXAPARIN PER 10 MG: Performed by: INTERNAL MEDICINE

## 2021-04-05 PROCEDURE — 63710000001 DEXAMETHASONE PER 0.25 MG: Performed by: STUDENT IN AN ORGANIZED HEALTH CARE EDUCATION/TRAINING PROGRAM

## 2021-04-05 PROCEDURE — 85379 FIBRIN DEGRADATION QUANT: CPT | Performed by: STUDENT IN AN ORGANIZED HEALTH CARE EDUCATION/TRAINING PROGRAM

## 2021-04-05 PROCEDURE — 97110 THERAPEUTIC EXERCISES: CPT

## 2021-04-05 PROCEDURE — 82728 ASSAY OF FERRITIN: CPT | Performed by: STUDENT IN AN ORGANIZED HEALTH CARE EDUCATION/TRAINING PROGRAM

## 2021-04-05 PROCEDURE — 85025 COMPLETE CBC W/AUTO DIFF WBC: CPT | Performed by: STUDENT IN AN ORGANIZED HEALTH CARE EDUCATION/TRAINING PROGRAM

## 2021-04-05 PROCEDURE — 97535 SELF CARE MNGMENT TRAINING: CPT

## 2021-04-05 PROCEDURE — 85384 FIBRINOGEN ACTIVITY: CPT | Performed by: STUDENT IN AN ORGANIZED HEALTH CARE EDUCATION/TRAINING PROGRAM

## 2021-04-05 PROCEDURE — 63710000001 INSULIN ASPART PER 5 UNITS: Performed by: STUDENT IN AN ORGANIZED HEALTH CARE EDUCATION/TRAINING PROGRAM

## 2021-04-05 RX ADMIN — SODIUM CHLORIDE, PRESERVATIVE FREE 10 ML: 5 INJECTION INTRAVENOUS at 20:50

## 2021-04-05 RX ADMIN — LEVOTHYROXINE SODIUM 112 MCG: 112 TABLET ORAL at 06:18

## 2021-04-05 RX ADMIN — CARVEDILOL 12.5 MG: 12.5 TABLET, FILM COATED ORAL at 09:56

## 2021-04-05 RX ADMIN — BUDESONIDE AND FORMOTEROL FUMARATE DIHYDRATE 2 PUFF: 160; 4.5 AEROSOL RESPIRATORY (INHALATION) at 19:56

## 2021-04-05 RX ADMIN — ENOXAPARIN SODIUM 40 MG: 40 INJECTION SUBCUTANEOUS at 17:34

## 2021-04-05 RX ADMIN — INSULIN ASPART 4 UNITS: 100 INJECTION, SOLUTION INTRAVENOUS; SUBCUTANEOUS at 12:31

## 2021-04-05 RX ADMIN — ESCITALOPRAM OXALATE 10 MG: 10 TABLET ORAL at 09:56

## 2021-04-05 RX ADMIN — SODIUM CHLORIDE, PRESERVATIVE FREE 10 ML: 5 INJECTION INTRAVENOUS at 09:56

## 2021-04-05 RX ADMIN — DEXAMETHASONE 6 MG: 2 TABLET ORAL at 09:56

## 2021-04-05 RX ADMIN — BUDESONIDE AND FORMOTEROL FUMARATE DIHYDRATE 2 PUFF: 160; 4.5 AEROSOL RESPIRATORY (INHALATION) at 07:00

## 2021-04-05 RX ADMIN — LOSARTAN POTASSIUM 100 MG: 50 TABLET, FILM COATED ORAL at 09:56

## 2021-04-05 RX ADMIN — INSULIN ASPART 4 UNITS: 100 INJECTION, SOLUTION INTRAVENOUS; SUBCUTANEOUS at 07:38

## 2021-04-05 RX ADMIN — ALBUTEROL SULFATE 2 PUFF: 90 AEROSOL, METERED RESPIRATORY (INHALATION) at 19:56

## 2021-04-05 RX ADMIN — MELATONIN 3 MG: 3 TAB ORAL at 20:50

## 2021-04-05 RX ADMIN — INSULIN DETEMIR 40 UNITS: 100 INJECTION, SOLUTION SUBCUTANEOUS at 20:49

## 2021-04-05 RX ADMIN — AMLODIPINE BESYLATE 5 MG: 5 TABLET ORAL at 09:56

## 2021-04-05 RX ADMIN — FAMOTIDINE 40 MG: 40 TABLET, FILM COATED ORAL at 09:56

## 2021-04-05 RX ADMIN — GUANFACINE 1 MG: 1 TABLET ORAL at 09:56

## 2021-04-05 RX ADMIN — ALBUTEROL SULFATE 2 PUFF: 90 AEROSOL, METERED RESPIRATORY (INHALATION) at 07:00

## 2021-04-05 RX ADMIN — BARICITINIB 4 MG: 2 TABLET, FILM COATED ORAL at 20:50

## 2021-04-05 RX ADMIN — INSULIN ASPART 16 UNITS: 100 INJECTION, SOLUTION INTRAVENOUS; SUBCUTANEOUS at 18:12

## 2021-04-05 RX ADMIN — CARVEDILOL 12.5 MG: 12.5 TABLET, FILM COATED ORAL at 17:35

## 2021-04-05 RX ADMIN — ARIPIPRAZOLE 2 MG: 2 TABLET ORAL at 09:56

## 2021-04-05 NOTE — PROGRESS NOTES
[facility]  INPATIENT PROGRESS NOTE    Length of Stay: 13  Date of Admission: 3/22/2021  Primary Care Physician: Bharati Love MD    Subjective   Chief Complaint: Shortness of breath    HPI: Patient has some improvement in her shortness of breath.  She is saturating adequately on 3.5 L of oxygen by nasal cannula.  She has severe weakness and fatigue.    Review of Systems   Constitutional: Positive for fatigue. Negative for activity change, appetite change, chills and fever.   HENT: Negative for congestion, sore throat and trouble swallowing.    Respiratory: Positive for shortness of breath. Negative for cough, chest tightness and wheezing.    Cardiovascular: Negative for chest pain, palpitations and leg swelling.   Gastrointestinal: Negative for abdominal distention, abdominal pain, diarrhea, nausea and vomiting.   Genitourinary: Negative for difficulty urinating, dysuria and hematuria.   Musculoskeletal: Negative for arthralgias, back pain and myalgias.   Skin: Negative for pallor and rash.   Neurological: Positive for weakness. Negative for dizziness, syncope, light-headedness and headaches.   Hematological: Negative for adenopathy. Does not bruise/bleed easily.   Psychiatric/Behavioral: Negative for agitation and confusion. The patient is not nervous/anxious.      Objective    Temp:  [96.1 °F (35.6 °C)-98 °F (36.7 °C)] 96.6 °F (35.9 °C)  Heart Rate:  [60-82] 69  Resp:  [18-20] 18  BP: (107-138)/(62-76) 107/62    Physical Exam  Constitutional:       General: She is not in acute distress.     Appearance: Normal appearance. She is not ill-appearing or diaphoretic.   HENT:      Head: Normocephalic and atraumatic.      Right Ear: External ear normal.      Left Ear: External ear normal.      Nose: No congestion or rhinorrhea.      Mouth/Throat:      Mouth: Mucous membranes are moist.      Pharynx: No oropharyngeal exudate or posterior oropharyngeal  erythema.   Eyes:      General: No scleral icterus.     Extraocular Movements: Extraocular movements intact.      Conjunctiva/sclera: Conjunctivae normal.   Cardiovascular:      Rate and Rhythm: Normal rate and regular rhythm.      Heart sounds: Normal heart sounds. No murmur heard.     Pulmonary:      Effort: Pulmonary effort is normal. No respiratory distress.      Breath sounds: Normal breath sounds. No wheezing, rhonchi or rales.   Abdominal:      General: Abdomen is flat. There is no distension.      Palpations: Abdomen is soft.      Tenderness: There is no abdominal tenderness. There is no guarding.   Musculoskeletal:         General: No swelling, tenderness or deformity.      Cervical back: Neck supple. No rigidity. No muscular tenderness.      Right lower leg: No edema.      Left lower leg: No edema.   Lymphadenopathy:      Cervical: No cervical adenopathy.   Skin:     General: Skin is warm and dry.   Neurological:      General: No focal deficit present.      Mental Status: She is alert and oriented to person, place, and time.      Cranial Nerves: No cranial nerve deficit.      Motor: No weakness.   Psychiatric:         Mood and Affect: Mood normal.         Behavior: Behavior normal.         Thought Content: Thought content normal.       Medication Review:    Current Facility-Administered Medications:   •  acetaminophen (TYLENOL) tablet 650 mg, 650 mg, Oral, Q6H PRN, Yvon Mast MD, 650 mg at 03/25/21 1541  •  albuterol sulfate HFA (PROVENTIL HFA;VENTOLIN HFA;PROAIR HFA) inhaler 2 puff, 2 puff, Inhalation, 4x Daily - RT, Dalila Serrano MD, 2 puff at 04/05/21 0700  •  amLODIPine (NORVASC) tablet 5 mg, 5 mg, Oral, Q24H, Yvon Mast MD, 5 mg at 04/05/21 0956  •  ARIPiprazole (ABILIFY) tablet 2 mg, 2 mg, Oral, Daily, Mimi Douglass MD, 2 mg at 04/05/21 0956  •  baricitinib (OLUMIANT) tablet 4 mg, 4 mg, Oral, Daily, Yvon Mast MD  •  budesonide-formoterol (SYMBICORT) 160-4.5 MCG/ACT  inhaler 2 puff, 2 puff, Inhalation, BID - RT, Dalila Serrano MD, 2 puff at 04/05/21 0700  •  carvedilol (COREG) tablet 12.5 mg, 12.5 mg, Oral, BID With Meals, Yvon Mast MD, 12.5 mg at 04/05/21 0956  •  dexamethasone (DECADRON) tablet 6 mg, 6 mg, Oral, Daily With Breakfast, Dalila Serrano MD, 6 mg at 04/05/21 0956  •  dextrose (D50W) 25 g/ 50mL Intravenous Solution 25 g, 25 g, Intravenous, Q15 Min PRN, Yvon Mast MD  •  dextrose (GLUTOSE) oral gel 15 g, 15 g, Oral, Q15 Min PRN, Yvon Mast MD  •  enoxaparin (LOVENOX) syringe 40 mg, 40 mg, Subcutaneous, Q24H, Yvon Mast MD, 40 mg at 04/04/21 1804  •  escitalopram (LEXAPRO) tablet 10 mg, 10 mg, Oral, Daily, Yvon Mast MD, 10 mg at 04/05/21 0956  •  famotidine (PEPCID) tablet 40 mg, 40 mg, Oral, Daily, Yvon Mast MD, 40 mg at 04/05/21 0956  •  glucagon (human recombinant) (GLUCAGEN DIAGNOSTIC) injection 1 mg, 1 mg, Subcutaneous, Q15 Min PRN, Yvon Mast MD  •  guanFACINE (TENEX) tablet 1 mg, 1 mg, Oral, Daily, Yvon Mast MD, 1 mg at 04/05/21 0956  •  HYDROcod Polst-CPM Polst ER (TUSSIONEX PENNKINETIC) 10-8 MG/5ML ER suspension 2.5 mL, 2.5 mL, Oral, Q12H PRN, Yvon Mast MD  •  insulin aspart (novoLOG) injection 0-24 Units, 0-24 Units, Subcutaneous, TID AC, Dalila Serrano MD, 4 Units at 04/05/21 1231  •  insulin detemir (LEVEMIR) injection 40 Units, 40 Units, Subcutaneous, Nightly, Yvon Mast MD, 40 Units at 04/04/21 2214  •  levothyroxine (SYNTHROID, LEVOTHROID) tablet 112 mcg, 112 mcg, Oral, QAM, Yvon Mast MD, 112 mcg at 04/05/21 0618  •  losartan (COZAAR) tablet 100 mg, 100 mg, Oral, Daily, Yvon Mast MD, 100 mg at 04/05/21 0956  •  melatonin tablet 3 mg, 3 mg, Oral, Nightly, Dalila Serrano MD, 3 mg at 04/04/21 2211  •  ondansetron ODT (ZOFRAN-ODT) disintegrating tablet 4 mg, 4 mg, Oral, Q6H PRN, Yvon Mast MD  •  sodium chloride 0.9 % flush 10 mL, 10 mL, Intravenous, Q12H,  Yvon Mast MD, 10 mL at 04/05/21 0956  •  sodium chloride 0.9 % flush 10 mL, 10 mL, Intravenous, PRN, Yvon Mast MD    I have reviewed the patient's current medications.     Results Review:  I have reviewed the labs, radiology results, and diagnostic studies.    Laboratory Data:   Results from last 7 days   Lab Units 04/05/21 0449 04/04/21  0608 04/03/21  0605   SODIUM mmol/L 131* 133* 135*   POTASSIUM mmol/L 4.9 4.6 4.8   CHLORIDE mmol/L 100 102 103   CO2 mmol/L 25.0 24.0 26.0   BUN mg/dL 31* 35* 42*   CREATININE mg/dL 0.77 0.91 0.98   GLUCOSE mg/dL 212* 213* 278*   CALCIUM mg/dL 8.7 8.8 8.8   BILIRUBIN mg/dL 0.7 0.7 0.6   ALK PHOS U/L 42 42 48   ALT (SGPT) U/L 20 18 17   AST (SGOT) U/L 15 13 15   ANION GAP mmol/L 6.0 7.0 6.0     Estimated Creatinine Clearance: 72.7 mL/min (by C-G formula based on SCr of 0.77 mg/dL).          Results from last 7 days   Lab Units 04/05/21 0448 04/04/21  0608 04/03/21  0605 04/02/21  0608 04/01/21  0635   WBC 10*3/mm3 15.29* 14.56* 14.00* 14.17* 14.62*   HEMOGLOBIN g/dL 13.4 13.3 13.5 13.5 13.3   HEMATOCRIT % 38.3 39.2 39.7 39.0 38.2   PLATELETS 10*3/mm3 172 174 171 180 172           Culture Data:   No results found for: BLOODCX  No results found for: URINECX  No results found for: RESPCX  No results found for: WOUNDCX  No results found for: STOOLCX  No components found for: BODYFLD    Radiology Data:   Imaging Results (Last 24 Hours)     ** No results found for the last 24 hours. **          Assessment/Plan     Hospital Problem List:  Principal Problem:    Pneumonia due to COVID-19 virus  Active Problems:    Generalized anxiety disorder    Essential hypertension    Diabetes mellitus (CMS/HCC)    Depression    CKD (chronic kidney disease) stage 3, GFR 30-59 ml/min (CMS/HCC)    Acute respiratory failure with hypoxia (CMS/HCC)    Plan  -Patient completed a course of remdesivir.  She is currently saturating adequately on 3.5 L of oxygen by nasal cannula  -Continue Decadron  and baricitinib  -Continue bronchodilators  -Psychiatry input appreciated for depression.  Continue Abilify and melatonin for insomnia  -Continue Levemir and NovoLog sliding scale for type 2 diabetes mellitus  -Continue Coreg, Norvasc and losartan for essential hypertension  -PT/OT for disposition planning  -DVT prophylaxis with subcutaneous Lovenox  -CODE STATUS is full code    Discharge Planning: Expect patient to be discharged in next 1 to 2 days with home oxygen and home health for physical and occupational therapy versus SNF placement    Fabiola Layne MD   04/05/21   16:29 CDT

## 2021-04-05 NOTE — PLAN OF CARE
Goal Outcome Evaluation:  Plan of Care Reviewed With: patient  Progress: improving  Outcome Summary: Pt appears to be feeling some better today. Oxygen remains on 3.5 lpm NC, desats with activity. Sitting up in chair most of the day today, no complaints.

## 2021-04-05 NOTE — DISCHARGE INSTR - OTHER ORDERS
Below are options for outpatient behavioral health follow if you choose to seek their services:  [facility]  [address]     [address] [place], [place]  [phone]      Local Phone: [phone]         Respond Line: [phone]         Walk-In clinic Monday-Friday 8:30am-11am    [facility]  [address]  [phone]      Local: [phone]  Respond Line: [phone]  Walk-In clinic: 8:30am - 11:00am

## 2021-04-05 NOTE — THERAPY TREATMENT NOTE
Acute Care - Physical Therapy Treatment Note  [facility]     Patient Name: Emily Haas  : 1950  MRN: [id number]  Today's Date: 2021           PT Assessment (last 12 hours)      PT Evaluation and Treatment     Row Name 21 09          Physical Therapy Time and Intention    Subjective Information  no complaints  -TW     Document Type  therapy note (daily note)  -TW     Mode of Treatment  physical therapy  -TW     Patient Effort  good  -TW     Row Name 21 0935          General Information    Patient Profile Reviewed  yes  -TW     Patient Observations  alert;cooperative;agree to therapy  -TW     Patient/Family/Caregiver Comments/Observations  none  -TW     General Observations of Patient  Pt supine in bed and agreeable to therapy.  -TW     Existing Precautions/Restrictions  fall;oxygen therapy device and L/min  -TW     Row Name 21          Cognition    Affect/Mental Status (Cognitive)  WFL  -TW     Orientation Status (Cognition)  oriented x 4  -TW     Follows Commands (Cognition)  WFL  -TW     Cognitive Function (Cognitive)  WFL  -TW     Personal Safety Interventions  fall prevention program maintained;gait belt;nonskid shoes/slippers when out of bed  -TW     Row Name 21 09          Pain Scale: Numbers Pre/Post-Treatment    Pretreatment Pain Rating  0/10 - no pain  -TW     Posttreatment Pain Rating  0/10 - no pain  -TW     Row Name 21 09          Bed Mobility    All Activities, [place] (Bed Mobility)  modified independence  -TW     Assistive Device (Bed Mobility)  bed rails  -TW     Row Name 21 09          Transfers    Sit-Stand [place] (Transfers)  contact guard  -TW     Stand-Sit [place] (Transfers)  contact guard  -TW     Row Name 21 0935          Sit-Stand Transfer    Assistive Device (Sit-Stand Transfers)  walker, front-wheeled  -     Row Name 21 09          Stand-Sit Transfer    Assistive Device (Stand-Sit  Transfers)  walker, front-wheeled  -TW     Row Name 04/05/21 0935          Gait/Stairs (Locomotion)    Gait/Stairs Locomotion  gait/ambulation assistive device  -TW     [place] Level (Gait)  supervision;contact guard  -TW     Assistive Device (Gait)  walker, front-wheeled  -TW     Distance in Feet (Gait)  17ft  -TW     Pattern (Gait)  step-through  -TW     Deviations/Abnormal Patterns (Gait)  gait speed decreased;stride length decreased  -TW     Comment (Gait/Stairs)  Pt O2 decreased to 84% after gait and pt required 3 minute of sitting rest to recover to 90%.  -TW     Row Name 04/05/21 0935          Safety Issues, Functional Mobility    Safety Issues Affecting Function (Mobility)  safety precaution awareness;safety precautions follow-through/compliance  -TW     Impairments Affecting Function (Mobility)  balance;endurance/activity tolerance;shortness of breath;strength  -TW     Row Name 04/05/21 0935          Hip (Therapeutic Exercise)    Hip AROM (Therapeutic Exercise)  bilateral;sitting  -TW     Row Name 04/05/21 0935          Knee (Therapeutic Exercise)    Knee AROM (Therapeutic Exercise)  bilateral;sitting  -TW     Row Name 04/05/21 0935          Ankle (Therapeutic Exercise)    Ankle AROM (Therapeutic Exercise)  bilateral;sitting;supine  -TW     Row Name 04/05/21 0935          Plan of Care Review    Plan of Care Reviewed With  patient  -TW     Progress  no change  -TW     Outcome Summary  Pt desats today with activity. Pt t/f sup to sit with mod ind using bed rail. Pt sat EOB 17 minutes to perform B LE ther ex and O2 sats decreased to 88%. Pt stood with amb with CGA using RW and O2 sats decreased to 84%. Pt was able to recover with 4 minute seated rest and returned to sup with mod ind. Pt would cont to benefit from therapy upon DC.  -TW     Row Name 04/05/21 0935          Vital Signs    Pretreatment Heart Rate (beats/min)  72  -TW     Intratreatment Heart Rate (beats/min)  88  -TW     Posttreatment Heart  Rate (beats/min)  71  -TW     Pre SpO2 (%)  97  -TW     O2 Delivery Pre Treatment  supplemental O2  -TW     Intra SpO2 (%)  84  -TW     O2 Delivery Intra Treatment  supplemental O2  -TW     Post SpO2 (%)  91  -TW     O2 Delivery Post Treatment  supplemental O2  -TW     Pre Patient Position  Supine  -TW     Intra Patient Position  Standing  -TW     Post Patient Position  Supine  -TW     Row Name 04/05/21 0935          Bed Mobility Goal 1 (PT)    Activity/Assistive Device (Bed Mobility Goal 1, PT)  sit to supine/supine to sit  -TW     [place] Level/Cues Needed (Bed Mobility Goal 1, PT)  independent  -TW     Time Frame (Bed Mobility Goal 1, PT)  by discharge  -TW     Strategies/Barriers (Bed Mobility Goal 1, PT)  HOB flat, no bed rails.  -TW     Progress/Outcomes (Bed Mobility Goal 1, PT)  goal partially met;good progress toward goal;continuing progress toward goal  -TW     Row Name 04/05/21 0935          Transfer Goal 1 (PT)    Activity/Assistive Device (Transfer Goal 1, PT)  sit-to-stand/stand-to-sit;bed-to-chair/chair-to-bed  -TW     [place] Level/Cues Needed (Transfer Goal 1, PT)  independent  -TW     Time Frame (Transfer Goal 1, PT)  by discharge  -TW     Strategies/Barriers (Transfers Goal 1, PT)  Maintain SpO2 >90%.  -TW     Progress/Outcome (Transfer Goal 1, PT)  goal not met  -TW     Row Name 04/05/21 0935          Gait Training Goal 1 (PT)    Activity/Assistive Device (Gait Training Goal 1, PT)  gait (walking locomotion)  -TW     [place] Level (Gait Training Goal 1, PT)  independent  -TW     Distance (Gait Training Goal 1, PT)  30'x3.  -TW     Time Frame (Gait Training Goal 1, PT)  by discharge  -TW     Strategies/Barriers (Gait Training Goal 1, PT)  Maintain SpO2 >90%.  -TW     Progress/Outcome (Gait Training Goal 1, PT)  goal partially met  -TW     Row Name 04/05/21 0935          Patient Education Goal (PT)    Activity (Patient Education Goal, PT)  inidep in using breathing ex to raise sat  levels  -TW     [place]/Cues/Accuracy (Memory Goal 2, PT)  demonstrates adequately  -TW     Time Frame (Patient Education Goal, PT)  by discharge  -TW     Progress/Outcome (Patient Education Goal, PT)  (S) goal met  -TW     Row Name 04/05/21 0935          Positioning and Restraints    Pre-Treatment Position  in bed  -TW     Post Treatment Position  bed  -TW     In Bed  supine;call light within reach;encouraged to call for assist;exit alarm on;with nsg  -TW     Row Name 04/05/21 0935          Therapy Assessment/Plan (PT)    Rehab Potential (PT)  good, to achieve stated therapy goals  -TW     Row Name 04/05/21 0935          Progress Summary (PT)    Progress Toward Functional Goals (PT)  progress toward functional goals is good  -TW       User Key  (r) = Recorded By, (t) = Taken By, (c) = Cosigned By    Initials Name Provider Type    TW Chetan Mcknight, ARTIS Physical Therapy Assistant        Physical Therapy Education                 Title: PT OT SLP Therapies (In Progress)     Topic: Physical Therapy (In Progress)     Point: Mobility training (Done)     Learning Progress Summary           Patient Acceptance, D,E, VU,DU,NR by KRISTINE at 4/1/2021 1706    Comment: breathing ex for urms up in hale, arms down exhale w/ humming; sidelying allen and how to realize sats w/ SOA ; encouraged call for assist for OOB    Acceptance E VU by MIC at 3/25/2021 1600    Comment: Educated on PT POC and goals. Educated on proper breating technique with exersion and avoiding valsalva maneuver.                   Point: Home exercise program (Done)     Learning Progress Summary           Patient Acceptance, D,E, VU,DU,NR by KRISTINE at 4/1/2021 1706    Comment: breathing ex for urms up in hale, arms down exhale w/ humming; sidelying allen and how to realize sats w/ SOA ; encouraged call for assist for OOB                   Point: Body mechanics (Not Started)     Learner Progress:  Not documented in this visit.          Point: Precautions (Done)      Learning Progress Summary           Patient Acceptance, D,E, VU,DU,NR by  at 4/1/2021 1706    Comment: breathing ex for urms up in hale, arms down exhale w/ humming; sidelying allen and how to realize sats w/ SOA ; encouraged call for assist for OOB                               User Key     Initials Effective Dates Name Provider Type Discipline     04/03/18 -  Florecita Wood, PT Physical Therapist PT    CZ 04/03/18 -  Kar López, PT Physical Therapist PT              PT Recommendation and Plan  Anticipated Discharge Disposition (PT): home with 24/7 care, home with assist, home with home health  Therapy Frequency (PT): other (see comments) (5-7 days/week)  Progress Summary (PT)  Progress Toward Functional Goals (PT): progress toward functional goals is good  Plan of Care Reviewed With: patient  Progress: no change  Outcome Summary: Pt desats today with activity. Pt t/f sup to sit with mod ind using bed rail. Pt sat EOB 17 minutes to perform B LE ther ex and O2 sats decreased to 88%. Pt stood with amb with CGA using RW and O2 sats decreased to 84%. Pt was able to recover with 4 minute seated rest and returned to sup with mod ind. Pt would cont to benefit from therapy upon DC.       Time Calculation:   PT Charges     Row Name 04/05/21 1327             Time Calculation    Start Time  0935  -TW      Stop Time  1004  -TW      Time Calculation (min)  29 min  -TW      PT Received On  04/05/21  -TW      PT Goal Re-Cert Due Date  04/07/21  -TW         Time Calculation- PT    Total Timed Code Minutes- PT  29 minute(s)  -TW        User Key  (r) = Recorded By, (t) = Taken By, (c) = Cosigned By    Initials Name Provider Type    TW Chetan Mcknight PTA Physical Therapy Assistant        Therapy Charges for Today     Code Description Service Date Service Provider Modifiers Qty    68279255430 HC GAIT TRAINING EA 15 MIN 4/5/2021 Chetan Mcknight PTA GP 1    35847231643 HC PT THER PROC EA 15 MIN 4/5/2021  Chetan Mcknight, PTA GP 1          PT G-Codes  Outcome Measure Options: Tinetti  AM-PAC 6 Clicks Score (PT): 17  AM-PAC 6 Clicks Score (OT): 18  [place] Total Score: 16    Chetan Mcknight, ARTIS  4/5/2021

## 2021-04-05 NOTE — THERAPY TREATMENT NOTE
Patient Name: Emily Haas  : 1950    MRN: [id number]                              Today's Date: 2021       Admit Date: 3/22/2021    Visit Dx:     ICD-10-CM ICD-9-CM   1. Pneumonia due to COVID-19 virus  U07.1 480.8    J12.82 079.89   2. Fever, unspecified fever cause  R50.9 780.60   3. Dyspnea, unspecified type  R06.00 786.09   4. Impaired mobility and ADLs  Z74.09 V49.89    Z78.9    5. Impaired functional mobility, balance, gait, and endurance  Z74.09 V49.89     Patient Active Problem List   Diagnosis   • Vitamin D deficiency   • Irritable bowel syndrome   • Insomnia   • Hyperlipidemia   • Generalized anxiety disorder   • Essential hypertension   • Diverticular disease of colon   • Diabetes mellitus type 2, diet-controlled (CMS/HCC)   • Anxiety state   • Acquired hypothyroidism   • Asthma   • Periumbilical abdominal pain   • Telangiectasia of limb   • Varicose veins of both lower extremities with pain   • Diabetes mellitus (CMS/HCC)   • Posterior subcapsular polar age-related cataract   • Encounter for screening for malignant neoplasm of colon   • Diverticulosis of large intestine without hemorrhage   • Irritable bowel syndrome with both constipation and diarrhea   • Vaginosis   • Fatty liver   • Diabetes mellitus type 2 without retinopathy (CMS/HCC)   • Depression   • Pure hypercholesterolemia   • Class 1 obesity due to excess calories with serious comorbidity and body mass index (BMI) of 33.0 to 33.9 in adult   • Right lower quadrant abdominal pain   • Left lower quadrant abdominal pain   • Diarrhea   • Nausea   • Bloating   • Pneumonia due to COVID-19 virus   • CKD (chronic kidney disease) stage 3, GFR 30-59 ml/min (CMS/HCC)   • Acute respiratory failure with hypoxia (CMS/HCC)     Past Medical History:   Diagnosis Date   • Anxiety    • Chronic bronchitis (CMS/HCC)    • CKD (chronic kidney disease) stage 3, GFR 30-59 ml/min (CMS/HCC)    • Diabetes mellitus (CMS/HCC)    • Diverticulitis    •  Hyperlipidemia    • Hypertension    • Hypothyroidism    • Irritable bowel syndrome    • Obesity    • Osteoarthritis    • Vitamin D deficiency      Past Surgical History:   Procedure Laterality Date   • BILATERAL BREAST REDUCTION     • CHOLECYSTECTOMY     • COLONOSCOPY N/A 11/17/2017   • COLONOSCOPY N/A 1/14/2021   • ENDOSCOPY N/A 1/14/2021   • HYSTERECTOMY       General Information     Row Name 04/05/21 1015          OT Time and Intention    Document Type  therapy note (daily note)  -     Mode of Treatment  occupational therapy;individual therapy  -KD     Row Name 04/05/21 1015          General Information    Patient Profile Reviewed  yes  -KD     Existing Precautions/Restrictions  fall;oxygen therapy device and L/min  -KD     Row Name 04/05/21 1015          Cognition    Orientation Status (Cognition)  oriented x 4  -KD     Row Name 04/05/21 1015          Safety Issues, Functional Mobility    Impairments Affecting Function (Mobility)  balance;endurance/activity tolerance;shortness of breath;strength  -KD       User Key  (r) = Recorded By, (t) = Taken By, (c) = Cosigned By    Initials Name Provider Type     Rachelle Salinas, JUNITO/L Occupational Therapy Assistant          Mobility/ADL's     Row Name 04/05/21 1015          Bed Mobility    All Activities, [place] (Bed Mobility)  modified independence  -KD     Supine-Sit [place] (Bed Mobility)  modified independence  -KD     Assistive Device (Bed Mobility)  bed rails  -KD     Row Name 04/05/21 1015          Transfers    Bed-Chair [place] (Transfers)  standby assist;1 person assist  -KD     Assistive Device (Bed-Chair Transfers)  walker, front-wheeled  -KD     Sit-Stand [place] (Transfers)  contact guard  -KD     Row Name 04/05/21 1015          Sit-Stand Transfer    Assistive Device (Sit-Stand Transfers)  walker, front-wheeled  -KD     Row Name 04/05/21 1015          Functional Mobility    Functional Mobility- Ind. Level  contact guard assist  -KD      Functional Mobility-Distance (Feet)  3  -KD     Row Name 04/05/21 1015          Bathing Assessment/Intervention    [place] Level (Bathing)  upper body;set up;supervision;lower body;moderate assist (50% patient effort);minimum assist (75% patient effort) Min A for BLE's; SBA for pericare  -KD     Position (Bathing)  edge of bed sitting;supported standing  -KD     Row Name 04/05/21 1015          Upper Body Dressing Assessment/Training    [place] Level (Upper Body Dressing)  upper body dressing skills;doff;don;standby assist  -KD     Position (Upper Body Dressing)  edge of bed sitting  -KD     Row Name 04/05/21 1015          Grooming Assessment/Training    [place] Level (Grooming)  hair care, combing/brushing;wash face, hands;set up;modified independence  -KD     Position (Grooming)  edge of bed sitting  -KD     Row Name 04/05/21 1015          Lower Body Dressing Assessment/Training    [place] Level (Lower Body Dressing)  doff;don;socks;moderate assist (50% patient effort)  -KD     Position (Lower Body Dressing)  edge of bed sitting  -KD       User Key  (r) = Recorded By, (t) = Taken By, (c) = Cosigned By    Initials Name Provider Type     Rachelle Salinas, JUNITO/L Occupational Therapy Assistant        Obj/Interventions    No documentation.       Goals/Plan     Row Name 04/05/21 1015          Transfer Goal 1 (OT)    Activity/Assistive Device (Transfer Goal 1, OT)  sit-to-stand/stand-to-sit;bed-to-chair/chair-to-bed;toilet  -KD     [place] Level/Cues Needed (Transfer Goal 1, OT)  modified independence  -KD     Time Frame (Transfer Goal 1, OT)  long term goal (LTG);by discharge  -KD     Progress/Outcome (Transfer Goal 1, OT)  goal not met  -KD     Row Name 04/05/21 1015          Bathing Goal 1 (OT)    Activity/Device (Bathing Goal 1, OT)  bathing skills, all  -KD     [place] Level/Cues Needed (Bathing Goal 1, OT)  set-up required  -KD     Time Frame (Bathing Goal 1, OT)  long term goal  (LTG);by discharge  -KD     Progress/Outcomes (Bathing Goal 1, OT)  goal not met  -KD     Row Name 04/05/21 1015          Toileting Goal 1 (OT)    Activity/Device (Toileting Goal 1, OT)  toileting skills, all  -KD     [place] Level/Cues Needed (Toileting Goal 1, OT)  modified independence  -KD     Time Frame (Toileting Goal 1, OT)  by discharge  -KD     Progress/Outcome (Toileting Goal 1, OT)  goal not met  -KD     Row Name 04/05/21 1015          ROM Goal 1 (OT)    ROM Goal 1 (OT)  Pt will tolerate 20 min of functional/therapeutic activities with O2=/> 88%.  -KD     Time Frame (ROM Goal 1, OT)  long term goal (LTG);by discharge  -KD     Progress/Outcome (ROM Goal 1, OT)  goal not met  -KD     Row Name 04/05/21 1015          Strength Goal 1 (OT)    Strength Goal 1 (OT)  Pt will be independent in BUE strengthening HEP  -KD     Time Frame (Strength Goal 1, OT)  long term goal (LTG);by discharge  -KD     Progress/Outcome (Strength Goal 1, OT)  goal not met  -KD       User Key  (r) = Recorded By, (t) = Taken By, (c) = Cosigned By    Initials Name Provider Type     Rachelle Salinas COTA/L Occupational Therapy Assistant        Clinical Impression     Row Name 04/05/21 1015          Pain Scale: Numbers Pre/Post-Treatment    Pretreatment Pain Rating  0/10 - no pain  -KD     Posttreatment Pain Rating  0/10 - no pain  -KD     Row Name 04/05/21 1015          Plan of Care Review    Plan of Care Reviewed With  patient  -KD     Progress  improving  -KD     Outcome Summary  Sup-sit- Mod I. Pt sat EOB and completed UB bathing/dressing-SBA. Grooming-set up. LB bathing; Pericare-SBA; BLE's-Min A. LB dressing-Min A. Sit-stand-CGA of 1 w/ RW. Amb ~3' to recliner CGA w/ RW. Oral Care- set up only required. Pt desate w/ activity and requires RB's PRN. Pt educated on PLB technique. Cont OT POC.  -KD     Row Name 04/05/21 1015          Therapy Assessment/Plan (OT)    Therapy Frequency (OT)  other (see comments) 5-7 days a week  -KD      Row Name 04/05/21 1015          Vital Signs    Pre Systolic BP Rehab  138  -KD     Pre Treatment Diastolic BP  70  -KD     Pretreatment Heart Rate (beats/min)  60  -KD     Pre SpO2 (%)  95  -KD     O2 Delivery Pre Treatment  supplemental O2  -KD     Pre Patient Position  Supine  -KD     Intra Patient Position  Standing  -KD     Post Patient Position  Sitting  -KD     Row Name 04/05/21 1015          Positioning and Restraints    Pre-Treatment Position  in bed  -KD     Post Treatment Position  chair  -KD     In Chair  reclined;call light within reach;encouraged to call for assist;exit alarm on  -KD       User Key  (r) = Recorded By, (t) = Taken By, (c) = Cosigned By    Initials Name Provider Type    Rachelle Kilpatrick COTA/L Occupational Therapy Assistant        Outcome Measures     Row Name 04/05/21 1015          How much help from another is currently needed...    Putting on and taking off regular lower body clothing?  2  -KD     Bathing (including washing, rinsing, and drying)  2  -KD     Toileting (which includes using toilet bed pan or urinal)  3  -KD     Putting on and taking off regular upper body clothing  3  -KD     Taking care of personal grooming (such as brushing teeth)  4  -KD     Eating meals  4  -KD     AM-PAC 6 Clicks Score (OT)  18  -KD       User Key  (r) = Recorded By, (t) = Taken By, (c) = Cosigned By    Initials Name Provider Type    Rachelle Kilpatrick COTA/L Occupational Therapy Assistant        Occupational Therapy Education                 Title: PT OT SLP Therapies (In Progress)     Topic: Occupational Therapy (Done)     Point: ADL training (Done)     Description:   Instruct learner(s) on proper safety adaptation and remediation techniques during self care or transfers.   Instruct in proper use of assistive devices.              Learning Progress Summary           Patient Acceptance, E,TB, VU by RICKEY at 3/26/2021 1429    Acceptance, E, VU by AS at 3/25/2021 0888    Comment: ADL training,  transfer training, PLB, safety precautions    Acceptance, E, VU by AS at 3/24/2021 1413    Comment: role of OT, OT POC, ADL training, transfer training                   Point: Home exercise program (Done)     Description:   Instruct learner(s) on appropriate technique for monitoring, assisting and/or progressing therapeutic exercises/activities.              Learning Progress Summary           Patient Acceptance, E,TB, VU by LW at 3/26/2021 1429                   Point: Precautions (Done)     Description:   Instruct learner(s) on prescribed precautions during self-care and functional transfers.              Learning Progress Summary           Patient Acceptance, E, VU by ME at 3/29/2021 1136    Comment: Educated on OT and POC. Educated to call for assistance. Educated on safety precautions. Educated on pursed lip breathing technique.    Acceptance, E,TB, VU by LW at 3/26/2021 1429    Acceptance, E, VU by AS at 3/25/2021 0853    Comment: ADL training, transfer training, PLB, safety precautions    Acceptance, E, VU by AS at 3/24/2021 1413    Comment: role of OT, OT POC, ADL training, transfer training                   Point: Body mechanics (Done)     Description:   Instruct learner(s) on proper positioning and spine alignment during self-care, functional mobility activities and/or exercises.              Learning Progress Summary           Patient Acceptance, E, VU by ME at 3/29/2021 1136    Comment: Educated on OT and POC. Educated to call for assistance. Educated on safety precautions. Educated on pursed lip breathing technique.    Acceptance, E,TB, VU by LW at 3/26/2021 1429                               User Key     Initials Effective Dates Name Provider Type Discipline    LW 03/07/18 -  Emily Jack COTA/L Occupational Therapy Assistant OT    AS 03/18/21 -  Jael Lopez OT Occupational Therapist OT    ME 08/24/20 -  Joe Perry OTR/L Occupational Therapist OT              OT Recommendation and  Plan  Therapy Frequency (OT): other (see comments) (5-7 days a week)  Plan of Care Review  Plan of Care Reviewed With: patient  Progress: improving  Outcome Summary: Sup-sit- Mod I. Pt sat EOB and completed UB bathing/dressing-SBA. Grooming-set up. LB bathing; Pericare-SBA; BLE's-Min A. LB dressing-Min A. Sit-stand-CGA of 1 w/ RW. Amb ~3' to recliner CGA w/ RW. Oral Care- set up only required. Pt desate w/ activity and requires RB's PRN. Pt educated on PLB technique. Cont OT POC.     Time Calculation:   Time Calculation- OT     Row Name 04/05/21 1502             Time Calculation- OT    OT Start Time  1015  -KD      OT Stop Time  1108  -KD      OT Time Calculation (min)  53 min  -KD      Total Timed Code Minutes- OT  53 minute(s)  -KD      OT Received On  04/05/21  -KD        User Key  (r) = Recorded By, (t) = Taken By, (c) = Cosigned By    Initials Name Provider Type    Rachelle Kilpatrick COTA/L Occupational Therapy Assistant        Therapy Charges for Today     Code Description Service Date Service Provider Modifiers Qty    69313851222 HC OT SELF CARE/MGMT/TRAIN EA 15 MIN 4/5/2021 Rachelle Salinas COTA/L GO 4               ADAM Yarbrough  4/5/2021

## 2021-04-05 NOTE — PLAN OF CARE
Problem: Adult Inpatient Plan of Care  Goal: Plan of Care Review  Recent Flowsheet Documentation  Taken 4/5/2021 1015 by Rachelle Salinas, WILD/L  Progress: improving  Plan of Care Reviewed With: patient  Outcome Summary:   Sup-sit- Mod I. Pt sat EOB and completed UB bathing/dressing-SBA. Grooming-set up. LB bathing   Pericare-SBA   BLE's-Min A. LB dressing-Min A. Sit-stand-CGA of 1 w/ RW. Amb ~3' to recliner CGA w/ RW. Oral Care- set up only required. Pt desats w/ activity and requires RB's PRN. Pt educated on PLB technique. Cont OT POC.

## 2021-04-05 NOTE — PLAN OF CARE
Goal Outcome Evaluation:  Plan of Care Reviewed With: patient  Progress: declining  Outcome Summary: Pt resting between carer. Pt appears saddened due to her condition and demonstrates deconditioning in mobilization. VSS on 3.5L NC. Will continue to monitor.

## 2021-04-05 NOTE — PROGRESS NOTES
Adult Nutrition  Assessment    Patient Name:  Emily Haas  YOB: 1950  MRN: [id number]  Admit Date:  3/22/2021    Assessment Date:  4/5/2021    Comments:  Pt continues treatment for acute respiratory failure r/t +Covid 19. Remdesivir completed. Baricitinib and decadron continue. Requiring 3.5L NC today. Alb 2.5L. ADA diet-no  intakes available x3 days. Admit wt 195# and currenlty 205# r/t IVF. Spoke w/ pt over phone- she requests d/c BGC- hurt her stomach. RD to follow hospital course.     Reason for Assessment     Row Name 04/05/21 0926          Reason for Assessment    Reason For Assessment  follow-up protocol     Diagnosis  pulmonary disease     Identified At Risk by Screening Criteria  no indicators present         Nutrition/Diet History     Row Name 04/05/21 0926          Nutrition/Diet History    Typical Food/Fluid Intake  Pt askes to d/c Boost- makes her stomach hurt     Food Preferences  likes oats at B--yates snot like turkey     Supplemental Drinks/Foods/Additives  d/c- makes stomach hurt         Anthropometrics     Row Name 04/05/21 0618          Anthropometrics    Weight  93.1 kg (205 lb 4.8 oz)         Labs/Tests/Procedures/Meds     Row Name 04/05/21 0927          Labs/Procedures/Meds    Lab Results Reviewed  reviewed     Lab Results Comments  Glu 162-265, low Na, BUN 31H/Cr 0.7, Alb 2.5L        Diagnostic Tests/Procedures    Diagnostic Test/Procedure Reviewed  reviewed     Diagnostic Test/Procedures Comments  +COVID 19        Medications    Pertinent Medications Reviewed  reviewed     Pertinent Medications Comments  SSI, levemir 40hs, baricitinib/decadron, s/p remdesivir             Nutrition Prescription Ordered     Row Name 04/05/21 0928          Nutrition Prescription PO    Current PO Diet  Regular     Fluid Consistency  Thin     Supplement  Boost Glucose Control (Glucerna Shake)     Supplement Frequency  Daily     Common Modifiers  Consistent Carbohydrate         Evaluation of  Received Nutrient/Fluid Intake     Row Name 04/05/21 0928          PO Evaluation    Number of Days PO Intake Evaluated  Insufficient Data     % PO Intake  no intakes x3 days               Electronically signed by:  Nicole Grayson RD  04/05/21 09:30 CDT

## 2021-04-06 ENCOUNTER — READMISSION MANAGEMENT (OUTPATIENT)
Dept: CALL CENTER | Facility: HOSPITAL | Age: 71
End: 2021-04-06

## 2021-04-06 ENCOUNTER — APPOINTMENT (OUTPATIENT)
Dept: CT IMAGING | Facility: HOSPITAL | Age: 71
End: 2021-04-06

## 2021-04-06 VITALS
OXYGEN SATURATION: 92 % | RESPIRATION RATE: 18 BRPM | HEIGHT: 65 IN | DIASTOLIC BLOOD PRESSURE: 69 MMHG | SYSTOLIC BLOOD PRESSURE: 143 MMHG | HEART RATE: 85 BPM | TEMPERATURE: 97.1 F | WEIGHT: 209.8 LBS | BODY MASS INDEX: 34.95 KG/M2

## 2021-04-06 PROBLEM — D89.832 CYTOKINE RELEASE SYNDROME, GRADE 2: Status: ACTIVE | Noted: 2021-04-06

## 2021-04-06 LAB
ALBUMIN SERPL-MCNC: 2.8 G/DL (ref 3.5–5.2)
ALBUMIN/GLOB SERPL: 1.1 G/DL
ALP SERPL-CCNC: 44 U/L (ref 39–117)
ALT SERPL W P-5'-P-CCNC: 24 U/L (ref 1–33)
ANION GAP SERPL CALCULATED.3IONS-SCNC: 7 MMOL/L (ref 5–15)
AST SERPL-CCNC: 17 U/L (ref 1–32)
BASOPHILS # BLD AUTO: 0.02 10*3/MM3 (ref 0–0.2)
BASOPHILS NFR BLD AUTO: 0.2 % (ref 0–1.5)
BILIRUB SERPL-MCNC: 0.7 MG/DL (ref 0–1.2)
BUN SERPL-MCNC: 34 MG/DL (ref 8–23)
BUN/CREAT SERPL: 41.5 (ref 7–25)
CALCIUM SPEC-SCNC: 9 MG/DL (ref 8.6–10.5)
CHLORIDE SERPL-SCNC: 97 MMOL/L (ref 98–107)
CK SERPL-CCNC: 153 U/L (ref 20–180)
CO2 SERPL-SCNC: 27 MMOL/L (ref 22–29)
CREAT SERPL-MCNC: 0.82 MG/DL (ref 0.57–1)
CRP SERPL-MCNC: <0.3 MG/DL (ref 0–0.5)
DEPRECATED RDW RBC AUTO: 36.3 FL (ref 37–54)
EOSINOPHIL # BLD AUTO: 0 10*3/MM3 (ref 0–0.4)
EOSINOPHIL NFR BLD AUTO: 0 % (ref 0.3–6.2)
ERYTHROCYTE [DISTWIDTH] IN BLOOD BY AUTOMATED COUNT: 11.4 % (ref 12.3–15.4)
FERRITIN SERPL-MCNC: 1670 NG/ML (ref 13–150)
GFR SERPL CREATININE-BSD FRML MDRD: 69 ML/MIN/1.73
GLOBULIN UR ELPH-MCNC: 2.6 GM/DL
GLUCOSE BLDC GLUCOMTR-MCNC: 161 MG/DL (ref 70–130)
GLUCOSE BLDC GLUCOMTR-MCNC: 197 MG/DL (ref 70–130)
GLUCOSE SERPL-MCNC: 189 MG/DL (ref 65–99)
HCT VFR BLD AUTO: 37.3 % (ref 34–46.6)
HGB BLD-MCNC: 13.4 G/DL (ref 12–15.9)
IMM GRANULOCYTES # BLD AUTO: 0.06 10*3/MM3 (ref 0–0.05)
IMM GRANULOCYTES NFR BLD AUTO: 0.5 % (ref 0–0.5)
LYMPHOCYTES # BLD AUTO: 0.47 10*3/MM3 (ref 0.7–3.1)
LYMPHOCYTES NFR BLD AUTO: 3.7 % (ref 19.6–45.3)
MCH RBC QN AUTO: 31.1 PG (ref 26.6–33)
MCHC RBC AUTO-ENTMCNC: 35.9 G/DL (ref 31.5–35.7)
MCV RBC AUTO: 86.5 FL (ref 79–97)
MONOCYTES # BLD AUTO: 0.44 10*3/MM3 (ref 0.1–0.9)
MONOCYTES NFR BLD AUTO: 3.5 % (ref 5–12)
NEUTROPHILS NFR BLD AUTO: 11.65 10*3/MM3 (ref 1.7–7)
NEUTROPHILS NFR BLD AUTO: 92.1 % (ref 42.7–76)
NRBC BLD AUTO-RTO: 0 /100 WBC (ref 0–0.2)
PLATELET # BLD AUTO: 146 10*3/MM3 (ref 140–450)
PMV BLD AUTO: 11.6 FL (ref 6–12)
POTASSIUM SERPL-SCNC: 5.1 MMOL/L (ref 3.5–5.2)
PROT SERPL-MCNC: 5.4 G/DL (ref 6–8.5)
RBC # BLD AUTO: 4.31 10*6/MM3 (ref 3.77–5.28)
SODIUM SERPL-SCNC: 131 MMOL/L (ref 136–145)
WBC # BLD AUTO: 12.64 10*3/MM3 (ref 3.4–10.8)

## 2021-04-06 PROCEDURE — 94799 UNLISTED PULMONARY SVC/PX: CPT

## 2021-04-06 PROCEDURE — 86140 C-REACTIVE PROTEIN: CPT | Performed by: STUDENT IN AN ORGANIZED HEALTH CARE EDUCATION/TRAINING PROGRAM

## 2021-04-06 PROCEDURE — 25010000002 ENOXAPARIN PER 10 MG: Performed by: INTERNAL MEDICINE

## 2021-04-06 PROCEDURE — 97535 SELF CARE MNGMENT TRAINING: CPT

## 2021-04-06 PROCEDURE — 85025 COMPLETE CBC W/AUTO DIFF WBC: CPT | Performed by: STUDENT IN AN ORGANIZED HEALTH CARE EDUCATION/TRAINING PROGRAM

## 2021-04-06 PROCEDURE — 0 IOPAMIDOL PER 1 ML: Performed by: INTERNAL MEDICINE

## 2021-04-06 PROCEDURE — 63710000001 DEXAMETHASONE PER 0.25 MG: Performed by: STUDENT IN AN ORGANIZED HEALTH CARE EDUCATION/TRAINING PROGRAM

## 2021-04-06 PROCEDURE — 82550 ASSAY OF CK (CPK): CPT | Performed by: STUDENT IN AN ORGANIZED HEALTH CARE EDUCATION/TRAINING PROGRAM

## 2021-04-06 PROCEDURE — 82962 GLUCOSE BLOOD TEST: CPT

## 2021-04-06 PROCEDURE — 80053 COMPREHEN METABOLIC PANEL: CPT | Performed by: STUDENT IN AN ORGANIZED HEALTH CARE EDUCATION/TRAINING PROGRAM

## 2021-04-06 PROCEDURE — 71275 CT ANGIOGRAPHY CHEST: CPT

## 2021-04-06 PROCEDURE — 82728 ASSAY OF FERRITIN: CPT | Performed by: STUDENT IN AN ORGANIZED HEALTH CARE EDUCATION/TRAINING PROGRAM

## 2021-04-06 PROCEDURE — 94760 N-INVAS EAR/PLS OXIMETRY 1: CPT

## 2021-04-06 PROCEDURE — 63710000001 INSULIN ASPART PER 5 UNITS: Performed by: STUDENT IN AN ORGANIZED HEALTH CARE EDUCATION/TRAINING PROGRAM

## 2021-04-06 PROCEDURE — 97530 THERAPEUTIC ACTIVITIES: CPT

## 2021-04-06 RX ORDER — GUAIFENESIN 200 MG/10ML
200 LIQUID ORAL 4 TIMES DAILY PRN
Qty: 180 ML | Refills: 0 | Status: SHIPPED | OUTPATIENT
Start: 2021-04-06 | End: 2022-02-11

## 2021-04-06 RX ORDER — BUDESONIDE AND FORMOTEROL FUMARATE DIHYDRATE 160; 4.5 UG/1; UG/1
2 AEROSOL RESPIRATORY (INHALATION)
Qty: 6 G | Refills: 0 | Status: SHIPPED | OUTPATIENT
Start: 2021-04-06

## 2021-04-06 RX ORDER — LANOLIN ALCOHOL/MO/W.PET/CERES
3 CREAM (GRAM) TOPICAL NIGHTLY PRN
Qty: 30 TABLET | Refills: 0 | Status: SHIPPED | OUTPATIENT
Start: 2021-04-06 | End: 2021-05-13 | Stop reason: SDUPTHER

## 2021-04-06 RX ORDER — ALBUTEROL SULFATE 90 UG/1
2 AEROSOL, METERED RESPIRATORY (INHALATION)
Qty: 6.7 G | Refills: 0 | Status: SHIPPED | OUTPATIENT
Start: 2021-04-06 | End: 2022-01-18 | Stop reason: SDUPTHER

## 2021-04-06 RX ORDER — ARIPIPRAZOLE 2 MG/1
2 TABLET ORAL DAILY
Qty: 90 TABLET | Refills: 0 | Status: SHIPPED | OUTPATIENT
Start: 2021-04-07 | End: 2021-07-06

## 2021-04-06 RX ADMIN — LEVOTHYROXINE SODIUM 112 MCG: 112 TABLET ORAL at 06:04

## 2021-04-06 RX ADMIN — BUDESONIDE AND FORMOTEROL FUMARATE DIHYDRATE 2 PUFF: 160; 4.5 AEROSOL RESPIRATORY (INHALATION) at 08:00

## 2021-04-06 RX ADMIN — ESCITALOPRAM OXALATE 10 MG: 10 TABLET ORAL at 09:58

## 2021-04-06 RX ADMIN — LOSARTAN POTASSIUM 100 MG: 50 TABLET, FILM COATED ORAL at 09:58

## 2021-04-06 RX ADMIN — SODIUM CHLORIDE, PRESERVATIVE FREE 10 ML: 5 INJECTION INTRAVENOUS at 11:02

## 2021-04-06 RX ADMIN — ENOXAPARIN SODIUM 40 MG: 40 INJECTION SUBCUTANEOUS at 15:45

## 2021-04-06 RX ADMIN — INSULIN ASPART 4 UNITS: 100 INJECTION, SOLUTION INTRAVENOUS; SUBCUTANEOUS at 11:59

## 2021-04-06 RX ADMIN — CARVEDILOL 12.5 MG: 12.5 TABLET, FILM COATED ORAL at 09:58

## 2021-04-06 RX ADMIN — GUANFACINE 1 MG: 1 TABLET ORAL at 09:58

## 2021-04-06 RX ADMIN — INSULIN ASPART 4 UNITS: 100 INJECTION, SOLUTION INTRAVENOUS; SUBCUTANEOUS at 08:00

## 2021-04-06 RX ADMIN — ARIPIPRAZOLE 2 MG: 2 TABLET ORAL at 09:58

## 2021-04-06 RX ADMIN — IOPAMIDOL 56 ML: 755 INJECTION, SOLUTION INTRAVENOUS at 13:05

## 2021-04-06 RX ADMIN — ALBUTEROL SULFATE 2 PUFF: 90 AEROSOL, METERED RESPIRATORY (INHALATION) at 08:00

## 2021-04-06 RX ADMIN — FAMOTIDINE 40 MG: 40 TABLET, FILM COATED ORAL at 11:01

## 2021-04-06 RX ADMIN — DEXAMETHASONE 6 MG: 2 TABLET ORAL at 09:58

## 2021-04-06 RX ADMIN — AMLODIPINE BESYLATE 5 MG: 5 TABLET ORAL at 09:58

## 2021-04-06 NOTE — PLAN OF CARE
Goal Outcome Evaluation:  Plan of Care Reviewed With: patient  Progress: improving  Outcome Summary: O2 remains @ 3.5L. VSS. Rrsted well. Hoping to go home.

## 2021-04-06 NOTE — PLAN OF CARE
Goal Outcome Evaluation:  Plan of Care Reviewed With: patient     PT re-certification.  Patient is alert and cooperative but fatigues very easily.  Patient requires min Ax1 for bed mobility, quite extensive cues for proper technique. Attempted gait, patient unable to stand from bed with FWW in front of her.  Removed FWW and provided max Ax1 with PT standing in front of patient.  Patient able to attain full upright posture with much encouragement, stands only briefly, sits back down suddenly, c/o fatigue.  Placed BSC on patient's R while sitting EOB to simulate car to w/c transfers: min Ax1, cues for technique but good effort. SpO2 87-92% with activity.  Patient unable to ambulate.  Expressed my concerns with patient and nurse that patient may not be ready to discharge home.  Patient is adamant that she is going home, reports she has 3 strong boys to help her. HHPT is already scheduled, per patient.

## 2021-04-06 NOTE — THERAPY TREATMENT NOTE
Patient Name: Emily Haas  : 1950    MRN: [id number]                              Today's Date: 2021       Admit Date: 3/22/2021    Visit Dx:     ICD-10-CM ICD-9-CM   1. Pneumonia due to COVID-19 virus  U07.1 480.8    J12.82 079.89   2. Fever, unspecified fever cause  R50.9 780.60   3. Dyspnea, unspecified type  R06.00 786.09   4. Impaired mobility and ADLs  Z74.09 V49.89    Z78.9    5. Impaired functional mobility, balance, gait, and endurance  Z74.09 V49.89   6. Acute respiratory failure with hypoxia (CMS/HCC)  J96.01 518.81     Patient Active Problem List   Diagnosis   • Vitamin D deficiency   • Irritable bowel syndrome   • Insomnia   • Hyperlipidemia   • Generalized anxiety disorder   • Essential hypertension   • Diverticular disease of colon   • Diabetes mellitus type 2, diet-controlled (CMS/HCC)   • Anxiety state   • Acquired hypothyroidism   • Asthma   • Periumbilical abdominal pain   • Telangiectasia of limb   • Varicose veins of both lower extremities with pain   • Diabetes mellitus (CMS/HCC)   • Posterior subcapsular polar age-related cataract   • Encounter for screening for malignant neoplasm of colon   • Diverticulosis of large intestine without hemorrhage   • Irritable bowel syndrome with both constipation and diarrhea   • Vaginosis   • Fatty liver   • Diabetes mellitus type 2 without retinopathy (CMS/HCC)   • Depression   • Pure hypercholesterolemia   • Class 1 obesity due to excess calories with serious comorbidity and body mass index (BMI) of 33.0 to 33.9 in adult   • Right lower quadrant abdominal pain   • Left lower quadrant abdominal pain   • Diarrhea   • Nausea   • Bloating   • Pneumonia due to COVID-19 virus   • CKD (chronic kidney disease) stage 3, GFR 30-59 ml/min (CMS/HCC)   • Acute respiratory failure with hypoxia (CMS/HCC)   • Cytokine release syndrome, grade 2     Past Medical History:   Diagnosis Date   • Anxiety    • Chronic bronchitis (CMS/HCC)    • CKD (chronic kidney  disease) stage 3, GFR 30-59 ml/min (CMS/HCC)    • Diabetes mellitus (CMS/HCC)    • Diverticulitis    • Hyperlipidemia    • Hypertension    • Hypothyroidism    • Irritable bowel syndrome    • Obesity    • Osteoarthritis    • Vitamin D deficiency      Past Surgical History:   Procedure Laterality Date   • BILATERAL BREAST REDUCTION     • CHOLECYSTECTOMY     • COLONOSCOPY N/A 11/17/2017   • COLONOSCOPY N/A 1/14/2021   • ENDOSCOPY N/A 1/14/2021   • HYSTERECTOMY       General Information     Row Name 04/06/21 0940          OT Time and Intention    Document Type  therapy note (daily note)  -     Mode of Treatment  occupational therapy;individual therapy  -KD     Row Name 04/06/21 0940          General Information    Patient Profile Reviewed  yes  -KD     Existing Precautions/Restrictions  fall;oxygen therapy device and L/min  -KD     Row Name 04/06/21 0940          Cognition    Orientation Status (Cognition)  oriented x 4  -KD     Row Name 04/06/21 0940          Safety Issues, Functional Mobility    Impairments Affecting Function (Mobility)  balance;endurance/activity tolerance;shortness of breath;strength  -KD       User Key  (r) = Recorded By, (t) = Taken By, (c) = Cosigned By    Initials Name Provider Type    KD Rachelle Salinas COTA/L Occupational Therapy Assistant          Mobility/ADL's     Row Name 04/06/21 0940          Bed Mobility    Supine-Sit [place] (Bed Mobility)  modified independence  -KD     Sit-Supine [place] (Bed Mobility)  minimum assist (75% patient effort)  -     Assistive Device (Bed Mobility)  bed rails  -KD     Row Name 04/06/21 0940          Transfers    Transfers  sit-stand transfer;toilet transfer  -KD     Sit-Stand [place] (Transfers)  contact guard w/ bed elevated  -KD     [place] Level (Toilet Transfer)  maximum assist (25% patient effort);2 person assist  -KD     Assistive Device (Toilet Transfer)  commode, bedside without drop arms  -KD     Row Name 04/06/21 0940           Toilet Transfer    Type (Toilet Transfer)  sit-stand;stand-sit  -KD     Row Name 04/06/21 0940          Toileting Assessment/Training    [place] Level (Toileting)  toileting skills;contact guard assist  -KD     Assistive Devices (Toileting)  commode, bedside without drop arms  -KD     Position (Toileting)  unsupported sitting;unsupported standing  -KD     Row Name 04/06/21 0940          Bathing Assessment/Intervention    [place] Level (Bathing)  upper body;set up;supervision;lower body;moderate assist (50% patient effort);minimum assist (75% patient effort)  -KD     Assistive Devices (Bathing)  bath mitt  -KD     Position (Bathing)  edge of bed sitting;supported standing  -KD     Row Name 04/06/21 0940          Upper Body Dressing Assessment/Training    [place] Level (Upper Body Dressing)  upper body dressing skills;doff;don;standby assist  -KD     Position (Upper Body Dressing)  edge of bed sitting  -KD     Row Name 04/06/21 0940          Grooming Assessment/Training    [place] Level (Grooming)  hair care, combing/brushing;wash face, hands;set up;modified independence  -KD     Position (Grooming)  edge of bed sitting  -KD     Row Name 04/06/21 0940          Lower Body Dressing Assessment/Training    [place] Level (Lower Body Dressing)  doff;don;socks;maximum assist (25% patient effort)  -KD     Position (Lower Body Dressing)  edge of bed sitting  -KD       User Key  (r) = Recorded By, (t) = Taken By, (c) = Cosigned By    Initials Name Provider Type     Rachelle Salinas COTA/L Occupational Therapy Assistant        Obj/Interventions    No documentation.       Goals/Plan     Row Name 04/06/21 0940          Transfer Goal 1 (OT)    Activity/Assistive Device (Transfer Goal 1, OT)  sit-to-stand/stand-to-sit;bed-to-chair/chair-to-bed;toilet  -KD     [place] Level/Cues Needed (Transfer Goal 1, OT)  modified independence  -KD     Time Frame (Transfer Goal 1, OT)  long term goal (LTG);by  discharge  -KD     Progress/Outcome (Transfer Goal 1, OT)  goal not met  -KD     Row Name 04/06/21 0940          Bathing Goal 1 (OT)    Activity/Device (Bathing Goal 1, OT)  bathing skills, all  -KD     [place] Level/Cues Needed (Bathing Goal 1, OT)  set-up required  -KD     Time Frame (Bathing Goal 1, OT)  long term goal (LTG);by discharge  -KD     Progress/Outcomes (Bathing Goal 1, OT)  goal not met  -KD     Row Name 04/06/21 0940          Toileting Goal 1 (OT)    Activity/Device (Toileting Goal 1, OT)  toileting skills, all  -KD     [place] Level/Cues Needed (Toileting Goal 1, OT)  modified independence  -KD     Time Frame (Toileting Goal 1, OT)  by discharge  -KD     Progress/Outcome (Toileting Goal 1, OT)  goal not met  -KD     Row Name 04/06/21 0940          ROM Goal 1 (OT)    ROM Goal 1 (OT)  Pt will tolerate 20 min of functional/therapeutic activities with O2=/> 88%.  -KD     Time Frame (ROM Goal 1, OT)  long term goal (LTG);by discharge  -KD     Progress/Outcome (ROM Goal 1, OT)  goal not met  -KD     Row Name 04/06/21 0940          Strength Goal 1 (OT)    Strength Goal 1 (OT)  Pt will be independent in BUE strengthening HEP  -KD     Time Frame (Strength Goal 1, OT)  long term goal (LTG);by discharge  -KD     Progress/Outcome (Strength Goal 1, OT)  goal not met  -KD       User Key  (r) = Recorded By, (t) = Taken By, (c) = Cosigned By    Initials Name Provider Type    KD Rachelle Salinas COTA/L Occupational Therapy Assistant        Clinical Impression     Row Name 04/06/21 1411          Pain Scale: Numbers Pre/Post-Treatment    Pretreatment Pain Rating  0/10 - no pain  -KD     Posttreatment Pain Rating  0/10 - no pain  -KD     Row Name 04/06/21 1411          Plan of Care Review    Plan of Care Reviewed With  patient  -KD     Outcome Summary  Pt CGA of 1 sor sit>stand from bed, w/ bed elevated. Pt stand pivot to BSC-Min A of 1 w/ RW. Pt made x3 attempts to stand from BSC  and could not stand fully  w/ Max A. Pt required Max A of 2 to stand pivot t/f from BSC >bed. Sit-sup-Min of 2. Pt also completed ADL this date.  -KD     Row Name 04/06/21 1411          Therapy Assessment/Plan (OT)    Therapy Frequency (OT)  other (see comments) 5-7 days a week  -KD     Row Name 04/06/21 1411          Therapy Plan Review/Discharge Plan (OT)    Anticipated Discharge Disposition (OT)  home with 24/7 care  -KD     Row Name 04/06/21 1411          Vital Signs    Pre Systolic BP Rehab  167  -KD     Pre Treatment Diastolic BP  81  -KD     Pretreatment Heart Rate (beats/min)  56  -KD     Pre SpO2 (%)  92  -KD     O2 Delivery Pre Treatment  supplemental O2  -KD     Pre Patient Position  Supine  -KD     Intra Patient Position  Standing  -KD     Post Patient Position  Sitting  -KD     Row Name 04/06/21 1411          Positioning and Restraints    Pre-Treatment Position  in bed  -KD     Post Treatment Position  bed  -KD     In Bed  fowlers;call light within reach;encouraged to call for assist;exit alarm on  -KD       User Key  (r) = Recorded By, (t) = Taken By, (c) = Cosigned By    Initials Name Provider Type    Rachelle Kilpatrick COTA/L Occupational Therapy Assistant        Outcome Measures     Row Name 04/06/21 0940          How much help from another is currently needed...    Putting on and taking off regular lower body clothing?  2  -KD     Bathing (including washing, rinsing, and drying)  2  -KD     Toileting (which includes using toilet bed pan or urinal)  3  -KD     Putting on and taking off regular upper body clothing  3  -KD     Taking care of personal grooming (such as brushing teeth)  4  -KD     Eating meals  4  -KD     AM-PAC 6 Clicks Score (OT)  18  -KD       User Key  (r) = Recorded By, (t) = Taken By, (c) = Cosigned By    Initials Name Provider Type    Rachelle Kilpatrick COTA/L Occupational Therapy Assistant        Occupational Therapy Education                 Title: PT OT SLP Therapies (Resolved)     Topic: Occupational  Therapy (Resolved)     Point: ADL training (Resolved)     Description:   Instruct learner(s) on proper safety adaptation and remediation techniques during self care or transfers.   Instruct in proper use of assistive devices.              Learning Progress Summary           Patient Acceptance, E,TB, VU by LW at 3/26/2021 1429    Acceptance, E, VU by AS at 3/25/2021 0853    Comment: ADL training, transfer training, PLB, safety precautions    Acceptance, E, VU by AS at 3/24/2021 1413    Comment: role of OT, OT POC, ADL training, transfer training                   Point: Home exercise program (Resolved)     Description:   Instruct learner(s) on appropriate technique for monitoring, assisting and/or progressing therapeutic exercises/activities.              Learning Progress Summary           Patient Acceptance, E,TB, VU by LW at 3/26/2021 1429                   Point: Precautions (Resolved)     Description:   Instruct learner(s) on prescribed precautions during self-care and functional transfers.              Learning Progress Summary           Patient Acceptance, E, VU by ME at 3/29/2021 1136    Comment: Educated on OT and POC. Educated to call for assistance. Educated on safety precautions. Educated on pursed lip breathing technique.    Acceptance, E,TB, VU by LW at 3/26/2021 1429    Acceptance, E, VU by AS at 3/25/2021 0853    Comment: ADL training, transfer training, PLB, safety precautions    Acceptance, E, VU by AS at 3/24/2021 1413    Comment: role of OT, OT POC, ADL training, transfer training                   Point: Body mechanics (Resolved)     Description:   Instruct learner(s) on proper positioning and spine alignment during self-care, functional mobility activities and/or exercises.              Learning Progress Summary           Patient Acceptance, E, VU by ME at 3/29/2021 1136    Comment: Educated on OT and POC. Educated to call for assistance. Educated on safety precautions. Educated on pursed lip  breathing technique.    Acceptance, E,TB, VU by LW at 3/26/2021 1429                               User Key     Initials Effective Dates Name Provider Type Discipline    LW 03/07/18 -  Emily Jack COTA/L Occupational Therapy Assistant OT    AS 03/18/21 -  Jael Lopez OT Occupational Therapist OT    ME 08/24/20 -  Joe Perry OTR/L Occupational Therapist OT              OT Recommendation and Plan  Therapy Frequency (OT): other (see comments) (5-7 days a week)  Plan of Care Review  Plan of Care Reviewed With: patient  Progress: improving  Outcome Summary: Pt CGA of 1 sor sit>stand from bed, w/ bed elevated. Pt stand pivot to BSC-Min A of 1 w/ RW. Pt made x3 attempts to stand from BSC  and could not stand fully w/ Max A. Pt required Max A of 2 to stand pivot t/f from BSC >bed. Sit-sup-Min of 2. Pt also completed ADL this date.     Time Calculation:   Time Calculation- OT     Row Name 04/06/21 0940             Time Calculation- OT    OT Start Time  0940  -KD      OT Stop Time  1038  -KD      OT Time Calculation (min)  58 min  -KD      Total Timed Code Minutes- OT  58 minute(s)  -KD      OT Received On  04/06/21  -KD        User Key  (r) = Recorded By, (t) = Taken By, (c) = Cosigned By    Initials Name Provider Type     Rachelle Salinas COTA/L Occupational Therapy Assistant        Therapy Charges for Today     Code Description Service Date Service Provider Modifiers Qty    90842608553 HC OT SELF CARE/MGMT/TRAIN EA 15 MIN 4/5/2021 Rachelle Salinas COTA/L GO 4    [id number] HC OT SELF CARE/MGMT/TRAIN EA 15 MIN 4/6/2021 Rachelle Salinas COTA/L GO 4               ADAM Yarbrough  4/6/2021

## 2021-04-06 NOTE — DISCHARGE PLACEMENT REQUEST
"Glenn Haas (71 y.o. Female)     Date of Birth Social Security Number Address Home Phone MRN    1950  [address] [facility] [address] [phone] [id number]    [employer]          [facility]        Admission Date Admission Type Admitting Provider Attending Provider Department, Room/Bed    3/22/21 Emergency Yvon Mast MD Ebenibo, Sotonte E, MD [address], 424/1    Discharge Date Discharge Disposition Discharge Destination         Home-Health Care [facility]              Attending Provider: Fabiola Layne MD    Allergies: Atenolol, Augmentin [Amoxicillin-pot Clavulanate], Dilaudid [Hydromorphone], Lipitor [Atorvastatin], Pravachol [Pravastatin], Statins, Sulfa Antibiotics    Isolation: Enh Drop/Con   Infection: COVID (confirmed) (03/19/21)   Code Status: CPR    Ht: 165.1 cm (65\")   Wt: 95.2 kg (209 lb 12.8 oz)    Admission Cmt: None   Principal Problem: Pneumonia due to COVID-19 virus [U07.1,J12.82]                 Active Insurance as of 3/22/2021     Primary Coverage     Payor Plan Insurance Group Employer/Plan Group    MEDICARE MEDICARE A & B      Payor Plan Address Payor Plan Phone Number Payor Plan Fax Number Effective Dates    [address] [phone]  2/1/2015 - None Entered    formerly [facility] [address]       Subscriber Name Subscriber Birth Date Member ID       GLENN HAAS 1950 [id number]           Secondary Coverage     Payor Plan Insurance Group Employer/Plan Group    [employer] [place] [employer] [place]     Payor Plan Address Payor Plan Phone Number Payor Plan Fax Number Effective Dates    [address] [place] [address] [phone]  2/1/2015 - None Entered    [place] [address]       Subscriber Name Subscriber Birth Date Member ID       GLENN HAAS 1950 [id number]                 Emergency Contacts      (Rel.) Home Phone Work Phone Mobile Phone    HaasCy saravia (Spouse) [phone] -- [phone]    " "Lori Giron (Daughter) [phone] -- [phone]        Nicole Loomis RN [facility]  [phone] phone  [phone] fax       21 1245 21 1246 21 1249   Oxygen Therapy   SpO2 95 % (!) 86 %  (sitting) 92 %   Device (Oxygen Therapy) nasal cannula room air nasal cannula   Flow (L/min) 3.5  --  3.5       [address] [address]  Dept. Phone:  [phone]  Dept. Fax:   Date Ordered: 2021         Patient:  Emily Haas MRN:  [id number]   [address] [facility] [address] :  1950  SSN:    Phone: [phone] Sex:  F     Weight: 95.2 kg (209 lb 12.8 oz)         Ht Readings from Last 1 Encounters:   21 165.1 cm (65\")         Oxygen Therapy         (Order ID: [id number])    Diagnosis:  Pneumonia due to COVID-19 virus (U07.1,J12.82 [ICD-10-CM] 480.8,079.89 [ICD-9-CM])  Acute respiratory failure with hypoxia (CMS/HCC) (J96.01 [ICD-10-CM] 518.81 [ICD-9-CM])   Quantity:  1     Delivery Modality: Nasal Cannula  Liters Per Minute: 4  Duration: Continuous  Equipment:  Oxygen Concentrator &  &  Portable Gaseous Oxygen System & Portable Oxygen Contents Gaseous &  Conserving Regulator  Length of Need (99 Months = Lifetime): 99 Months = Lifetime        Authorizing Provider's Phone: [phone]   Authorizing Provider:Fabiola Layne MD  Authorizing Provider's NPI: [id number]  Order Entered By: Fabiola Layne MD 2021 10:52 AM     Electronically signed by: Fabiola Layne MD 2021 10:52 AM          [address] [address]  Dept. Phone:  [phone]  Dept. Fax:   Date Ordered: 2021         Patient:  Emily Haas MRN:  [id number]   [address] [facility] [address] :  1950  SSN:    Phone: [phone] Sex:  F     Weight: 95.2 kg (209 lb 12.8 oz)         Ht " "Readings from Last 1 Encounters:   03/22/21 165.1 cm (65\")         Walker               (Order ID: [id number])    Diagnosis:  Impaired mobility and ADLs (Z74.09,Z78.9 [ICD-10-CM] V49.89 [ICD-9-CM])  Impaired functional mobility, balance, gait, and endurance (Z74.09 [ICD-10-CM] V49.89 [ICD-9-CM])   Quantity:  1     Equipment:  Walker Folding with Wheels  Length of Need (99 Months = Lifetime): 99 Months = Lifetime        Authorizing Provider's Phone: [phone]   Authorizing Provider:Fabiola Layne MD  Authorizing Provider's NPI: [id number]  Order Entered By: Fabiola Layne MD 4/6/2021 10:52 AM     Electronically signed by: Fabiola Layne MD 4/6/2021 10:52 AM                   History & Physical      Yvon Mast MD at 03/22/21 1255          History and Physical  Yvon Mast MD  Hospitalist    Date of admission: 3/22/2021    Patient Care Team:  Bharati Love MD as PCP - General (Family Medicine)    Chief complaint   Chief Complaint   Patient presents with   • Fatigue   • Weakness - Generalized     Subjective     Patient is a 71 y.o. female admitted for worsening cough, congestion, and mild dyspnea, generalized weakness, symptoms experienced for the last several days. She has been diagnosed as Covid + few days ago as have been several members of her close family.    History  Past Medical History:   Diagnosis Date   • Anxiety    • Chronic bronchitis (CMS/HCC)    • CKD (chronic kidney disease) stage 3, GFR 30-59 ml/min (CMS/HCC)    • Diabetes mellitus (CMS/HCC)    • Diverticulitis    • Hyperlipidemia    • Hypertension    • Hypothyroidism    • Irritable bowel syndrome    • Obesity    • Osteoarthritis    • Vitamin D deficiency      Past Surgical History:   Procedure Laterality Date   • BILATERAL BREAST REDUCTION     • CHOLECYSTECTOMY     • COLONOSCOPY N/A 11/17/2017   • COLONOSCOPY N/A 1/14/2021   • ENDOSCOPY N/A 1/14/2021   • HYSTERECTOMY       Family History   Problem Relation Age of Onset "   • Hypertension Mother    • Alzheimer's disease Father    • Diabetes Brother    • Hypothyroidism Daughter      Social History     Tobacco Use   • Smoking status: Former Smoker   • Smokeless tobacco: Never Used   Vaping Use   • Vaping Use: Never assessed   Substance Use Topics   • Alcohol use: No   • Drug use: No     Medications Prior to Admission   Medication Sig Dispense Refill Last Dose   • acetaminophen (TYLENOL) 650 MG 8 hr tablet Take 2 tablets by mouth Every 8 (Eight) Hours As Needed.   3/22/2021 at Unknown time   • amLODIPine (NORVASC) 5 MG tablet Take 5 mg by mouth Daily.   Past Week at Unknown time   • carvedilol (COREG) 12.5 MG tablet Take 1 tablet by mouth 2 (Two) Times a Day With Meals. 180 tablet 3 3/21/2021 at Unknown time   • docusate sodium (COLACE) 50 MG capsule Take 1 capsule by mouth Daily As Needed.   Past Month at Unknown time   • escitalopram (LEXAPRO) 10 MG tablet TAKE 1 TABLET EVERY DAY 90 tablet 3 3/21/2021 at Unknown time   • guanFACINE (TENEX) 1 MG tablet Take 1 mg by mouth Daily.   3/21/2021 at Unknown time   • levothyroxine (Synthroid) 112 MCG tablet Take 1 tablet by mouth Daily. 90 tablet 0 3/21/2021 at Unknown time   • losartan (COZAAR) 100 MG tablet TAKE 1 TABLET EVERY DAY 90 tablet 2 3/21/2021 at Unknown time   • omeprazole (priLOSEC) 40 MG capsule Take 1 capsule by mouth Daily. 30 capsule 11 3/21/2021 at Unknown time   • polyethylene glycol (MIRALAX) powder Take 17 g by mouth As Needed. 17gm in 8oz bid    Past Month at Unknown time   • sucralfate (CARAFATE) 1 g tablet Take 1 g by mouth 4 (Four) Times a Day.   Past Week at Unknown time   • vitamin D (ERGOCALCIFEROL) 1.25 MG (80884 UT) capsule capsule Take 50,000 Units by mouth 1 (One) Time Per Week.   Past Week at Unknown time   • ezetimibe (Zetia) 10 MG tablet Take 1 tablet by mouth Daily. 30 tablet 2 More than a month at Unknown time   • famotidine (Pepcid) 40 MG tablet Take 1 tablet by mouth Daily for 30 days. 30 tablet 5 Unknown  at Unknown time   • ondansetron ODT (Zofran ODT) 8 MG disintegrating tablet Place 1 tablet on the tongue Every 8 (Eight) Hours As Needed for Nausea or Vomiting. 15 tablet 0 Unknown at Unknown time     Allergies:  Atenolol, Augmentin [amoxicillin-pot clavulanate], Dilaudid [hydromorphone], Lipitor [atorvastatin], Pravachol [pravastatin], Statins, and Sulfa antibiotics    Review of Systems  Review of Systems   Constitutional: Positive for chills, fatigue and fever.   Respiratory: Positive for cough and shortness of breath. Negative for wheezing.    Cardiovascular: Negative for chest pain, palpitations and leg swelling.   Gastrointestinal: Negative for abdominal distention, abdominal pain and anal bleeding.   Genitourinary: Negative for dysuria, frequency, hematuria and urgency.   Musculoskeletal: Positive for arthralgias. Negative for back pain and gait problem.   Skin: Positive for pallor. Negative for color change and rash.   Psychiatric/Behavioral: Negative for agitation, behavioral problems and confusion.   All other systems reviewed and are negative.      Objective     Vital Signs  Temp:  [96.1 °F (35.6 °C)-101.6 °F (38.7 °C)] 96.1 °F (35.6 °C)  Heart Rate:  [83-99] 83  Resp:  [18-20] 20  BP: (126-139)/(76-82) 129/82    Physical Exam:  Physical Exam  Vitals reviewed.   Constitutional:       General: She is not in acute distress.     Appearance: She is obese. She is not ill-appearing.   HENT:      Head: Normocephalic and atraumatic.   Eyes:      General: No scleral icterus.     Extraocular Movements: Extraocular movements intact.      Pupils: Pupils are equal, round, and reactive to light.   Cardiovascular:      Rate and Rhythm: Normal rate and regular rhythm.   Pulmonary:      Effort: Pulmonary effort is normal. No respiratory distress.      Breath sounds: No stridor. Rhonchi present. No wheezing or rales.   Abdominal:      General: There is no distension.      Palpations: There is no mass.      Tenderness: There  is no abdominal tenderness. There is no right CVA tenderness or left CVA tenderness.      Hernia: No hernia is present.   Musculoskeletal:         General: No swelling, tenderness or deformity.      Cervical back: Normal range of motion and neck supple. No rigidity or tenderness.      Right lower leg: No edema.      Left lower leg: No edema.   Skin:     General: Skin is warm.      Coloration: Skin is pale. Skin is not jaundiced.      Findings: No bruising, erythema or lesion.   Neurological:      Mental Status: She is alert and oriented to person, place, and time.      Cranial Nerves: No cranial nerve deficit.      Sensory: No sensory deficit.      Motor: No weakness.   Psychiatric:         Mood and Affect: Mood normal.         Behavior: Behavior normal.         Results Review:   Lab Results (last 24 hours)     Procedure Component Value Units Date/Time    Extra Tubes [[id number]] Collected: 03/22/21 1041    Specimen: Blood, Venous Line Updated: 03/22/21 1530    Narrative:      The following orders were created for panel order Extra Tubes.  Procedure                               Abnormality         Status                     ---------                               -----------         ------                     Light Blue Top[[id number]]                                   Final result               Gold Top - [facility][[id number]]                                   Final result                 Please view results for these tests on the individual orders.    Light Blue Top [[id number]] Collected: 03/22/21 1424    Specimen: Blood Updated: 03/22/21 1530     Extra Tube hold for add-on     Comment: Auto resulted       POC Glucose Once [[id number]]  (Abnormal) Collected: 03/22/21 1456    Specimen: Blood Updated: 03/22/21 1522     Glucose 160 mg/dL      Comment: RN NotifiedOperator: [id number] SMILEY Alonso ID: [id number]       Blood Culture - Blood, Arm, Right [[id number]] Collected: 03/22/21 1502    Specimen: Blood from Arm,  Right Updated: 03/22/21 1508    Urinalysis, Microscopic Only - Urine, Clean Catch [[id number]]  (Abnormal) Collected: 03/22/21 1232    Specimen: Urine, Clean Catch Updated: 03/22/21 1308     RBC, UA None Seen /HPF      WBC, UA 3-5 /HPF      Bacteria, UA 1+ /HPF      Squamous Epithelial Cells, UA 6-12 /HPF      Hyaline Casts, UA None Seen /LPF      Methodology Manual Light Microscopy    Urinalysis With Culture If Indicated - Urine, Clean Catch [[id number]]  (Abnormal) Collected: 03/22/21 1232    Specimen: Urine, Clean Catch Updated: 03/22/21 1253     Color, UA Yellow     Appearance, UA Cloudy     pH, UA 5.5     Specific Gravity, UA 1.018     Glucose, UA Negative     Ketones, UA Trace     Bilirubin, UA Negative     Blood, UA Negative     Protein,  mg/dL (2+)     Leuk Esterase, UA Negative     Nitrite, UA Negative     Urobilinogen, UA 1.0 E.U./dL    [facility] [[id number]] Collected: 03/22/21 1041    Specimen: Blood Updated: 03/22/21 1145     Extra Tube Hold for add-ons.     Comment: Auto resulted.       Comprehensive Metabolic Panel [[id number]]  (Abnormal) Collected: 03/22/21 0954    Specimen: Blood Updated: 03/22/21 1029     Glucose 175 mg/dL      BUN 13 mg/dL      Creatinine 1.24 mg/dL      Sodium 134 mmol/L      Potassium 4.2 mmol/L      Chloride 98 mmol/L      CO2 25.0 mmol/L      Calcium 9.0 mg/dL      Total Protein 6.5 g/dL      Albumin 3.00 g/dL      ALT (SGPT) 14 U/L      AST (SGOT) 23 U/L      Alkaline Phosphatase 55 U/L      Total Bilirubin 0.3 mg/dL      eGFR Non African Amer 43 mL/min/1.73      Globulin 3.5 gm/dL      A/G Ratio 0.9 g/dL      BUN/Creatinine Ratio 10.5     Anion Gap 11.0 mmol/L     Narrative:      GFR Normal >60  Chronic Kidney Disease <60  Kidney Failure <15      Lipase [[id number]]  (Abnormal) Collected: 03/22/21 0954    Specimen: Blood Updated: 03/22/21 1029     Lipase 10 U/L     CK [[id number]]  (Normal) Collected: 03/22/21 0954    Specimen: Blood Updated: 03/22/21 1029     Creatine  Kinase 44 U/L     Magnesium [[id number]]  (Normal) Collected: 03/22/21 0954    Specimen: Blood Updated: 03/22/21 1029     Magnesium 1.7 mg/dL     BNP [[id number]]  (Normal) Collected: 03/22/21 0954    Specimen: Blood Updated: 03/22/21 1026     proBNP 224.1 pg/mL     Narrative:      Among patients with dyspnea, NT-proBNP is highly sensitive for the detection of acute congestive heart failure. In addition NT-proBNP of <300 pg/ml effectively rules out acute congestive heart failure with 99% negative predictive value.    Results may be falsely decreased if patient taking Biotin.      Lactic Acid, Plasma [[id number]]  (Normal) Collected: 03/22/21 0954    Specimen: Blood Updated: 03/22/21 1026     Lactate 1.4 mmol/L     CBC & Differential [[id number]]  (Abnormal) Collected: 03/22/21 0954    Specimen: Blood Updated: 03/22/21 1013    Narrative:      The following orders were created for panel order CBC & Differential.  Procedure                               Abnormality         Status                     ---------                               -----------         ------                     CBC Auto Differential[[id number]]        Abnormal            Final result                 Please view results for these tests on the individual orders.    CBC Auto Differential [[id number]]  (Abnormal) Collected: 03/22/21 0954    Specimen: Blood Updated: 03/22/21 1013     WBC 7.97 10*3/mm3      RBC 4.37 10*6/mm3      Hemoglobin 13.9 g/dL      Hematocrit 40.7 %      MCV 93.1 fL      MCH 31.8 pg      MCHC 34.2 g/dL      RDW 12.3 %      RDW-SD 42.7 fl      MPV 12.3 fL      Platelets 99 10*3/mm3      Neutrophil % 81.6 %      Lymphocyte % 14.4 %      Monocyte % 3.0 %      Eosinophil % 0.1 %      Basophil % 0.3 %      Immature Grans % 0.6 %      Neutrophils, Absolute 6.50 10*3/mm3      Lymphocytes, Absolute 1.15 10*3/mm3      Monocytes, Absolute 0.24 10*3/mm3      Eosinophils, Absolute 0.01 10*3/mm3      Basophils, Absolute 0.02 10*3/mm3       Immature Grans, Absolute 0.05 10*3/mm3      nRBC 0.0 /100 WBC     Blood Culture - Blood, Arm, Left [[id number]] Collected: 03/22/21 0954    Specimen: Blood from Arm, Left Updated: 03/22/21 1005          Imaging Results (Last 24 Hours)     Procedure Component Value Units Date/Time    XR Chest 1 View [[id number]] Collected: 03/22/21 0938     Updated: 03/22/21 1008    Narrative:      EXAM: XR CHEST 1 VIEW    INDICATION: fever    COMPARISON: Chest x-ray dated 3/21/2017    FINDINGS: Lung volumes are low. Somewhat coarse airspace disease  is present in both mid and lower lungs. No pleural effusion or  pneumothorax. Calcified granulomas, present previously. Heart  size appears to be within normal limits, allowing for patient  body habitus, AP technique, and the low lung volumes.      Impression:      Low lung volume with coarse bilateral airspace  disease, likely infectious.      Electronically signed by:  Brad Lomeli MD  3/22/2021 10:07 AM  CDT Workstation: [id number]          Assessment/Plan       Pneumonia due to COVID-19 virus    CKD (chronic kidney disease) stage 3, GFR 30-59 ml/min (CMS/[facility])    Diabetes mellitus (CMS/[facility])    Provide her with supplemental Oxygen if necessary, PRN Proventil HFA, IV Decadron / Remdesivir and supportive care.    Yvon Mast MD  03/22/21  17:15 CDT      Electronically signed by Yvon Mast MD at 03/22/21 1715          Physician Progress Notes (most recent note)      Fabiola Layne MD at 04/05/21 1628              [facility] Medicine Services  INPATIENT PROGRESS NOTE    Length of Stay: 13  Date of Admission: 3/22/2021  Primary Care Physician: Bharati Love MD    Subjective   Chief Complaint: Shortness of breath    HPI: Patient has some improvement in her shortness of breath.  She is saturating adequately on 3.5 L of oxygen by nasal cannula.  She has severe weakness and fatigue.    Review of Systems   Constitutional: Positive for  fatigue. Negative for activity change, appetite change, chills and fever.   HENT: Negative for congestion, sore throat and trouble swallowing.    Respiratory: Positive for shortness of breath. Negative for cough, chest tightness and wheezing.    Cardiovascular: Negative for chest pain, palpitations and leg swelling.   Gastrointestinal: Negative for abdominal distention, abdominal pain, diarrhea, nausea and vomiting.   Genitourinary: Negative for difficulty urinating, dysuria and hematuria.   Musculoskeletal: Negative for arthralgias, back pain and myalgias.   Skin: Negative for pallor and rash.   Neurological: Positive for weakness. Negative for dizziness, syncope, light-headedness and headaches.   Hematological: Negative for adenopathy. Does not bruise/bleed easily.   Psychiatric/Behavioral: Negative for agitation and confusion. The patient is not nervous/anxious.      Objective    Temp:  [96.1 °F (35.6 °C)-98 °F (36.7 °C)] 96.6 °F (35.9 °C)  Heart Rate:  [60-82] 69  Resp:  [18-20] 18  BP: (107-138)/(62-76) 107/62    Physical Exam  Constitutional:       General: She is not in acute distress.     Appearance: Normal appearance. She is not ill-appearing or diaphoretic.   HENT:      Head: Normocephalic and atraumatic.      Right Ear: External ear normal.      Left Ear: External ear normal.      Nose: No congestion or rhinorrhea.      Mouth/Throat:      Mouth: Mucous membranes are moist.      Pharynx: No oropharyngeal exudate or posterior oropharyngeal erythema.   Eyes:      General: No scleral icterus.     Extraocular Movements: Extraocular movements intact.      Conjunctiva/sclera: Conjunctivae normal.   Cardiovascular:      Rate and Rhythm: Normal rate and regular rhythm.      Heart sounds: Normal heart sounds. No murmur heard.     Pulmonary:      Effort: Pulmonary effort is normal. No respiratory distress.      Breath sounds: Normal breath sounds. No wheezing, rhonchi or rales.   Abdominal:      General: Abdomen is  flat. There is no distension.      Palpations: Abdomen is soft.      Tenderness: There is no abdominal tenderness. There is no guarding.   Musculoskeletal:         General: No swelling, tenderness or deformity.      Cervical back: Neck supple. No rigidity. No muscular tenderness.      Right lower leg: No edema.      Left lower leg: No edema.   Lymphadenopathy:      Cervical: No cervical adenopathy.   Skin:     General: Skin is warm and dry.   Neurological:      General: No focal deficit present.      Mental Status: She is alert and oriented to person, place, and time.      Cranial Nerves: No cranial nerve deficit.      Motor: No weakness.   Psychiatric:         Mood and Affect: Mood normal.         Behavior: Behavior normal.         Thought Content: Thought content normal.       Medication Review:    Current Facility-Administered Medications:   •  acetaminophen (TYLENOL) tablet 650 mg, 650 mg, Oral, Q6H PRN, Yvon Mast MD, 650 mg at 03/25/21 1541  •  albuterol sulfate HFA (PROVENTIL HFA;VENTOLIN HFA;PROAIR HFA) inhaler 2 puff, 2 puff, Inhalation, 4x Daily - RT, Dalila Serrano MD, 2 puff at 04/05/21 0700  •  amLODIPine (NORVASC) tablet 5 mg, 5 mg, Oral, Q24H, Yvon Mast MD, 5 mg at 04/05/21 0956  •  ARIPiprazole (ABILIFY) tablet 2 mg, 2 mg, Oral, Daily, Mimi Douglass MD, 2 mg at 04/05/21 0956  •  baricitinib (OLUMIANT) tablet 4 mg, 4 mg, Oral, Daily, Yvon Mast MD  •  budesonide-formoterol (SYMBICORT) 160-4.5 MCG/ACT inhaler 2 puff, 2 puff, Inhalation, BID - RT, Dalila Serrano MD, 2 puff at 04/05/21 0700  •  carvedilol (COREG) tablet 12.5 mg, 12.5 mg, Oral, BID With Meals, Yvon Mast MD, 12.5 mg at 04/05/21 0956  •  dexamethasone (DECADRON) tablet 6 mg, 6 mg, Oral, Daily With Breakfast, Dalila Serrano MD, 6 mg at 04/05/21 0956  •  dextrose (D50W) 25 g/ 50mL Intravenous Solution 25 g, 25 g, Intravenous, Q15 Min PRN, Yvon Mast MD  •  dextrose (GLUTOSE) oral gel 15 g,  15 g, Oral, Q15 Min PRN, Yvon Mast MD  •  enoxaparin (LOVENOX) syringe 40 mg, 40 mg, Subcutaneous, Q24H, Yvon Mast MD, 40 mg at 04/04/21 1804  •  escitalopram (LEXAPRO) tablet 10 mg, 10 mg, Oral, Daily, Yvon Mast MD, 10 mg at 04/05/21 0956  •  famotidine (PEPCID) tablet 40 mg, 40 mg, Oral, Daily, Yvon Mast MD, 40 mg at 04/05/21 0956  •  glucagon (human recombinant) (GLUCAGEN DIAGNOSTIC) injection 1 mg, 1 mg, Subcutaneous, Q15 Min PRN, Yvon Mast MD  •  guanFACINE (TENEX) tablet 1 mg, 1 mg, Oral, Daily, Yvon Mast MD, 1 mg at 04/05/21 0956  •  HYDROcod Polst-CPM Polst ER (TUSSIONEX PENNKINETIC) 10-8 MG/5ML ER suspension 2.5 mL, 2.5 mL, Oral, Q12H PRN, Yvon Mast MD  •  insulin aspart (novoLOG) injection 0-24 Units, 0-24 Units, Subcutaneous, TID AC, Dalila Serrano MD, 4 Units at 04/05/21 1231  •  insulin detemir (LEVEMIR) injection 40 Units, 40 Units, Subcutaneous, Nightly, Yvon Mast MD, 40 Units at 04/04/21 2214  •  levothyroxine (SYNTHROID, LEVOTHROID) tablet 112 mcg, 112 mcg, Oral, QAM, Yvon Mast MD, 112 mcg at 04/05/21 0618  •  losartan (COZAAR) tablet 100 mg, 100 mg, Oral, Daily, Yvon Mast MD, 100 mg at 04/05/21 0956  •  melatonin tablet 3 mg, 3 mg, Oral, Nightly, Dalila Serrano MD, 3 mg at 04/04/21 2211  •  ondansetron ODT (ZOFRAN-ODT) disintegrating tablet 4 mg, 4 mg, Oral, Q6H PRN, Yvon Mast MD  •  sodium chloride 0.9 % flush 10 mL, 10 mL, Intravenous, Q12H, Yvon Mast MD, 10 mL at 04/05/21 0956  •  sodium chloride 0.9 % flush 10 mL, 10 mL, Intravenous, PRN, Yvon Mast MD    I have reviewed the patient's current medications.     Results Review:  I have reviewed the labs, radiology results, and diagnostic studies.    Laboratory Data:   Results from last 7 days   Lab Units 04/05/21  0449 04/04/21  0608 04/03/21  0605   SODIUM mmol/L 131* 133* 135*   POTASSIUM mmol/L 4.9 4.6 4.8   CHLORIDE mmol/L 100 102 103   CO2 mmol/L 25.0  24.0 26.0   BUN mg/dL 31* 35* 42*   CREATININE mg/dL 0.77 0.91 0.98   GLUCOSE mg/dL 212* 213* 278*   CALCIUM mg/dL 8.7 8.8 8.8   BILIRUBIN mg/dL 0.7 0.7 0.6   ALK PHOS U/L 42 42 48   ALT (SGPT) U/L 20 18 17   AST (SGOT) U/L 15 13 15   ANION GAP mmol/L 6.0 7.0 6.0     Estimated Creatinine Clearance: 72.7 mL/min (by C-G formula based on SCr of 0.77 mg/dL).          Results from last 7 days   Lab Units 04/05/21  0448 04/04/21  0608 04/03/21  0605 04/02/21  0608 04/01/21  0635   WBC 10*3/mm3 15.29* 14.56* 14.00* 14.17* 14.62*   HEMOGLOBIN g/dL 13.4 13.3 13.5 13.5 13.3   HEMATOCRIT % 38.3 39.2 39.7 39.0 38.2   PLATELETS 10*3/mm3 172 174 171 180 172           Culture Data:   No results found for: BLOODCX  No results found for: URINECX  No results found for: RESPCX  No results found for: WOUNDCX  No results found for: STOOLCX  No components found for: BODYFLD    Radiology Data:   Imaging Results (Last 24 Hours)     ** No results found for the last 24 hours. **          Assessment/Plan     Hospital Problem List:  Principal Problem:    Pneumonia due to COVID-19 virus  Active Problems:    Generalized anxiety disorder    Essential hypertension    Diabetes mellitus (CMS/[facility])    Depression    CKD (chronic kidney disease) stage 3, GFR 30-59 ml/min (CMS/[facility])    Acute respiratory failure with hypoxia (CMS/[facility])    Plan  -Patient completed a course of remdesivir.  She is currently saturating adequately on 3.5 L of oxygen by nasal cannula  -Continue Decadron and baricitinib  -Continue bronchodilators  -Psychiatry input appreciated for depression.  Continue Abilify and melatonin for insomnia  -Continue Levemir and NovoLog sliding scale for type 2 diabetes mellitus  -Continue Coreg, Norvasc and losartan for essential hypertension  -PT/OT for disposition planning  -DVT prophylaxis with subcutaneous Lovenox  -CODE STATUS is full code    Discharge Planning: Expect patient to be discharged in next 1 to 2 days with home oxygen and home  health for physical and occupational therapy versus SNF placement    Fabiola Layne MD   04/05/21   16:29 CDT          Electronically signed by Fabiola Layne MD at 04/05/21 8739

## 2021-04-06 NOTE — THERAPY PROGRESS REPORT/RE-CERT
Patient Name: Emily Haas  : 1950    MRN: [id number]                              Today's Date: 2021       Admit Date: 3/22/2021    Visit Dx:     ICD-10-CM ICD-9-CM   1. Pneumonia due to COVID-19 virus  U07.1 480.8    J12.82 079.89   2. Fever, unspecified fever cause  R50.9 780.60   3. Dyspnea, unspecified type  R06.00 786.09   4. Impaired mobility and ADLs  Z74.09 V49.89    Z78.9    5. Impaired functional mobility, balance, gait, and endurance  Z74.09 V49.89   6. Acute respiratory failure with hypoxia (CMS/HCC)  J96.01 518.81     Patient Active Problem List   Diagnosis   • Vitamin D deficiency   • Irritable bowel syndrome   • Insomnia   • Hyperlipidemia   • Generalized anxiety disorder   • Essential hypertension   • Diverticular disease of colon   • Diabetes mellitus type 2, diet-controlled (CMS/HCC)   • Anxiety state   • Acquired hypothyroidism   • Asthma   • Periumbilical abdominal pain   • Telangiectasia of limb   • Varicose veins of both lower extremities with pain   • Diabetes mellitus (CMS/HCC)   • Posterior subcapsular polar age-related cataract   • Encounter for screening for malignant neoplasm of colon   • Diverticulosis of large intestine without hemorrhage   • Irritable bowel syndrome with both constipation and diarrhea   • Vaginosis   • Fatty liver   • Diabetes mellitus type 2 without retinopathy (CMS/HCC)   • Depression   • Pure hypercholesterolemia   • Class 1 obesity due to excess calories with serious comorbidity and body mass index (BMI) of 33.0 to 33.9 in adult   • Right lower quadrant abdominal pain   • Left lower quadrant abdominal pain   • Diarrhea   • Nausea   • Bloating   • Pneumonia due to COVID-19 virus   • CKD (chronic kidney disease) stage 3, GFR 30-59 ml/min (CMS/HCC)   • Acute respiratory failure with hypoxia (CMS/HCC)   • Cytokine release syndrome, grade 2     Past Medical History:   Diagnosis Date   • Anxiety    • Chronic bronchitis (CMS/HCC)    • CKD (chronic kidney  disease) stage 3, GFR 30-59 ml/min (CMS/HCC)    • Diabetes mellitus (CMS/HCC)    • Diverticulitis    • Hyperlipidemia    • Hypertension    • Hypothyroidism    • Irritable bowel syndrome    • Obesity    • Osteoarthritis    • Vitamin D deficiency      Past Surgical History:   Procedure Laterality Date   • BILATERAL BREAST REDUCTION     • CHOLECYSTECTOMY     • COLONOSCOPY N/A 11/17/2017   • COLONOSCOPY N/A 1/14/2021   • ENDOSCOPY N/A 1/14/2021   • HYSTERECTOMY       General Information     Row Name 04/06/21 1450          Physical Therapy Time and Intention    Document Type  progress note/recertification  -CZ     Mode of Treatment  individual therapy;physical therapy  -CZ     Row Name 04/06/21 1450          General Information    Patient Profile Reviewed  yes  -CZ     Prior Level of Function  independent:;all household mobility  -CZ     Existing Precautions/Restrictions  fall;oxygen therapy device and L/min  -CZ     Barriers to Rehab  medically complex  -CZ     Row Name 04/06/21 1450          Stairs Within Home, Primary    Stairs, Within Home, Primary  Can drive to back of house, then one step to enter.  -CZ     Row Name 04/06/21 1450          Cognition    Orientation Status (Cognition)  oriented x 4  -CZ     Row Name 04/06/21 1450          Safety Issues, Functional Mobility    Impairments Affecting Function (Mobility)  balance;endurance/activity tolerance;shortness of breath;strength  -CZ       User Key  (r) = Recorded By, (t) = Taken By, (c) = Cosigned By    Initials Name Provider Type    CZ Kar López, PT Physical Therapist        Mobility     Row Name 04/06/21 1452          Bed Mobility    Bed Mobility  supine-sit;sit-supine  -CZ     All Activities, [place] (Bed Mobility)  --  -CZ     Rolling Left [place] (Bed Mobility)  modified independence  -CZ     Rolling Right [place] (Bed Mobility)  modified independence  -CZ     Scooting/Bridging [place] (Bed Mobility)  minimum assist (75% patient  effort)  -CZ     Supine-Sit [place] (Bed Mobility)  minimum assist (75% patient effort)  -CZ     Sit-Supine [place] (Bed Mobility)  minimum assist (75% patient effort)  -CZ     Assistive Device (Bed Mobility)  bed rails;draw sheet;head of bed elevated  -CZ     Comment (Bed Mobility)  Many cues for proper technique.  -CZ     Row Name 04/06/21 1450          Transfers    Comment (Transfers)  Many cues for hand placement and technique.  -CZ     Row Name 04/06/21 1450          Bed-Chair Transfer    Bed-Chair [place] (Transfers)  minimum assist (75% patient effort)  -CZ     Row Name 04/06/21 1450          Sit-Stand Transfer    Sit-Stand [place] (Transfers)  maximum assist (25% patient effort)  -CZ     Assistive Device (Sit-Stand Transfers)  walker, front-wheeled  -CZ     Row Name 04/06/21 1450          Gait/Stairs (Locomotion)    Distance in Feet (Gait)  0  -CZ     Comment (Gait/Stairs)  Stands fully upright very briefly, extensive encouragement, max Ax1, then immediately sits back down.  -CZ       User Key  (r) = Recorded By, (t) = Taken By, (c) = Cosigned By    Initials Name Provider Type    CZ Kar López, PT Physical Therapist        Obj/Interventions     Row Name 04/06/21 1450          Range of Motion Comprehensive    General Range of Motion  bilateral lower extremity ROM WFL  -CZ     Row Name 04/06/21 1450          Strength Comprehensive (MMT)    Comment, General Manual Muscle Testing (MMT) Assessment  BLEs: 3+/5 grossly.  -CZ       User Key  (r) = Recorded By, (t) = Taken By, (c) = Cosigned By    Initials Name Provider Type    CZ Kar López, PT Physical Therapist        Goals/Plan     Row Name 04/06/21 1535          Bed Mobility Goal 1 (PT)    Activity/Assistive Device (Bed Mobility Goal 1, PT)  sit to supine/supine to sit  -CZ     [place] Level/Cues Needed (Bed Mobility Goal 1, PT)  modified independence  -CZ     Time Frame (Bed Mobility Goal 1, PT)  by discharge  -CZ      Strategies/Barriers (Bed Mobility Goal 1, PT)  Has adjustable bed at home.  -CZ     Progress/Outcomes (Bed Mobility Goal 1, PT)  goal revised this date;goal not met  -CZ     Row Name 04/06/21 9275          Transfer Goal 1 (PT)    Activity/Assistive Device (Transfer Goal 1, PT)  sit-to-stand/stand-to-sit;bed-to-chair/chair-to-bed;walker, rolling  -CZ     [place] Level/Cues Needed (Transfer Goal 1, PT)  supervision required  -CZ     Time Frame (Transfer Goal 1, PT)  by discharge  -CZ     Strategies/Barriers (Transfers Goal 1, PT)  Maintain SpO2 >90%.  -CZ     Progress/Outcome (Transfer Goal 1, PT)  goal not met;goal revised this date  -CZ     Row Name 04/06/21 1535          Gait Training Goal 1 (PT)    Activity/Assistive Device (Gait Training Goal 1, PT)  walker, rolling  -CZ     [place] Level (Gait Training Goal 1, PT)  supervision required  -CZ     Distance (Gait Training Goal 1, PT)  30'x3.  -CZ     Time Frame (Gait Training Goal 1, PT)  by discharge  -CZ     Strategies/Barriers (Gait Training Goal 1, PT)  Maintain SpO2 >90%.  -CZ     Progress/Outcome (Gait Training Goal 1, PT)  goal revised this date;goal not met  -CZ       User Key  (r) = Recorded By, (t) = Taken By, (c) = Cosigned By    Initials Name Provider Type    CZ Kar López, PT Physical Therapist        Clinical Impression     Row Name 04/06/21 1450          Pain    Additional Documentation  Pain Scale: Numbers Pre/Post-Treatment (Group)  -     Row Name 04/06/21 1450          Pain Scale: Numbers Pre/Post-Treatment    Pretreatment Pain Rating  0/10 - no pain  -CZ     Posttreatment Pain Rating  0/10 - no pain  -     Row Name 04/06/21 1450          Plan of Care Review    Plan of Care Reviewed With  patient  -     Row Name 04/06/21 1450          Therapy Assessment/Plan (PT)    Rehab Potential (PT)  fair, will monitor progress closely  -     Row Name 04/06/21 1454          Vital Signs    Pretreatment Heart Rate (beats/min)  79  -CZ      Intratreatment Heart Rate (beats/min)  100  -CZ     Posttreatment Heart Rate (beats/min)  87  -CZ     Pre SpO2 (%)  92  -CZ     O2 Delivery Pre Treatment  nasal cannula  -CZ     Intra SpO2 (%)  87 After transfer training  -CZ     O2 Delivery Intra Treatment  nasal cannula  -CZ     Post SpO2 (%)  92  -CZ     Pre Patient Position  Supine  -CZ     Intra Patient Position  Sitting  -CZ     Row Name 04/06/21 1450          Positioning and Restraints    Pre-Treatment Position  in bed  -CZ     Post Treatment Position  bed  -CZ     In Bed  supine;call light within reach;encouraged to call for assist;exit alarm on  -CZ       User Key  (r) = Recorded By, (t) = Taken By, (c) = Cosigned By    Initials Name Provider Type    Kar Skinner, PT Physical Therapist        Outcome Measures     Row Name 04/06/21 1450          How much help from another person do you currently need...    Turning from your back to your side while in flat bed without using bedrails?  3  -CZ     Moving from lying on back to sitting on the side of a flat bed without bedrails?  3  -CZ     Moving to and from a bed to a chair (including a wheelchair)?  3  -CZ     Standing up from a chair using your arms (e.g., wheelchair, bedside chair)?  2  -CZ     Climbing 3-5 steps with a railing?  1  -CZ     To walk in hospital room?  2  -CZ     AM-PAC 6 Clicks Score (PT)  14  -CZ     Row Name 04/06/21 1450          Functional Assessment    Outcome Measure Options  AM-PAC 6 Clicks Basic Mobility (PT)  -CZ       User Key  (r) = Recorded By, (t) = Taken By, (c) = Cosigned By    Initials Name Provider Type    Kar Skinner, PT Physical Therapist        Physical Therapy Education                 Title: PT OT SLP Therapies (Resolved)     Topic: Physical Therapy (Resolved)     Point: Mobility training (Resolved)     Learning Progress Summary           Patient Acceptance, D,E, VU,DU,NR by KRISTINE at 4/1/2021 4537    Comment: breathing ex for urms up in hale, arms down exhale  w/ humming; sidelying allen and how to realize sats w/ SOA ; encouraged call for assist for OOB    Acceptance, E, VU by MIC at 3/25/2021 1600    Comment: Educated on PT POC and goals. Educated on proper breating technique with exersion and avoiding valsalva maneuver.                   Point: Home exercise program (Resolved)     Learning Progress Summary           Patient Acceptance, D,E, VU,DU,NR by KRISTINE at 4/1/2021 1706    Comment: breathing ex for urms up in hale, arms down exhale w/ humming; sidelying allen and how to realize sats w/ SOA ; encouraged call for assist for OOB                   Point: Body mechanics (Resolved)     Learner Progress:  Not documented in this visit.          Point: Precautions (Resolved)     Learning Progress Summary           Patient Acceptance, D,E, VU,DU,NR by KRISTINE at 4/1/2021 1706    Comment: breathing ex for urms up in hale, arms down exhale w/ humming; sidelying allen and how to realize sats w/ SOA ; encouraged call for assist for OOB                               User Key     Initials Effective Dates Name Provider Type Discipline     04/03/18 -  Florecita Wood, PT Physical Therapist PT    CZ 04/03/18 -  Kar López, PT Physical Therapist PT              PT Recommendation and Plan  Planned Therapy Interventions (PT): balance training, bed mobility training, patient/family education, transfer training, gait training, strengthening, stretching  Plan of Care Reviewed With: patient  Outcome Summary: Initial PT evaluation complete.  Patient is alert and cooperative.  She requires SPV with bed mobility, CGA with transfers and gait, ambulating 5'x2 to bathroom with HHA on L.  Patient's SpO2 decreased to 82% upon sitting back down on bed, increased to 92% after coughing and deep inspiration through nose.  Very good effort.  Goals established, continue skilled PT to increase strength and improve activity tolerance.     Time Calculation:   PT Charges     Row Name 04/06/21 3186              Time Calculation    Start Time  1450  -CZ      Stop Time  1529  -CZ      Time Calculation (min)  39 min  -CZ      PT Received On  04/06/21  -CZ      PT Goal Re-Cert Due Date  04/19/21  -CZ         Time Calculation- PT    Total Timed Code Minutes- PT  39 minute(s)  -CZ         Timed Charges    65783 - PT Therapeutic Activity Minutes  39  -CZ        User Key  (r) = Recorded By, (t) = Taken By, (c) = Cosigned By    Initials Name Provider Type    CZ Kar López, PT Physical Therapist        Therapy Charges for Today     Code Description Service Date Service Provider Modifiers Qty    [id number]  PT THERAPEUTIC ACT EA 15 MIN 4/6/2021 Kar López, PT GP 3          PT G-Codes  Outcome Measure Options: AM-PAC 6 Clicks Basic Mobility (PT)  AM-PAC 6 Clicks Score (PT): 14  AM-PAC 6 Clicks Score (OT): 18  [place] Total Score: 16    Kar López PT  4/6/2021

## 2021-04-06 NOTE — DISCHARGE PLACEMENT REQUEST
"Glenn Haas (71 y.o. Female)     Date of Birth Social Security Number Address Home Phone MRN    1950  [address] [facility] [address] [phone] [id number]    Mu-ism Marital Status          Synagogue        Admission Date Admission Type Admitting Provider Attending Provider Department, Room/Bed    3/22/21 Emergency Yvon Mast MD Eben[place], Sotonte E, MD [address], 424/1    Discharge Date Discharge Disposition Discharge Destination         [place] [facility] [place]ending Provider: Fabiola Layne MD    Allergies: Atenolol, Augmentin [Amoxicillin-pot Clavulanate], Dilaudid [Hydromorphone], Lipitor [Atorvastatin], Pravachol [Pravastatin], Statins, Sulfa Antibiotics    Isolation: Enh Drop/Con   Infection: COVID (confirmed) (03/19/21)   Code Status: CPR    Ht: 165.1 cm (65\")   Wt: 95.2 kg (209 lb 12.8 oz)    Admission Cmt: None   Principal Problem: Pneumonia due to COVID-19 virus [U07.1,J12.82]                 Active Insurance as of 3/22/2021     Primary Coverage     Payor Plan Insurance Group Employer/Plan Group    MEDICARE MEDICARE A & B      Payor Plan Address Payor Plan Phone Number Payor Plan Fax Number Effective Dates    [address] [phone]  2/1/2015 - None Entered    [facility] [id number]       Subscriber Name Subscriber Birth Date Member ID       GLENN HAAS 1950 [id number]           Secondary Coverage     Payor Plan Insurance Group Employer/Plan Group    [employer] [place] [employer] [place] G     Payor Plan Address Payor Plan Phone Number Payor Plan Fax Number Effective Dates    [address] [place] [address] [phone]  2/1/2015 - None Entered    [place] [address]       Subscriber Name Subscriber Birth Date Member ID       GLENN HAAS 1950 [id number]                 Emergency Contacts      (Rel.) Home Phone Work Phone Mobile Phone    HaasCy saravia (Spouse) [phone] -- [phone]    " "Lori Giron (Daughter) [phone] -- [phone]        [address] [address]  Dept. Phone:  [phone]  Dept. Fax:   Date Ordered: 2021         Patient:  Emily Haas MRN:  [id number]   [address] [facility] [address] :  1950  SSN:    Phone: [phone] Sex:  F     Weight: 95.2 kg (209 lb 12.8 oz)         Ht Readings from Last 1 Encounters:   21 165.1 cm (65\")         Commode Chair          (Order ID: [id number])    Diagnosis:  Impaired mobility and ADLs (Z74.09,Z78.9 [ICD-10-CM] V49.89 [ICD-9-CM])  Impaired functional mobility, balance, gait, and endurance (Z74.09 [ICD-10-CM] V49.89 [ICD-9-CM])   Quantity:  1     Equipment:  Bedside Commode Chair w/Fixed Arms  Length of Need (99 Months = Lifetime): 99 Months = Lifetime        Authorizing Provider's Phone: [phone]   Authorizing Provider:Fabiola Layne MD  Authorizing Provider's NPI: [id number]  Order Entered By: Fabiola Layne MD 2021  2:18 PM     Electronically signed by: Fabiola Layne MD 2021  2:18 PM               History & Physical      Yvon Mast MD at 21 1255          History and Physical  Yvon Mast MD  Hospitalist    Date of admission: 3/22/2021    Patient Care Team:  Bharati Love MD as PCP - General (Family Medicine)    Chief complaint   Chief Complaint   Patient presents with   • Fatigue   • Weakness - Generalized     Subjective     Patient is a 71 y.o. female admitted for worsening cough, congestion, and mild dyspnea, generalized weakness, symptoms experienced for the last several days. She has been diagnosed as Covid + few days ago as have been several members of her close family.    History  Past Medical History:   Diagnosis Date   • Anxiety    • Chronic bronchitis (CMS/HCC)    • CKD (chronic kidney disease) stage 3, GFR 30-59 ml/min (CMS/HCC)    • Diabetes mellitus (CMS/HCC)    • " Diverticulitis    • Hyperlipidemia    • Hypertension    • Hypothyroidism    • Irritable bowel syndrome    • Obesity    • Osteoarthritis    • Vitamin D deficiency      Past Surgical History:   Procedure Laterality Date   • BILATERAL BREAST REDUCTION     • CHOLECYSTECTOMY     • COLONOSCOPY N/A 11/17/2017   • COLONOSCOPY N/A 1/14/2021   • ENDOSCOPY N/A 1/14/2021   • HYSTERECTOMY       Family History   Problem Relation Age of Onset   • Hypertension Mother    • Alzheimer's disease Father    • Diabetes Brother    • Hypothyroidism Daughter      Social History     Tobacco Use   • Smoking status: Former Smoker   • Smokeless tobacco: Never Used   Vaping Use   • Vaping Use: Never assessed   Substance Use Topics   • Alcohol use: No   • Drug use: No     Medications Prior to Admission   Medication Sig Dispense Refill Last Dose   • acetaminophen (TYLENOL) 650 MG 8 hr tablet Take 2 tablets by mouth Every 8 (Eight) Hours As Needed.   3/22/2021 at Unknown time   • amLODIPine (NORVASC) 5 MG tablet Take 5 mg by mouth Daily.   Past Week at Unknown time   • carvedilol (COREG) 12.5 MG tablet Take 1 tablet by mouth 2 (Two) Times a Day With Meals. 180 tablet 3 3/21/2021 at Unknown time   • docusate sodium (COLACE) 50 MG capsule Take 1 capsule by mouth Daily As Needed.   Past Month at Unknown time   • escitalopram (LEXAPRO) 10 MG tablet TAKE 1 TABLET EVERY DAY 90 tablet 3 3/21/2021 at Unknown time   • guanFACINE (TENEX) 1 MG tablet Take 1 mg by mouth Daily.   3/21/2021 at Unknown time   • levothyroxine (Synthroid) 112 MCG tablet Take 1 tablet by mouth Daily. 90 tablet 0 3/21/2021 at Unknown time   • losartan (COZAAR) 100 MG tablet TAKE 1 TABLET EVERY DAY 90 tablet 2 3/21/2021 at Unknown time   • omeprazole (priLOSEC) 40 MG capsule Take 1 capsule by mouth Daily. 30 capsule 11 3/21/2021 at Unknown time   • polyethylene glycol (MIRALAX) powder Take 17 g by mouth As Needed. 17gm in 8oz bid    Past Month at Unknown time   • sucralfate (CARAFATE)  1 g tablet Take 1 g by mouth 4 (Four) Times a Day.   Past Week at Unknown time   • vitamin D (ERGOCALCIFEROL) 1.25 MG (01809 UT) capsule capsule Take 50,000 Units by mouth 1 (One) Time Per Week.   Past Week at Unknown time   • ezetimibe (Zetia) 10 MG tablet Take 1 tablet by mouth Daily. 30 tablet 2 More than a month at Unknown time   • famotidine (Pepcid) 40 MG tablet Take 1 tablet by mouth Daily for 30 days. 30 tablet 5 Unknown at Unknown time   • ondansetron ODT (Zofran ODT) 8 MG disintegrating tablet Place 1 tablet on the tongue Every 8 (Eight) Hours As Needed for Nausea or Vomiting. 15 tablet 0 Unknown at Unknown time     Allergies:  Atenolol, Augmentin [amoxicillin-pot clavulanate], Dilaudid [hydromorphone], Lipitor [atorvastatin], Pravachol [pravastatin], Statins, and Sulfa antibiotics    Review of Systems  Review of Systems   Constitutional: Positive for chills, fatigue and fever.   Respiratory: Positive for cough and shortness of breath. Negative for wheezing.    Cardiovascular: Negative for chest pain, palpitations and leg swelling.   Gastrointestinal: Negative for abdominal distention, abdominal pain and anal bleeding.   Genitourinary: Negative for dysuria, frequency, hematuria and urgency.   Musculoskeletal: Positive for arthralgias. Negative for back pain and gait problem.   Skin: Positive for pallor. Negative for color change and rash.   Psychiatric/Behavioral: Negative for agitation, behavioral problems and confusion.   All other systems reviewed and are negative.      Objective     Vital Signs  Temp:  [96.1 °F (35.6 °C)-101.6 °F (38.7 °C)] 96.1 °F (35.6 °C)  Heart Rate:  [83-99] 83  Resp:  [18-20] 20  BP: (126-139)/(76-82) 129/82    Physical Exam:  Physical Exam  Vitals reviewed.   Constitutional:       General: She is not in acute distress.     Appearance: She is obese. She is not ill-appearing.   HENT:      Head: Normocephalic and atraumatic.   Eyes:      General: No scleral icterus.     Extraocular  Movements: Extraocular movements intact.      Pupils: Pupils are equal, round, and reactive to light.   Cardiovascular:      Rate and Rhythm: Normal rate and regular rhythm.   Pulmonary:      Effort: Pulmonary effort is normal. No respiratory distress.      Breath sounds: No stridor. Rhonchi present. No wheezing or rales.   Abdominal:      General: There is no distension.      Palpations: There is no mass.      Tenderness: There is no abdominal tenderness. There is no right CVA tenderness or left CVA tenderness.      Hernia: No hernia is present.   Musculoskeletal:         General: No swelling, tenderness or deformity.      Cervical back: Normal range of motion and neck supple. No rigidity or tenderness.      Right lower leg: No edema.      Left lower leg: No edema.   Skin:     General: Skin is warm.      Coloration: Skin is pale. Skin is not jaundiced.      Findings: No bruising, erythema or lesion.   Neurological:      Mental Status: She is alert and oriented to person, place, and time.      Cranial Nerves: No cranial nerve deficit.      Sensory: No sensory deficit.      Motor: No weakness.   Psychiatric:         Mood and Affect: Mood normal.         Behavior: Behavior normal.         Results Review:   Lab Results (last 24 hours)     Procedure Component Value Units Date/Time    Extra Tubes [[id number]] Collected: 03/22/21 1041    Specimen: Blood, Venous Line Updated: 03/22/21 1530    Narrative:      The following orders were created for panel order Extra Tubes.  Procedure                               Abnormality         Status                     ---------                               -----------         ------                     Light Blue Top[[id number]]                                   Final result               Gold Top - SST[[id number]]                                   Final result                 Please view results for these tests on the individual orders.    Light Blue Top [[id number]] Collected: 03/22/21  1424    Specimen: Blood Updated: 03/22/21 1530     Extra Tube hold for add-on     Comment: Auto resulted       POC Glucose Once [[id number]]  (Abnormal) Collected: 03/22/21 1456    Specimen: Blood Updated: 03/22/21 1522     Glucose 160 mg/dL      Comment: RN NotifiedOperator: [id number] SMILEY Alonso ID: [id number]       Blood Culture - Blood, Arm, Right [[id number]] Collected: 03/22/21 1502    Specimen: Blood from Arm, Right Updated: 03/22/21 1508    Urinalysis, Microscopic Only - Urine, Clean Catch [[id number]]  (Abnormal) Collected: 03/22/21 1232    Specimen: Urine, Clean Catch Updated: 03/22/21 1308     RBC, UA None Seen /HPF      WBC, UA 3-5 /HPF      Bacteria, UA 1+ /HPF      Squamous Epithelial Cells, UA 6-12 /HPF      Hyaline Casts, UA None Seen /LPF      Methodology Manual Light Microscopy    Urinalysis With Culture If Indicated - Urine, Clean Catch [[id number]]  (Abnormal) Collected: 03/22/21 1232    Specimen: Urine, Clean Catch Updated: 03/22/21 1253     Color, UA Yellow     Appearance, UA Cloudy     pH, UA 5.5     Specific Gravity, UA 1.018     Glucose, UA Negative     Ketones, UA Trace     Bilirubin, UA Negative     Blood, UA Negative     Protein,  mg/dL (2+)     Leuk Esterase, UA Negative     Nitrite, UA Negative     Urobilinogen, UA 1.0 E.U./dL    [facility] [[id number]] Collected: 03/22/21 1041    Specimen: Blood Updated: 03/22/21 1145     Extra Tube Hold for add-ons.     Comment: Auto resulted.       Comprehensive Metabolic Panel [[id number]]  (Abnormal) Collected: 03/22/21 0954    Specimen: Blood Updated: 03/22/21 1029     Glucose 175 mg/dL      BUN 13 mg/dL      Creatinine 1.24 mg/dL      Sodium 134 mmol/L      Potassium 4.2 mmol/L      Chloride 98 mmol/L      CO2 25.0 mmol/L      Calcium 9.0 mg/dL      Total Protein 6.5 g/dL      Albumin 3.00 g/dL      ALT (SGPT) 14 U/L      AST (SGOT) 23 U/L      Alkaline Phosphatase 55 U/L      Total Bilirubin 0.3 mg/dL      eGFR Non  Amer 43  mL/min/1.73      Globulin 3.5 gm/dL      A/G Ratio 0.9 g/dL      BUN/Creatinine Ratio 10.5     Anion Gap 11.0 mmol/L     Narrative:      GFR Normal >60  Chronic Kidney Disease <60  Kidney Failure <15      Lipase [[id number]]  (Abnormal) Collected: 03/22/21 0954    Specimen: Blood Updated: 03/22/21 1029     Lipase 10 U/L     CK [[id number]]  (Normal) Collected: 03/22/21 0954    Specimen: Blood Updated: 03/22/21 1029     Creatine Kinase 44 U/L     Magnesium [[id number]]  (Normal) Collected: 03/22/21 0954    Specimen: Blood Updated: 03/22/21 1029     Magnesium 1.7 mg/dL     BNP [[id number]]  (Normal) Collected: 03/22/21 0954    Specimen: Blood Updated: 03/22/21 1026     proBNP 224.1 pg/mL     Narrative:      Among patients with dyspnea, NT-proBNP is highly sensitive for the detection of acute congestive heart failure. In addition NT-proBNP of <300 pg/ml effectively rules out acute congestive heart failure with 99% negative predictive value.    Results may be falsely decreased if patient taking Biotin.      Lactic Acid, Plasma [[id number]]  (Normal) Collected: 03/22/21 0954    Specimen: Blood Updated: 03/22/21 1026     Lactate 1.4 mmol/L     CBC & Differential [[id number]]  (Abnormal) Collected: 03/22/21 0954    Specimen: Blood Updated: 03/22/21 1013    Narrative:      The following orders were created for panel order CBC & Differential.  Procedure                               Abnormality         Status                     ---------                               -----------         ------                     CBC Auto Differential[[id number]]        Abnormal            Final result                 Please view results for these tests on the individual orders.    CBC Auto Differential [[id number]]  (Abnormal) Collected: 03/22/21 0954    Specimen: Blood Updated: 03/22/21 1013     WBC 7.97 10*3/mm3      RBC 4.37 10*6/mm3      Hemoglobin 13.9 g/dL      Hematocrit 40.7 %      MCV 93.1 fL      MCH 31.8 pg      MCHC 34.2 g/dL      RDW  12.3 %      RDW-SD 42.7 fl      MPV 12.3 fL      Platelets 99 10*3/mm3      Neutrophil % 81.6 %      Lymphocyte % 14.4 %      Monocyte % 3.0 %      Eosinophil % 0.1 %      Basophil % 0.3 %      Immature Grans % 0.6 %      Neutrophils, Absolute 6.50 10*3/mm3      Lymphocytes, Absolute 1.15 10*3/mm3      Monocytes, Absolute 0.24 10*3/mm3      Eosinophils, Absolute 0.01 10*3/mm3      Basophils, Absolute 0.02 10*3/mm3      Immature Grans, Absolute 0.05 10*3/mm3      nRBC 0.0 /100 WBC     Blood Culture - Blood, Arm, Left [[id number]] Collected: 03/22/21 0954    Specimen: Blood from Arm, Left Updated: 03/22/21 1005          Imaging Results (Last 24 Hours)     Procedure Component Value Units Date/Time    XR Chest 1 View [[id number]] Collected: 03/22/21 0938     Updated: 03/22/21 1008    Narrative:      EXAM: XR CHEST 1 VIEW    INDICATION: fever    COMPARISON: Chest x-ray dated 3/21/2017    FINDINGS: Lung volumes are low. Somewhat coarse airspace disease  is present in both mid and lower lungs. No pleural effusion or  pneumothorax. Calcified granulomas, present previously. Heart  size appears to be within normal limits, allowing for patient  body habitus, AP technique, and the low lung volumes.      Impression:      Low lung volume with coarse bilateral airspace  disease, likely infectious.      Electronically signed by:  Brad Lomeli MD  3/22/2021 10:07 AM  CDT Workstation: [id number]          Assessment/Plan       Pneumonia due to COVID-19 virus    CKD (chronic kidney disease) stage 3, GFR 30-59 ml/min (CMS/[facility])    Diabetes mellitus (CMS/[facility])    Provide her with supplemental Oxygen if necessary, PRN Proventil HFA, IV Decadron / Remdesivir and supportive care.    Yvon Mast MD  03/22/21  17:15 CDT      Electronically signed by Yvon Mast MD at 03/22/21 1824

## 2021-04-06 NOTE — OUTREACH NOTE
Prep Survey      Responses   [facility] [place] patient discharged from?  [place]   Is LACE score < 7 ?  No   Emergency Room discharge w/ pulse ox?  No   Eligibility  [facility]   Date of Admission  03/22/21   Date of Discharge  04/06/21   Discharge Disposition  Home or Self Care   Discharge diagnosis  Pneumonia due to COVID-19 virus   Does the patient have one of the following disease processes/diagnoses(primary or secondary)?  COVID-19   Does the patient have Home health ordered?  Yes   What is the Home health agency?    [facility]   Is there a DME ordered?  Yes   What DME was ordered?  home o2, walker, and commode chair - [facility]   Prep survey completed?  Yes          Deena Irene RN

## 2021-04-06 NOTE — DISCHARGE SUMMARY
[facility] Medicine Services  DISCHARGE SUMMARY       Date of Admission: 3/22/2021  Date of Discharge:  4/6/2021  Primary Care Physician: Bharati Love MD    Presenting Problem/History of Present Illness:  Fever, unspecified fever cause [R50.9]  Dyspnea, unspecified type [R06.00]  Pneumonia due to COVID-19 virus [U07.1, J12.82]       Final Discharge Diagnoses:  Active Hospital Problems    Diagnosis    • **Pneumonia due to COVID-19 virus    • Cytokine release syndrome, grade 2    • Acute respiratory failure with hypoxia (CMS/[facility])    • CKD (chronic kidney disease) stage 3, GFR 30-59 ml/min (CMS/[facility])    • Depression    • Diabetes mellitus (CMS/[facility])    • Essential hypertension    • Generalized anxiety disorder      Consults:   Consults     Date and Time Order Name Status Description    3/30/2021  2:38 PM Inpatient Psychiatrist Consult Completed     3/22/2021 12:27 PM Hospitalist (on-call MD unless specified)            Procedures Performed: None                Pertinent Test Results:   Collected Updated Procedure Result Status    04/06/2021 0741 04/06/2021 0812 Comprehensive Metabolic Panel [[id number]]    (Abnormal)   Blood    Final result Component Value Units   Glucose 189High  mg/dL   BUN 34High  mg/dL   Creatinine 0.82 mg/dL   Sodium 131Low  mmol/L   Potassium 5.1  mmol/L   Chloride 97Low  mmol/L   CO2 27.0 mmol/L   Calcium 9.0 mg/dL   Total Protein 5.4Low  g/dL   Albumin 2.80Low  g/dL   ALT (SGPT) 24 U/L   AST (SGOT) 17 U/L   Alkaline Phosphatase 44 U/L   Total Bilirubin 0.7 mg/dL   eGFR Non African Am 69 mL/min/1.73   Globulin 2.6 gm/dL   A/G Ratio 1.1 g/dL   BUN/Creatinine Ratio 41.5High     Anion Gap 7.0 mmol/L           04/06/2021 0641 04/06/2021 0713 CK [[id number]]   Blood    Final result Component Value Units   Creatine Kinase 153  U/L           04/06/2021 0641 04/06/2021 0723 C-reactive Protein [[id number]]   Blood    Final result Component Value Units    C-Reactive Protein <0.30 mg/dL           04/06/2021 0641 04/06/2021 0718 Ferritin [[id number]]    (Abnormal)   Blood    Final result Component Value Units   Ferritin 1,670.00High  ng/mL           04/06/2021 0641 04/06/2021 0657 CBC Auto Differential [[id number]]   (Abnormal)   Blood    Final result Component Value Units   WBC 12.64High  10*3/mm3   RBC 4.31 10*6/mm3   Hemoglobin 13.4 g/dL   Hematocrit 37.3 %   MCV 86.5 fL   MCH 31.1 pg   MCHC 35.9High  g/dL   RDW 11.4Low  %   RDW-SD 36.3Low  fl   MPV 11.6 fL   Platelets 146 10*3/mm3   Neutrophil Rel % 92.1High  %   Lymphocyte Rel % 3.7Low  %   Monocyte Rel % 3.5Low  %   Eosinophil Rel % 0.0Low  %   Basophil Rel % 0.2 %   Immature Granulocyte Rel % 0.5 %   Neutrophils Absolute 11.65High  10*3/mm3   Lymphocytes Absolute 0.47Low  10*3/mm3   Monocytes Absolute 0.44 10*3/mm3   Eosinophils Absolute 0.00 10*3/mm3   Basophils Absolute 0.02 10*3/mm3   Immature Grans, Absolute 0.06High  10*3/mm3   nRBC 0.0 /100 WBC        Procedure Component Value Units Date/Time   CT Angiogram Chest [[id number]] Quintin as Reviewed   Order Status: Completed Collected: 04/06/21 1300    Updated: 04/06/21 1337   Narrative:     EXAM: CT ANGIOGRAM CHEST     DATE: 4/6/2021 1:00 PM CDT     TECHNIQUE: Images were obtained at 2 mm intervals from the level   of thoracic inlet through the upper abdomen during bolus infusion   of IV contrast.     This exam was performed according to our departmental   dose-optimization program, which includes automated exposure   control, adjustment of the mA and/or kV according to patient size   and/or use of iterative reconstruction technique.     CLINICAL INDICATION: PE suspected, low/intermediate prob,   positive D-dimer   , U07.1 COVID-19 J12.82 Pneumonia due to Coronavirus disease 2019   R50.9 Fever, unspecified R06.00 Dyspnea, unspecified Z74.09 Other   reduced mobility Z78.9 Other specified health status Z74.09 Other   reduced mobility J96.01 Acute respiratory  failure with hypoxia:   COVID pneumonia and hypoxic respiratory failure improving     COMPARISON: March 2017     FINDINGS: The pulmonary arteries are well-opacified. There is no   evidence for pulmonary embolus. The distribution of the pulmonary   arteries is within normal limits with no evidence of a congenital   anomaly or vascular malformation.     There is chronic elevation the right hemidiaphragm. There are   bilateral peripheral patchy groundglass opacities noted   throughout the lungs compatible with patient's given history of   Covid 19. There is no measurable pneumothorax. No dense areas of   consolidation are seen. There are no pleural effusions.     There is no evidence of enlarged mediastinal or hilar adenopathy.   There is calcified subcarinal lymph nodes noted.     Limited images of the upper abdomen demonstrate a left renal cyst   as well as evidence of prior cholecystectomy.    Impression:       1. No evidence of a pulmonary embolus.   2. Bilateral peripheral groundglass opacities compatible with the   patient's given history of Covid 19/viral pneumonia.      Chief Complaint on Day of Discharge: None    Hospital Course:  The patient is a 71 y.o. female who presented to [facility] with complaints of worsening cough, congestion, shortness of breath and generalized weakness of 5 days duration.  She was diagnosed with COVID-19 few days prior to admission.  Several members of family also tested positive for COVID-19.  She was admitted and treated for COVID-19 pneumonia and acute respiratory failure with hypoxia.  Patient required oxygen by nasal cannula.  She was treated with Decadron as well as IV remdesivir and p.o. baricitinib. Patient gradually improved on above therapy and her oxygen requirements were weaned down to 3.5 to 4 L/min by nasal cannula.  However she remained significantly weak and deconditioned.  She was reviewed by physical and Occupational Therapy. She was counseled but  "refused skilled nursing facility placement offered. She eventually was discharged home with physical and occupational therapy via home health.  She was also given a prescription for home oxygen, rolling walker, as well as a bedside commode.  Her blood glucose was elevated during the admission likely due to steroid therapy.  Steroids were discontinued on discharge but she was given a prescription for sitagliptin for her diabetes mellitus.    Condition on Discharge: Stable    Physical Exam on Discharge:  /69 (BP Location: Left arm, Patient Position: Lying)   Pulse 85   Temp 97.1 °F (36.2 °C) (Oral)   Resp 18   Ht 165.1 cm (65\")   Wt 95.2 kg (209 lb 12.8 oz)   LMP  (LMP Unknown)   SpO2 92%   BMI 34.91 kg/m²      Physical Exam  Constitutional:       General: She is not in acute distress.     Appearance: Normal appearance. She is not ill-appearing or diaphoretic.   HENT:      Head: Normocephalic and atraumatic.      Right Ear: External ear normal.      Left Ear: External ear normal.      Nose: No congestion or rhinorrhea.      Mouth/Throat:      Mouth: Mucous membranes are moist.      Pharynx: No oropharyngeal exudate or posterior oropharyngeal erythema.   Eyes:      General: No scleral icterus.     Extraocular Movements: Extraocular movements intact.      Conjunctiva/sclera: Conjunctivae normal.   Cardiovascular:      Rate and Rhythm: Normal rate and regular rhythm.      Heart sounds: Normal heart sounds. No murmur heard.     Pulmonary:      Effort: Pulmonary effort is normal. No respiratory distress.      Breath sounds: Normal breath sounds. No wheezing, rhonchi or rales.   Abdominal:      General: Abdomen is flat. There is no distension.      Palpations: Abdomen is soft.      Tenderness: There is no abdominal tenderness. There is no guarding.   Musculoskeletal:         General: No swelling, tenderness or deformity.      Cervical back: Neck supple. No rigidity. No muscular tenderness.      Right lower " leg: No edema.      Left lower leg: No edema.   Lymphadenopathy:      Cervical: No cervical adenopathy.   Skin:     General: Skin is warm and dry.   Neurological:      General: No focal deficit present.      Mental Status: She is alert and oriented to person, place, and time.      Cranial Nerves: No cranial nerve deficit.      Motor: No weakness.   Psychiatric:         Mood and Affect: Mood normal.         Behavior: Behavior normal.         Thought Content: Thought content normal.     Discharge Disposition:  Home-Health Care [facility]    Discharge Medications:     Discharge Medications      New Medications      Instructions Start Date   albuterol sulfate  (90 Base) MCG/ACT inhaler  Commonly known as: PROVENTIL HFA;VENTOLIN HFA;PROAIR HFA   2 puffs, Inhalation, 4 Times Daily - RT      ARIPiprazole 2 MG tablet  Commonly known as: ABILIFY   2 mg, Oral, Daily   Start Date: April 7, 2021     budesonide-formoterol 160-4.5 MCG/ACT inhaler  Commonly known as: SYMBICORT   2 puffs, Inhalation, 2 Times Daily - RT      guaifenesin 100 MG/5ML liquid  Commonly known as: ROBITUSSIN   200 mg, Oral, 4 Times Daily PRN      melatonin 3 MG tablet   3 mg, Oral, Nightly PRN      SITagliptin 25 MG tablet  Commonly known as: Januvia   25 mg, Oral, Daily         Continue These Medications      Instructions Start Date   acetaminophen 650 MG 8 hr tablet  Commonly known as: TYLENOL   2 tablets, Oral, Every 8 Hours PRN      amLODIPine 5 MG tablet  Commonly known as: NORVASC   5 mg, Oral, Daily      carvedilol 12.5 MG tablet  Commonly known as: COREG   12.5 mg, Oral, 2 Times Daily With Meals      docusate sodium 50 MG capsule  Commonly known as: COLACE   1 capsule, Oral, Daily PRN      escitalopram 10 MG tablet  Commonly known as: LEXAPRO   TAKE 1 TABLET EVERY DAY      ezetimibe 10 MG tablet  Commonly known as: Zetia   10 mg, Oral, Daily      famotidine 40 MG tablet  Commonly known as: Pepcid   40 mg, Oral, Daily      guanFACINE 1 MG  tablet  Commonly known as: TENEX   1 mg, Oral, Daily      levothyroxine 112 MCG tablet  Commonly known as: Synthroid   112 mcg, Oral, Daily      losartan 100 MG tablet  Commonly known as: COZAAR   TAKE 1 TABLET EVERY DAY      MiraLax 17 GM/SCOOP powder  Generic drug: polyethylene glycol   17 g, Oral, As Needed, 17gm in 8oz bid      omeprazole 40 MG capsule  Commonly known as: priLOSEC   40 mg, Oral, Daily      ondansetron ODT 8 MG disintegrating tablet  Commonly known as: Zofran ODT   8 mg, Translingual, Every 8 Hours PRN      sucralfate 1 g tablet  Commonly known as: CARAFATE   1 g, Oral, 4 Times Daily      vitamin D 1.25 MG (34927 UT) capsule capsule  Commonly known as: ERGOCALCIFEROL   50,000 Units, Oral, Weekly             Discharge Diet:   Diet Instructions     Diet: Consistent Carbohydrate      Discharge Diet: Consistent Carbohydrate          Activity at Discharge:   Activity Instructions     Activity as Tolerated            Discharge Care Plan/Instructions:   1.  You should self isolate for the next 10 days until 4/16/2021 and use a face mask if you have to leave the house for any reason.  2.  Follow-up with your primary care provider within 10 days of discharge.    Follow-up Appointments:   Future Appointments   Date Time Provider Department Center   4/12/2021 11:30 AM Bharati Love MD [facility]   5/19/2021 10:00 AM Bharati Love MD [facility]       Test Results Pending at Discharge:     Fabiola Layne MD  04/06/21  16:48 CDT    Time: 35 minutes of time was spent evaluating patient and planning discharge.      Part of this note may be an electronic transcription/translation of spoken language to printed text using the Dragon Dictation system.

## 2021-04-06 NOTE — PLAN OF CARE
Problem: Adult Inpatient Plan of Care  Goal: Plan of Care Review  Recent Flowsheet Documentation  Taken 4/6/2021 1411 by Rachelle Salinas, JUNITO/L  Plan of Care Reviewed With: patient  Outcome Summary: Pt CGA of 1 for sit>stand from bed, w/ bed elevated. Pt stand pivot to BSC-Min A of 1 w/ RW. Pt made x3 attempts to stand from BSC and could not stand fully w/ Max A. Pt required Max A of 2 to stand pivot t/f from BSC >bed. Sit-sup-Min of 2. Pt also completed ADL this date. No goals met @  this time. PPt to D/C  home this date. Recommend 24/7 care.

## 2021-04-07 ENCOUNTER — TRANSITIONAL CARE MANAGEMENT TELEPHONE ENCOUNTER (OUTPATIENT)
Dept: CALL CENTER | Facility: HOSPITAL | Age: 71
End: 2021-04-07

## 2021-04-07 ENCOUNTER — TELEPHONE (OUTPATIENT)
Dept: FAMILY MEDICINE CLINIC | Facility: CLINIC | Age: 71
End: 2021-04-07

## 2021-04-07 DIAGNOSIS — M19.90 ARTHRITIS: Primary | ICD-10-CM

## 2021-04-07 DIAGNOSIS — I10 ESSENTIAL HYPERTENSION: ICD-10-CM

## 2021-04-07 LAB — GLUCOSE BLDC GLUCOMTR-MCNC: 353 MG/DL (ref 70–130)

## 2021-04-07 NOTE — TELEPHONE ENCOUNTER
I spoke to Cy and changed Emily's appt Monday to doxi, also he said if Dr Love would fax a script for chair lift for stairs stating she needs this they will not have to pay sales tax for the service.  Fax to [facility] at [phone]

## 2021-04-07 NOTE — OUTREACH NOTE
"Call Center TCM Note      Responses   [facility] patient discharged from?  [place]   Does the patient have one of the following disease processes/diagnoses(primary or secondary)?  COVID-19   COVID-19 underlying condition?  None   TCM attempt successful?  Yes [Tankdith]   Call start time  1500   Call end time  1511   Discharge diagnosis  Pneumonia due to COVID-19 virus   Person spoke with today (if not patient) and relationship  Cy-spouse    Meds reviewed with patient/caregiver?  Yes   Is the patient having any side effects they believe may be caused by any medication additions or changes?  No   Does the patient have all medications ordered at discharge?  Yes   Is the patient taking all medications as directed (includes completed medication regime)?  Yes   Does the patient have a primary care provider?   Yes   Comments regarding PCP  Phone appt with PCP is on 4/12/21 at 11:30 am    Does the patient have an appointment with their PCP or specialist within 7 days of discharge?  Yes   Has the patient kept scheduled appointments due by today?  N/A   What is the Home health agency?    [facility]   Has home health visited the patient within 72 hours of discharge?  Yes   What DME was ordered?  home o2, walker, and commode chair - [facility]   Has all DME been delivered?  Yes   DME comments  Pt is wearing 4 L of O2 continuous    Psychosocial issues?  No   Did the patient receive a copy of their discharge instructions?  Yes   Did the patient receive a copy of COVID-19 specific instructions?  Yes   Nursing interventions  Reviewed instructions with patient   What is the patient's perception of their health status since discharge?  Improving   Does the patient have any of the following symptoms?  Loss of taste/smell   Nursing Interventions  Nurse provided patient education   Pulse Ox monitoring  Intermittent   Pulse Ox device source  Patient   O2 Sat comments  \"up in the 90's\"   Is the patient/caregiver able to " teach back steps to recovery at home?  Rest and rebuild strength, gradually increase activity, Set small, achievable goals for return to baseline health   If the patient is a current smoker, are they able to teach back resources for cessation?  Not a smoker   Is the patient/caregiver able to teach back the hierarchy of who to call/visit for symptoms/problems? PCP, Specialist, Home health nurse, Urgent Care, ED, 911  Yes   TCM call completed?  Yes   Wrap up additional comments  Daughter is APRRAIMUNDO           Sally MOMO Rosales RN    4/7/2021, 15:13 EDT

## 2021-04-08 ENCOUNTER — READMISSION MANAGEMENT (OUTPATIENT)
Dept: CALL CENTER | Facility: HOSPITAL | Age: 71
End: 2021-04-08

## 2021-04-08 NOTE — OUTREACH NOTE
COVID-19 Week 1 Survey      Responses   [facility] patient discharged from?  [place]   Does the patient have one of the following disease processes/diagnoses(primary or secondary)?  COVID-19   COVID-19 underlying condition?  None   Call Number  Call 2   Week 1 Call successful?  Yes   Call start time  1117   Call end time  1124   Is patient permission given to speak with other caregiver?  Yes   Person spoke with today (if not patient) and relationship  Cy-spouse   Meds reviewed with patient/caregiver?  Yes   Is the patient having any side effects they believe may be caused by any medication additions or changes?  No   Does the patient have all medications ordered at discharge?  Yes   Is the patient taking all medications as directed (includes completed medication regime)?  Yes   Does the patient have a primary care provider?   Yes   Has the patient kept scheduled appointments due by today?  N/A   Has home health visited the patient within 72 hours of discharge?  Yes   Has all DME been delivered?  Yes   Psychosocial issues?  No   What is the patient's perception of their health status since discharge?  Improving   Does the patient have any of the following symptoms?  Loss of taste/smell [Good appetite.]   Nursing Interventions  Nurse provided patient education   Pulse Ox monitoring  Intermittent   Pulse Ox device source  Patient   O2 Sat comments   states O2 sats 93% today on 4L continuous home O2 after working with HH PT.   O2 Sat: education provided  Sat levels, Monitoring frequency, When to seek care   O2 Sat education comments  States aware of need to return to ER if O2 sats remain below 92% while on home O2.   Is the patient/caregiver able to teach back the hierarchy of who to call/visit for symptoms/problems? PCP, Specialist, Home health nurse, Urgent Care, ED, 911  Yes   COVID-19 call completed?  Yes   Wrap up additional comments   states patient is improving. States has good  appetite, and seems stronger today. Denies any needs today.          Rowan Fonseca, RN

## 2021-04-09 ENCOUNTER — READMISSION MANAGEMENT (OUTPATIENT)
Dept: CALL CENTER | Facility: HOSPITAL | Age: 71
End: 2021-04-09

## 2021-04-09 ENCOUNTER — OUTSIDE FACILITY SERVICE (OUTPATIENT)
Dept: FAMILY MEDICINE CLINIC | Facility: CLINIC | Age: 71
End: 2021-04-09

## 2021-04-09 PROCEDURE — OUTSIDEPOS PR OUTSIDE POS PLACEHOLDER: Performed by: FAMILY MEDICINE

## 2021-04-09 NOTE — OUTREACH NOTE
COVID-19 Week 1 Survey      Responses   [facility] patient discharged from?  [place]   Does the patient have one of the following disease processes/diagnoses(primary or secondary)?  COVID-19   COVID-19 underlying condition?  None   Call Number  Call 3   Week 1 Call successful?  No   Discharge diagnosis  Pneumonia due to COVID-19 virus          Sweta Hratley RN

## 2021-04-12 ENCOUNTER — READMISSION MANAGEMENT (OUTPATIENT)
Dept: CALL CENTER | Facility: HOSPITAL | Age: 71
End: 2021-04-12

## 2021-04-12 ENCOUNTER — TELEPHONE (OUTPATIENT)
Dept: FAMILY MEDICINE CLINIC | Facility: CLINIC | Age: 71
End: 2021-04-12

## 2021-04-12 NOTE — OUTREACH NOTE
COVID-19 Week 1 Survey      Responses   [facility] patient discharged from?  [place]   Does the patient have one of the following disease processes/diagnoses(primary or secondary)?  COVID-19   COVID-19 underlying condition?  None   Call Number  Call 4   Week 1 Call successful?  No   Discharge diagnosis  Pneumonia due to COVID-19 virus          Sweta Hartley RN

## 2021-04-12 NOTE — TELEPHONE ENCOUNTER
"Patient had scheduled [facility] visit for hospital follow-up, however when contacted patient she was admitted to [facility] in a \"swing unit\".  From my understanding, patient is admitted to acute rehab unit.  Patient is receiving care there.  Discussed that patient should make a hospital follow-up appointment when she is discharged.  ThanksPio  "

## 2021-04-15 ENCOUNTER — READMISSION MANAGEMENT (OUTPATIENT)
Dept: CALL CENTER | Facility: HOSPITAL | Age: 71
End: 2021-04-15

## 2021-04-15 NOTE — OUTREACH NOTE
COVID-19 Week 2 Survey      Responses   [facility] patient discharged from?  [place]   Does the patient have one of the following disease processes/diagnoses(primary or secondary)?  COVID-19   COVID-19 underlying condition?  None   Call Number  Call 1   COVID-19 Week 2: Call 1 attempt successful?  No   Discharge diagnosis  Pneumonia due to COVID-19 virus          Drea Spann RN

## 2021-04-26 ENCOUNTER — OUTSIDE FACILITY SERVICE (OUTPATIENT)
Dept: FAMILY MEDICINE CLINIC | Facility: CLINIC | Age: 71
End: 2021-04-26

## 2021-04-26 PROCEDURE — G0180 MD CERTIFICATION HHA PATIENT: HCPCS | Performed by: FAMILY MEDICINE

## 2021-05-07 ENCOUNTER — OFFICE VISIT (OUTPATIENT)
Dept: GASTROENTEROLOGY | Facility: CLINIC | Age: 71
End: 2021-05-07

## 2021-05-07 DIAGNOSIS — K21.9 GASTROESOPHAGEAL REFLUX DISEASE, UNSPECIFIED WHETHER ESOPHAGITIS PRESENT: ICD-10-CM

## 2021-05-07 DIAGNOSIS — K58.2 IRRITABLE BOWEL SYNDROME WITH BOTH CONSTIPATION AND DIARRHEA: Primary | ICD-10-CM

## 2021-05-07 PROCEDURE — 99442 PR PHYS/QHP TELEPHONE EVALUATION 11-20 MIN: CPT | Performed by: INTERNAL MEDICINE

## 2021-05-07 NOTE — PROGRESS NOTES
No chief complaint on file.      Subjective    Emily Haas is a 71 y.o. female.    History of Present Illness  Patient had a 15-minute telephone follow-up visit today due to current pandemic.  She feels better currently.  No further chest pain, abdominal pain, nausea or vomiting.  Taking PPI daily.  Bowel movements are regular.  Weight is stable.       The following portions of the patient's history were reviewed and updated as appropriate:   Past Medical History:   Diagnosis Date   • Anxiety    • Chronic bronchitis (CMS/HCC)    • CKD (chronic kidney disease) stage 3, GFR 30-59 ml/min (CMS/HCC)    • Diabetes mellitus (CMS/HCC)    • Diverticulitis    • Hyperlipidemia    • Hypertension    • Hypothyroidism    • Irritable bowel syndrome    • Obesity    • Osteoarthritis    • Vitamin D deficiency      Past Surgical History:   Procedure Laterality Date   • BILATERAL BREAST REDUCTION     • CHOLECYSTECTOMY     • COLONOSCOPY N/A 2017   • COLONOSCOPY N/A 2021   • ENDOSCOPY N/A 2021   • HYSTERECTOMY       Family History   Problem Relation Age of Onset   • Hypertension Mother    • Alzheimer's disease Father    • Diabetes Brother    • Hypothyroidism Daughter      OB History        2    Para   2    Term   2            AB        Living           SAB        TAB        Ectopic        Molar        Multiple        Live Births                  Prior to Admission medications    Medication Sig Start Date End Date Taking? Authorizing Provider   acetaminophen (TYLENOL) 650 MG 8 hr tablet Take 2 tablets by mouth Every 8 (Eight) Hours As Needed.    Provider, MD Jose Alejandro   albuterol sulfate  (90 Base) MCG/ACT inhaler Inhale 2 puffs 4 (Four) Times a Day. 21   Fabiola Layne MD   amLODIPine (NORVASC) 5 MG tablet Take 5 mg by mouth Daily.    Provider, MD Jose Alejandro   ARIPiprazole (ABILIFY) 2 MG tablet Take 1 tablet by mouth Daily for 90 days. 21  Fabiola Layne MD    budesonide-formoterol (SYMBICORT) 160-4.5 MCG/ACT inhaler Inhale 2 puffs 2 (Two) Times a Day. 4/6/21   Fabiola Layne MD   carvedilol (COREG) 12.5 MG tablet Take 1 tablet by mouth 2 (Two) Times a Day With Meals. 5/12/20   Fernando Hutton MD   docusate sodium (COLACE) 50 MG capsule Take 1 capsule by mouth Daily As Needed.    Jose Alejandro Sommers MD   escitalopram (LEXAPRO) 10 MG tablet TAKE 1 TABLET EVERY DAY 9/17/20   Fernando Hutton MD   ezetimibe (Zetia) 10 MG tablet Take 1 tablet by mouth Daily. 2/17/21   Bharati Love MD   guaifenesin (ROBITUSSIN) 100 MG/5ML liquid Take 10 mL by mouth 4 (Four) Times a Day As Needed for Cough or Congestion. 4/6/21   Fabiola Layne MD   guanFACINE (TENEX) 1 MG tablet Take 1 mg by mouth Daily. 6/12/17   Jose Alejandro Sommers MD   levothyroxine (Synthroid) 112 MCG tablet Take 1 tablet by mouth Daily. 12/31/20   Fernando Hutton MD   losartan (COZAAR) 100 MG tablet TAKE 1 TABLET EVERY DAY 9/17/20   Fernando Hutton MD   melatonin 3 MG tablet Take 1 tablet by mouth At Night As Needed for Sleep. 4/6/21   Fabiola Layne MD   omeprazole (priLOSEC) 40 MG capsule Take 1 capsule by mouth Daily. 2/12/21   Mauricio Betancourt MD   SITagliptin (Januvia) 25 MG tablet Take 1 tablet by mouth Daily for 90 days. 4/6/21 7/5/21  Fabiola Layne MD   vitamin D (ERGOCALCIFEROL) 1.25 MG (85528 UT) capsule capsule Take 50,000 Units by mouth 1 (One) Time Per Week. 11/12/20   Jose Alejandro Sommers MD   ondansetron ODT (Zofran ODT) 8 MG disintegrating tablet Place 1 tablet on the tongue Every 8 (Eight) Hours As Needed for Nausea or Vomiting. 3/19/21 5/7/21  Susanne Concepcion APRN   polyethylene glycol (MIRALAX) powder Take 17 g by mouth As Needed. 17gm in 8oz bid   5/7/21  Provider, MD Jose Alejandro   sucralfate (CARAFATE) 1 g tablet Take 1 g by mouth 4 (Four) Times a Day.  5/7/21  Provider, MD Jose Alejandro     Allergies   Allergen Reactions   • Atenolol Myalgia   • Augmentin  "[Amoxicillin-Pot Clavulanate] Hives   • Dilaudid [Hydromorphone] Nausea And Vomiting   • Lipitor [Atorvastatin] Myalgia   • Pravachol [Pravastatin] Myalgia   • Statins Other (See Comments)     \"muscle pain \"    • Sulfa Antibiotics Hives     Social History     Socioeconomic History   • Marital status:      Spouse name: Not on file   • Number of children: Not on file   • Years of education: Not on file   • Highest education level: Not on file   Tobacco Use   • Smoking status: Former Smoker   • Smokeless tobacco: Never Used   Vaping Use   • Vaping Use: Never assessed   Substance and Sexual Activity   • Alcohol use: No   • Drug use: No   • Sexual activity: Not Currently       Review of Systems  Review of Systems   Constitutional: Negative for chills, fatigue, fever and unexpected weight change.   HENT: Negative for congestion, ear discharge, hearing loss, nosebleeds and sore throat.    Eyes: Negative for pain, discharge and redness.   Respiratory: Negative for cough, chest tightness, shortness of breath and wheezing.    Cardiovascular: Negative for chest pain and palpitations.   Gastrointestinal: Negative for abdominal distention, abdominal pain, blood in stool, constipation, diarrhea, nausea and vomiting.   Endocrine: Negative for cold intolerance, polydipsia, polyphagia and polyuria.   Genitourinary: Negative for dysuria, flank pain, frequency, hematuria and urgency.   Musculoskeletal: Negative for arthralgias, back pain, joint swelling and myalgias.   Skin: Negative for color change, pallor and rash.   Neurological: Negative for tremors, seizures, syncope, weakness and headaches.   Hematological: Negative for adenopathy. Does not bruise/bleed easily.   Psychiatric/Behavioral: Negative for behavioral problems, confusion, dysphoric mood, hallucinations and suicidal ideas. The patient is not nervous/anxious.         LMP  (LMP Unknown)     Objective    Physical Exam  No results displayed because visit has over 200 " results.        Assessment/Plan    No diagnosis found..   1.   Chest pain and back pain, improved.  Likely due to GERD.  Continue PPI and antireflux lifestyle.  Discontinue Pepcid and Carafate.  2.  Left lower quadrant abdominal pain associated with diarrhea, improved.  Likely due to irritable bowel syndrome.  Could also be due to adhesions due to previous colonic surgery.  Continue high-fiber diet and discontinue Bentyl.  CT abdomen pelvis if recurs.  3. Nausea, improved. likely due to reactive gastropathy.  Continue Prilosec 40 mg p.o. daily.    Gastric emptying scan if symptoms recurs.  4.  Obesity BMI 33.22, recommend exercise and diet control.  5.  History of segmental colonic resection  6.  Diverticulosis, high-fiber diet.  7.  Barnett's esophagus, repeat EGD in 1 year.    Orders placed during this encounter include:  No orders of the defined types were placed in this encounter.      * Surgery not found *    Review and/or summary of lab tests, radiology, procedures, medications. Review and summary of old records and obtaining of history. The risks and benefits of my recommendations, as well as other treatment options were discussed with the patient today. Questions were answered.    No orders of the defined types were placed in this encounter.      Follow-up: Return in about 3 months (around 8/7/2021).               Results for orders placed or performed during the hospital encounter of 03/22/21   Gold Top - SST   Result Value Ref Range    Extra Tube Hold for add-ons.    Green Top (Gel)   Result Value Ref Range    Extra Tube Hold for add-ons.    Green Top (Gel)   Result Value Ref Range    Extra Tube Hold for add-ons.    Urinalysis, Microscopic Only - Urine, Clean Catch    Specimen: Urine, Clean Catch   Result Value Ref Range    RBC, UA None Seen None Seen /HPF    WBC, UA 3-5 None Seen, 0-2, 3-5 /HPF    Bacteria, UA 1+ (A) None Seen /HPF    Squamous Epithelial Cells, UA 6-12 (A) None Seen, 0-2 /HPF    Hyaline  Casts, UA None Seen None Seen /LPF    Methodology Manual Light Microscopy    Urinalysis With Culture If Indicated - Urine, Clean Catch    Specimen: Urine, Clean Catch   Result Value Ref Range    Color, UA Yellow Yellow, Straw, Dark Yellow, Ángela    Appearance, UA Cloudy (A) Clear    pH, UA 5.5 5.0 - 9.0    Specific Gravity, UA 1.018 1.003 - 1.030    Glucose, UA Negative Negative    Ketones, UA Trace (A) Negative    Bilirubin, UA Negative Negative    Blood, UA Negative Negative    Protein,  mg/dL (2+) (A) Negative    Leuk Esterase, UA Negative Negative    Nitrite, UA Negative Negative    Urobilinogen, UA 1.0 E.U./dL 0.2 - 1.0 E.U./dL   Procalcitonin    Specimen: Blood   Result Value Ref Range    Procalcitonin 0.46 (H) 0.00 - 0.25 ng/mL   CBC Auto Differential    Specimen: Blood   Result Value Ref Range    WBC 12.64 (H) 3.40 - 10.80 10*3/mm3    RBC 4.31 3.77 - 5.28 10*6/mm3    Hemoglobin 13.4 12.0 - 15.9 g/dL    Hematocrit 37.3 34.0 - 46.6 %    MCV 86.5 79.0 - 97.0 fL    MCH 31.1 26.6 - 33.0 pg    MCHC 35.9 (H) 31.5 - 35.7 g/dL    RDW 11.4 (L) 12.3 - 15.4 %    RDW-SD 36.3 (L) 37.0 - 54.0 fl    MPV 11.6 6.0 - 12.0 fL    Platelets 146 140 - 450 10*3/mm3    Neutrophil % 92.1 (H) 42.7 - 76.0 %    Lymphocyte % 3.7 (L) 19.6 - 45.3 %    Monocyte % 3.5 (L) 5.0 - 12.0 %    Eosinophil % 0.0 (L) 0.3 - 6.2 %    Basophil % 0.2 0.0 - 1.5 %    Immature Grans % 0.5 0.0 - 0.5 %    Neutrophils, Absolute 11.65 (H) 1.70 - 7.00 10*3/mm3    Lymphocytes, Absolute 0.47 (L) 0.70 - 3.10 10*3/mm3    Monocytes, Absolute 0.44 0.10 - 0.90 10*3/mm3    Eosinophils, Absolute 0.00 0.00 - 0.40 10*3/mm3    Basophils, Absolute 0.02 0.00 - 0.20 10*3/mm3    Immature Grans, Absolute 0.06 (H) 0.00 - 0.05 10*3/mm3    nRBC 0.0 0.0 - 0.2 /100 WBC   CBC Auto Differential    Specimen: Blood   Result Value Ref Range    WBC 15.29 (H) 3.40 - 10.80 10*3/mm3    RBC 4.32 3.77 - 5.28 10*6/mm3    Hemoglobin 13.4 12.0 - 15.9 g/dL    Hematocrit 38.3 34.0 - 46.6 %     MCV 88.7 79.0 - 97.0 fL    MCH 31.0 26.6 - 33.0 pg    MCHC 35.0 31.5 - 35.7 g/dL    RDW 11.7 (L) 12.3 - 15.4 %    RDW-SD 37.6 37.0 - 54.0 fl    MPV 11.2 6.0 - 12.0 fL    Platelets 172 140 - 450 10*3/mm3    Neutrophil % 91.9 (H) 42.7 - 76.0 %    Lymphocyte % 3.4 (L) 19.6 - 45.3 %    Monocyte % 3.7 (L) 5.0 - 12.0 %    Eosinophil % 0.0 (L) 0.3 - 6.2 %    Basophil % 0.1 0.0 - 1.5 %    Immature Grans % 0.9 (H) 0.0 - 0.5 %    Neutrophils, Absolute 14.04 (H) 1.70 - 7.00 10*3/mm3    Lymphocytes, Absolute 0.52 (L) 0.70 - 3.10 10*3/mm3    Monocytes, Absolute 0.57 0.10 - 0.90 10*3/mm3    Eosinophils, Absolute 0.00 0.00 - 0.40 10*3/mm3    Basophils, Absolute 0.02 0.00 - 0.20 10*3/mm3    Immature Grans, Absolute 0.14 (H) 0.00 - 0.05 10*3/mm3    nRBC 0.0 0.0 - 0.2 /100 WBC   CBC Auto Differential    Specimen: Blood   Result Value Ref Range    WBC 14.56 (H) 3.40 - 10.80 10*3/mm3    RBC 4.38 3.77 - 5.28 10*6/mm3    Hemoglobin 13.3 12.0 - 15.9 g/dL    Hematocrit 39.2 34.0 - 46.6 %    MCV 89.5 79.0 - 97.0 fL    MCH 30.4 26.6 - 33.0 pg    MCHC 33.9 31.5 - 35.7 g/dL    RDW 11.7 (L) 12.3 - 15.4 %    RDW-SD 38.1 37.0 - 54.0 fl    MPV 11.7 6.0 - 12.0 fL    Platelets 174 140 - 450 10*3/mm3    Neutrophil % 92.8 (H) 42.7 - 76.0 %    Lymphocyte % 3.0 (L) 19.6 - 45.3 %    Monocyte % 3.3 (L) 5.0 - 12.0 %    Eosinophil % 0.0 (L) 0.3 - 6.2 %    Basophil % 0.1 0.0 - 1.5 %    Immature Grans % 0.8 (H) 0.0 - 0.5 %    Neutrophils, Absolute 13.52 (H) 1.70 - 7.00 10*3/mm3    Lymphocytes, Absolute 0.44 (L) 0.70 - 3.10 10*3/mm3    Monocytes, Absolute 0.48 0.10 - 0.90 10*3/mm3    Eosinophils, Absolute 0.00 0.00 - 0.40 10*3/mm3    Basophils, Absolute 0.01 0.00 - 0.20 10*3/mm3    Immature Grans, Absolute 0.11 (H) 0.00 - 0.05 10*3/mm3    nRBC 0.0 0.0 - 0.2 /100 WBC   CBC Auto Differential    Specimen: Blood   Result Value Ref Range    WBC 14.00 (H) 3.40 - 10.80 10*3/mm3    RBC 4.43 3.77 - 5.28 10*6/mm3    Hemoglobin 13.5 12.0 - 15.9 g/dL    Hematocrit 39.7  34.0 - 46.6 %    MCV 89.6 79.0 - 97.0 fL    MCH 30.5 26.6 - 33.0 pg    MCHC 34.0 31.5 - 35.7 g/dL    RDW 11.7 (L) 12.3 - 15.4 %    RDW-SD 38.3 37.0 - 54.0 fl    MPV 11.5 6.0 - 12.0 fL    Platelets 171 140 - 450 10*3/mm3    Neutrophil % 93.6 (H) 42.7 - 76.0 %    Lymphocyte % 2.4 (L) 19.6 - 45.3 %    Monocyte % 3.1 (L) 5.0 - 12.0 %    Eosinophil % 0.0 (L) 0.3 - 6.2 %    Basophil % 0.1 0.0 - 1.5 %    Immature Grans % 0.8 (H) 0.0 - 0.5 %    Neutrophils, Absolute 13.11 (H) 1.70 - 7.00 10*3/mm3    Lymphocytes, Absolute 0.34 (L) 0.70 - 3.10 10*3/mm3    Monocytes, Absolute 0.43 0.10 - 0.90 10*3/mm3    Eosinophils, Absolute 0.00 0.00 - 0.40 10*3/mm3    Basophils, Absolute 0.01 0.00 - 0.20 10*3/mm3    Immature Grans, Absolute 0.11 (H) 0.00 - 0.05 10*3/mm3    nRBC 0.0 0.0 - 0.2 /100 WBC     *Note: Due to a large number of results and/or encounters for the requested time period, some results have not been displayed. A complete set of results can be found in Results Review.         This document has been electronically signed by Mauricio Betancourt MD on May 7, 2021 11:57 CDT

## 2021-05-13 ENCOUNTER — LAB (OUTPATIENT)
Dept: LAB | Facility: HOSPITAL | Age: 71
End: 2021-05-13

## 2021-05-13 ENCOUNTER — OFFICE VISIT (OUTPATIENT)
Dept: FAMILY MEDICINE CLINIC | Facility: CLINIC | Age: 71
End: 2021-05-13

## 2021-05-13 VITALS
HEART RATE: 81 BPM | SYSTOLIC BLOOD PRESSURE: 150 MMHG | WEIGHT: 191 LBS | HEIGHT: 65 IN | BODY MASS INDEX: 31.82 KG/M2 | DIASTOLIC BLOOD PRESSURE: 90 MMHG | OXYGEN SATURATION: 98 %

## 2021-05-13 DIAGNOSIS — U07.1 COVID-19 VIRUS INFECTION: Primary | ICD-10-CM

## 2021-05-13 DIAGNOSIS — R34 DECREASED URINATION: ICD-10-CM

## 2021-05-13 DIAGNOSIS — E03.9 ACQUIRED HYPOTHYROIDISM: ICD-10-CM

## 2021-05-13 DIAGNOSIS — E11.9 TYPE 2 DIABETES MELLITUS WITHOUT COMPLICATION, WITHOUT LONG-TERM CURRENT USE OF INSULIN (HCC): ICD-10-CM

## 2021-05-13 LAB
ALBUMIN SERPL-MCNC: 4 G/DL (ref 3.5–5.2)
ALBUMIN/GLOB SERPL: 1.4 G/DL
ALP SERPL-CCNC: 81 U/L (ref 39–117)
ALT SERPL W P-5'-P-CCNC: 18 U/L (ref 1–33)
ANION GAP SERPL CALCULATED.3IONS-SCNC: 13.4 MMOL/L (ref 5–15)
AST SERPL-CCNC: 17 U/L (ref 1–32)
BILIRUB SERPL-MCNC: 0.5 MG/DL (ref 0–1.2)
BILIRUB UR QL STRIP: NEGATIVE
BUN SERPL-MCNC: 18 MG/DL (ref 8–23)
BUN/CREAT SERPL: 19.8 (ref 7–25)
CALCIUM SPEC-SCNC: 9.6 MG/DL (ref 8.6–10.5)
CHLORIDE SERPL-SCNC: 104 MMOL/L (ref 98–107)
CLARITY UR: ABNORMAL
CO2 SERPL-SCNC: 24.6 MMOL/L (ref 22–29)
COLOR UR: YELLOW
CREAT SERPL-MCNC: 0.91 MG/DL (ref 0.57–1)
GFR SERPL CREATININE-BSD FRML MDRD: 61 ML/MIN/1.73
GLOBULIN UR ELPH-MCNC: 2.8 GM/DL
GLUCOSE SERPL-MCNC: 138 MG/DL (ref 65–99)
GLUCOSE UR STRIP-MCNC: NEGATIVE MG/DL
HGB UR QL STRIP.AUTO: NEGATIVE
KETONES UR QL STRIP: NEGATIVE
LEUKOCYTE ESTERASE UR QL STRIP.AUTO: NEGATIVE
NITRITE UR QL STRIP: NEGATIVE
PH UR STRIP.AUTO: 5.5 [PH] (ref 5–8)
POTASSIUM SERPL-SCNC: 4.1 MMOL/L (ref 3.5–5.2)
PROT SERPL-MCNC: 6.8 G/DL (ref 6–8.5)
PROT UR QL STRIP: ABNORMAL
SODIUM SERPL-SCNC: 142 MMOL/L (ref 136–145)
SP GR UR STRIP: 1.02 (ref 1–1.03)
UROBILINOGEN UR QL STRIP: ABNORMAL

## 2021-05-13 PROCEDURE — 81003 URINALYSIS AUTO W/O SCOPE: CPT

## 2021-05-13 PROCEDURE — 80053 COMPREHEN METABOLIC PANEL: CPT

## 2021-05-13 PROCEDURE — 36415 COLL VENOUS BLD VENIPUNCTURE: CPT

## 2021-05-13 PROCEDURE — 99213 OFFICE O/P EST LOW 20 MIN: CPT | Performed by: FAMILY MEDICINE

## 2021-05-13 RX ORDER — LANOLIN ALCOHOL/MO/W.PET/CERES
3 CREAM (GRAM) TOPICAL NIGHTLY PRN
Qty: 90 TABLET | Refills: 1 | Status: SHIPPED | OUTPATIENT
Start: 2021-05-13 | End: 2021-12-09

## 2021-05-13 RX ORDER — LEVOTHYROXINE SODIUM 112 MCG
112 TABLET ORAL DAILY
Qty: 30 TABLET | Refills: 5 | Status: SHIPPED | OUTPATIENT
Start: 2021-05-13 | End: 2021-08-30

## 2021-05-17 ENCOUNTER — TELEPHONE (OUTPATIENT)
Dept: FAMILY MEDICINE CLINIC | Facility: CLINIC | Age: 71
End: 2021-05-17

## 2021-05-18 NOTE — TELEPHONE ENCOUNTER
Per Dr. Love, Ms. Haas has been called with recent lab results and recommendations.   Continue current medications and follow-up as planned or sooner if any problems.

## 2021-05-31 ENCOUNTER — PATIENT MESSAGE (OUTPATIENT)
Dept: FAMILY MEDICINE CLINIC | Facility: CLINIC | Age: 71
End: 2021-05-31

## 2021-06-03 RX ORDER — POTASSIUM CHLORIDE 750 MG/1
10 TABLET, EXTENDED RELEASE ORAL DAILY
Qty: 5 TABLET | Refills: 0 | Status: SHIPPED | OUTPATIENT
Start: 2021-06-03 | End: 2021-08-10

## 2021-06-03 RX ORDER — FUROSEMIDE 20 MG/1
20 TABLET ORAL DAILY
Qty: 5 TABLET | Refills: 0 | Status: SHIPPED | OUTPATIENT
Start: 2021-06-03 | End: 2022-02-11

## 2021-06-17 ENCOUNTER — TRANSCRIBE ORDERS (OUTPATIENT)
Dept: LAB | Facility: HOSPITAL | Age: 71
End: 2021-06-17

## 2021-06-17 ENCOUNTER — LAB (OUTPATIENT)
Dept: LAB | Facility: HOSPITAL | Age: 71
End: 2021-06-17

## 2021-06-17 DIAGNOSIS — I10 ESSENTIAL (PRIMARY) HYPERTENSION: ICD-10-CM

## 2021-06-17 DIAGNOSIS — E11.69 TYPE 2 DIABETES MELLITUS WITH OTHER SPECIFIED COMPLICATION, UNSPECIFIED WHETHER LONG TERM INSULIN USE (HCC): ICD-10-CM

## 2021-06-17 DIAGNOSIS — N18.32 CHRONIC KIDNEY DISEASE (CKD) STAGE G3B/A1, MODERATELY DECREASED GLOMERULAR FILTRATION RATE (GFR) BETWEEN 30-44 ML/MIN/1.73 SQUARE METER AND ALBUMINURIA CREATININE RATIO LESS THAN 30 MG/G (CMS/H* (HCC): Primary | ICD-10-CM

## 2021-06-17 DIAGNOSIS — E78.5 HYPERLIPIDEMIA, UNSPECIFIED HYPERLIPIDEMIA TYPE: ICD-10-CM

## 2021-06-17 DIAGNOSIS — E55.9 AVITAMINOSIS D: ICD-10-CM

## 2021-06-17 LAB
ALBUMIN SERPL-MCNC: 3.7 G/DL (ref 3.5–5.2)
ANION GAP SERPL CALCULATED.3IONS-SCNC: 7.3 MMOL/L (ref 5–15)
BUN SERPL-MCNC: 15 MG/DL (ref 8–23)
BUN/CREAT SERPL: 18.5 (ref 7–25)
CALCIUM SPEC-SCNC: 9.5 MG/DL (ref 8.6–10.5)
CHLORIDE SERPL-SCNC: 105 MMOL/L (ref 98–107)
CO2 SERPL-SCNC: 27.7 MMOL/L (ref 22–29)
CREAT SERPL-MCNC: 0.81 MG/DL (ref 0.57–1)
GFR SERPL CREATININE-BSD FRML MDRD: 70 ML/MIN/1.73
GLUCOSE SERPL-MCNC: 118 MG/DL (ref 65–99)
PHOSPHATE SERPL-MCNC: 3.4 MG/DL (ref 2.5–4.5)
POTASSIUM SERPL-SCNC: 4.6 MMOL/L (ref 3.5–5.2)
SODIUM SERPL-SCNC: 140 MMOL/L (ref 136–145)

## 2021-06-17 PROCEDURE — 36415 COLL VENOUS BLD VENIPUNCTURE: CPT | Performed by: NURSE PRACTITIONER

## 2021-06-17 PROCEDURE — 80069 RENAL FUNCTION PANEL: CPT | Performed by: NURSE PRACTITIONER

## 2021-06-25 RX ORDER — LOSARTAN POTASSIUM 100 MG/1
100 TABLET ORAL DAILY
Qty: 90 TABLET | Refills: 2 | Status: SHIPPED | OUTPATIENT
Start: 2021-06-25 | End: 2022-12-07

## 2021-08-10 ENCOUNTER — OFFICE VISIT (OUTPATIENT)
Dept: GASTROENTEROLOGY | Facility: CLINIC | Age: 71
End: 2021-08-10

## 2021-08-10 VITALS
DIASTOLIC BLOOD PRESSURE: 73 MMHG | WEIGHT: 194 LBS | BODY MASS INDEX: 32.32 KG/M2 | SYSTOLIC BLOOD PRESSURE: 127 MMHG | HEIGHT: 65 IN | HEART RATE: 66 BPM

## 2021-08-10 DIAGNOSIS — K58.2 IRRITABLE BOWEL SYNDROME WITH BOTH CONSTIPATION AND DIARRHEA: Primary | ICD-10-CM

## 2021-08-10 PROCEDURE — 99213 OFFICE O/P EST LOW 20 MIN: CPT | Performed by: INTERNAL MEDICINE

## 2021-08-10 RX ORDER — DICYCLOMINE HYDROCHLORIDE 10 MG/1
10 CAPSULE ORAL 2 TIMES DAILY
COMMUNITY

## 2021-08-10 NOTE — PATIENT INSTRUCTIONS
Irritable Bowel Syndrome, Adult    Irritable bowel syndrome (IBS) is a group of symptoms that affects the organs responsible for digestion (gastrointestinal or GI tract). IBS is not one specific disease.  To regulate how the GI tract works, the body sends signals back and forth between the intestines and the brain. If you have IBS, there may be a problem with these signals. As a result, the GI tract does not function normally. The intestines may become more sensitive and overreact to certain things. This may be especially true when you eat certain foods or when you are under stress.  There are four types of IBS. These may be determined based on the consistency of your stool (feces):  · IBS with diarrhea.  · IBS with constipation.  · Mixed IBS.  · Unsubtyped IBS.  It is important to know which type of IBS you have. Certain treatments are more likely to be helpful for certain types of IBS.  What are the causes?  The exact cause of IBS is not known.  What increases the risk?  You may have a higher risk for IBS if you:  · Are female.  · Are younger than 40.  · Have a family history of IBS.  · Have a mental health condition, such as depression, anxiety, or post-traumatic stress disorder.  · Have had a bacterial infection of your GI tract.  What are the signs or symptoms?  Symptoms of IBS vary from person to person. The main symptom is abdominal pain or discomfort. Other symptoms usually include one or more of the following:  · Diarrhea, constipation, or both.  · Abdominal swelling or bloating.  · Feeling full after eating a small or regular-sized meal.  · Frequent gas.  · Mucus in the stool.  · A feeling of having more stool left after a bowel movement.  Symptoms tend to come and go. They may be triggered by stress, mental health conditions, or certain foods.  How is this diagnosed?  This condition may be diagnosed based on a physical exam, your medical history, and your symptoms. You may have tests, such as:  · Blood  tests.  · Stool test.  · X-rays.  · CT scan.  · Colonoscopy. This is a procedure in which your GI tract is viewed with a long, thin, flexible tube.  How is this treated?  There is no cure for IBS, but treatment can help relieve symptoms. Treatment depends on the type of IBS you have, and may include:  · Changes to your diet, such as:  ? Avoiding foods that cause symptoms.  ? Drinking more water.  ? Following a low-FODMAP (fermentable oligosaccharides, disaccharides, monosaccharides, and polyols) diet for up to 6 weeks, or as told by your health care provider. FODMAPs are sugars that are hard for some people to digest.  ? Eating more fiber.  ? Eating medium-sized meals at the same times every day.  · Medicines. These may include:  ? Fiber supplements, if you have constipation.  ? Medicine to control diarrhea (antidiarrheal medicines).  ? Medicine to help control muscle tightening (spasms) in your GI tract (antispasmodic medicines).  ? Medicines to help with mental health conditions, such as antidepressants or tranquilizers.  · Talk therapy or counseling.  · Working with a diet and nutrition specialist (dietitian) to help create a food plan that is right for you.  · Managing your stress.  Follow these instructions at home:  Eating and drinking  · Eat a healthy diet.  · Eat medium-sized meals at about the same time every day. Do not eat large meals.  · Gradually eat more fiber-rich foods. These include whole grains, fruits, and vegetables. This may be especially helpful if you have IBS with constipation.  · Eat a diet low in FODMAPs.  · Drink enough fluid to keep your urine pale yellow.  · Keep a journal of foods that seem to trigger symptoms.  · Avoid foods and drinks that:  ? Contain added sugar.  ? Make your symptoms worse. Dairy products, caffeinated drinks, and carbonated drinks can make symptoms worse for some people.  General instructions  · Take over-the-counter and prescription medicines and supplements only  as told by your health care provider.  · Get enough exercise. Do at least 150 minutes of moderate-intensity exercise each week.  · Manage your stress. Getting enough sleep and exercise can help you manage stress.  · Keep all follow-up visits as told by your health care provider and therapist. This is important.  Alcohol Use  · Do not drink alcohol if:  ? Your health care provider tells you not to drink.  ? You are pregnant, may be pregnant, or are planning to become pregnant.  · If you drink alcohol, limit how much you have:  ? 0-1 drink a day for women.  ? 0-2 drinks a day for men.  · Be aware of how much alcohol is in your drink. In the [place] one drink equals one typical bottle of beer (12 oz), one-half glass of wine (5 oz), or one shot of hard liquor (1½ oz).  Contact a health care provider if you have:  · Constant pain.  · Weight loss.  · Difficulty or pain when swallowing.  · Diarrhea that gets worse.  Get help right away if you have:  · Severe abdominal pain.  · Fever.  · Diarrhea with symptoms of dehydration, such as dizziness or dry mouth.  · Bright red blood in your stool.  · Stool that is black and tarry.  · Abdominal swelling.  · Vomiting that does not stop.  · Blood in your vomit.  Summary  · Irritable bowel syndrome (IBS) is not one specific disease. It is a group of symptoms that affects digestion.  · Your intestines may become more sensitive and overreact to certain things. This may be especially true when you eat certain foods or when you are under stress.  · There is no cure for IBS, but treatment can help relieve symptoms.  This information is not intended to replace advice given to you by your health care provider. Make sure you discuss any questions you have with your health care provider.  Document Revised: 12/11/2018 Document Reviewed: 12/11/2018  Elsevier Patient Education © 2021 [employer]

## 2021-08-13 ENCOUNTER — OFFICE VISIT (OUTPATIENT)
Dept: FAMILY MEDICINE CLINIC | Facility: CLINIC | Age: 71
End: 2021-08-13

## 2021-08-13 ENCOUNTER — LAB (OUTPATIENT)
Dept: LAB | Facility: HOSPITAL | Age: 71
End: 2021-08-13

## 2021-08-13 VITALS
BODY MASS INDEX: 32.32 KG/M2 | HEART RATE: 64 BPM | HEIGHT: 65 IN | OXYGEN SATURATION: 96 % | SYSTOLIC BLOOD PRESSURE: 148 MMHG | WEIGHT: 194 LBS | DIASTOLIC BLOOD PRESSURE: 84 MMHG

## 2021-08-13 DIAGNOSIS — E11.9 TYPE 2 DIABETES MELLITUS WITHOUT COMPLICATION, WITHOUT LONG-TERM CURRENT USE OF INSULIN (HCC): ICD-10-CM

## 2021-08-13 DIAGNOSIS — Z78.0 POSTMENOPAUSAL: ICD-10-CM

## 2021-08-13 DIAGNOSIS — M19.90 ARTHRITIS: ICD-10-CM

## 2021-08-13 DIAGNOSIS — Z12.31 ENCOUNTER FOR SCREENING MAMMOGRAM FOR MALIGNANT NEOPLASM OF BREAST: ICD-10-CM

## 2021-08-13 DIAGNOSIS — E03.9 ACQUIRED HYPOTHYROIDISM: Primary | ICD-10-CM

## 2021-08-13 DIAGNOSIS — R22.32 SUBCUTANEOUS MASS OF LEFT FOREARM: ICD-10-CM

## 2021-08-13 DIAGNOSIS — E03.9 ACQUIRED HYPOTHYROIDISM: ICD-10-CM

## 2021-08-13 LAB — HBA1C MFR BLD: 6.8 % (ref 4.8–5.6)

## 2021-08-13 PROCEDURE — 99214 OFFICE O/P EST MOD 30 MIN: CPT | Performed by: FAMILY MEDICINE

## 2021-08-13 PROCEDURE — 84443 ASSAY THYROID STIM HORMONE: CPT

## 2021-08-13 PROCEDURE — 36415 COLL VENOUS BLD VENIPUNCTURE: CPT

## 2021-08-13 PROCEDURE — 83036 HEMOGLOBIN GLYCOSYLATED A1C: CPT

## 2021-08-13 PROCEDURE — 96372 THER/PROPH/DIAG INJ SC/IM: CPT | Performed by: FAMILY MEDICINE

## 2021-08-13 RX ORDER — TRIAMCINOLONE ACETONIDE 40 MG/ML
80 INJECTION, SUSPENSION INTRA-ARTICULAR; INTRAMUSCULAR ONCE
Status: COMPLETED | OUTPATIENT
Start: 2021-08-13 | End: 2021-08-13

## 2021-08-13 RX ADMIN — TRIAMCINOLONE ACETONIDE 80 MG: 40 INJECTION, SUSPENSION INTRA-ARTICULAR; INTRAMUSCULAR at 11:29

## 2021-08-13 NOTE — PROGRESS NOTES
"Chief Complaint  Hypertension    Subjective          Emily Haas presents to [facility]  History of Present Illness    Patient presents today for follow-up blood pressure.  Blood pressure is doing okay at home.  Says that systolic is usually less than 140.  She is taking losartan 100 mg daily, carvedilol 12.5 mg twice daily and amlodipine 5 mg daily.  No problems with these medications.  She does have problems with her Prilosec every once in a while when she takes daily, so she has been using as needed instead.  Patient still feels like she has some generalized weakness after COVID-19 infection.  Her breathing has returned to normal.  Patient has been having some hair loss, using a wide tooth comb and switched shampoos to try to help with this.  Patient currently taking Synthroid 112 mcg daily.  Patient has some lumps on her left forearm that seem to be enlarging.  They have been present for a long time.  Lexapro is still controlling her moods.  Patient does have arthritis pain, and is requesting a steroid injection.  Has been at least 6 months since this was given.  No problems with these injections in the past.      Objective   Vital Signs:   /84   Pulse 64   Ht 165.1 cm (65\")   Wt 88 kg (194 lb)   SpO2 96%   BMI 32.28 kg/m²     Physical Exam  Vitals reviewed.   Constitutional:       General: She is not in acute distress.     Appearance: She is well-developed.   HENT:      Head: Normocephalic and atraumatic.   Cardiovascular:      Rate and Rhythm: Normal rate and regular rhythm.      Heart sounds: Normal heart sounds. No murmur heard.     Pulmonary:      Effort: Pulmonary effort is normal. No respiratory distress.      Breath sounds: Normal breath sounds. No wheezing or rales.   Abdominal:      Palpations: Abdomen is soft.      Tenderness: There is no abdominal tenderness.   Skin:     General: Skin is warm and dry.      Comments: Left forearm has three soft masses, " irregular shape, mobile, no overlying skin changes   Neurological:      Mental Status: She is alert and oriented to person, place, and time.        Result Review :   The following data was reviewed by: Bharati Love MD on 08/13/2021:  Common labs    Common Labsle 4/6/21 6/17/21    0641    Glucose  118 (A)   BUN  15   Creatinine  0.81   eGFR Non African Am  70   Sodium  140   Potassium  4.6   Chloride  105   Calcium  9.5   Albumin  3.70   Total Bilirubin     Alkaline Phosphatase     AST (SGOT)     ALT (SGPT)     WBC 12.64 (A)    Hemoglobin 13.4    Hematocrit 37.3    Platelets 146    (A) Abnormal value       Comments are available for some flowsheets but are not being displayed.           Lab Results   Component Value Date    TSH 3.590 02/17/2021     Lab Results   Component Value Date    HGBA1C 6.74 (H) 11/24/2020                 Assessment and Plan    Diagnoses and all orders for this visit:    1. Acquired hypothyroidism (Primary)  -     TSH; Future    2. Subcutaneous mass of left forearm  -     US Nonvascular Extremity Limited; Future    3. Arthritis  -     triamcinolone acetonide (KENALOG-40) injection 80 mg    4. Type 2 diabetes mellitus without complication, without long-term current use of insulin (CMS/[facility])  -     Hemoglobin A1c; Future    5. Encounter for screening mammogram for malignant neoplasm of breast  -     Mammo Screening Digital Tomosynthesis Bilateral With CAD; Future    6. Postmenopausal  -     DEXA Bone Density Axial; Future      Patient seen today for follow-up.  Overall doing well, but having some generalized lower extremity weakness.  Will provide exercises today.  Patient said that physical therapy helped while she was in rehab following hospitalization.  If home exercises not helping enough, patient will be referred for physical therapy.  Will obtain ultrasound to evaluate subcutaneous masses of the left forearm.  May just be fatty tissue, however with subjective enlargement per patient  will check to make sure no AV malformation or lymphadenopathy.  Kenalog injection for arthritis, patient has had this in the past and does not cause significant elevations with her blood pressure.  Will check hemoglobin A1c and TSH today.  Patient due for mammogram and DEXA bone scan, ordered.          Follow Up   Return in about 3 months (around 11/13/2021) for Recheck.  Patient was given instructions and counseling regarding her condition or for health maintenance advice. Please see specific information pulled into the AVS if appropriate.           This document has been electronically signed by Bharati Love MD

## 2021-08-14 LAB — TSH SERPL DL<=0.05 MIU/L-ACNC: 4.56 UIU/ML (ref 0.27–4.2)

## 2021-08-17 ENCOUNTER — HOSPITAL ENCOUNTER (OUTPATIENT)
Dept: ULTRASOUND IMAGING | Facility: HOSPITAL | Age: 71
Discharge: HOME | End: 2021-08-17
Admitting: FAMILY MEDICINE

## 2021-08-17 DIAGNOSIS — R22.32 SUBCUTANEOUS MASS OF LEFT FOREARM: ICD-10-CM

## 2021-08-17 PROCEDURE — 76882 US LMTD JT/FCL EVL NVASC XTR: CPT

## 2021-08-19 ENCOUNTER — TELEPHONE (OUTPATIENT)
Dept: FAMILY MEDICINE CLINIC | Facility: CLINIC | Age: 71
End: 2021-08-19

## 2021-08-19 DIAGNOSIS — E03.9 ACQUIRED HYPOTHYROIDISM: Primary | ICD-10-CM

## 2021-08-24 NOTE — PROGRESS NOTES
Chief Complaint   Patient presents with   • 3 Month Clinical Appointment     Irritable Bowel Syndrome With Both Constipation And Diarrhea       Subjective    Emily Haas is a 71 y.o. female.    History of Present Illness  Patient presented to GI clinic for follow-up visit today.  Feels better currently.  Chest pain and back pain have improved.  No further diarrhea or constipation.  Bowel movements regular.  Weight is stable.       The following portions of the patient's history were reviewed and updated as appropriate:   Past Medical History:   Diagnosis Date   • Anxiety    • Chronic bronchitis (CMS/Formerly [facility])    • CKD (chronic kidney disease) stage 3, GFR 30-59 ml/min (CMS/HCC)    • Diabetes mellitus (CMS/HCC)    • Diverticulitis    • Hyperlipidemia    • Hypertension    • Hypothyroidism    • Irritable bowel syndrome    • Obesity    • Osteoarthritis    • Vitamin D deficiency      Past Surgical History:   Procedure Laterality Date   • BILATERAL BREAST REDUCTION     • CHOLECYSTECTOMY     • COLONOSCOPY N/A 2017   • COLONOSCOPY N/A 2021   • ENDOSCOPY N/A 2021   • HYSTERECTOMY     • UPPER GASTROINTESTINAL ENDOSCOPY  2021     Family History   Problem Relation Age of Onset   • Hypertension Mother    • Alzheimer's disease Father    • Diabetes Brother    • Hypothyroidism Daughter      OB History        2    Para   2    Term   2            AB        Living           SAB        TAB        Ectopic        Molar        Multiple        Live Births                  Prior to Admission medications    Medication Sig Start Date End Date Taking? Authorizing Provider   acetaminophen (TYLENOL) 650 MG 8 hr tablet Take 2 tablets by mouth Every 8 (Eight) Hours As Needed.   Yes Provider, MD Jose Alejandro   albuterol sulfate  (90 Base) MCG/ACT inhaler Inhale 2 puffs 4 (Four) Times a Day.  Patient taking differently: Inhale 2 puffs 4 (Four) Times a Day As Needed. 21  Yes Fabiola Layne MD    amLODIPine (NORVASC) 5 MG tablet Take 5 mg by mouth Daily.   Yes Jose Alejandro Sommers MD   budesonide-formoterol (SYMBICORT) 160-4.5 MCG/ACT inhaler Inhale 2 puffs 2 (Two) Times a Day.  Patient taking differently: Inhale 2 puffs 2 (Two) Times a Day As Needed. 4/6/21  Yes Fabiola Layne MD   carvedilol (COREG) 12.5 MG tablet Take 1 tablet by mouth 2 (Two) Times a Day With Meals. 5/12/20  Yes Fernando Hutton MD   dicyclomine (BENTYL) 10 MG capsule Take 10 mg by mouth 2 (two) times a day.   Yes Jose Alejandro Sommers MD   docusate sodium (COLACE) 50 MG capsule Take 1 capsule by mouth Daily As Needed.   Yes Jose Alejandro Sommers MD   escitalopram (LEXAPRO) 10 MG tablet TAKE 1 TABLET EVERY DAY 9/17/20  Yes Fernando Hutton MD   guaifenesin (ROBITUSSIN) 100 MG/5ML liquid Take 10 mL by mouth 4 (Four) Times a Day As Needed for Cough or Congestion. 4/6/21  Yes Fabiola Layne MD   guanFACINE (TENEX) 1 MG tablet Take 1 mg by mouth Daily. 6/12/17  Yes Jose Alejandro Sommers MD   losartan (COZAAR) 100 MG tablet Take 1 tablet by mouth Daily. 6/25/21  Yes Bharati Love MD   melatonin 3 MG tablet Take 1 tablet by mouth At Night As Needed for Sleep. 5/13/21  Yes Bharati Love MD   omeprazole (priLOSEC) 40 MG capsule Take 1 capsule by mouth Daily.  Patient taking differently: Take 40 mg by mouth Daily As Needed. 2/12/21  Yes Mauricio Betancourt MD   Synthroid 112 MCG tablet Take 1 tablet by mouth Daily. 5/13/21  Yes Bharati Love MD   vitamin D (ERGOCALCIFEROL) 1.25 MG (59556 UT) capsule capsule Take 50,000 Units by mouth 1 (One) Time Per Week. 11/12/20  Yes Jose Alejandro Sommers MD   furosemide (Lasix) 20 MG tablet Take 1 tablet by mouth Daily for 5 days. 6/3/21 6/8/21  Bharati Love MD     Allergies   Allergen Reactions   • Atenolol Myalgia   • Augmentin [Amoxicillin-Pot Clavulanate] Hives   • Dilaudid [Hydromorphone] Nausea And Vomiting   • Lipitor [Atorvastatin] Myalgia   • Pravachol  "[Pravastatin] Myalgia   • Statins Other (See Comments)     \"muscle pain \"    • Sulfa Antibiotics Hives     Social History     Socioeconomic History   • Marital status:      Spouse name: Not on file   • Number of children: Not on file   • Years of education: Not on file   • Highest education level: Not on file   Tobacco Use   • Smoking status: Former Smoker     Packs/day: 3.00     Years: 15.00     Pack years: 45.00     Types: Cigarettes   • Smokeless tobacco: Never Used   • Tobacco comment: 08/10/2021 - Patient states she has not utilized Cigarettes X 34 years and upon ceassation, patient was utilizing 3 packs of Cigarettes per day.   Vaping Use   • Vaping Use: Never used   Substance and Sexual Activity   • Alcohol use: No   • Drug use: No   • Sexual activity: Defer       Review of Systems  Review of Systems   Constitutional: Negative for chills, fatigue, fever and unexpected weight change.   HENT: Negative for congestion, ear discharge, hearing loss, nosebleeds and sore throat.    Eyes: Negative for pain, discharge and redness.   Respiratory: Negative for cough, chest tightness, shortness of breath and wheezing.    Cardiovascular: Negative for chest pain and palpitations.   Gastrointestinal: Negative for abdominal distention, abdominal pain, blood in stool, constipation, diarrhea, nausea and vomiting.   Endocrine: Negative for cold intolerance, polydipsia, polyphagia and polyuria.   Genitourinary: Negative for dysuria, flank pain, frequency, hematuria and urgency.   Musculoskeletal: Negative for arthralgias, back pain, joint swelling and myalgias.   Skin: Negative for color change, pallor and rash.   Neurological: Negative for tremors, seizures, syncope, weakness and headaches.   Hematological: Negative for adenopathy. Does not bruise/bleed easily.   Psychiatric/Behavioral: Negative for behavioral problems, confusion, dysphoric mood, hallucinations and suicidal ideas. The patient is not nervous/anxious.  " "       /73   Pulse 66   Ht 165.1 cm (65\")   Wt 88 kg (194 lb)   LMP  (LMP Unknown)   BMI 32.28 kg/m²     Objective    Physical Exam  Constitutional:       Appearance: She is well-developed.   HENT:      Head: Normocephalic and atraumatic.   Eyes:      Conjunctiva/sclera: Conjunctivae normal.      Pupils: Pupils are equal, round, and reactive to light.   Neck:      Thyroid: No thyromegaly.   Cardiovascular:      Rate and Rhythm: Normal rate and regular rhythm.      Heart sounds: Normal heart sounds. No murmur heard.     Pulmonary:      Effort: Pulmonary effort is normal.      Breath sounds: Normal breath sounds. No wheezing.   Abdominal:      General: Bowel sounds are normal. There is no distension.      Palpations: Abdomen is soft. There is no mass.      Tenderness: There is no abdominal tenderness.      Hernia: No hernia is present.   Genitourinary:     Comments: No lesions noted  Musculoskeletal:         General: No tenderness. Normal range of motion.      Cervical back: Normal range of motion and neck supple.   Lymphadenopathy:      Cervical: No cervical adenopathy.   Skin:     General: Skin is warm and dry.      Findings: No rash.   Neurological:      Mental Status: She is alert and oriented to person, place, and time.      Cranial Nerves: No cranial nerve deficit.   Psychiatric:         Thought Content: Thought content normal.       Transcribe Orders on 06/17/2021   Component Date Value Ref Range Status   • Glucose 06/17/2021 118* 65 - 99 mg/dL Final   • BUN 06/17/2021 15  8 - 23 mg/dL Final   • Creatinine 06/17/2021 0.81  0.57 - 1.00 mg/dL Final   • Sodium 06/17/2021 140  136 - 145 mmol/L Final   • Potassium 06/17/2021 4.6  3.5 - 5.2 mmol/L Final   • Chloride 06/17/2021 105  98 - 107 mmol/L Final   • CO2 06/17/2021 27.7  22.0 - 29.0 mmol/L Final   • Calcium 06/17/2021 9.5  8.6 - 10.5 mg/dL Final   • Albumin 06/17/2021 3.70  3.50 - 5.20 g/dL Final   • Phosphorus 06/17/2021 3.4  2.5 - 4.5 mg/dL Final "   • Anion Gap 06/17/2021 7.3  5.0 - 15.0 mmol/L Final   • BUN/Creatinine Ratio 06/17/2021 18.5  7.0 - 25.0 Final   • eGFR Non  Amer 06/17/2021 70  >60 mL/min/1.73 Final     Assessment/Plan    No diagnosis found..   1.   Chest pain and back pain, improved.  Likely due to GERD.  Continue PPI and antireflux lifestyle.  Discontinue Pepcid and Carafate.  2.  Left lower quadrant abdominal pain associated with diarrhea, improved.  Likely due to irritable bowel syndrome.  Could also be due to adhesions due to previous colonic surgery.  Continue high-fiber diet.  CT abdomen pelvis if recurs.  3. Nausea, improved. likely due to reactive gastropathy.  Continue Prilosec 40 mg p.o. daily.    Gastric emptying scan if symptoms recurs.  4.  Obesity BMI 33.22, recommend exercise and diet control.  5.  History of segmental colonic resection  6.  Diverticulosis, high-fiber diet.  7.  Barnett's esophagus, repeat EGD in visit.    Orders placed during this encounter include:  No orders of the defined types were placed in this encounter.      * Surgery not found *    Review and/or summary of lab tests, radiology, procedures, medications. Review and summary of old records and obtaining of history. The risks and benefits of my recommendations, as well as other treatment options were discussed with the patient today. Questions were answered.    No orders of the defined types were placed in this encounter.      Follow-up: Return in about 6 months (around 2/10/2022).               Results for orders placed or performed in visit on 08/13/21   TSH    Specimen: Blood   Result Value Ref Range    TSH 4.560 (H) 0.270 - 4.200 uIU/mL   Hemoglobin A1c    Specimen: Blood   Result Value Ref Range    Hemoglobin A1C 6.80 (H) 4.80 - 5.60 %   Results for orders placed or performed in visit on 06/17/21   Renal Function Panel    Specimen: Blood   Result Value Ref Range    Glucose 118 (H) 65 - 99 mg/dL    BUN 15 8 - 23 mg/dL    Creatinine 0.81 0.57 -  1.00 mg/dL    Sodium 140 136 - 145 mmol/L    Potassium 4.6 3.5 - 5.2 mmol/L    Chloride 105 98 - 107 mmol/L    CO2 27.7 22.0 - 29.0 mmol/L    Calcium 9.5 8.6 - 10.5 mg/dL    Albumin 3.70 3.50 - 5.20 g/dL    Phosphorus 3.4 2.5 - 4.5 mg/dL    Anion Gap 7.3 5.0 - 15.0 mmol/L    BUN/Creatinine Ratio 18.5 7.0 - 25.0    eGFR Non African Amer 70 >60 mL/min/1.73   Results for orders placed or performed in visit on 05/13/21   Urinalysis With Culture If Indicated - Urine, Clean Catch    Specimen: Urine, Clean Catch   Result Value Ref Range    Color, UA Yellow Yellow, Straw    Appearance, UA Cloudy (A) Clear    pH, UA 5.5 5.0 - 8.0    Specific Gravity, UA 1.025 1.005 - 1.030    Glucose, UA Negative Negative    Ketones, UA Negative Negative    Bilirubin, UA Negative Negative    Blood, UA Negative Negative    Protein, UA Trace (A) Negative    Leuk Esterase, UA Negative Negative    Nitrite, UA Negative Negative    Urobilinogen, UA 1.0 E.U./dL 0.2 - 1.0 E.U./dL   Comprehensive Metabolic Panel    Specimen: Blood   Result Value Ref Range    Glucose 138 (H) 65 - 99 mg/dL    BUN 18 8 - 23 mg/dL    Creatinine 0.91 0.57 - 1.00 mg/dL    Sodium 142 136 - 145 mmol/L    Potassium 4.1 3.5 - 5.2 mmol/L    Chloride 104 98 - 107 mmol/L    CO2 24.6 22.0 - 29.0 mmol/L    Calcium 9.6 8.6 - 10.5 mg/dL    Total Protein 6.8 6.0 - 8.5 g/dL    Albumin 4.00 3.50 - 5.20 g/dL    ALT (SGPT) 18 1 - 33 U/L    AST (SGOT) 17 1 - 32 U/L    Alkaline Phosphatase 81 39 - 117 U/L    Total Bilirubin 0.5 0.0 - 1.2 mg/dL    eGFR Non African Amer 61 >60 mL/min/1.73    Globulin 2.8 gm/dL    A/G Ratio 1.4 g/dL    BUN/Creatinine Ratio 19.8 7.0 - 25.0    Anion Gap 13.4 5.0 - 15.0 mmol/L   Results for orders placed or performed during the hospital encounter of 03/22/21   Gold Top - SST   Result Value Ref Range    Extra Tube Hold for add-ons.    Green Top (Gel)   Result Value Ref Range    Extra Tube Hold for add-ons.    Green Top (Gel)   Result Value Ref Range    Extra Tube  Hold for add-ons.    Urinalysis, Microscopic Only - Urine, Clean Catch    Specimen: Urine, Clean Catch   Result Value Ref Range    RBC, UA None Seen None Seen /HPF    WBC, UA 3-5 None Seen, 0-2, 3-5 /HPF    Bacteria, UA 1+ (A) None Seen /HPF    Squamous Epithelial Cells, UA 6-12 (A) None Seen, 0-2 /HPF    Hyaline Casts, UA None Seen None Seen /LPF    Methodology Manual Light Microscopy    Urinalysis With Culture If Indicated - Urine, Clean Catch    Specimen: Urine, Clean Catch   Result Value Ref Range    Color, UA Yellow Yellow, Straw, Dark Yellow, Ángela    Appearance, UA Cloudy (A) Clear    pH, UA 5.5 5.0 - 9.0    Specific Gravity, UA 1.018 1.003 - 1.030    Glucose, UA Negative Negative    Ketones, UA Trace (A) Negative    Bilirubin, UA Negative Negative    Blood, UA Negative Negative    Protein,  mg/dL (2+) (A) Negative    Leuk Esterase, UA Negative Negative    Nitrite, UA Negative Negative    Urobilinogen, UA 1.0 E.U./dL 0.2 - 1.0 E.U./dL   Procalcitonin    Specimen: Blood   Result Value Ref Range    Procalcitonin 0.46 (H) 0.00 - 0.25 ng/mL   CBC Auto Differential    Specimen: Blood   Result Value Ref Range    WBC 12.64 (H) 3.40 - 10.80 10*3/mm3    RBC 4.31 3.77 - 5.28 10*6/mm3    Hemoglobin 13.4 12.0 - 15.9 g/dL    Hematocrit 37.3 34.0 - 46.6 %    MCV 86.5 79.0 - 97.0 fL    MCH 31.1 26.6 - 33.0 pg    MCHC 35.9 (H) 31.5 - 35.7 g/dL    RDW 11.4 (L) 12.3 - 15.4 %    RDW-SD 36.3 (L) 37.0 - 54.0 fl    MPV 11.6 6.0 - 12.0 fL    Platelets 146 140 - 450 10*3/mm3    Neutrophil % 92.1 (H) 42.7 - 76.0 %    Lymphocyte % 3.7 (L) 19.6 - 45.3 %    Monocyte % 3.5 (L) 5.0 - 12.0 %    Eosinophil % 0.0 (L) 0.3 - 6.2 %    Basophil % 0.2 0.0 - 1.5 %    Immature Grans % 0.5 0.0 - 0.5 %    Neutrophils, Absolute 11.65 (H) 1.70 - 7.00 10*3/mm3    Lymphocytes, Absolute 0.47 (L) 0.70 - 3.10 10*3/mm3    Monocytes, Absolute 0.44 0.10 - 0.90 10*3/mm3    Eosinophils, Absolute 0.00 0.00 - 0.40 10*3/mm3    Basophils, Absolute 0.02 0.00 -  0.20 10*3/mm3    Immature Grans, Absolute 0.06 (H) 0.00 - 0.05 10*3/mm3    nRBC 0.0 0.0 - 0.2 /100 WBC   CBC Auto Differential    Specimen: Blood   Result Value Ref Range    WBC 15.29 (H) 3.40 - 10.80 10*3/mm3    RBC 4.32 3.77 - 5.28 10*6/mm3    Hemoglobin 13.4 12.0 - 15.9 g/dL    Hematocrit 38.3 34.0 - 46.6 %    MCV 88.7 79.0 - 97.0 fL    MCH 31.0 26.6 - 33.0 pg    MCHC 35.0 31.5 - 35.7 g/dL    RDW 11.7 (L) 12.3 - 15.4 %    RDW-SD 37.6 37.0 - 54.0 fl    MPV 11.2 6.0 - 12.0 fL    Platelets 172 140 - 450 10*3/mm3    Neutrophil % 91.9 (H) 42.7 - 76.0 %    Lymphocyte % 3.4 (L) 19.6 - 45.3 %    Monocyte % 3.7 (L) 5.0 - 12.0 %    Eosinophil % 0.0 (L) 0.3 - 6.2 %    Basophil % 0.1 0.0 - 1.5 %    Immature Grans % 0.9 (H) 0.0 - 0.5 %    Neutrophils, Absolute 14.04 (H) 1.70 - 7.00 10*3/mm3    Lymphocytes, Absolute 0.52 (L) 0.70 - 3.10 10*3/mm3    Monocytes, Absolute 0.57 0.10 - 0.90 10*3/mm3    Eosinophils, Absolute 0.00 0.00 - 0.40 10*3/mm3    Basophils, Absolute 0.02 0.00 - 0.20 10*3/mm3    Immature Grans, Absolute 0.14 (H) 0.00 - 0.05 10*3/mm3    nRBC 0.0 0.0 - 0.2 /100 WBC     *Note: Due to a large number of results and/or encounters for the requested time period, some results have not been displayed. A complete set of results can be found in Results Review.         This document has been electronically signed by Mauricio Betancourt MD on August 23, 2021 20:30 CDT

## 2021-08-30 DIAGNOSIS — E03.9 ACQUIRED HYPOTHYROIDISM: ICD-10-CM

## 2021-08-30 RX ORDER — LEVOTHYROXINE SODIUM 112 MCG
TABLET ORAL
Qty: 30 TABLET | Refills: 0 | Status: SHIPPED | OUTPATIENT
Start: 2021-08-30 | End: 2022-01-04 | Stop reason: SDUPTHER

## 2021-09-29 ENCOUNTER — TELEPHONE (OUTPATIENT)
Dept: FAMILY MEDICINE CLINIC | Facility: CLINIC | Age: 71
End: 2021-09-29

## 2021-09-29 NOTE — TELEPHONE ENCOUNTER
Please call and let patient know that mammogram is normal.  Plan to repeat in 1 year.  DEXA bone scan showed normal in back and right hip, left hip had osteopenia.  Recommended weight bearing exercise and calcium/vitamin D supplements.  ThanksCHEMO

## 2021-11-12 ENCOUNTER — OFFICE VISIT (OUTPATIENT)
Dept: FAMILY MEDICINE CLINIC | Facility: CLINIC | Age: 71
End: 2021-11-12

## 2021-11-12 ENCOUNTER — LAB (OUTPATIENT)
Dept: LAB | Facility: HOSPITAL | Age: 71
End: 2021-11-12

## 2021-11-12 VITALS
HEART RATE: 70 BPM | DIASTOLIC BLOOD PRESSURE: 86 MMHG | SYSTOLIC BLOOD PRESSURE: 142 MMHG | WEIGHT: 197 LBS | HEIGHT: 65 IN | BODY MASS INDEX: 32.82 KG/M2 | OXYGEN SATURATION: 97 %

## 2021-11-12 DIAGNOSIS — E03.9 ACQUIRED HYPOTHYROIDISM: ICD-10-CM

## 2021-11-12 DIAGNOSIS — S81.801A NON-HEALING WOUND OF RIGHT LOWER EXTREMITY: ICD-10-CM

## 2021-11-12 DIAGNOSIS — I10 ESSENTIAL HYPERTENSION: Primary | ICD-10-CM

## 2021-11-12 DIAGNOSIS — S81.802A NON-HEALING WOUND OF LEFT LOWER EXTREMITY: ICD-10-CM

## 2021-11-12 LAB — TSH SERPL DL<=0.05 MIU/L-ACNC: 0.37 UIU/ML (ref 0.27–4.2)

## 2021-11-12 PROCEDURE — 36415 COLL VENOUS BLD VENIPUNCTURE: CPT

## 2021-11-12 PROCEDURE — 99214 OFFICE O/P EST MOD 30 MIN: CPT | Performed by: FAMILY MEDICINE

## 2021-11-12 PROCEDURE — 84443 ASSAY THYROID STIM HORMONE: CPT

## 2021-11-12 RX ORDER — CHLORHEXIDINE GLUCONATE 4 G/100ML
SOLUTION TOPICAL DAILY
Qty: 118 ML | Refills: 2 | Status: SHIPPED | OUTPATIENT
Start: 2021-11-12 | End: 2022-02-11

## 2021-11-12 NOTE — PROGRESS NOTES
"Chief Complaint  Hypothyroidism    Subjective     {Problem List  Visit Diagnosis   Encounters  Notes  Medications  Labs  Result Review Imaging  Media :23}     Emily Haas presents to [facility]  History of Present Illness    Patient presents today for follow up hypertension and hypothyroidism.  She is overall doing ok.  Blood pressure controlled with home checks.  No having any symptoms of hypothyroidism - other than hair loss, which she attributes to infection with COVID19.  Patient says her breathing has returned to normal.  She does have a couple of wounds on her legs and arms that are from her grand-dog.  Says that the wounds have been present for about a month and are healing slowly.    Objective   Vital Signs:   /86   Pulse 70   Ht 165.1 cm (65\")   Wt 89.4 kg (197 lb)   SpO2 97%   BMI 32.78 kg/m²     Physical Exam  Vitals reviewed.   Constitutional:       General: She is not in acute distress.     Appearance: She is well-developed.   Cardiovascular:      Rate and Rhythm: Normal rate and regular rhythm.      Heart sounds: Normal heart sounds. No murmur heard.      Pulmonary:      Effort: Pulmonary effort is normal. No respiratory distress.      Breath sounds: Normal breath sounds. No wheezing or rales.   Abdominal:      Palpations: Abdomen is soft.      Tenderness: There is no abdominal tenderness.   Skin:     General: Skin is warm and dry.      Comments: Wound with eschar present on left and right distal lower extremities, left sided wound is larger.     Neurological:      Mental Status: She is alert and oriented to person, place, and time.        Result Review :   The following data was reviewed by: Bharati Love MD on 11/12/2021:  Common labs    Common Labsle 5/13/21 6/17/21 8/13/21   Glucose 138 (A) 118 (A)    BUN 18 15    Creatinine 0.91 0.81    eGFR Non African Am 61 70    Sodium 142 140    Potassium 4.1 4.6    Chloride 104 " 105    Calcium 9.6 9.5    Albumin 4.00 3.70    Total Bilirubin 0.5     Alkaline Phosphatase 81     AST (SGOT) 17     ALT (SGPT) 18     Hemoglobin A1C   6.80 (A)   (A) Abnormal value            Lab Results   Component Value Date    TSH 4.560 (H) 08/13/2021                 Assessment and Plan    Diagnoses and all orders for this visit:    1. Essential hypertension (Primary)    2. Acquired hypothyroidism    3. Non-healing wound of right lower extremity  -     Ambulatory Referral to Wound Clinic  -     chlorhexidine (HIBICLENS) 4 % external liquid; Apply  topically to the appropriate area as directed Daily.  Dispense: 118 mL; Refill: 2    4. Non-healing wound of left lower extremity  -     Ambulatory Referral to Wound Clinic  -     chlorhexidine (HIBICLENS) 4 % external liquid; Apply  topically to the appropriate area as directed Daily.  Dispense: 118 mL; Refill: 2      Patient presents today for follow up.  Systolic blood pressure borderline, will continue with home monitoring.  Continue losartan 100 mg daily, amlodipine 5 mg and carvedilol 12.5 mg twice daily.  Due for recheck TSH, last lab elevated.  Lab order already in computer.  Patient has non-healing wounds on the left and right lower extremities, needs wound care.  Hibiclens solution for cleaning.  Recommended daily dressing change.  Referral placed to wound clinic to help with healing.  No signs of infection today, but patient will monitor.          Follow Up   Return in about 3 months (around 2/12/2022) for Recheck.  Patient was given instructions and counseling regarding her condition or for health maintenance advice. Please see specific information pulled into the AVS if appropriate.         This document has been electronically signed by Bharati Loev MD

## 2021-11-13 ENCOUNTER — TELEPHONE (OUTPATIENT)
Dept: FAMILY MEDICINE CLINIC | Facility: CLINIC | Age: 71
End: 2021-11-13

## 2021-11-14 NOTE — TELEPHONE ENCOUNTER
Please let patient know that TSH is normal. Continue current dose of levothyroxine.   Thanks, CHEMO Love

## 2021-11-15 NOTE — TELEPHONE ENCOUNTER
Per Dr. Love, Ms. Arevalo has been called with recent lab results and recommendations.   Follow-up as planned or sooner if any problems, continue all medications as prescribed.

## 2021-11-22 ENCOUNTER — OFFICE VISIT (OUTPATIENT)
Dept: WOUND CARE | Facility: HOSPITAL | Age: 71
End: 2021-11-22

## 2021-11-22 ENCOUNTER — TRANSCRIBE ORDERS (OUTPATIENT)
Dept: WOUND CARE | Facility: HOSPITAL | Age: 71
End: 2021-11-22

## 2021-11-22 DIAGNOSIS — R09.89 WEAK PULSE: ICD-10-CM

## 2021-11-22 DIAGNOSIS — R60.0 EDEMA LEG: Primary | ICD-10-CM

## 2021-12-06 ENCOUNTER — OFFICE VISIT (OUTPATIENT)
Dept: WOUND CARE | Facility: HOSPITAL | Age: 71
End: 2021-12-06

## 2021-12-09 RX ORDER — LANOLIN ALCOHOL/MO/W.PET/CERES
3 CREAM (GRAM) TOPICAL NIGHTLY PRN
Qty: 90 TABLET | Refills: 1 | Status: SHIPPED | OUTPATIENT
Start: 2021-12-09

## 2021-12-13 RX ORDER — ESCITALOPRAM OXALATE 10 MG/1
10 TABLET ORAL DAILY
Qty: 90 TABLET | Refills: 3 | Status: SHIPPED | OUTPATIENT
Start: 2021-12-13 | End: 2023-03-31

## 2021-12-17 ENCOUNTER — LAB (OUTPATIENT)
Dept: LAB | Facility: HOSPITAL | Age: 71
End: 2021-12-17

## 2021-12-17 ENCOUNTER — TRANSCRIBE ORDERS (OUTPATIENT)
Dept: LAB | Facility: HOSPITAL | Age: 71
End: 2021-12-17

## 2021-12-17 DIAGNOSIS — E78.2 MIXED HYPERLIPIDEMIA: ICD-10-CM

## 2021-12-17 DIAGNOSIS — I10 ESSENTIAL HYPERTENSION: ICD-10-CM

## 2021-12-17 DIAGNOSIS — N18.31 STAGE 3A CHRONIC KIDNEY DISEASE (HCC): ICD-10-CM

## 2021-12-17 DIAGNOSIS — E11.9 TYPE 2 DIABETES MELLITUS WITHOUT COMPLICATION, WITHOUT LONG-TERM CURRENT USE OF INSULIN (HCC): ICD-10-CM

## 2021-12-17 DIAGNOSIS — E55.9 VITAMIN D DEFICIENCY: ICD-10-CM

## 2021-12-17 DIAGNOSIS — I10 ESSENTIAL HYPERTENSION: Primary | ICD-10-CM

## 2021-12-17 LAB
25(OH)D3 SERPL-MCNC: 33.6 NG/ML (ref 30–100)
ALBUMIN SERPL-MCNC: 3.9 G/DL (ref 3.5–5.2)
ANION GAP SERPL CALCULATED.3IONS-SCNC: 12.1 MMOL/L (ref 5–15)
BACTERIA UR QL AUTO: ABNORMAL /HPF
BASOPHILS # BLD MANUAL: 0.08 10*3/MM3 (ref 0–0.2)
BASOPHILS NFR BLD MANUAL: 1.1 % (ref 0–1.5)
BILIRUB UR QL STRIP: NEGATIVE
BUN SERPL-MCNC: 14 MG/DL (ref 8–23)
BUN/CREAT SERPL: 13.1 (ref 7–25)
CALCIUM SPEC-SCNC: 9.8 MG/DL (ref 8.6–10.5)
CHLORIDE SERPL-SCNC: 106 MMOL/L (ref 98–107)
CLARITY UR: ABNORMAL
CO2 SERPL-SCNC: 24.9 MMOL/L (ref 22–29)
COLOR UR: ABNORMAL
CREAT SERPL-MCNC: 1.07 MG/DL (ref 0.57–1)
CREAT UR-MCNC: 239.1 MG/DL
DEPRECATED RDW RBC AUTO: 42.7 FL (ref 37–54)
ERYTHROCYTE [DISTWIDTH] IN BLOOD BY AUTOMATED COUNT: 12.6 % (ref 12.3–15.4)
GFR SERPL CREATININE-BSD FRML MDRD: 51 ML/MIN/1.73
GLUCOSE SERPL-MCNC: 147 MG/DL (ref 65–99)
GLUCOSE UR STRIP-MCNC: NEGATIVE MG/DL
HCT VFR BLD AUTO: 39.3 % (ref 34–46.6)
HGB BLD-MCNC: 13.1 G/DL (ref 12–15.9)
HGB UR QL STRIP.AUTO: NEGATIVE
HYALINE CASTS UR QL AUTO: ABNORMAL /LPF
KETONES UR QL STRIP: ABNORMAL
LEUKOCYTE ESTERASE UR QL STRIP.AUTO: NEGATIVE
LYMPHOCYTES # BLD MANUAL: 1.76 10*3/MM3 (ref 0.7–3.1)
LYMPHOCYTES NFR BLD MANUAL: 4.5 % (ref 5–12)
MAGNESIUM SERPL-MCNC: 2.2 MG/DL (ref 1.6–2.4)
MCH RBC QN AUTO: 31.2 PG (ref 26.6–33)
MCHC RBC AUTO-ENTMCNC: 33.3 G/DL (ref 31.5–35.7)
MCV RBC AUTO: 93.6 FL (ref 79–97)
MONOCYTES # BLD: 0.33 10*3/MM3 (ref 0.1–0.9)
NEUTROPHILS # BLD AUTO: 5.2 10*3/MM3 (ref 1.7–7)
NEUTROPHILS NFR BLD MANUAL: 70.5 % (ref 42.7–76)
NITRITE UR QL STRIP: NEGATIVE
PH UR STRIP.AUTO: 5.5 [PH] (ref 5–8)
PHOSPHATE SERPL-MCNC: 3.5 MG/DL (ref 2.5–4.5)
PLAT MORPH BLD: NORMAL
PLATELET # BLD AUTO: 245 10*3/MM3 (ref 140–450)
PMV BLD AUTO: 11.8 FL (ref 6–12)
POTASSIUM SERPL-SCNC: 4.8 MMOL/L (ref 3.5–5.2)
PROT ?TM UR-MCNC: 19 MG/DL
PROT UR QL STRIP: ABNORMAL
PROT/CREAT UR: 79.5 MG/G CREA (ref 0–200)
PTH-INTACT SERPL-MCNC: 51.6 PG/ML (ref 15–65)
RBC # BLD AUTO: 4.2 10*6/MM3 (ref 3.77–5.28)
RBC # UR STRIP: ABNORMAL /HPF
RBC MORPH BLD: NORMAL
REF LAB TEST METHOD: ABNORMAL
SODIUM SERPL-SCNC: 143 MMOL/L (ref 136–145)
SP GR UR STRIP: 1.03 (ref 1–1.03)
SQUAMOUS #/AREA URNS HPF: ABNORMAL /HPF
UROBILINOGEN UR QL STRIP: ABNORMAL
VARIANT LYMPHS NFR BLD MANUAL: 23.9 % (ref 19.6–45.3)
WBC # UR STRIP: ABNORMAL /HPF
WBC MORPH BLD: NORMAL
WBC NRBC COR # BLD: 7.37 10*3/MM3 (ref 3.4–10.8)

## 2021-12-17 PROCEDURE — 80069 RENAL FUNCTION PANEL: CPT

## 2021-12-17 PROCEDURE — 82306 VITAMIN D 25 HYDROXY: CPT

## 2021-12-17 PROCEDURE — 82570 ASSAY OF URINE CREATININE: CPT

## 2021-12-17 PROCEDURE — 36415 COLL VENOUS BLD VENIPUNCTURE: CPT

## 2021-12-17 PROCEDURE — 81001 URINALYSIS AUTO W/SCOPE: CPT

## 2021-12-17 PROCEDURE — 84156 ASSAY OF PROTEIN URINE: CPT

## 2021-12-17 PROCEDURE — 83970 ASSAY OF PARATHORMONE: CPT

## 2021-12-17 PROCEDURE — 85027 COMPLETE CBC AUTOMATED: CPT

## 2021-12-17 PROCEDURE — 83735 ASSAY OF MAGNESIUM: CPT

## 2021-12-17 PROCEDURE — 85007 BL SMEAR W/DIFF WBC COUNT: CPT

## 2022-01-04 DIAGNOSIS — E03.9 ACQUIRED HYPOTHYROIDISM: ICD-10-CM

## 2022-01-04 RX ORDER — LEVOTHYROXINE SODIUM 112 MCG
112 TABLET ORAL DAILY
Qty: 30 TABLET | Refills: 0 | Status: SHIPPED | OUTPATIENT
Start: 2022-01-04 | End: 2022-02-16 | Stop reason: SDUPTHER

## 2022-01-12 ENCOUNTER — APPOINTMENT (OUTPATIENT)
Dept: CT IMAGING | Facility: HOSPITAL | Age: 72
End: 2022-01-12

## 2022-01-12 ENCOUNTER — HOSPITAL ENCOUNTER (EMERGENCY)
Facility: HOSPITAL | Age: 72
Discharge: HOME | End: 2022-01-12
Attending: STUDENT IN AN ORGANIZED HEALTH CARE EDUCATION/TRAINING PROGRAM | Admitting: STUDENT IN AN ORGANIZED HEALTH CARE EDUCATION/TRAINING PROGRAM

## 2022-01-12 VITALS
OXYGEN SATURATION: 96 % | RESPIRATION RATE: 20 BRPM | HEART RATE: 66 BPM | BODY MASS INDEX: 32.49 KG/M2 | HEIGHT: 65 IN | DIASTOLIC BLOOD PRESSURE: 81 MMHG | WEIGHT: 195 LBS | TEMPERATURE: 97.7 F | SYSTOLIC BLOOD PRESSURE: 167 MMHG

## 2022-01-12 DIAGNOSIS — R10.84 GENERALIZED ABDOMINAL PAIN: Primary | ICD-10-CM

## 2022-01-12 LAB
ANION GAP SERPL CALCULATED.3IONS-SCNC: 11 MMOL/L (ref 5–15)
BACTERIA UR QL AUTO: ABNORMAL /HPF
BASOPHILS # BLD AUTO: 0.08 10*3/MM3 (ref 0–0.2)
BASOPHILS NFR BLD AUTO: 0.6 % (ref 0–1.5)
BILIRUB UR QL STRIP: NEGATIVE
BUN SERPL-MCNC: 14 MG/DL (ref 8–23)
BUN/CREAT SERPL: 13.3 (ref 7–25)
CALCIUM SPEC-SCNC: 9.3 MG/DL (ref 8.6–10.5)
CHLORIDE SERPL-SCNC: 97 MMOL/L (ref 98–107)
CLARITY UR: ABNORMAL
CO2 SERPL-SCNC: 27 MMOL/L (ref 22–29)
COLOR UR: YELLOW
CREAT SERPL-MCNC: 1.05 MG/DL (ref 0.57–1)
DEPRECATED RDW RBC AUTO: 40 FL (ref 37–54)
EOSINOPHIL # BLD AUTO: 0.15 10*3/MM3 (ref 0–0.4)
EOSINOPHIL NFR BLD AUTO: 1.2 % (ref 0.3–6.2)
ERYTHROCYTE [DISTWIDTH] IN BLOOD BY AUTOMATED COUNT: 11.9 % (ref 12.3–15.4)
GFR SERPL CREATININE-BSD FRML MDRD: 52 ML/MIN/1.73
GLUCOSE SERPL-MCNC: 209 MG/DL (ref 65–99)
GLUCOSE UR STRIP-MCNC: NEGATIVE MG/DL
HCT VFR BLD AUTO: 43.5 % (ref 34–46.6)
HGB BLD-MCNC: 14.4 G/DL (ref 12–15.9)
HGB UR QL STRIP.AUTO: NEGATIVE
HOLD SPECIMEN: NORMAL
HYALINE CASTS UR QL AUTO: ABNORMAL /LPF
IMM GRANULOCYTES # BLD AUTO: 0.12 10*3/MM3 (ref 0–0.05)
IMM GRANULOCYTES NFR BLD AUTO: 0.9 % (ref 0–0.5)
KETONES UR QL STRIP: ABNORMAL
LEUKOCYTE ESTERASE UR QL STRIP.AUTO: ABNORMAL
LIPASE SERPL-CCNC: 8 U/L (ref 13–60)
LYMPHOCYTES # BLD AUTO: 2.41 10*3/MM3 (ref 0.7–3.1)
LYMPHOCYTES NFR BLD AUTO: 18.5 % (ref 19.6–45.3)
MAGNESIUM SERPL-MCNC: 1.9 MG/DL (ref 1.6–2.4)
MCH RBC QN AUTO: 30.3 PG (ref 26.6–33)
MCHC RBC AUTO-ENTMCNC: 33.1 G/DL (ref 31.5–35.7)
MCV RBC AUTO: 91.4 FL (ref 79–97)
MONOCYTES # BLD AUTO: 0.69 10*3/MM3 (ref 0.1–0.9)
MONOCYTES NFR BLD AUTO: 5.3 % (ref 5–12)
NEUTROPHILS NFR BLD AUTO: 73.5 % (ref 42.7–76)
NEUTROPHILS NFR BLD AUTO: 9.59 10*3/MM3 (ref 1.7–7)
NITRITE UR QL STRIP: NEGATIVE
NRBC BLD AUTO-RTO: 0 /100 WBC (ref 0–0.2)
PH UR STRIP.AUTO: 5.5 [PH] (ref 5–9)
PLATELET # BLD AUTO: 268 10*3/MM3 (ref 140–450)
PMV BLD AUTO: 11 FL (ref 6–12)
POTASSIUM SERPL-SCNC: 4.1 MMOL/L (ref 3.5–5.2)
PROT UR QL STRIP: ABNORMAL
RBC # BLD AUTO: 4.76 10*6/MM3 (ref 3.77–5.28)
RBC # UR STRIP: ABNORMAL /HPF
REF LAB TEST METHOD: ABNORMAL
SODIUM SERPL-SCNC: 135 MMOL/L (ref 136–145)
SP GR UR STRIP: 1.02 (ref 1–1.03)
SQUAMOUS #/AREA URNS HPF: ABNORMAL /HPF
UROBILINOGEN UR QL STRIP: ABNORMAL
WBC # UR STRIP: ABNORMAL /HPF
WBC NRBC COR # BLD: 13.04 10*3/MM3 (ref 3.4–10.8)

## 2022-01-12 PROCEDURE — 96374 THER/PROPH/DIAG INJ IV PUSH: CPT

## 2022-01-12 PROCEDURE — 99283 EMERGENCY DEPT VISIT LOW MDM: CPT

## 2022-01-12 PROCEDURE — 85025 COMPLETE CBC W/AUTO DIFF WBC: CPT | Performed by: STUDENT IN AN ORGANIZED HEALTH CARE EDUCATION/TRAINING PROGRAM

## 2022-01-12 PROCEDURE — 0 IOPAMIDOL PER 1 ML: Performed by: STUDENT IN AN ORGANIZED HEALTH CARE EDUCATION/TRAINING PROGRAM

## 2022-01-12 PROCEDURE — 25010000002 ONDANSETRON PER 1 MG: Performed by: STUDENT IN AN ORGANIZED HEALTH CARE EDUCATION/TRAINING PROGRAM

## 2022-01-12 PROCEDURE — 81001 URINALYSIS AUTO W/SCOPE: CPT | Performed by: STUDENT IN AN ORGANIZED HEALTH CARE EDUCATION/TRAINING PROGRAM

## 2022-01-12 PROCEDURE — 80048 BASIC METABOLIC PNL TOTAL CA: CPT | Performed by: STUDENT IN AN ORGANIZED HEALTH CARE EDUCATION/TRAINING PROGRAM

## 2022-01-12 PROCEDURE — 83690 ASSAY OF LIPASE: CPT | Performed by: STUDENT IN AN ORGANIZED HEALTH CARE EDUCATION/TRAINING PROGRAM

## 2022-01-12 PROCEDURE — 74177 CT ABD & PELVIS W/CONTRAST: CPT

## 2022-01-12 PROCEDURE — 96375 TX/PRO/DX INJ NEW DRUG ADDON: CPT

## 2022-01-12 PROCEDURE — 25010000002 MORPHINE PER 10 MG: Performed by: STUDENT IN AN ORGANIZED HEALTH CARE EDUCATION/TRAINING PROGRAM

## 2022-01-12 PROCEDURE — 83735 ASSAY OF MAGNESIUM: CPT | Performed by: STUDENT IN AN ORGANIZED HEALTH CARE EDUCATION/TRAINING PROGRAM

## 2022-01-12 RX ORDER — ONDANSETRON 2 MG/ML
4 INJECTION INTRAMUSCULAR; INTRAVENOUS ONCE
Status: COMPLETED | OUTPATIENT
Start: 2022-01-12 | End: 2022-01-12

## 2022-01-12 RX ORDER — ONDANSETRON 4 MG/1
4 TABLET, ORALLY DISINTEGRATING ORAL 4 TIMES DAILY PRN
Qty: 12 TABLET | Refills: 0 | Status: ON HOLD | OUTPATIENT
Start: 2022-01-12 | End: 2022-11-10

## 2022-01-12 RX ADMIN — IOPAMIDOL 90 ML: 755 INJECTION, SOLUTION INTRAVENOUS at 10:14

## 2022-01-12 RX ADMIN — SODIUM CHLORIDE, POTASSIUM CHLORIDE, SODIUM LACTATE AND CALCIUM CHLORIDE 1000 ML: 600; 310; 30; 20 INJECTION, SOLUTION INTRAVENOUS at 09:25

## 2022-01-12 RX ADMIN — MORPHINE SULFATE 4 MG: 4 INJECTION, SOLUTION INTRAMUSCULAR; INTRAVENOUS at 09:35

## 2022-01-12 RX ADMIN — ONDANSETRON 4 MG: 2 INJECTION INTRAMUSCULAR; INTRAVENOUS at 09:25

## 2022-01-12 NOTE — ED PROVIDER NOTES
"Subjective   71-year-old female past surgical history of colectomy, hysterectomy, gallbladder removal comes to the ER chief complaint of abdominal pain, nausea, vomiting since yesterday.  Symptoms have been getting worse.  Nothing seems to make it better.  She denies fevers or chills.      History provided by:  Patient   used: No        Review of Systems   Constitutional: Positive for appetite change. Negative for chills and fever.   HENT: Negative for congestion and rhinorrhea.    Respiratory: Negative for cough and shortness of breath.    Cardiovascular: Negative for chest pain and palpitations.   Gastrointestinal: Positive for abdominal pain, nausea and vomiting.   Genitourinary: Negative for dysuria and flank pain.   Skin: Negative for color change and rash.   Neurological: Negative for dizziness and headaches.   Psychiatric/Behavioral: Negative for agitation. The patient is not nervous/anxious.        Past Medical History:   Diagnosis Date   • Anxiety    • Chronic bronchitis (HCC)    • CKD (chronic kidney disease) stage 3, GFR 30-59 ml/min (HCC)    • Diabetes mellitus (HCC)    • Diverticulitis    • Hyperlipidemia    • Hypertension    • Hypothyroidism    • Irritable bowel syndrome    • Obesity    • Osteoarthritis    • Vitamin D deficiency        Allergies   Allergen Reactions   • Atenolol Myalgia   • Augmentin [Amoxicillin-Pot Clavulanate] Hives   • Dilaudid [Hydromorphone] Nausea And Vomiting   • Lipitor [Atorvastatin] Myalgia   • Pravachol [Pravastatin] Myalgia   • Statins Other (See Comments)     \"muscle pain \"    • Sulfa Antibiotics Hives       Past Surgical History:   Procedure Laterality Date   • BILATERAL BREAST REDUCTION     • CHOLECYSTECTOMY     • COLONOSCOPY N/A 11/17/2017   • COLONOSCOPY N/A 1/14/2021   • ENDOSCOPY N/A 1/14/2021   • HYSTERECTOMY     • REDUCTION MAMMAPLASTY     • UPPER GASTROINTESTINAL ENDOSCOPY  01/14/2021       Family History   Problem Relation Age of Onset   • " Hypertension Mother    • Alzheimer's disease Father    • Diabetes Brother    • Hypothyroidism Daughter        Social History     Socioeconomic History   • Marital status:    Tobacco Use   • Smoking status: Former Smoker     Packs/day: 3.00     Years: 15.00     Pack years: 45.00     Types: Cigarettes   • Smokeless tobacco: Never Used   • Tobacco comment: 08/10/2021 - Patient states she has not utilized Cigarettes X 34 years and upon ceassation, patient was utilizing 3 packs of Cigarettes per day.   Vaping Use   • Vaping Use: Never used   Substance and Sexual Activity   • Alcohol use: No   • Drug use: No   • Sexual activity: Defer           Objective   Physical Exam  Vitals and nursing note reviewed.   Constitutional:       General: She is not in acute distress.     Appearance: She is well-developed. She is obese. She is ill-appearing. She is not toxic-appearing or diaphoretic.   HENT:      Head: Normocephalic.      Right Ear: External ear normal.      Left Ear: External ear normal.   Pulmonary:      Effort: Pulmonary effort is normal. No accessory muscle usage or respiratory distress.      Breath sounds: No wheezing.   Chest:      Chest wall: No tenderness.   Abdominal:      Palpations: Abdomen is soft.      Tenderness: There is abdominal tenderness. There is guarding. There is no right CVA tenderness, left CVA tenderness or rebound.   Skin:     General: Skin is warm and dry.      Capillary Refill: Capillary refill takes less than 2 seconds.   Neurological:      Mental Status: She is alert.   Psychiatric:         Behavior: Behavior normal.         Procedures           ED Course      Results for orders placed or performed during the hospital encounter of 01/12/22   Basic Metabolic Panel    Specimen: Blood   Result Value Ref Range    Glucose 209 (H) 65 - 99 mg/dL    BUN 14 8 - 23 mg/dL    Creatinine 1.05 (H) 0.57 - 1.00 mg/dL    Sodium 135 (L) 136 - 145 mmol/L    Potassium 4.1 3.5 - 5.2 mmol/L    Chloride 97 (L)  98 - 107 mmol/L    CO2 27.0 22.0 - 29.0 mmol/L    Calcium 9.3 8.6 - 10.5 mg/dL    eGFR Non African Amer 52 (L) >60 mL/min/1.73    BUN/Creatinine Ratio 13.3 7.0 - 25.0    Anion Gap 11.0 5.0 - 15.0 mmol/L   Urinalysis With Microscopic If Indicated (No Culture) - Urine, Clean Catch    Specimen: Urine, Clean Catch   Result Value Ref Range    Color, UA Yellow Yellow, Straw, Dark Yellow, Ángela    Appearance, UA Turbid (A) Clear    pH, UA 5.5 5.0 - 9.0    Specific Gravity, UA 1.016 1.003 - 1.030    Glucose, UA Negative Negative    Ketones, UA Trace (A) Negative    Bilirubin, UA Negative Negative    Blood, UA Negative Negative    Protein, UA 30 mg/dL (1+) (A) Negative    Leuk Esterase, UA Small (1+) (A) Negative    Nitrite, UA Negative Negative    Urobilinogen, UA 1.0 E.U./dL 0.2 - 1.0 E.U./dL   Lipase    Specimen: Blood   Result Value Ref Range    Lipase 8 (L) 13 - 60 U/L   Magnesium    Specimen: Blood   Result Value Ref Range    Magnesium 1.9 1.6 - 2.4 mg/dL   CBC Auto Differential    Specimen: Blood   Result Value Ref Range    WBC 13.04 (H) 3.40 - 10.80 10*3/mm3    RBC 4.76 3.77 - 5.28 10*6/mm3    Hemoglobin 14.4 12.0 - 15.9 g/dL    Hematocrit 43.5 34.0 - 46.6 %    MCV 91.4 79.0 - 97.0 fL    MCH 30.3 26.6 - 33.0 pg    MCHC 33.1 31.5 - 35.7 g/dL    RDW 11.9 (L) 12.3 - 15.4 %    RDW-SD 40.0 37.0 - 54.0 fl    MPV 11.0 6.0 - 12.0 fL    Platelets 268 140 - 450 10*3/mm3    Neutrophil % 73.5 42.7 - 76.0 %    Lymphocyte % 18.5 (L) 19.6 - 45.3 %    Monocyte % 5.3 5.0 - 12.0 %    Eosinophil % 1.2 0.3 - 6.2 %    Basophil % 0.6 0.0 - 1.5 %    Immature Grans % 0.9 (H) 0.0 - 0.5 %    Neutrophils, Absolute 9.59 (H) 1.70 - 7.00 10*3/mm3    Lymphocytes, Absolute 2.41 0.70 - 3.10 10*3/mm3    Monocytes, Absolute 0.69 0.10 - 0.90 10*3/mm3    Eosinophils, Absolute 0.15 0.00 - 0.40 10*3/mm3    Basophils, Absolute 0.08 0.00 - 0.20 10*3/mm3    Immature Grans, Absolute 0.12 (H) 0.00 - 0.05 10*3/mm3    nRBC 0.0 0.0 - 0.2 /100 WBC   Urinalysis,  Microscopic Only - Urine, Clean Catch    Specimen: Urine, Clean Catch   Result Value Ref Range    RBC, UA 0-2 (A) None Seen /HPF    WBC, UA 6-12 (A) None Seen, 0-2, 3-5 /HPF    Bacteria, UA 2+ (A) None Seen /HPF    Squamous Epithelial Cells, UA Too Numerous to Count (A) None Seen, 0-2 /HPF    Hyaline Casts, UA 3-6 None Seen /LPF    Methodology Manual Light Microscopy    Gold Top - SST   Result Value Ref Range    Extra Tube Hold for add-ons.      CT Abdomen Pelvis With Contrast   Final Result   No significant acute intra-abdominal or pelvic pathology   appreciated.   Incidental findings as above. See body of report for full   details.      Electronically signed by:  John Olivarez MD  1/12/2022 10:35 AM   CST Workstation: [id number]                                                   MDM  Number of Diagnoses or Management Options  Generalized abdominal pain: new and requires workup  Diagnosis management comments: Vital signs are stable, afebrile.  Labs are fairly unremarkable.  UA is contaminated.  CT abdomen and pelvis negative for acute findings.  Patient reports feeling better on reevaluation.  She received IVF bolus, antiemetics, morphine.  Recommend follow-up with her PCP.  Return precautions provided.      Final diagnoses:   Generalized abdominal pain       ED Disposition  ED Disposition     ED Disposition Condition Comment    Discharge Stable           Bharati Love MD  [address]  [facility] [address] [place], [place] [id number] [phone]    Schedule an appointment as soon as possible for a visit in 2 days  ER follow up         Medication List      Changed    albuterol sulfate  (90 Base) MCG/ACT inhaler  Commonly known as: PROVENTIL HFA;VENTOLIN HFA;PROAIR HFA  Inhale 2 puffs 4 (Four) Times a Day.  What changed:   · when to take this  · reasons to take this     budesonide-formoterol 160-4.5 MCG/ACT inhaler  Commonly known as: SYMBICORT  Inhale 2 puffs 2 (Two) Times a Day.  What changed:    · when to take this  · reasons to take this     omeprazole 40 MG capsule  Commonly known as: priLOSEC  Take 1 capsule by mouth Daily.  What changed:   · when to take this  · reasons to take this             Dewey Del Valle MD  01/12/22 [id number]

## 2022-01-12 NOTE — ED NOTES
Pt discharged home, she ambulated out of ed in no distress. She states she is feeling better. Spouse here to drive pt     Tracey Kay RN  01/12/22 1988

## 2022-01-18 ENCOUNTER — OFFICE VISIT (OUTPATIENT)
Dept: FAMILY MEDICINE CLINIC | Facility: CLINIC | Age: 72
End: 2022-01-18

## 2022-01-18 ENCOUNTER — LAB (OUTPATIENT)
Dept: LAB | Facility: HOSPITAL | Age: 72
End: 2022-01-18

## 2022-01-18 VITALS
HEART RATE: 73 BPM | OXYGEN SATURATION: 98 % | RESPIRATION RATE: 18 BRPM | HEIGHT: 65 IN | TEMPERATURE: 96.8 F | BODY MASS INDEX: 30.57 KG/M2 | DIASTOLIC BLOOD PRESSURE: 88 MMHG | SYSTOLIC BLOOD PRESSURE: 120 MMHG | WEIGHT: 183.5 LBS

## 2022-01-18 DIAGNOSIS — R09.81 NASAL CONGESTION: ICD-10-CM

## 2022-01-18 DIAGNOSIS — R05.9 COUGH: Primary | ICD-10-CM

## 2022-01-18 DIAGNOSIS — R05.9 COUGH: ICD-10-CM

## 2022-01-18 DIAGNOSIS — J20.9 ACUTE BRONCHITIS, UNSPECIFIED ORGANISM: Primary | ICD-10-CM

## 2022-01-18 DIAGNOSIS — Z20.822 ENCOUNTER FOR LABORATORY TESTING FOR COVID-19 VIRUS: ICD-10-CM

## 2022-01-18 PROBLEM — E11.9 DIABETES MELLITUS TYPE 2 WITHOUT RETINOPATHY: Status: ACTIVE | Noted: 2017-04-05

## 2022-01-18 PROBLEM — E78.5 HYPERLIPIDEMIA: Status: ACTIVE | Noted: 2020-12-09

## 2022-01-18 PROBLEM — N18.32 STAGE 3B CHRONIC KIDNEY DISEASE: Status: ACTIVE | Noted: 2020-12-09

## 2022-01-18 PROBLEM — I83.90 VARICOSE VEINS OF LOWER EXTREMITY: Status: ACTIVE | Noted: 2017-03-02

## 2022-01-18 PROBLEM — E55.9 VITAMIN D DEFICIENCY: Status: ACTIVE | Noted: 2020-12-09

## 2022-01-18 PROBLEM — E11.9 DIET-CONTROLLED TYPE 2 DIABETES MELLITUS (HCC): Status: ACTIVE | Noted: 2020-12-09

## 2022-01-18 PROBLEM — E03.9 ACQUIRED HYPOTHYROIDISM: Status: ACTIVE | Noted: 2020-12-09

## 2022-01-18 PROBLEM — F41.1 ANXIETY STATE: Status: ACTIVE | Noted: 2020-12-09

## 2022-01-18 PROBLEM — J45.909 ASTHMA: Status: ACTIVE | Noted: 2020-12-09

## 2022-01-18 PROBLEM — I10 ESSENTIAL HYPERTENSION: Status: ACTIVE | Noted: 2020-12-09

## 2022-01-18 PROBLEM — K57.30 DIVERTICULAR DISEASE OF COLON: Status: ACTIVE | Noted: 2020-12-09

## 2022-01-18 LAB
EXPIRATION DATE: NORMAL
FLUAV AG NPH QL: NEGATIVE
FLUBV AG NPH QL: NEGATIVE
INTERNAL CONTROL: NORMAL
Lab: 2780

## 2022-01-18 PROCEDURE — 99213 OFFICE O/P EST LOW 20 MIN: CPT | Performed by: FAMILY MEDICINE

## 2022-01-18 PROCEDURE — U0003 INFECTIOUS AGENT DETECTION BY NUCLEIC ACID (DNA OR RNA); SEVERE ACUTE RESPIRATORY SYNDROME CORONAVIRUS 2 (SARS-COV-2) (CORONAVIRUS DISEASE [COVID-19]), AMPLIFIED PROBE TECHNIQUE, MAKING USE OF HIGH THROUGHPUT TECHNOLOGIES AS DESCRIBED BY CMS-2020-01-R: HCPCS

## 2022-01-18 PROCEDURE — 87804 INFLUENZA ASSAY W/OPTIC: CPT | Performed by: FAMILY MEDICINE

## 2022-01-18 RX ORDER — AMLODIPINE BESYLATE 10 MG/1
TABLET ORAL
COMMUNITY
Start: 2021-12-14 | End: 2022-02-11

## 2022-01-18 RX ORDER — DEXTROMETHORPHAN HYDROBROMIDE AND PROMETHAZINE HYDROCHLORIDE 15; 6.25 MG/5ML; MG/5ML
5 SOLUTION ORAL 4 TIMES DAILY PRN
Qty: 240 ML | Refills: 1 | Status: SHIPPED | OUTPATIENT
Start: 2022-01-18 | End: 2022-02-11

## 2022-01-18 RX ORDER — ALBUTEROL SULFATE 90 UG/1
2 AEROSOL, METERED RESPIRATORY (INHALATION) EVERY 6 HOURS PRN
Qty: 6.7 G | Refills: 0 | Status: SHIPPED | OUTPATIENT
Start: 2022-01-18

## 2022-01-18 RX ORDER — AZITHROMYCIN 250 MG/1
TABLET, FILM COATED ORAL
Qty: 6 TABLET | Refills: 0 | Status: SHIPPED | OUTPATIENT
Start: 2022-01-18 | End: 2022-02-11

## 2022-01-18 NOTE — PROGRESS NOTES
"Chief Complaint  Cough and Abdominal Pain    Subjective          Emily Haas presents to [facility] [place]  History of Present Illness    Patient seen today for sick visit.  Has had cough for the last 2 weeks.  Associated with nasal congestion.  Patient cannot get rid of cough.  Denies fever.  Has also had some GI symptoms.  Family members living in her home have had recent positive test.  She is not having any breathing difficulties.  Patient is not taking any medication at this time, but requesting something for cough.    Objective   Vital Signs:   /88 (BP Location: Left arm, Patient Position: Sitting, Cuff Size: Adult)   Pulse 73   Temp 96.8 °F (36 °C) (Infrared)   Resp 18   Ht 165.1 cm (65\")   Wt 83.2 kg (183 lb 8 oz)   SpO2 98%   BMI 30.54 kg/m²     Physical Exam  Vitals reviewed.   Constitutional:       General: She is not in acute distress.     Appearance: She is well-developed.   Cardiovascular:      Rate and Rhythm: Normal rate and regular rhythm.      Heart sounds: Normal heart sounds. No murmur heard.      Pulmonary:      Effort: Pulmonary effort is normal. No respiratory distress.      Breath sounds: Wheezing (throughout) present.   Abdominal:      General: Bowel sounds are normal.      Palpations: Abdomen is soft.      Tenderness: There is no abdominal tenderness.   Skin:     General: Skin is warm and dry.   Neurological:      Mental Status: She is alert and oriented to person, place, and time.        Result Review :   The following data was reviewed by: Bharati Love MD on 01/18/2022:  Common labs    Common Labsle 8/13/21 12/17/21 12/17/21 1/12/22 1/12/22     1034 1034 0911 0911   Glucose   147 (A)  209 (A)   BUN   14  14   Creatinine   1.07 (A)  1.05 (A)   eGFR Non African Am   51 (A)  52 (A)   Sodium   143  135 (A)   Potassium   4.8  4.1   Chloride   106  97 (A)   Calcium   9.8  9.3   Albumin   3.90     WBC  7.37  13.04 (A)  "   Hemoglobin  13.1  14.4    Hematocrit  39.3  43.5    Platelets  245  268    Hemoglobin A1C 6.80 (A)       (A) Abnormal value                      Assessment and Plan    Diagnoses and all orders for this visit:    1. Acute bronchitis, unspecified organism (Primary)  -     azithromycin (Zithromax Z-Angel) 250 MG tablet; Take 2 tablets by mouth on day 1, then 1 tablet daily on days 2-5  Dispense: 6 tablet; Refill: 0  -     albuterol sulfate  (90 Base) MCG/ACT inhaler; Inhale 2 puffs Every 6 (Six) Hours As Needed for Wheezing or Shortness of Air.  Dispense: 6.7 g; Refill: 0  -     promethazine-dextromethorphan (PROMETHAZINE-DM) 6.25-15 MG/5ML solution; Take 5 mL by mouth 4 (Four) Times a Day As Needed for Cough.  Dispense: 240 mL; Refill: 1    2. Cough  -     COVID-19, [facility] IN-HOUSE, NP SWAB IN TRANSPORT MEDIA 8-10 HR TAT - Swab, Nasopharynx; Future  -     XR Chest PA & Lateral; Future  -     promethazine-dextromethorphan (PROMETHAZINE-DM) 6.25-15 MG/5ML solution; Take 5 mL by mouth 4 (Four) Times a Day As Needed for Cough.  Dispense: 240 mL; Refill: 1    3. Nasal congestion  -     XR Chest PA & Lateral; Future  -     COVID-19, [facility] IN-HOUSE, NP SWAB IN TRANSPORT MEDIA 8-10 HR TAT - Swab, Nasopharynx  -     POCT Influenza A/B    4. Encounter for laboratory testing for COVID-19 virus  -     COVID-19, [facility] IN-HOUSE, NP SWAB IN TRANSPORT MEDIA 8-10 HR TAT - Swab, Nasopharynx      Patient seen today for respiratory symptoms  Exam consistent with bronchitis, treat with albuterol and azithromycin  Check Chest Xray for pneumonia - if positive will need antibiotic adjustment  Cough present for 2 weeks and exposure to COVID19 virus  Will test for COVID19, call with results  Influenza test done and negative  Phenergan DM for cough  Encouraged symptomatic care with rest and fluids  Quarantine until COVID test returns  Should be seen in ED with any breathing difficulties or chest pain      Follow Up   Return if symptoms  worsen or fail to improve, for Next scheduled follow up.  Patient was given instructions and counseling regarding her condition or for health maintenance advice. Please see specific information pulled into the AVS if appropriate.           This document has been electronically signed by Bharati Love MD

## 2022-01-21 LAB — SARS-COV-2 RNA RESP QL NAA+PROBE: NOT DETECTED

## 2022-01-21 NOTE — TELEPHONE ENCOUNTER
Please let patient know that HgbA1c is stable at 6.8%.  TSH is elevated.  Please ask that patient take Synthroid 112 mcg, 1 tab daily Mon-Sat and two tabs Sunday.  Plan to recheck in 6 weeks.  Lab order placed.  Ultrasound ok.  ThanksCHEMO   Retention Suture Bite Size: 3 mm

## 2022-02-08 ENCOUNTER — OFFICE VISIT (OUTPATIENT)
Dept: WOUND CARE | Facility: HOSPITAL | Age: 72
End: 2022-02-08

## 2022-02-08 LAB — GLUCOSE BLDC GLUCOMTR-MCNC: 138 MG/DL (ref 70–130)

## 2022-02-08 PROCEDURE — 82962 GLUCOSE BLOOD TEST: CPT | Performed by: NURSE PRACTITIONER

## 2022-02-11 ENCOUNTER — OFFICE VISIT (OUTPATIENT)
Dept: GASTROENTEROLOGY | Facility: CLINIC | Age: 72
End: 2022-02-11

## 2022-02-11 VITALS
HEART RATE: 72 BPM | BODY MASS INDEX: 32.42 KG/M2 | WEIGHT: 194.6 LBS | HEIGHT: 65 IN | DIASTOLIC BLOOD PRESSURE: 66 MMHG | SYSTOLIC BLOOD PRESSURE: 115 MMHG

## 2022-02-11 DIAGNOSIS — K22.70 BARRETT'S ESOPHAGUS WITHOUT DYSPLASIA: Primary | ICD-10-CM

## 2022-02-11 PROCEDURE — 99213 OFFICE O/P EST LOW 20 MIN: CPT | Performed by: INTERNAL MEDICINE

## 2022-02-11 RX ORDER — DEXTROSE AND SODIUM CHLORIDE 5; .45 G/100ML; G/100ML
30 INJECTION, SOLUTION INTRAVENOUS CONTINUOUS PRN
Status: CANCELLED | OUTPATIENT
Start: 2022-02-16

## 2022-02-11 NOTE — PATIENT INSTRUCTIONS
"BMI for Adults  What is BMI?  Body mass index (BMI) is a number that is calculated from a person's weight and height. BMI can help estimate how much of a person's weight is composed of fat. BMI does not measure body fat directly. Rather, it is an alternative to procedures that directly measure body fat, which can be difficult and expensive.  BMI can help identify people who may be at higher risk for certain medical problems.  What are BMI measurements used for?  BMI is used as a screening tool to identify possible weight problems. It helps determine whether a person is obese, overweight, a healthy weight, or underweight.  BMI is useful for:  · Identifying a weight problem that may be related to a medical condition or may increase the risk for medical problems.  · Promoting changes, such as changes in diet and exercise, to help reach a healthy weight. BMI screening can be repeated to see if these changes are working.  How is BMI calculated?  BMI involves measuring your weight in relation to your height. Both height and weight are measured, and the BMI is calculated from those numbers. This can be done either in English (U[place] or metric measurements. Note that charts and online BMI calculators are available to help you find your BMI quickly and easily without having to do these calculations yourself.  To calculate your BMI in English (U.S[place] measurements:    1. Measure your weight in pounds (lb).  2. Multiply the number of pounds by 703.  ? For example, for a person who weighs 180 lb, multiply that number by 703, which equals 126,540.  3. Measure your height in inches. Then multiply that number by itself to get a measurement called \"inches squared.\"  ? For example, for a person who is 70 inches tall, the \"inches squared\" measurement is 70 inches x 70 inches, which equals 4,900 inches squared.  4. Divide the total from step 2 (number of lb x 703) by the total from step 3 (inches squared): 126,540 ÷ 4,900 = 25.8. This is " "your BMI.    To calculate your BMI in metric measurements:  1. Measure your weight in kilograms (kg).  2. Measure your height in meters (m). Then multiply that number by itself to get a measurement called \"meters squared.\"  ? For example, for a person who is 1.75 m tall, the \"meters squared\" measurement is 1.75 m x 1.75 m, which is equal to 3.1 meters squared.  3. Divide the number of kilograms (your weight) by the meters squared number. In this example: 70 ÷ 3.1 = 22.6. This is your BMI.  What do the results mean?  BMI charts are used to identify whether you are underweight, normal weight, overweight, or obese. The following guidelines will be used:  · Underweight: BMI less than 18.5.  · Normal weight: BMI between 18.5 and 24.9.  · Overweight: BMI between 25 and 29.9.  · Obese: BMI of 30 or above.  Keep these notes in mind:  · Weight includes both fat and muscle, so someone with a muscular build, such as an athlete, may have a BMI that is higher than 24.9. In cases like these, BMI is not an accurate measure of body fat.  · To determine if excess body fat is the cause of a BMI of 25 or higher, further assessments may need to be done by a health care provider.  · BMI is usually interpreted in the same way for men and women.  Where to find more information  For more information about BMI, including tools to quickly calculate your BMI, go to these websites:  · Centers for Disease Control and Prevention: www.cdc.gov  · American Heart Association: www.heart.org  · National Heart, Lung, and Blood [place]: www.nhlbi.nih.gov  Summary  · Body mass index (BMI) is a number that is calculated from a person's weight and height.  · BMI may help estimate how much of a person's weight is composed of fat. BMI can help identify those who may be at higher risk for certain medical problems.  · BMI can be measured using English measurements or metric measurements.  · BMI charts are used to identify whether you are underweight, normal " weight, overweight, or obese.  This information is not intended to replace advice given to you by your health care provider. Make sure you discuss any questions you have with your health care provider.  Document Revised: 09/09/2020 Document Reviewed: 07/17/2020  Elsevier Patient Education © 2021 [employer]

## 2022-02-14 ENCOUNTER — LAB (OUTPATIENT)
Dept: LAB | Facility: HOSPITAL | Age: 72
End: 2022-02-14

## 2022-02-14 DIAGNOSIS — K22.70 BARRETT'S ESOPHAGUS WITHOUT DYSPLASIA: ICD-10-CM

## 2022-02-14 LAB — SARS-COV-2 N GENE RESP QL NAA+PROBE: NOT DETECTED

## 2022-02-14 PROCEDURE — 87635 SARS-COV-2 COVID-19 AMP PRB: CPT

## 2022-02-14 PROCEDURE — C9803 HOPD COVID-19 SPEC COLLECT: HCPCS

## 2022-02-16 ENCOUNTER — ANESTHESIA EVENT (OUTPATIENT)
Dept: GASTROENTEROLOGY | Facility: HOSPITAL | Age: 72
End: 2022-02-16

## 2022-02-16 ENCOUNTER — ANESTHESIA (OUTPATIENT)
Dept: GASTROENTEROLOGY | Facility: HOSPITAL | Age: 72
End: 2022-02-16

## 2022-02-16 ENCOUNTER — HOSPITAL ENCOUNTER (OUTPATIENT)
Facility: HOSPITAL | Age: 72
Setting detail: HOSPITAL OUTPATIENT SURGERY
Discharge: HOME | End: 2022-02-16
Attending: INTERNAL MEDICINE | Admitting: INTERNAL MEDICINE

## 2022-02-16 VITALS
TEMPERATURE: 97.4 F | SYSTOLIC BLOOD PRESSURE: 150 MMHG | HEART RATE: 63 BPM | WEIGHT: 195.11 LBS | OXYGEN SATURATION: 96 % | RESPIRATION RATE: 18 BRPM | DIASTOLIC BLOOD PRESSURE: 68 MMHG | HEIGHT: 65 IN | BODY MASS INDEX: 32.51 KG/M2

## 2022-02-16 DIAGNOSIS — E03.9 ACQUIRED HYPOTHYROIDISM: ICD-10-CM

## 2022-02-16 DIAGNOSIS — K22.70 BARRETT'S ESOPHAGUS WITHOUT DYSPLASIA: ICD-10-CM

## 2022-02-16 PROCEDURE — 25010000002 PROPOFOL 10 MG/ML EMULSION: Performed by: NURSE ANESTHETIST, CERTIFIED REGISTERED

## 2022-02-16 PROCEDURE — 43239 EGD BIOPSY SINGLE/MULTIPLE: CPT | Performed by: INTERNAL MEDICINE

## 2022-02-16 RX ORDER — DEXTROSE AND SODIUM CHLORIDE 5; .45 G/100ML; G/100ML
30 INJECTION, SOLUTION INTRAVENOUS CONTINUOUS PRN
Status: DISCONTINUED | OUTPATIENT
Start: 2022-02-16 | End: 2022-02-16 | Stop reason: HOSPADM

## 2022-02-16 RX ORDER — LEVOTHYROXINE SODIUM 112 MCG
112 TABLET ORAL DAILY
Qty: 90 TABLET | Refills: 0 | Status: SHIPPED | OUTPATIENT
Start: 2022-02-16 | End: 2022-05-20

## 2022-02-16 RX ORDER — SODIUM CHLORIDE, SODIUM GLUCONATE, SODIUM ACETATE, POTASSIUM CHLORIDE, AND MAGNESIUM CHLORIDE 526; 502; 368; 37; 30 MG/100ML; MG/100ML; MG/100ML; MG/100ML; MG/100ML
INJECTION, SOLUTION INTRAVENOUS CONTINUOUS PRN
Status: DISCONTINUED | OUTPATIENT
Start: 2022-02-16 | End: 2022-02-16 | Stop reason: SURG

## 2022-02-16 RX ORDER — PROPOFOL 10 MG/ML
VIAL (ML) INTRAVENOUS AS NEEDED
Status: DISCONTINUED | OUTPATIENT
Start: 2022-02-16 | End: 2022-02-16 | Stop reason: SURG

## 2022-02-16 RX ORDER — LIDOCAINE HYDROCHLORIDE 20 MG/ML
INJECTION, SOLUTION INTRAVENOUS AS NEEDED
Status: DISCONTINUED | OUTPATIENT
Start: 2022-02-16 | End: 2022-02-16 | Stop reason: SURG

## 2022-02-16 RX ADMIN — LIDOCAINE HYDROCHLORIDE 80 MG: 20 INJECTION, SOLUTION INTRAVENOUS at 11:42

## 2022-02-16 RX ADMIN — PROPOFOL 20 MG: 10 INJECTION, EMULSION INTRAVENOUS at 11:46

## 2022-02-16 RX ADMIN — DEXTROSE AND SODIUM CHLORIDE 30 ML/HR: 5; 450 INJECTION, SOLUTION INTRAVENOUS at 10:57

## 2022-02-16 RX ADMIN — SODIUM CHLORIDE, SODIUM GLUCONATE, SODIUM ACETATE, POTASSIUM CHLORIDE, AND MAGNESIUM CHLORIDE: 526; 502; 368; 37; 30 INJECTION, SOLUTION INTRAVENOUS at 11:42

## 2022-02-16 RX ADMIN — PROPOFOL 100 MG: 10 INJECTION, EMULSION INTRAVENOUS at 11:42

## 2022-02-16 NOTE — INTERVAL H&P NOTE
Chief Complaint   Patient presents with   • 6 month f/u            Subjective       Emily Haas is a 72 y.o. female.     History of Present Illness  Patient presented to clinic for follow-up visit today.  Feels better currently but has occasional symptoms of left lower quadrant abdominal pain which improves with bowel movement.  Chest pain has improved.  Denied nausea, vomiting, diarrhea, rectal bleeding or weight loss.  Due for Barnett's screening.        The following portions of the patient's history were reviewed and updated as appropriate:   Medical History        Past Medical History:   Diagnosis Date   • Anxiety     • Chronic bronchitis (HCC)     • CKD (chronic kidney disease) stage 3, GFR 30-59 ml/min (HCC)     • Diabetes mellitus (HCC)     • Diverticulitis     • Hyperlipidemia     • Hypertension     • Hypothyroidism     • Irritable bowel syndrome     • Obesity     • Osteoarthritis     • Vitamin D deficiency           Surgical History         Past Surgical History:   Procedure Laterality Date   • BILATERAL BREAST REDUCTION       • CHOLECYSTECTOMY       • COLONOSCOPY N/A 2017   • COLONOSCOPY N/A 2021   • ENDOSCOPY N/A 2021   • HYSTERECTOMY       • REDUCTION MAMMAPLASTY       • UPPER GASTROINTESTINAL ENDOSCOPY   2021               Family History   Problem Relation Age of Onset   • Hypertension Mother     • Alzheimer's disease Father     • Diabetes Brother     • Hypothyroidism Daughter                 OB History         2    Para   2    Term   2            AB        Living            SAB        IAB        Ectopic        Molar        Multiple        Live Births                            Prior to Admission medications    Medication Sig Start Date End Date Taking? Authorizing Provider   acetaminophen (TYLENOL) 650 MG 8 hr tablet Take 2 tablets by mouth Every 8 (Eight) Hours As Needed.     Yes Provider, MD Jose Alejandro   albuterol sulfate  (90 Base) MCG/ACT inhaler  "Inhale 2 puffs Every 6 (Six) Hours As Needed for Wheezing or Shortness of Air. 1/18/22   Yes Bharati Love MD   budesonide-formoterol (SYMBICORT) 160-4.5 MCG/ACT inhaler Inhale 2 puffs 2 (Two) Times a Day.  Patient taking differently: Inhale 2 puffs 2 (Two) Times a Day As Needed. 4/6/21   Yes Fabiola Layne MD   carvedilol (COREG) 12.5 MG tablet Take 1 tablet by mouth 2 (Two) Times a Day With Meals. 5/12/20   Yes Fernando Hutton MD   dicyclomine (BENTYL) 10 MG capsule Take 10 mg by mouth 2 (two) times a day.     Yes Jose Alejandro Sommers MD   docusate sodium (COLACE) 50 MG capsule Take 1 capsule by mouth Daily As Needed.     Yes Jose Alejandro Sommers MD   escitalopram (LEXAPRO) 10 MG tablet Take 1 tablet by mouth Daily. 12/13/21   Yes Bharati Love MD   losartan (COZAAR) 100 MG tablet Take 1 tablet by mouth Daily. 6/25/21   Yes Bharati Love MD   melatonin 3 MG tablet TAKE 1 TABLET BY MOUTH AT NIGHT AS NEEDED FOR SLEEP. 12/9/21   Yes Bharati Love MD   omeprazole (priLOSEC) 40 MG capsule Take 1 capsule by mouth Daily.  Patient taking differently: Take 40 mg by mouth Daily As Needed. 2/12/21   Yes Mauricio Betancourt MD   ondansetron ODT (ZOFRAN-ODT) 4 MG disintegrating tablet Place 1 tablet on the tongue 4 (Four) Times a Day As Needed for Nausea or Vomiting. 1/12/22   Yes Dewey Del Valle MD   Synthroid 112 MCG tablet Take 1 tablet by mouth Daily. 1/4/22   Yes Bharati Love MD   vitamin D (ERGOCALCIFEROL) 1.25 MG (57508 UT) capsule capsule Take 50,000 Units by mouth 1 (One) Time Per Week. 11/12/20   Yes Jose Alejandro Sommers MD            Allergies   Allergen Reactions   • Augmentin [Amoxicillin-Pot Clavulanate] Hives   • Dilaudid [Hydromorphone] Nausea And Vomiting   • Sulfa Antibiotics Hives   • Atenolol Myalgia   • Lipitor [Atorvastatin] Myalgia   • Pravachol [Pravastatin] Myalgia   • Statins Myalgia       \"muscle pain \"       Social History   Social History          " "  Socioeconomic History   • Marital status:    Tobacco Use   • Smoking status: Former Smoker       Packs/day: 3.00       Years: 15.00       Pack years: 45.00       Types: Cigarettes   • Smokeless tobacco: Never Used   • Tobacco comment: 08/10/2021 - Patient states she has not utilized Cigarettes X 34 years and upon ceassation, patient was utilizing 3 packs of Cigarettes per day.   Vaping Use   • Vaping Use: Never used   Substance and Sexual Activity   • Alcohol use: No   • Drug use: No   • Sexual activity: Defer            Review of Systems  Review of Systems   Constitutional: Negative for chills, fatigue, fever and unexpected weight change.   HENT: Negative for congestion, ear discharge, hearing loss, nosebleeds and sore throat.    Eyes: Negative for pain, discharge and redness.   Respiratory: Negative for cough, chest tightness, shortness of breath and wheezing.    Cardiovascular: Negative for chest pain and palpitations.   Gastrointestinal: Positive for abdominal pain and constipation. Negative for abdominal distention, blood in stool, diarrhea, nausea and vomiting.   Endocrine: Negative for cold intolerance, polydipsia, polyphagia and polyuria.   Genitourinary: Negative for dysuria, flank pain, frequency, hematuria and urgency.   Musculoskeletal: Negative for arthralgias, back pain, joint swelling and myalgias.   Skin: Negative for color change, pallor and rash.   Neurological: Negative for tremors, seizures, syncope, weakness and headaches.   Hematological: Negative for adenopathy. Does not bruise/bleed easily.   Psychiatric/Behavioral: Negative for behavioral problems, confusion, dysphoric mood, hallucinations and suicidal ideas. The patient is not nervous/anxious.                     /66 (BP Location: Right arm)   Pulse 72   Ht 165.1 cm (65\")   Wt 88.3 kg (194 lb 9.6 oz)   LMP  (LMP Unknown)   BMI 32.38 kg/m²            Objective       Physical Exam  Constitutional:       Appearance: She is " well-developed.   HENT:      Head: Normocephalic and atraumatic.   Eyes:      Conjunctiva/sclera: Conjunctivae normal.      Pupils: Pupils are equal, round, and reactive to light.   Neck:      Thyroid: No thyromegaly.   Cardiovascular:      Rate and Rhythm: Normal rate and regular rhythm.      Heart sounds: Normal heart sounds. No murmur heard.       Pulmonary:      Effort: Pulmonary effort is normal.      Breath sounds: Normal breath sounds. No wheezing.   Abdominal:      General: Bowel sounds are normal. There is no distension.      Palpations: Abdomen is soft. There is no mass.      Tenderness: There is no abdominal tenderness.      Hernia: No hernia is present.   Genitourinary:     Comments: No lesions noted  Musculoskeletal:         General: No tenderness. Normal range of motion.      Cervical back: Normal range of motion and neck supple.   Lymphadenopathy:      Cervical: No cervical adenopathy.   Skin:     General: Skin is warm and dry.      Findings: No rash.   Neurological:      Mental Status: She is alert and oriented to person, place, and time.      Cranial Nerves: No cranial nerve deficit.   Psychiatric:         Thought Content: Thought content normal.                 Office Visit on 02/08/2022   Component Date Value Ref Range Status   • Glucose 02/08/2022 138* 70 - 130 mg/dL Final     RN NotifiedOperator: [id number] EJ AMADOMeter ID: [id number]            Assessment/Plan          1. Barnett's esophagus without dysplasia    1.   Chest pain and back pain, improved.  Likely due to GERD.  Continue PPI and antireflux lifestyle.   2.  Left lower quadrant abdominal pain associated with diarrhea, improved.  Likely due to irritable bowel syndrome.  Could also be due to adhesions due to previous colonic surgery.  Continue high-fiber diet.  CT abdomen pelvis if recurs.  3. Nausea, improved. likely due to reactive gastropathy.  Continue Prilosec 40 mg p.o. daily.    Gastric emptying scan if  symptoms recurs.  4.  Obesity BMI 33.22, recommend exercise and diet control.  5.  History of segmental colonic resection  6.  Diverticulosis, high-fiber diet.  7.  Barnett's esophagus, repeat EGD for surveillance.        Orders placed during this encounter include:        Orders Placed This Encounter   Procedures   • COVID PRE-OP / PRE-PROCEDURE SCREENING ORDER (NO ISOLATION) - Swab, Nasopharynx       Standing Status:   Future       Number of Occurrences:   1       Standing Expiration Date:   2/11/2023       Order Specific Question:   Release to patient       Answer:   Immediate       Order Specific Question:   Please select your location:       Answer:   [facility]       Order Specific Question:   COVID Screening Order:       Answer:   In-House: PRE-OP: BD MAX, 8-10 HR TAT [[id number]]       Order Specific Question:   Previously tested for COVID-19?       Answer:   Yes       Order Specific Question:   Employed in healthcare setting?       Answer:   No       Order Specific Question:   Symptomatic for COVID-19 as defined by CDC?       Answer:   No       Order Specific Question:   Hospitalized for COVID-19?       Answer:   No       Order Specific Question:   Admitted to ICU for COVID-19?       Answer:   No       Order Specific Question:   Resident in a congregate (group) care setting?       Answer:   No       Order Specific Question:   Pregnant?       Answer:   No   • Follow Anesthesia Guidelines / Standing Orders       Standing Status:   Future   • Obtain Informed Consent       Standing Status:   Future       Order Specific Question:   Informed Consent Given For       Answer:   ESOPHAGOGASTRODUODENOSCOPY         ESOPHAGOGASTRODUODENOSCOPY (N/A)     Review and/or summary of lab tests, radiology, procedures, medications. Review and summary of old records and obtaining of history. The risks and benefits of my recommendations, as well as other treatment options were discussed with the patient today. Questions were  answered.     No orders of the defined types were placed in this encounter.        Follow-up: Return in about 1 month (around 3/11/2022).                             Results for orders placed or performed in visit on 02/14/22   COVID-19, [facility] IN-HOUSE, NP SWAB IN TRANSPORT MEDIA 8-10 HR TAT - Swab, Nasopharynx     Specimen: Nasopharynx; Swab   Result Value Ref Range     COVID19 Not Detected Not Detected - Ref. Range   Results for orders placed or performed in visit on 02/08/22   POC Glucose     Specimen: Blood   Result Value Ref Range     Glucose 138 (H) 70 - 130 mg/dL   Results for orders placed or performed in visit on 01/18/22   COVID-19,[facility] ROUTINE, NP/OP SWAB IN TRANSPORT MEDIA OR ESWAB 72 HR TAT - Swab, Nasopharynx     Specimen: Nasopharynx; Swab   Result Value Ref Range     SARS-CoV-2, [facility] Not Detected Not Detected   Results for orders placed or performed in visit on 01/18/22   POCT Influenza A/B     Specimen: Swab   Result Value Ref Range     Rapid Influenza A Ag Negative Negative     Rapid Influenza B Ag Negative Negative     Internal Control Passed Passed     Lot Number [id number]       Expiration Date 05-     Results for orders placed or performed during the hospital encounter of 01/12/22   Gold Top - SST   Result Value Ref Range     Extra Tube Hold for add-ons.     Urinalysis, Microscopic Only - Urine, Clean Catch     Specimen: Urine, Clean Catch   Result Value Ref Range     RBC, UA 0-2 (A) None Seen /HPF     WBC, UA 6-12 (A) None Seen, 0-2, 3-5 /HPF     Bacteria, UA 2+ (A) None Seen /HPF     Squamous Epithelial Cells, UA Too Numerous to Count (A) None Seen, 0-2 /HPF     Hyaline Casts, UA 3-6 None Seen /LPF     Methodology Manual Light Microscopy     Urinalysis With Microscopic If Indicated (No Culture) - Urine, Clean Catch     Specimen: Urine, Clean Catch   Result Value Ref Range     Color, UA Yellow Yellow, Straw, Dark Yellow, Ángela     Appearance, UA Turbid (A) Clear     pH, UA 5.5 5.0 - 9.0      Specific Gravity, UA 1.016 1.003 - 1.030     Glucose, UA Negative Negative     Ketones, UA Trace (A) Negative     Bilirubin, UA Negative Negative     Blood, UA Negative Negative     Protein, UA 30 mg/dL (1+) (A) Negative     Leuk Esterase, UA Small (1+) (A) Negative     Nitrite, UA Negative Negative     Urobilinogen, UA 1.0 E.U./dL 0.2 - 1.0 E.U./dL   CBC Auto Differential     Specimen: Blood   Result Value Ref Range     WBC 13.04 (H) 3.40 - 10.80 10*3/mm3     RBC 4.76 3.77 - 5.28 10*6/mm3     Hemoglobin 14.4 12.0 - 15.9 g/dL     Hematocrit 43.5 34.0 - 46.6 %     MCV 91.4 79.0 - 97.0 fL     MCH 30.3 26.6 - 33.0 pg     MCHC 33.1 31.5 - 35.7 g/dL     RDW 11.9 (L) 12.3 - 15.4 %     RDW-SD 40.0 37.0 - 54.0 fl     MPV 11.0 6.0 - 12.0 fL     Platelets 268 140 - 450 10*3/mm3     Neutrophil % 73.5 42.7 - 76.0 %     Lymphocyte % 18.5 (L) 19.6 - 45.3 %     Monocyte % 5.3 5.0 - 12.0 %     Eosinophil % 1.2 0.3 - 6.2 %     Basophil % 0.6 0.0 - 1.5 %     Immature Grans % 0.9 (H) 0.0 - 0.5 %     Neutrophils, Absolute 9.59 (H) 1.70 - 7.00 10*3/mm3     Lymphocytes, Absolute 2.41 0.70 - 3.10 10*3/mm3     Monocytes, Absolute 0.69 0.10 - 0.90 10*3/mm3     Eosinophils, Absolute 0.15 0.00 - 0.40 10*3/mm3     Basophils, Absolute 0.08 0.00 - 0.20 10*3/mm3     Immature Grans, Absolute 0.12 (H) 0.00 - 0.05 10*3/mm3     nRBC 0.0 0.0 - 0.2 /100 WBC   Magnesium     Specimen: Blood   Result Value Ref Range     Magnesium 1.9 1.6 - 2.4 mg/dL   Lipase     Specimen: Blood   Result Value Ref Range     Lipase 8 (L) 13 - 60 U/L   Basic Metabolic Panel     Specimen: Blood   Result Value Ref Range     Glucose 209 (H) 65 - 99 mg/dL     BUN 14 8 - 23 mg/dL     Creatinine 1.05 (H) 0.57 - 1.00 mg/dL     Sodium 135 (L) 136 - 145 mmol/L     Potassium 4.1 3.5 - 5.2 mmol/L     Chloride 97 (L) 98 - 107 mmol/L     CO2 27.0 22.0 - 29.0 mmol/L     Calcium 9.3 8.6 - 10.5 mg/dL     eGFR Non African Amer 52 (L) >60 mL/min/1.73     BUN/Creatinine Ratio 13.3 7.0 -  25.0     Anion Gap 11.0 5.0 - 15.0 mmol/L   Results for orders placed or performed in visit on 12/17/21   Urinalysis, Microscopic Only - Urine, Clean Catch     Specimen: Urine, Clean Catch   Result Value Ref Range     RBC, UA 0-2 None Seen, 0-2 /HPF     WBC, UA 6-12 (A) None Seen, 0-2 /HPF     Bacteria, UA 3+ (A) None Seen /HPF     Squamous Epithelial Cells, UA 7-12 (A) None Seen, 0-2 /HPF     Hyaline Casts, UA 3-6 None Seen /LPF     Methodology Automated Microscopy     CBC Auto Differential     Specimen: Blood   Result Value Ref Range     WBC 7.37 3.40 - 10.80 10*3/mm3     RBC 4.20 3.77 - 5.28 10*6/mm3     Hemoglobin 13.1 12.0 - 15.9 g/dL     Hematocrit 39.3 34.0 - 46.6 %     MCV 93.6 79.0 - 97.0 fL     MCH 31.2 26.6 - 33.0 pg     MCHC 33.3 31.5 - 35.7 g/dL     RDW 12.6 12.3 - 15.4 %     RDW-SD 42.7 37.0 - 54.0 fl     MPV 11.8 6.0 - 12.0 fL     Platelets 245 140 - 450 10*3/mm3   Protein / Creatinine Ratio, Urine - Urine, Clean Catch     Specimen: Urine, Clean Catch   Result Value Ref Range     Protein/Creatinine Ratio, Urine 79.5 0.0 - 200.0 mg/G Crea     Creatinine, Urine 239.1 mg/dL     Total Protein, Urine 19.0 mg/dL   Vitamin D 25 Hydroxy     Specimen: Blood   Result Value Ref Range     25 Hydroxy, Vitamin D 33.6 30.0 - 100.0 ng/ml   Manual Differential     Specimen: Blood   Result Value Ref Range     Neutrophil % 70.5 42.7 - 76.0 %     Lymphocyte % 23.9 19.6 - 45.3 %     Monocyte % 4.5 (L) 5.0 - 12.0 %     Basophil % 1.1 0.0 - 1.5 %     Neutrophils Absolute 5.20 1.70 - 7.00 10*3/mm3     Lymphocytes Absolute 1.76 0.70 - 3.10 10*3/mm3     Monocytes Absolute 0.33 0.10 - 0.90 10*3/mm3     Basophils Absolute 0.08 0.00 - 0.20 10*3/mm3     RBC Morphology Normal Normal     WBC Morphology Normal Normal     Platelet Morphology Normal Normal   Urinalysis without microscopic (no culture) - Urine, Clean Catch     Specimen: Urine, Clean Catch   Result Value Ref Range     Color, UA Dark Yellow (A) Yellow, Straw      Appearance, UA Turbid (A) Clear     pH, UA 5.5 5.0 - 8.0     Specific Gravity, UA 1.026 1.005 - 1.030     Glucose, UA Negative Negative     Ketones, UA Trace (A) Negative     Bilirubin, UA Negative Negative     Blood, UA Negative Negative     Protein, UA Trace (A) Negative     Leuk Esterase, UA Negative Negative     Nitrite, UA Negative Negative     Urobilinogen, UA 1.0 E.U./dL 0.2 - 1.0 E.U./dL      *Note: Due to a large number of results and/or encounters for the requested time period, some results have not been displayed. A complete set of results can be found in Results Review.        Risks and benefits discussed with patient.  Patient verbalized understanding and would like to proceed with procedure.   H&P reviewed. The patient was examined and there are no changes to the H&P.

## 2022-02-16 NOTE — ANESTHESIA POSTPROCEDURE EVALUATION
Patient: Emily Haas    Procedure Summary     Date: 02/16/22 Room / Location: [facility] ENDOSCOPY 3 / [facility] ENDOSCOPY    Anesthesia Start: 1137 Anesthesia Stop: 1148    Procedure: ESOPHAGOGASTRODUODENOSCOPY (N/A ) Diagnosis:       Barnett's esophagus without dysplasia      (Barnett's esophagus without dysplasia [K22.70])    Surgeons: Mauricio Betancourt MD Provider: Anurag Arenas CRNA    Anesthesia Type: MAC ASA Status: 3          Anesthesia Type: MAC    Vitals  No vitals data found for the desired time range.          Post Anesthesia Care and Evaluation    Patient location during evaluation: bedside  Patient participation: waiting for patient participation  Level of consciousness: responsive to verbal stimuli  Pain management: adequate  Airway patency: patent  Anesthetic complications: No anesthetic complications  PONV Status: none  Cardiovascular status: acceptable  Respiratory status: acceptable  Hydration status: acceptable    Comments: ---------------------------               02/16/22                      1148         ---------------------------   BP:          186/91         Pulse:         81           Resp:          16           Temp:   97.5 °F (36.4 °C)   SpO2:          98%         ---------------------------

## 2022-02-16 NOTE — PROGRESS NOTES
Chief Complaint   Patient presents with   • 6 month f/u       Subjective    Emily Haas is a 72 y.o. female.    History of Present Illness  Patient presented to clinic for follow-up visit today.  Feels better currently but has occasional symptoms of left lower quadrant abdominal pain which improves with bowel movement.  Chest pain has improved.  Denied nausea, vomiting, diarrhea, rectal bleeding or weight loss.  Due for Barnett's screening.       The following portions of the patient's history were reviewed and updated as appropriate:   Past Medical History:   Diagnosis Date   • Anxiety    • Chronic bronchitis (HCC)    • CKD (chronic kidney disease) stage 3, GFR 30-59 ml/min (HCC)    • Diabetes mellitus (HCC)    • Diverticulitis    • Hyperlipidemia    • Hypertension    • Hypothyroidism    • Irritable bowel syndrome    • Obesity    • Osteoarthritis    • Vitamin D deficiency      Past Surgical History:   Procedure Laterality Date   • BILATERAL BREAST REDUCTION     • CHOLECYSTECTOMY     • COLONOSCOPY N/A 2017   • COLONOSCOPY N/A 2021   • ENDOSCOPY N/A 2021   • HYSTERECTOMY     • REDUCTION MAMMAPLASTY     • UPPER GASTROINTESTINAL ENDOSCOPY  2021     Family History   Problem Relation Age of Onset   • Hypertension Mother    • Alzheimer's disease Father    • Diabetes Brother    • Hypothyroidism Daughter      OB History        2    Para   2    Term   2            AB        Living           SAB        IAB        Ectopic        Molar        Multiple        Live Births                  Prior to Admission medications    Medication Sig Start Date End Date Taking? Authorizing Provider   acetaminophen (TYLENOL) 650 MG 8 hr tablet Take 2 tablets by mouth Every 8 (Eight) Hours As Needed.   Yes Provider, MD Jose Alejandro   albuterol sulfate  (90 Base) MCG/ACT inhaler Inhale 2 puffs Every 6 (Six) Hours As Needed for Wheezing or Shortness of Air. 22  Yes Bharati Love MD  "  budesonide-formoterol (SYMBICORT) 160-4.5 MCG/ACT inhaler Inhale 2 puffs 2 (Two) Times a Day.  Patient taking differently: Inhale 2 puffs 2 (Two) Times a Day As Needed. 4/6/21  Yes Fabiola Layne MD   carvedilol (COREG) 12.5 MG tablet Take 1 tablet by mouth 2 (Two) Times a Day With Meals. 5/12/20  Yes Fernando Hutton MD   dicyclomine (BENTYL) 10 MG capsule Take 10 mg by mouth 2 (two) times a day.   Yes ProviderJose Alejandro MD   docusate sodium (COLACE) 50 MG capsule Take 1 capsule by mouth Daily As Needed.   Yes ProviderJose Alejandro MD   escitalopram (LEXAPRO) 10 MG tablet Take 1 tablet by mouth Daily. 12/13/21  Yes Bharati Love MD   losartan (COZAAR) 100 MG tablet Take 1 tablet by mouth Daily. 6/25/21  Yes Bharati Love MD   melatonin 3 MG tablet TAKE 1 TABLET BY MOUTH AT NIGHT AS NEEDED FOR SLEEP. 12/9/21  Yes Bharati Love MD   omeprazole (priLOSEC) 40 MG capsule Take 1 capsule by mouth Daily.  Patient taking differently: Take 40 mg by mouth Daily As Needed. 2/12/21  Yes Mauricio Betancourt MD   ondansetron ODT (ZOFRAN-ODT) 4 MG disintegrating tablet Place 1 tablet on the tongue 4 (Four) Times a Day As Needed for Nausea or Vomiting. 1/12/22  Yes Dewey Del Valle MD   Synthroid 112 MCG tablet Take 1 tablet by mouth Daily. 1/4/22  Yes Bharati Love MD   vitamin D (ERGOCALCIFEROL) 1.25 MG (21455 UT) capsule capsule Take 50,000 Units by mouth 1 (One) Time Per Week. 11/12/20  Yes ProviderJoes Alejandro MD     Allergies   Allergen Reactions   • Augmentin [Amoxicillin-Pot Clavulanate] Hives   • Dilaudid [Hydromorphone] Nausea And Vomiting   • Sulfa Antibiotics Hives   • Atenolol Myalgia   • Lipitor [Atorvastatin] Myalgia   • Pravachol [Pravastatin] Myalgia   • Statins Myalgia     \"muscle pain \"      Social History     Socioeconomic History   • Marital status:    Tobacco Use   • Smoking status: Former Smoker     Packs/day: 3.00     Years: 15.00     Pack years: 45.00     Types: " "Cigarettes   • Smokeless tobacco: Never Used   • Tobacco comment: 08/10/2021 - Patient states she has not utilized Cigarettes X 34 years and upon ceassation, patient was utilizing 3 packs of Cigarettes per day.   Vaping Use   • Vaping Use: Never used   Substance and Sexual Activity   • Alcohol use: No   • Drug use: No   • Sexual activity: Defer       Review of Systems  Review of Systems   Constitutional: Negative for chills, fatigue, fever and unexpected weight change.   HENT: Negative for congestion, ear discharge, hearing loss, nosebleeds and sore throat.    Eyes: Negative for pain, discharge and redness.   Respiratory: Negative for cough, chest tightness, shortness of breath and wheezing.    Cardiovascular: Negative for chest pain and palpitations.   Gastrointestinal: Positive for abdominal pain and constipation. Negative for abdominal distention, blood in stool, diarrhea, nausea and vomiting.   Endocrine: Negative for cold intolerance, polydipsia, polyphagia and polyuria.   Genitourinary: Negative for dysuria, flank pain, frequency, hematuria and urgency.   Musculoskeletal: Negative for arthralgias, back pain, joint swelling and myalgias.   Skin: Negative for color change, pallor and rash.   Neurological: Negative for tremors, seizures, syncope, weakness and headaches.   Hematological: Negative for adenopathy. Does not bruise/bleed easily.   Psychiatric/Behavioral: Negative for behavioral problems, confusion, dysphoric mood, hallucinations and suicidal ideas. The patient is not nervous/anxious.         /66 (BP Location: Right arm)   Pulse 72   Ht 165.1 cm (65\")   Wt 88.3 kg (194 lb 9.6 oz)   LMP  (LMP Unknown)   BMI 32.38 kg/m²     Objective    Physical Exam  Constitutional:       Appearance: She is well-developed.   HENT:      Head: Normocephalic and atraumatic.   Eyes:      Conjunctiva/sclera: Conjunctivae normal.      Pupils: Pupils are equal, round, and reactive to light.   Neck:      Thyroid: No " thyromegaly.   Cardiovascular:      Rate and Rhythm: Normal rate and regular rhythm.      Heart sounds: Normal heart sounds. No murmur heard.      Pulmonary:      Effort: Pulmonary effort is normal.      Breath sounds: Normal breath sounds. No wheezing.   Abdominal:      General: Bowel sounds are normal. There is no distension.      Palpations: Abdomen is soft. There is no mass.      Tenderness: There is no abdominal tenderness.      Hernia: No hernia is present.   Genitourinary:     Comments: No lesions noted  Musculoskeletal:         General: No tenderness. Normal range of motion.      Cervical back: Normal range of motion and neck supple.   Lymphadenopathy:      Cervical: No cervical adenopathy.   Skin:     General: Skin is warm and dry.      Findings: No rash.   Neurological:      Mental Status: She is alert and oriented to person, place, and time.      Cranial Nerves: No cranial nerve deficit.   Psychiatric:         Thought Content: Thought content normal.       Office Visit on 02/08/2022   Component Date Value Ref Range Status   • Glucose 02/08/2022 138* 70 - 130 mg/dL Final    RN NotifiedOperator: [id number] EJ JOSTINJager ID: [id number]     Assessment/Plan      1. Barnett's esophagus without dysplasia    1.   Chest pain and back pain, improved.  Likely due to GERD.  Continue PPI and antireflux lifestyle.   2.  Left lower quadrant abdominal pain associated with diarrhea, improved.  Likely due to irritable bowel syndrome.  Could also be due to adhesions due to previous colonic surgery.  Continue high-fiber diet.  CT abdomen pelvis if recurs.  3. Nausea, improved. likely due to reactive gastropathy.  Continue Prilosec 40 mg p.o. daily.    Gastric emptying scan if symptoms recurs.  4.  Obesity BMI 33.22, recommend exercise and diet control.  5.  History of segmental colonic resection  6.  Diverticulosis, high-fiber diet.  7.  Barnett's esophagus, repeat EGD for surveillance.      Orders placed during  this encounter include:  Orders Placed This Encounter   Procedures   • COVID PRE-OP / PRE-PROCEDURE SCREENING ORDER (NO ISOLATION) - Swab, Nasopharynx     Standing Status:   Future     Number of Occurrences:   1     Standing Expiration Date:   2/11/2023     Order Specific Question:   Release to patient     Answer:   Immediate     Order Specific Question:   Please select your location:     Answer:   [facility]     Order Specific Question:   COVID Screening Order:     Answer:   In-House: PRE-OP: BD MAX, 8-10 HR TAT [[id number]]     Order Specific Question:   Previously tested for COVID-19?     Answer:   Yes     Order Specific Question:   Employed in healthcare setting?     Answer:   No     Order Specific Question:   Symptomatic for COVID-19 as defined by CDC?     Answer:   No     Order Specific Question:   Hospitalized for COVID-19?     Answer:   No     Order Specific Question:   Admitted to ICU for COVID-19?     Answer:   No     Order Specific Question:   Resident in a congregate (group) care setting?     Answer:   No     Order Specific Question:   Pregnant?     Answer:   No   • Follow Anesthesia Guidelines / Standing Orders     Standing Status:   Future   • Obtain Informed Consent     Standing Status:   Future     Order Specific Question:   Informed Consent Given For     Answer:   ESOPHAGOGASTRODUODENOSCOPY       ESOPHAGOGASTRODUODENOSCOPY (N/A)    Review and/or summary of lab tests, radiology, procedures, medications. Review and summary of old records and obtaining of history. The risks and benefits of my recommendations, as well as other treatment options were discussed with the patient today. Questions were answered.    No orders of the defined types were placed in this encounter.      Follow-up: Return in about 1 month (around 3/11/2022).               Results for orders placed or performed in visit on 02/14/22   COVID-19, [facility] IN-HOUSE, NP SWAB IN TRANSPORT MEDIA 8-10 HR TAT - Swab, Nasopharynx    Specimen:  Nasopharynx; Swab   Result Value Ref Range    COVID19 Not Detected Not Detected - Ref. Range   Results for orders placed or performed in visit on 02/08/22   POC Glucose    Specimen: Blood   Result Value Ref Range    Glucose 138 (H) 70 - 130 mg/dL   Results for orders placed or performed in visit on 01/18/22   COVID-19,[facility] ROUTINE, NP/OP SWAB IN TRANSPORT MEDIA OR ESWAB 72 HR TAT - Swab, Nasopharynx    Specimen: Nasopharynx; Swab   Result Value Ref Range    SARS-CoV-2, [facility] Not Detected Not Detected   Results for orders placed or performed in visit on 01/18/22   POCT Influenza A/B    Specimen: Swab   Result Value Ref Range    Rapid Influenza A Ag Negative Negative    Rapid Influenza B Ag Negative Negative    Internal Control Passed Passed    Lot Number [id number]     Expiration Date 05-    Results for orders placed or performed during the hospital encounter of 01/12/22   Gold Top - SST   Result Value Ref Range    Extra Tube Hold for add-ons.    Urinalysis, Microscopic Only - Urine, Clean Catch    Specimen: Urine, Clean Catch   Result Value Ref Range    RBC, UA 0-2 (A) None Seen /HPF    WBC, UA 6-12 (A) None Seen, 0-2, 3-5 /HPF    Bacteria, UA 2+ (A) None Seen /HPF    Squamous Epithelial Cells, UA Too Numerous to Count (A) None Seen, 0-2 /HPF    Hyaline Casts, UA 3-6 None Seen /LPF    Methodology Manual Light Microscopy    Urinalysis With Microscopic If Indicated (No Culture) - Urine, Clean Catch    Specimen: Urine, Clean Catch   Result Value Ref Range    Color, UA Yellow Yellow, Straw, Dark Yellow, Ángela    Appearance, UA Turbid (A) Clear    pH, UA 5.5 5.0 - 9.0    Specific Gravity, UA 1.016 1.003 - 1.030    Glucose, UA Negative Negative    Ketones, UA Trace (A) Negative    Bilirubin, UA Negative Negative    Blood, UA Negative Negative    Protein, UA 30 mg/dL (1+) (A) Negative    Leuk Esterase, UA Small (1+) (A) Negative    Nitrite, UA Negative Negative    Urobilinogen, UA 1.0 E.U./dL 0.2 - 1.0 E.U./dL   CBC  Auto Differential    Specimen: Blood   Result Value Ref Range    WBC 13.04 (H) 3.40 - 10.80 10*3/mm3    RBC 4.76 3.77 - 5.28 10*6/mm3    Hemoglobin 14.4 12.0 - 15.9 g/dL    Hematocrit 43.5 34.0 - 46.6 %    MCV 91.4 79.0 - 97.0 fL    MCH 30.3 26.6 - 33.0 pg    MCHC 33.1 31.5 - 35.7 g/dL    RDW 11.9 (L) 12.3 - 15.4 %    RDW-SD 40.0 37.0 - 54.0 fl    MPV 11.0 6.0 - 12.0 fL    Platelets 268 140 - 450 10*3/mm3    Neutrophil % 73.5 42.7 - 76.0 %    Lymphocyte % 18.5 (L) 19.6 - 45.3 %    Monocyte % 5.3 5.0 - 12.0 %    Eosinophil % 1.2 0.3 - 6.2 %    Basophil % 0.6 0.0 - 1.5 %    Immature Grans % 0.9 (H) 0.0 - 0.5 %    Neutrophils, Absolute 9.59 (H) 1.70 - 7.00 10*3/mm3    Lymphocytes, Absolute 2.41 0.70 - 3.10 10*3/mm3    Monocytes, Absolute 0.69 0.10 - 0.90 10*3/mm3    Eosinophils, Absolute 0.15 0.00 - 0.40 10*3/mm3    Basophils, Absolute 0.08 0.00 - 0.20 10*3/mm3    Immature Grans, Absolute 0.12 (H) 0.00 - 0.05 10*3/mm3    nRBC 0.0 0.0 - 0.2 /100 WBC   Magnesium    Specimen: Blood   Result Value Ref Range    Magnesium 1.9 1.6 - 2.4 mg/dL   Lipase    Specimen: Blood   Result Value Ref Range    Lipase 8 (L) 13 - 60 U/L   Basic Metabolic Panel    Specimen: Blood   Result Value Ref Range    Glucose 209 (H) 65 - 99 mg/dL    BUN 14 8 - 23 mg/dL    Creatinine 1.05 (H) 0.57 - 1.00 mg/dL    Sodium 135 (L) 136 - 145 mmol/L    Potassium 4.1 3.5 - 5.2 mmol/L    Chloride 97 (L) 98 - 107 mmol/L    CO2 27.0 22.0 - 29.0 mmol/L    Calcium 9.3 8.6 - 10.5 mg/dL    eGFR Non African Amer 52 (L) >60 mL/min/1.73    BUN/Creatinine Ratio 13.3 7.0 - 25.0    Anion Gap 11.0 5.0 - 15.0 mmol/L   Results for orders placed or performed in visit on 12/17/21   Urinalysis, Microscopic Only - Urine, Clean Catch    Specimen: Urine, Clean Catch   Result Value Ref Range    RBC, UA 0-2 None Seen, 0-2 /HPF    WBC, UA 6-12 (A) None Seen, 0-2 /HPF    Bacteria, UA 3+ (A) None Seen /HPF    Squamous Epithelial Cells, UA 7-12 (A) None Seen, 0-2 /HPF    Hyaline Casts,  UA 3-6 None Seen /LPF    Methodology Automated Microscopy    CBC Auto Differential    Specimen: Blood   Result Value Ref Range    WBC 7.37 3.40 - 10.80 10*3/mm3    RBC 4.20 3.77 - 5.28 10*6/mm3    Hemoglobin 13.1 12.0 - 15.9 g/dL    Hematocrit 39.3 34.0 - 46.6 %    MCV 93.6 79.0 - 97.0 fL    MCH 31.2 26.6 - 33.0 pg    MCHC 33.3 31.5 - 35.7 g/dL    RDW 12.6 12.3 - 15.4 %    RDW-SD 42.7 37.0 - 54.0 fl    MPV 11.8 6.0 - 12.0 fL    Platelets 245 140 - 450 10*3/mm3   Protein / Creatinine Ratio, Urine - Urine, Clean Catch    Specimen: Urine, Clean Catch   Result Value Ref Range    Protein/Creatinine Ratio, Urine 79.5 0.0 - 200.0 mg/G Crea    Creatinine, Urine 239.1 mg/dL    Total Protein, Urine 19.0 mg/dL   Vitamin D 25 Hydroxy    Specimen: Blood   Result Value Ref Range    25 Hydroxy, Vitamin D 33.6 30.0 - 100.0 ng/ml   Manual Differential    Specimen: Blood   Result Value Ref Range    Neutrophil % 70.5 42.7 - 76.0 %    Lymphocyte % 23.9 19.6 - 45.3 %    Monocyte % 4.5 (L) 5.0 - 12.0 %    Basophil % 1.1 0.0 - 1.5 %    Neutrophils Absolute 5.20 1.70 - 7.00 10*3/mm3    Lymphocytes Absolute 1.76 0.70 - 3.10 10*3/mm3    Monocytes Absolute 0.33 0.10 - 0.90 10*3/mm3    Basophils Absolute 0.08 0.00 - 0.20 10*3/mm3    RBC Morphology Normal Normal    WBC Morphology Normal Normal    Platelet Morphology Normal Normal   Urinalysis without microscopic (no culture) - Urine, Clean Catch    Specimen: Urine, Clean Catch   Result Value Ref Range    Color, UA Dark Yellow (A) Yellow, Straw    Appearance, UA Turbid (A) Clear    pH, UA 5.5 5.0 - 8.0    Specific Gravity, UA 1.026 1.005 - 1.030    Glucose, UA Negative Negative    Ketones, UA Trace (A) Negative    Bilirubin, UA Negative Negative    Blood, UA Negative Negative    Protein, UA Trace (A) Negative    Leuk Esterase, UA Negative Negative    Nitrite, UA Negative Negative    Urobilinogen, UA 1.0 E.U./dL 0.2 - 1.0 E.U./dL     *Note: Due to a large number of results and/or encounters for  the requested time period, some results have not been displayed. A complete set of results can be found in Results Review.         This document has been electronically signed by Mauricio Betancourt MD on February 16, 2022 11:40 CST

## 2022-02-16 NOTE — ANESTHESIA PREPROCEDURE EVALUATION
Anesthesia Evaluation     Patient summary reviewed and Nursing notes reviewed   NPO Solid Status: > 8 hours  NPO Liquid Status: > 8 hours           Airway   Mallampati: II  TM distance: >3 FB  Neck ROM: full  Dental - normal exam     Pulmonary - normal exam   (+) asthma,  Cardiovascular - normal exam    (+) hypertension well controlled less than 2 medications, hyperlipidemia,       Neuro/Psych  GI/Hepatic/Renal/Endo    (+)   liver disease, renal disease ARF, diabetes mellitus type 2 well controlled, thyroid problem hypothyroidism    Musculoskeletal     Abdominal   (+) obese,    Substance History - negative use     OB/GYN negative ob/gyn ROS         Other   arthritis,                      Anesthesia Plan    ASA 3     MAC     intravenous induction     Anesthetic plan, all risks, benefits, and alternatives have been provided, discussed and informed consent has been obtained with: patient.        CODE STATUS:

## 2022-02-17 ENCOUNTER — OFFICE VISIT (OUTPATIENT)
Dept: WOUND CARE | Facility: HOSPITAL | Age: 72
End: 2022-02-17

## 2022-02-17 PROCEDURE — 88305 TISSUE EXAM BY PATHOLOGIST: CPT

## 2022-02-21 LAB
LAB AP CASE REPORT: NORMAL
PATH REPORT.FINAL DX SPEC: NORMAL

## 2022-02-23 ENCOUNTER — OFFICE VISIT (OUTPATIENT)
Dept: WOUND CARE | Facility: HOSPITAL | Age: 72
End: 2022-02-23

## 2022-02-28 ENCOUNTER — OFFICE VISIT (OUTPATIENT)
Dept: GASTROENTEROLOGY | Facility: CLINIC | Age: 72
End: 2022-02-28

## 2022-02-28 VITALS
BODY MASS INDEX: 32.46 KG/M2 | WEIGHT: 194.8 LBS | DIASTOLIC BLOOD PRESSURE: 90 MMHG | SYSTOLIC BLOOD PRESSURE: 174 MMHG | HEIGHT: 65 IN | HEART RATE: 80 BPM

## 2022-02-28 DIAGNOSIS — K21.9 GASTROESOPHAGEAL REFLUX DISEASE, UNSPECIFIED WHETHER ESOPHAGITIS PRESENT: ICD-10-CM

## 2022-02-28 DIAGNOSIS — R10.32 LEFT LOWER QUADRANT ABDOMINAL PAIN: Primary | ICD-10-CM

## 2022-02-28 PROCEDURE — 99214 OFFICE O/P EST MOD 30 MIN: CPT | Performed by: INTERNAL MEDICINE

## 2022-03-02 ENCOUNTER — OFFICE VISIT (OUTPATIENT)
Dept: WOUND CARE | Facility: HOSPITAL | Age: 72
End: 2022-03-02

## 2022-03-02 PROCEDURE — 17250 CHEM CAUT OF GRANLTJ TISSUE: CPT

## 2022-03-09 ENCOUNTER — OFFICE VISIT (OUTPATIENT)
Dept: WOUND CARE | Facility: HOSPITAL | Age: 72
End: 2022-03-09

## 2022-03-14 NOTE — PROGRESS NOTES
Chief Complaint   Patient presents with   • endo f/u       Subjective    Emily Haas is a 72 y.o. female.    History of Present Illness  Patient presented to GI clinic for follow-up visit today.  Has intermittent symptoms of left lower quadrant pain.  Bowel movements are regular.  Denied nausea or vomiting.  Weight is stable.  Has occasional GERD symptoms.  Taking PPI daily.  EGD was consistent with hiatal hernia and esophagitis.  Path was consistent with reactive gastropathy.    The following portions of the patient's history were reviewed and updated as appropriate:   Past Medical History:   Diagnosis Date   • Anxiety    • Chronic bronchitis (HCC)    • CKD (chronic kidney disease) stage 3, GFR 30-59 ml/min (HCC)    • Diabetes mellitus (HCC)    • Diverticulitis    • Hyperlipidemia    • Hypertension    • Hypothyroidism    • Irritable bowel syndrome    • Obesity    • Osteoarthritis    • Vitamin D deficiency      Past Surgical History:   Procedure Laterality Date   • BILATERAL BREAST REDUCTION     • CHOLECYSTECTOMY     • COLONOSCOPY N/A 2017   • COLONOSCOPY N/A 2021   • ENDOSCOPY N/A 2021   • ENDOSCOPY N/A 2022    Procedure: ESOPHAGOGASTRODUODENOSCOPY;  Surgeon: Mauricio Betancourt MD;  Location: [facility] ENDOSCOPY;  Service: Gastroenterology;  Laterality: N/A;   • HYSTERECTOMY     • REDUCTION MAMMAPLASTY     • UPPER GASTROINTESTINAL ENDOSCOPY  2021   • UPPER GASTROINTESTINAL ENDOSCOPY  2022     Family History   Problem Relation Age of Onset   • Hypertension Mother    • Alzheimer's disease Father    • Diabetes Brother    • Hypothyroidism Daughter      OB History        2    Para   2    Term   2            AB        Living           SAB        IAB        Ectopic        Molar        Multiple        Live Births                  Prior to Admission medications    Medication Sig Start Date End Date Taking? Authorizing Provider   acetaminophen (TYLENOL) 650 MG 8 hr tablet Take 2  tablets by mouth Every 8 (Eight) Hours As Needed.   Yes Jose Alejandro Sommers MD   albuterol sulfate  (90 Base) MCG/ACT inhaler Inhale 2 puffs Every 6 (Six) Hours As Needed for Wheezing or Shortness of Air. 1/18/22  Yes Bharati Love MD   budesonide-formoterol (SYMBICORT) 160-4.5 MCG/ACT inhaler Inhale 2 puffs 2 (Two) Times a Day.  Patient taking differently: Inhale 2 puffs 2 (Two) Times a Day As Needed. 4/6/21  Yes Fabiola Layne MD   carvedilol (COREG) 12.5 MG tablet Take 1 tablet by mouth 2 (Two) Times a Day With Meals. 5/12/20  Yes Fernando Hutton MD   dicyclomine (BENTYL) 10 MG capsule Take 10 mg by mouth 2 (two) times a day.   Yes Jose Alejandro Sommers MD   docusate sodium (COLACE) 50 MG capsule Take 1 capsule by mouth Daily As Needed.   Yes Jose Alejandro Sommers MD   escitalopram (LEXAPRO) 10 MG tablet Take 1 tablet by mouth Daily. 12/13/21  Yes Bharati Love MD   losartan (COZAAR) 100 MG tablet Take 1 tablet by mouth Daily. 6/25/21  Yes Bharati Love MD   melatonin 3 MG tablet TAKE 1 TABLET BY MOUTH AT NIGHT AS NEEDED FOR SLEEP. 12/9/21  Yes Bharati Love MD   omeprazole (priLOSEC) 40 MG capsule Take 1 capsule by mouth Daily.  Patient taking differently: Take 40 mg by mouth Daily As Needed. 2/12/21  Yes Mauricio Betancourt MD   ondansetron ODT (ZOFRAN-ODT) 4 MG disintegrating tablet Place 1 tablet on the tongue 4 (Four) Times a Day As Needed for Nausea or Vomiting. 1/12/22  Yes Dewey Del Valle MD   Synthroid 112 MCG tablet Take 1 tablet by mouth Daily. 2/16/22  Yes Bharati Love MD   vitamin D (ERGOCALCIFEROL) 1.25 MG (87612 UT) capsule capsule Take 50,000 Units by mouth 1 (One) Time Per Week. 11/12/20  Yes Jose Alejandro Sommers MD     Allergies   Allergen Reactions   • Augmentin [Amoxicillin-Pot Clavulanate] Hives   • Dilaudid [Hydromorphone] Nausea And Vomiting   • Sulfa Antibiotics Hives   • Atenolol Myalgia   • Lipitor [Atorvastatin] Myalgia   • Pravachol  "[Pravastatin] Myalgia   • Statins Myalgia     \"muscle pain \"      Social History     Socioeconomic History   • Marital status:    Tobacco Use   • Smoking status: Former Smoker     Packs/day: 3.00     Years: 15.00     Pack years: 45.00     Types: Cigarettes   • Smokeless tobacco: Never Used   • Tobacco comment: 08/10/2021 - Patient states she has not utilized Cigarettes X 34 years and upon ceassation, patient was utilizing 3 packs of Cigarettes per day.   Vaping Use   • Vaping Use: Never used   Substance and Sexual Activity   • Alcohol use: No   • Drug use: No   • Sexual activity: Defer       Review of Systems  Review of Systems   Constitutional: Negative for chills, fatigue, fever and unexpected weight change.   HENT: Negative for congestion, ear discharge, hearing loss, nosebleeds and sore throat.    Eyes: Negative for pain, discharge and redness.   Respiratory: Negative for cough, chest tightness, shortness of breath and wheezing.    Cardiovascular: Negative for chest pain and palpitations.   Gastrointestinal: Positive for abdominal pain. Negative for abdominal distention, blood in stool, constipation, diarrhea, nausea and vomiting.   Endocrine: Negative for cold intolerance, polydipsia, polyphagia and polyuria.   Genitourinary: Negative for dysuria, flank pain, frequency, hematuria and urgency.   Musculoskeletal: Negative for arthralgias, back pain, joint swelling and myalgias.   Skin: Negative for color change, pallor and rash.   Neurological: Negative for tremors, seizures, syncope, weakness and headaches.   Hematological: Negative for adenopathy. Does not bruise/bleed easily.   Psychiatric/Behavioral: Negative for behavioral problems, confusion, dysphoric mood, hallucinations and suicidal ideas. The patient is not nervous/anxious.    Has GERD symptoms.     /90 (BP Location: Right arm)   Pulse 80   Ht 165.1 cm (65\")   Wt 88.4 kg (194 lb 12.8 oz)   LMP  (LMP Unknown)   BMI 32.42 kg/m² "     Objective    Physical Exam  Constitutional:       Appearance: She is well-developed.   HENT:      Head: Normocephalic and atraumatic.   Eyes:      Conjunctiva/sclera: Conjunctivae normal.      Pupils: Pupils are equal, round, and reactive to light.   Neck:      Thyroid: No thyromegaly.   Cardiovascular:      Rate and Rhythm: Normal rate and regular rhythm.      Heart sounds: Normal heart sounds. No murmur heard.  Pulmonary:      Effort: Pulmonary effort is normal.      Breath sounds: Normal breath sounds. No wheezing.   Abdominal:      General: Bowel sounds are normal. There is no distension.      Palpations: Abdomen is soft. There is no mass.      Tenderness: There is no abdominal tenderness.      Hernia: No hernia is present.   Genitourinary:     Comments: No lesions noted  Musculoskeletal:         General: No tenderness. Normal range of motion.      Cervical back: Normal range of motion and neck supple.   Lymphadenopathy:      Cervical: No cervical adenopathy.   Skin:     General: Skin is warm and dry.      Findings: No rash.   Neurological:      Mental Status: She is alert and oriented to person, place, and time.      Cranial Nerves: No cranial nerve deficit.   Psychiatric:         Thought Content: Thought content normal.       Admission on 02/16/2022, Discharged on 02/16/2022   Component Date Value Ref Range Status   • Case Report 02/16/2022    Final                    Value:Surgical Pathology Report                         Case: [id number]                                  Authorizing Provider:  Mauricio Betancourt MD      Collected:           02/16/2022 11:46 AM          Ordering Location:     [facility]   Received:            02/17/2022 07:10 AM                                 [facility]                                                     Pathologist:           Anthony Hoawrd MD                                                           Specimen:    Esophagus, Distal, eg  junction cj                                                         • Final Diagnosis 02/16/2022    Final                    Value:This result contains rich text formatting which cannot be displayed here.     Assessment/Plan    No diagnosis found..   1.   Chest pain and back pain, improved.  Likely due to GERD.  Continue PPI and antireflux lifestyle.   2.  Left lower quadrant abdominal pain associated with diarrhea, improving with intermittent symptoms.  Likely due to irritable bowel syndrome.  Could also be due to adhesions due to previous colonic surgery.  Continue high-fiber diet.  Add Metamucil daily.  CT abdomen pelvis if continues.  3. Nausea, improved. likely due to reactive gastropathy.  Continue Prilosec 40 mg p.o. daily.    Gastric emptying scan if symptoms recurs.  4.  Obesity BMI 32.42, recommend exercise and diet control.  5.  History of segmental colonic resection  6.  Diverticulosis, high-fiber diet.  7.  Barnett's esophagus, repeat EGD in 3 years for surveillance.  Continue PPI and antireflux lifestyle    Orders placed during this encounter include:  No orders of the defined types were placed in this encounter.      * Surgery not found *    Review and/or summary of lab tests, radiology, procedures, medications. Review and summary of old records and obtaining of history. The risks and benefits of my recommendations, as well as other treatment options were discussed with the patient today. Questions were answered.    No orders of the defined types were placed in this encounter.      Follow-up: Return in about 3 months (around 5/28/2022).               Results for orders placed or performed during the hospital encounter of 02/16/22   Tissue Pathology Exam    Specimen: Esophagus, Distal; Tissue   Result Value Ref Range    Case Report       Surgical Pathology Report                         Case: [id number]                                  Authorizing Provider:  Mauricio Betancourt MD      Collected:            02/16/2022 11:46 AM          Ordering Location:     [facility]   Received:            02/17/2022 07:10 AM                                 [facility]                                                     Pathologist:           Anthony Howard MD                                                           Specimen:    Esophagus, Distal, eg junction cj                                                          Final Diagnosis       SEE SCANNED REPORT       Results for orders placed or performed in visit on 02/14/22   COVID-19, [facility] IN-HOUSE, NP SWAB IN TRANSPORT MEDIA 8-10 HR TAT - Swab, Nasopharynx    Specimen: Nasopharynx; Swab   Result Value Ref Range    COVID19 Not Detected Not Detected - Ref. Range   Results for orders placed or performed in visit on 02/08/22   POC Glucose    Specimen: Blood   Result Value Ref Range    Glucose 138 (H) 70 - 130 mg/dL   Results for orders placed or performed in visit on 01/18/22   COVID-19,[facility] ROUTINE, NP/OP SWAB IN TRANSPORT MEDIA OR ESWAB 72 HR TAT - Swab, Nasopharynx    Specimen: Nasopharynx; Swab   Result Value Ref Range    SARS-CoV-2, [facility] Not Detected Not Detected   Results for orders placed or performed in visit on 01/18/22   POCT Influenza A/B    Specimen: Swab   Result Value Ref Range    Rapid Influenza A Ag Negative Negative    Rapid Influenza B Ag Negative Negative    Internal Control Passed Passed    Lot Number [id number]     Expiration Date 05-    Results for orders placed or performed during the hospital encounter of 01/12/22   Gold Top - SST   Result Value Ref Range    Extra Tube Hold for add-ons.    Urinalysis, Microscopic Only - Urine, Clean Catch    Specimen: Urine, Clean Catch   Result Value Ref Range    RBC, UA 0-2 (A) None Seen /HPF    WBC, UA 6-12 (A) None Seen, 0-2, 3-5 /HPF    Bacteria, UA 2+ (A) None Seen /HPF    Squamous Epithelial Cells, UA Too Numerous to Count (A) None Seen, 0-2 /HPF    Hyaline Casts, UA 3-6 None Seen /LPF     Methodology Manual Light Microscopy    Urinalysis With Microscopic If Indicated (No Culture) - Urine, Clean Catch    Specimen: Urine, Clean Catch   Result Value Ref Range    Color, UA Yellow Yellow, Straw, Dark Yellow, Ángela    Appearance, UA Turbid (A) Clear    pH, UA 5.5 5.0 - 9.0    Specific Gravity, UA 1.016 1.003 - 1.030    Glucose, UA Negative Negative    Ketones, UA Trace (A) Negative    Bilirubin, UA Negative Negative    Blood, UA Negative Negative    Protein, UA 30 mg/dL (1+) (A) Negative    Leuk Esterase, UA Small (1+) (A) Negative    Nitrite, UA Negative Negative    Urobilinogen, UA 1.0 E.U./dL 0.2 - 1.0 E.U./dL   CBC Auto Differential    Specimen: Blood   Result Value Ref Range    WBC 13.04 (H) 3.40 - 10.80 10*3/mm3    RBC 4.76 3.77 - 5.28 10*6/mm3    Hemoglobin 14.4 12.0 - 15.9 g/dL    Hematocrit 43.5 34.0 - 46.6 %    MCV 91.4 79.0 - 97.0 fL    MCH 30.3 26.6 - 33.0 pg    MCHC 33.1 31.5 - 35.7 g/dL    RDW 11.9 (L) 12.3 - 15.4 %    RDW-SD 40.0 37.0 - 54.0 fl    MPV 11.0 6.0 - 12.0 fL    Platelets 268 140 - 450 10*3/mm3    Neutrophil % 73.5 42.7 - 76.0 %    Lymphocyte % 18.5 (L) 19.6 - 45.3 %    Monocyte % 5.3 5.0 - 12.0 %    Eosinophil % 1.2 0.3 - 6.2 %    Basophil % 0.6 0.0 - 1.5 %    Immature Grans % 0.9 (H) 0.0 - 0.5 %    Neutrophils, Absolute 9.59 (H) 1.70 - 7.00 10*3/mm3    Lymphocytes, Absolute 2.41 0.70 - 3.10 10*3/mm3    Monocytes, Absolute 0.69 0.10 - 0.90 10*3/mm3    Eosinophils, Absolute 0.15 0.00 - 0.40 10*3/mm3    Basophils, Absolute 0.08 0.00 - 0.20 10*3/mm3    Immature Grans, Absolute 0.12 (H) 0.00 - 0.05 10*3/mm3    nRBC 0.0 0.0 - 0.2 /100 WBC   Magnesium    Specimen: Blood   Result Value Ref Range    Magnesium 1.9 1.6 - 2.4 mg/dL   Lipase    Specimen: Blood   Result Value Ref Range    Lipase 8 (L) 13 - 60 U/L   Basic Metabolic Panel    Specimen: Blood   Result Value Ref Range    Glucose 209 (H) 65 - 99 mg/dL    BUN 14 8 - 23 mg/dL    Creatinine 1.05 (H) 0.57 - 1.00 mg/dL    Sodium 135  (L) 136 - 145 mmol/L    Potassium 4.1 3.5 - 5.2 mmol/L    Chloride 97 (L) 98 - 107 mmol/L    CO2 27.0 22.0 - 29.0 mmol/L    Calcium 9.3 8.6 - 10.5 mg/dL    eGFR Non African Amer 52 (L) >60 mL/min/1.73    BUN/Creatinine Ratio 13.3 7.0 - 25.0    Anion Gap 11.0 5.0 - 15.0 mmol/L   Results for orders placed or performed in visit on 12/17/21   Urinalysis, Microscopic Only - Urine, Clean Catch    Specimen: Urine, Clean Catch   Result Value Ref Range    RBC, UA 0-2 None Seen, 0-2 /HPF    WBC, UA 6-12 (A) None Seen, 0-2 /HPF    Bacteria, UA 3+ (A) None Seen /HPF    Squamous Epithelial Cells, UA 7-12 (A) None Seen, 0-2 /HPF    Hyaline Casts, UA 3-6 None Seen /LPF    Methodology Automated Microscopy    CBC Auto Differential    Specimen: Blood   Result Value Ref Range    WBC 7.37 3.40 - 10.80 10*3/mm3    RBC 4.20 3.77 - 5.28 10*6/mm3    Hemoglobin 13.1 12.0 - 15.9 g/dL    Hematocrit 39.3 34.0 - 46.6 %    MCV 93.6 79.0 - 97.0 fL    MCH 31.2 26.6 - 33.0 pg    MCHC 33.3 31.5 - 35.7 g/dL    RDW 12.6 12.3 - 15.4 %    RDW-SD 42.7 37.0 - 54.0 fl    MPV 11.8 6.0 - 12.0 fL    Platelets 245 140 - 450 10*3/mm3   Protein / Creatinine Ratio, Urine - Urine, Clean Catch    Specimen: Urine, Clean Catch   Result Value Ref Range    Protein/Creatinine Ratio, Urine 79.5 0.0 - 200.0 mg/G Crea    Creatinine, Urine 239.1 mg/dL    Total Protein, Urine 19.0 mg/dL   Vitamin D 25 Hydroxy    Specimen: Blood   Result Value Ref Range    25 Hydroxy, Vitamin D 33.6 30.0 - 100.0 ng/ml   Manual Differential    Specimen: Blood   Result Value Ref Range    Neutrophil % 70.5 42.7 - 76.0 %    Lymphocyte % 23.9 19.6 - 45.3 %    Monocyte % 4.5 (L) 5.0 - 12.0 %    Basophil % 1.1 0.0 - 1.5 %    Neutrophils Absolute 5.20 1.70 - 7.00 10*3/mm3    Lymphocytes Absolute 1.76 0.70 - 3.10 10*3/mm3    Monocytes Absolute 0.33 0.10 - 0.90 10*3/mm3    Basophils Absolute 0.08 0.00 - 0.20 10*3/mm3    RBC Morphology Normal Normal    WBC Morphology Normal Normal    Platelet Morphology  Normal Normal   Urinalysis without microscopic (no culture) - Urine, Clean Catch    Specimen: Urine, Clean Catch   Result Value Ref Range    Color, UA Dark Yellow (A) Yellow, Straw    Appearance, UA Turbid (A) Clear    pH, UA 5.5 5.0 - 8.0    Specific Gravity, UA 1.026 1.005 - 1.030    Glucose, UA Negative Negative    Ketones, UA Trace (A) Negative    Bilirubin, UA Negative Negative    Blood, UA Negative Negative    Protein, UA Trace (A) Negative    Leuk Esterase, UA Negative Negative    Nitrite, UA Negative Negative    Urobilinogen, UA 1.0 E.U./dL 0.2 - 1.0 E.U./dL     *Note: Due to a large number of results and/or encounters for the requested time period, some results have not been displayed. A complete set of results can be found in Results Review.         This document has been electronically signed by Mauricio Betancourt MD on March 14, 2022 14:41 CDT

## 2022-03-16 ENCOUNTER — OFFICE VISIT (OUTPATIENT)
Dept: WOUND CARE | Facility: HOSPITAL | Age: 72
End: 2022-03-16

## 2022-03-23 ENCOUNTER — OFFICE VISIT (OUTPATIENT)
Dept: WOUND CARE | Facility: HOSPITAL | Age: 72
End: 2022-03-23

## 2022-03-23 ENCOUNTER — HOSPITAL ENCOUNTER (OUTPATIENT)
Dept: PHYSICAL THERAPY | Facility: HOSPITAL | Age: 72
Setting detail: THERAPIES SERIES
Discharge: HOME | End: 2022-03-23

## 2022-03-23 DIAGNOSIS — R29.898 WEAKNESS OF BOTH LEGS: ICD-10-CM
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
## 2022-03-23 PROCEDURE — G0463 HOSPITAL OUTPT CLINIC VISIT: HCPCS

## 2022-03-23 PROCEDURE — 97162 PT EVAL MOD COMPLEX 30 MIN: CPT | Performed by: PHYSICAL THERAPIST

## 2022-03-23 NOTE — THERAPY EVALUATION
Outpatient Physical Therapy Ortho Initial Evaluation  [facility]     Patient Name: Emily Haas  : 1950  MRN: [id number]  Today's Date: 3/23/2022      Visit Date: 2022  Attendance:   (med [facility])  Subjective % Improvement: n/a  Recert Date: 22  MD appointment: tbd    Therapy Diagnosis: weakness, falls     Patient Active Problem List   Diagnosis   • Vitamin D deficiency   • Irritable bowel syndrome   • Insomnia   • Hyperlipidemia   • Generalized anxiety disorder   • Essential hypertension   • Diverticular disease of colon   • Diet-controlled type 2 diabetes mellitus (HCC)   • Anxiety state   • Acquired hypothyroidism   • Asthma   • Periumbilical abdominal pain   • Spider veins of limb   • Varicose veins of lower extremity   • Diabetes mellitus (HCC)   • Posterior subcapsular polar age-related cataract   • Encounter for screening for malignant neoplasm of colon   • Diverticulosis of large intestine without hemorrhage   • Irritable bowel syndrome with both constipation and diarrhea   • Vaginosis   • Fatty liver   • Diabetes mellitus type 2 without retinopathy (HCC)   • Depressive disorder   • Pure hypercholesterolemia   • Class 1 obesity due to excess calories with serious comorbidity and body mass index (BMI) of 33.0 to 33.9 in adult   • Right lower quadrant abdominal pain   • Left lower quadrant abdominal pain   • Diarrhea   • Nausea   • Bloating   • Pneumonia due to COVID-19 virus   • Stage 3b chronic kidney disease (HCC)   • Acute respiratory failure with hypoxia (HCC)   • Cytokine release syndrome, grade 2   • Barnett's esophagus without dysplasia        Past Medical History:   Diagnosis Date   • Anxiety    • Cancer (HCC)    • Chronic bronchitis (HCC)    • CKD (chronic kidney disease) stage 3, GFR 30-59 ml/min (HCC)    • Diabetes mellitus (HCC)    • Diverticulitis    • Hyperlipidemia    • Hypertension    • Hypothyroidism    • Irritable bowel syndrome    • Obesity    • Osteoarthritis   "  • Vitamin D deficiency         Past Surgical History:   Procedure Laterality Date   • BILATERAL BREAST REDUCTION     • CHOLECYSTECTOMY     • COLONOSCOPY N/A 11/17/2017   • COLONOSCOPY N/A 1/14/2021   • ENDOSCOPY N/A 1/14/2021   • ENDOSCOPY N/A 2/16/2022    Procedure: ESOPHAGOGASTRODUODENOSCOPY;  Surgeon: Mauricio Betancourt MD;  Location: [facility] ENDOSCOPY;  Service: Gastroenterology;  Laterality: N/A;   • HYSTERECTOMY     • REDUCTION MAMMAPLASTY     • UPPER GASTROINTESTINAL ENDOSCOPY  01/14/2021   • UPPER GASTROINTESTINAL ENDOSCOPY  02/16/2022       Visit Dx:     ICD-10-CM ICD-9-CM   1. Fall, initial encounter  W19.XXXA E888.9   2. Weakness of both legs  R29.898 729.89          Patient History     Row Name 03/23/22 1019             History    Brief Description of Current Complaint Present today with reports of having COVID about 1 year ago and reports being in hospital for a week and then rehab after. Notes feeling weak since that time. No OP PT thus far. No numbness or tingling. Reports getting skin tears and sees wound care for them. Reports \"nortorious for falling\". Notes when falls has to have help getting up. Cant go up/down stairs. Notes not using AD.  -BB      Patient/Caregiver Goals Improve mobility  -BB      Hand Dominance right-handed  -BB      Occupation/sports/leisure activities Enjoys walking  -BB              Pain     Pain at Present 0  -BB      Pain at Worst --  calves sometimes. left thigh pain at night  -BB              Fall Risk Assessment    Any falls in the past year: Yes  -BB      Number of falls reported in the last 12 months unknown  -BB            User Key  (r) = Recorded By, (t) = Taken By, (c) = Cosigned By    Initials Name Provider Type    BB Marisa Fang, PT DPT Physical Therapist                 PT Ortho     Row Name 03/23/22 1019       Subjective Comments    Subjective Comments see pt hx  -BB       Precautions and Contraindications    Precautions/Limitations fall precautions  -BB    " Precautions hx of bladder cx  -BB       Subjective Pain    Post-Treatment Pain Level 0  -BB       Posture/Observations    Posture/Observations Comments ambulates with wide base of support no assistive device. No acute distress. Compression sleeve on LLE  -BB       Special Tests/Palpation    Special Tests/Palpation --  pitting edema of RLE  -BB       General ROM    GENERAL ROM COMMENTS BLE are WFL for gait  -BB       MMT (Manual Muscle Testing)    General MMT Comments Right: DF: 4/5, hip ER:4-/5, hip IR: 4-/5, knee extension: 4/5, hip abd/add:4+/5 sitting, Left: DF 4/5, knee flexion: 4-/5, knee extension 4-/5, hip er/ir: 4-/5, hip abd/add: 4/5  -BB       Sensation    Sensation WNL? WNL  -BB       Balance Skills Training    SLS deferred  -BB    Rhomberg sway all planes- UE assist needed for position  -BB    Sharpened Rhomberg modified with sway all planes with UE assist for positioning  -BB       Transfers    Comment, (Transfers) needs UE assist for sit to stand from standard height chair  -BB       Gait/Stairs (Locomotion)    Comment, (Gait/Stairs) decreased heel strike and toe off with gait, shortened stride but equal left to right. Caught toe 2x walking about 75ft  -BB          User Key  (r) = Recorded By, (t) = Taken By, (c) = Cosigned By    Initials Name Provider Type    BB Marisa Fang, PT DPT Physical Therapist                            Therapy Education  Education Details: Ham sets, glute squeezes, LAQ, hip abd red tband, hip add with pillow, TR/HR  Given: HEP  Program: New  How Provided: Verbal, Written (use cane in community for safety)      PT OP Goals     Row Name 03/23/22 1019          Long Term Goals    LTG Date to Achieve 05/04/22  -BB     LTG 1 complete 6' walk test safely without loss of balance or tripping  -BB     LTG 2 knee MMT grossly 5/5 sitting  -BB     LTG 3 hip ER/IR MMT grossly 5/5  -BB     LTG 4 narrow base of support 2' without sway or loss of balance  -BB            Time Calculation     PT Goal Re-Cert Due Date 04/13/22  -BB           User Key  (r) = Recorded By, (t) = Taken By, (c) = Cosigned By    Initials Name Provider Type    Marisa Carvajal PT DPT Physical Therapist                 PT Assessment/Plan     Row Name 03/23/22 1019          PT Assessment    Functional Limitations Impaired gait;Limitations in functional capacity and performance;Decreased safety during functional activities;Limitations in community activities;Limitation in home management;Impaired locomotion  -BB     Impairments Balance;Gait;Muscle strength;Poor body mechanics;Impaired muscle endurance;Impaired muscle power  -BB     Assessment Comments Patient presents today with reports of weakness that was increased after being ill with a hospital stay and notes difficulty performing tasks and is a fall risk and wishes to decrease falling fx. Patient is noted to have decreased balance, functional strength and could benefit from skilled PT to improve functional mobility and decrease fall risk concerns.  -BB     Rehab Potential Good  -BB     Patient/caregiver participated in establishment of treatment plan and goals Yes  -BB     Patient would benefit from skilled therapy intervention Yes  -BB            PT Plan    PT Frequency 2x/week  -BB     Predicted Duration of Therapy Intervention (PT) 6 weeks  -BB     Planned CPT's? PT RE-EVAL: 20721;PT THER PROC EA 15 MIN: 74016;PT EVAL MOD COMPLELITY: 20848;PT MANUAL THERAPY EA 15 MIN: 63870;PT THER ACT EA 15 MIN: 35269;PT NEUROMUSC RE-EDUCATION EA 15 MIN: 19084;PT GAIT TRAINING EA 15 MIN: 30983;PT SELF CARE/HOME MGMT/TRAIN EA 15: 65370;PT THER SUPP EA 15 MIN  -BB     PT Plan Comments gait, balance, strength of BLE, stretching, endurance as able, [employer] balance  -BB           User Key  (r) = Recorded By, (t) = Taken By, (c) = Cosigned By    Initials Name Provider Type    Marisa Carvajal PT DPT Physical Therapist                   OP Exercises     Row Name 03/23/22 1016              Subjective Comments    Subjective Comments see pt hx  -BB              Subjective Pain    Able to rate subjective pain? yes  -BB      Pre-Treatment Pain Level 0  -BB      Post-Treatment Pain Level 0  -BB            User Key  (r) = Recorded By, (t) = Taken By, (c) = Cosigned By    Initials Name Provider Type    Marisa Carvajal, PT DPT Physical Therapist                                        Time Calculation:     Start Time: 1019  Stop Time: 1054  Time Calculation (min): 35 min     Therapy Charges for Today     Code Description Service Date Service Provider Modifiers Qty    29403245898  PT EVAL MOD COMPLEXITY 3 3/23/2022 Marisa Fang, PT DPT GP 1                    Marisa Fang PT DPT  3/23/2022

## 2022-03-30 ENCOUNTER — HOSPITAL ENCOUNTER (OUTPATIENT)
Dept: PHYSICAL THERAPY | Facility: HOSPITAL | Age: 72
Setting detail: THERAPIES SERIES
Discharge: HOME | End: 2022-03-30
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
## 2022-03-30 DIAGNOSIS — R29.898 WEAKNESS OF BOTH LEGS: ICD-10-CM

## 2022-03-30 PROCEDURE — 97110 THERAPEUTIC EXERCISES: CPT

## 2022-03-30 NOTE — THERAPY TREATMENT NOTE
Outpatient Physical Therapy Ortho Treatment Note  [facility]     Patient Name: Emily Haas  : 1950  MRN: [id number]  Today's Date: 3/30/2022      Visit Date: 2022  Subjective Improvement: 25%  Visit Number:      Recert Date:   22  MD Visit:   TBD  Total Approved Visits:  [facility]    Therapy Diagnosis: weakness, falls   Visit Dx:    ICD-10-CM ICD-9-CM   1. Fall, initial encounter  W19.XXXA E888.9   2. Weakness of both legs  R29.898 729.89       Patient Active Problem List   Diagnosis   • Vitamin D deficiency   • Irritable bowel syndrome   • Insomnia   • Hyperlipidemia   • Generalized anxiety disorder   • Essential hypertension   • Diverticular disease of colon   • Diet-controlled type 2 diabetes mellitus (HCC)   • Anxiety state   • Acquired hypothyroidism   • Asthma   • Periumbilical abdominal pain   • Spider veins of limb   • Varicose veins of lower extremity   • Diabetes mellitus (HCC)   • Posterior subcapsular polar age-related cataract   • Encounter for screening for malignant neoplasm of colon   • Diverticulosis of large intestine without hemorrhage   • Irritable bowel syndrome with both constipation and diarrhea   • Vaginosis   • Fatty liver   • Diabetes mellitus type 2 without retinopathy (HCC)   • Depressive disorder   • Pure hypercholesterolemia   • Class 1 obesity due to excess calories with serious comorbidity and body mass index (BMI) of 33.0 to 33.9 in adult   • Right lower quadrant abdominal pain   • Left lower quadrant abdominal pain   • Diarrhea   • Nausea   • Bloating   • Pneumonia due to COVID-19 virus   • Stage 3b chronic kidney disease (HCC)   • Acute respiratory failure with hypoxia (HCC)   • Cytokine release syndrome, grade 2   • Barnett's esophagus without dysplasia        Past Medical History:   Diagnosis Date   • Anxiety    • Cancer (HCC)    • Chronic bronchitis (HCC)    • CKD (chronic kidney disease) stage 3, GFR 30-59 ml/min (HCC)    • Diabetes mellitus (HCC)  ----- Message from Pietro Hughes sent at 4/10/2018 12:31 PM CDT -----  Contact: Patient  Annual Physical 04/17/18 needs lab orders placed and  Linked    Thank you      • Diverticulitis    • Hyperlipidemia    • Hypertension    • Hypothyroidism    • Irritable bowel syndrome    • Obesity    • Osteoarthritis    • Vitamin D deficiency         Past Surgical History:   Procedure Laterality Date   • BILATERAL BREAST REDUCTION     • CHOLECYSTECTOMY     • COLONOSCOPY N/A 11/17/2017   • COLONOSCOPY N/A 1/14/2021   • ENDOSCOPY N/A 1/14/2021   • ENDOSCOPY N/A 2/16/2022    Procedure: ESOPHAGOGASTRODUODENOSCOPY;  Surgeon: Mauricio Betancourt MD;  Location: [facility] ENDOSCOPY;  Service: Gastroenterology;  Laterality: N/A;   • HYSTERECTOMY     • REDUCTION MAMMAPLASTY     • UPPER GASTROINTESTINAL ENDOSCOPY  01/14/2021   • UPPER GASTROINTESTINAL ENDOSCOPY  02/16/2022        PT Ortho     Row Name 03/30/22 0800       Subjective Comments    Subjective Comments Patient reports current HEP is going well.  Patient states she can already tell a difference of 25%.  -BB       Precautions and Contraindications    Precautions/Limitations fall precautions  -BB    Precautions hs of bladder cancer  -BB       Subjective Pain    Able to rate subjective pain? yes  -BB    Pre-Treatment Pain Level 0  -BB          User Key  (r) = Recorded By, (t) = Taken By, (c) = Cosigned By    Initials Name Provider Type    Joan Sykes PTA Physical Therapist Assistant                             PT Assessment/Plan     Row Name 03/30/22 0858          PT Assessment    Assessment Comments Pt performed therex that was provided as HEP well with review today.   Minimal L hip discomfort after therex performed.  -BB            PT Plan    PT Frequency 2x/week  -BB     Predicted Duration of Therapy Intervention (PT) x 6 weeks  -BB     PT Plan Comments Trial pro II to increase patient strength of LE to improve overall stability and balance.  -BB           User Key  (r) = Recorded By, (t) = Taken By, (c) = Cosigned By    Initials Name Provider Type    Joan Sykes PTA Physical Therapist Assistant                   OP Exercises      Row Name 03/30/22 0800             Subjective Comments    Subjective Comments Patient reports current HEP is going well.  Patient states she can already tell a difference of 25%.  -BB              Subjective Pain    Able to rate subjective pain? yes  -BB      Pre-Treatment Pain Level 0  -BB      Post-Treatment Pain Level 1  left hip  -BB              Exercise 1    Exercise Name 1 LAQ  -BB      Cueing 1 Verbal;Demo  -BB      Sets 1 2  -BB      Reps 1 10  -BB      Additional Comments bilat  -BB              Exercise 2    Exercise Name 2 seated add sq with pillow  -BB      Cueing 2 Verbal;Demo  -BB      Sets 2 2  -BB      Reps 2 10  -BB      Time 2 5 sec  -BB              Exercise 3    Exercise Name 3 seated glute squeezes  -BB      Cueing 3 Verbal  -BB      Sets 3 2  -BB      Reps 3 10  -BB      Time 3 5 sec  -BB              Exercise 4    Exercise Name 4 standing CR/TR  -BB      Cueing 4 Verbal;Demo  -BB      Sets 4 2  -BB      Reps 4 10  -BB      Additional Comments minimal Dorsiflexion with standing Toe Raise.  Pt held onto counter for support.  -BB              Exercise 5    Exercise Name 5 sit to stands  -BB      Reps 5 5  -BB      Additional Comments Encouraged to do throughout the day to improve strength.  Patient used UE for support with sit to stand.  -BB              Exercise 6    Exercise Name 6 wide ADRIAN balance no UE  -BB      Time 6 1 min  -BB      Additional Comments SBA only minimal sway 1x when closed eyes  -BB              Exercise 7    Exercise Name 7 Balance feet shld width balance  -BB      Time 7 1 min  -BB      Additional Comments SBA no LOB  -BB              Exercise 8    Exercise Name 8 Balance Rhomberg (feet together)  -BB      Time 8 1 min  -BB      Additional Comments SBA no LOB  -BB              Exercise 9    Exercise Name 9 Split stance balance ( not full tandem)  -BB      Reps 9 1  -BB      Time 9 1 min bilat  -BB      Additional Comments SBA no LOB  -BB              Exercise 10     Exercise Name 10 Gait around clinic approx 200 ft  -BB      Time 10 approx 200 ft  -BB      Additional Comments Lat sway to left 1x.  -BB            User Key  (r) = Recorded By, (t) = Taken By, (c) = Cosigned By    Initials Name Provider Type    Joan Sykes PTA Physical Therapist Assistant                              PT OP Goals     Row Name 03/30/22 0800          Long Term Goals    LTG Date to Achieve 05/04/22  -BB     LTG 1 complete 6' walk test safely without loss of balance or tripping  -BB     LTG 1 Progress Not Met  -BB     LTG 2 knee MMT grossly 5/5 sitting  -BB     LTG 2 Progress Not Met  -BB     LTG 3 hip ER/IR MMT grossly 5/5  -BB     LTG 3 Progress Not Met  -BB     LTG 4 narrow base of support 2' without sway or loss of balance  -BB     LTG 4 Progress Not Met  -BB            Time Calculation    PT Goal Re-Cert Due Date 04/13/22  -BB           User Key  (r) = Recorded By, (t) = Taken By, (c) = Cosigned By    Initials Name Provider Type    Joan Sykes PTA Physical Therapist Assistant                Therapy Education  Education Details: Encouraged to work on sit to stands from a safe chair as added exercise.  Good demonstration of previous exercises performed.  Program: Reinforced  How Provided: Verbal, Demonstration  Provided to: Patient              Time Calculation:   Start Time: 0851  Stop Time: 0930  Time Calculation (min): 39 min  Therapy Charges for Today     Code Description Service Date Service Provider Modifiers Qty    93370285048 HC PT THER PROC EA 15 MIN 3/30/2022 Jaon Knight PTA GP 3                    Joan Knight PTA  3/30/2022

## 2022-04-01 ENCOUNTER — HOSPITAL ENCOUNTER (OUTPATIENT)
Dept: PHYSICAL THERAPY | Facility: HOSPITAL | Age: 72
Setting detail: THERAPIES SERIES
Discharge: HOME | End: 2022-04-01

## 2022-04-01 DIAGNOSIS — R29.898 WEAKNESS OF BOTH LEGS: ICD-10-CM
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
## 2022-04-01 PROCEDURE — 97110 THERAPEUTIC EXERCISES: CPT

## 2022-04-01 PROCEDURE — 97530 THERAPEUTIC ACTIVITIES: CPT

## 2022-04-01 NOTE — THERAPY TREATMENT NOTE
Outpatient Physical Therapy Ortho Treatment Note  [facility]     Patient Name: Emily Haas  : 1950  MRN: [id number]  Today's Date: 2022      Visit Date: 2022     Subjective IMprovement 0  Visits 3/3  Visits [facility]  RTMD PRN  Recert Date     Fall and decrease LE strength    Visit Dx:    ICD-10-CM ICD-9-CM   1. Fall, initial encounter  W19.XXXA E888.9   2. Weakness of both legs  R29.898 729.89       Patient Active Problem List   Diagnosis   • Vitamin D deficiency   • Irritable bowel syndrome   • Insomnia   • Hyperlipidemia   • Generalized anxiety disorder   • Essential hypertension   • Diverticular disease of colon   • Diet-controlled type 2 diabetes mellitus (HCC)   • Anxiety state   • Acquired hypothyroidism   • Asthma   • Periumbilical abdominal pain   • Spider veins of limb   • Varicose veins of lower extremity   • Diabetes mellitus (HCC)   • Posterior subcapsular polar age-related cataract   • Encounter for screening for malignant neoplasm of colon   • Diverticulosis of large intestine without hemorrhage   • Irritable bowel syndrome with both constipation and diarrhea   • Vaginosis   • Fatty liver   • Diabetes mellitus type 2 without retinopathy (HCC)   • Depressive disorder   • Pure hypercholesterolemia   • Class 1 obesity due to excess calories with serious comorbidity and body mass index (BMI) of 33.0 to 33.9 in adult   • Right lower quadrant abdominal pain   • Left lower quadrant abdominal pain   • Diarrhea   • Nausea   • Bloating   • Pneumonia due to COVID-19 virus   • Stage 3b chronic kidney disease (HCC)   • Acute respiratory failure with hypoxia (HCC)   • Cytokine release syndrome, grade 2   • Barnett's esophagus without dysplasia        Past Medical History:   Diagnosis Date   • Anxiety    • Cancer (HCC)    • Chronic bronchitis (HCC)    • CKD (chronic kidney disease) stage 3, GFR 30-59 ml/min (HCC)    • Diabetes mellitus (HCC)    • Diverticulitis    • Hyperlipidemia   "  • Hypertension    • Hypothyroidism    • Irritable bowel syndrome    • Obesity    • Osteoarthritis    • Vitamin D deficiency         Past Surgical History:   Procedure Laterality Date   • BILATERAL BREAST REDUCTION     • CHOLECYSTECTOMY     • COLONOSCOPY N/A 11/17/2017   • COLONOSCOPY N/A 1/14/2021   • ENDOSCOPY N/A 1/14/2021   • ENDOSCOPY N/A 2/16/2022    Procedure: ESOPHAGOGASTRODUODENOSCOPY;  Surgeon: Mauricio Betancourt MD;  Location: [facility] ENDOSCOPY;  Service: Gastroenterology;  Laterality: N/A;   • HYSTERECTOMY     • REDUCTION MAMMAPLASTY     • UPPER GASTROINTESTINAL ENDOSCOPY  01/14/2021   • UPPER GASTROINTESTINAL ENDOSCOPY  02/16/2022        PT Ortho     Row Name 04/01/22 1100       Subjective Comments    Subjective Comments Patient states that she hasnt fallen since her last PT visit.  -CP       Precautions and Contraindications    Precautions/Limitations fall precautions  -CP    Precautions HX of bladder CA  -CP       Subjective Pain    Able to rate subjective pain? yes  -CP    Pre-Treatment Pain Level 2  -CP    Post-Treatment Pain Level 2  -CP    Subjective Pain Comment Right shoulder pain  -CP          User Key  (r) = Recorded By, (t) = Taken By, (c) = Cosigned By    Initials Name Provider Type    CP Yocasta Linares, PTA Physical Therapist Assistant                             PT Assessment/Plan     Row Name 04/01/22 1120          PT Assessment    Assessment Comments Patient tolerated ther ex well.  She was able to complete a 6' walk test without LOB or without a break.  One new goal met this date  -CP            PT Plan    PT Frequency 2x/week  -CP     Predicted Duration of Therapy Intervention (PT) 6 weeks  -CP     PT Plan Comments Cont with POC.  step up 4\"  -CP           User Key  (r) = Recorded By, (t) = Taken By, (c) = Cosigned By    Initials Name Provider Type    Yocasta Quan, ARTIS Physical Therapist Assistant                   OP Exercises     Row Name 04/01/22 1121 04/01/22 1100       " "   Subjective Comments    Subjective Comments -- Patient states that she hasnt fallen since her last PT visit.  -CP            Subjective Pain    Able to rate subjective pain? -- yes  -CP     Pre-Treatment Pain Level -- 2  -CP     Post-Treatment Pain Level -- 2  -CP     Subjective Pain Comment -- Right shoulder pain  -CP            Total Minutes    32612 - PT Therapeutic Exercise Minutes 30  -CP --     23855 - PT Therapeutic Activity Minutes 10  -CP --            Exercise 1    Exercise Name 1 -- Pro II level 1  -CP     Time 1 -- 10  -CP            Exercise 2    Exercise Name 2 -- LAQ with hip AD squeezes  -CP     Cueing 2 -- Verbal;Demo  -CP     Sets 2 -- 2  -CP     Reps 2 -- 10  -CP     Time 2 -- 3\" holds  -CP     Additional Comments -- B LE  -CP            Exercise 3    Exercise Name 3 -- seated hip fl  -CP     Cueing 3 -- Verbal;Demo  -CP     Sets 3 -- 2  -CP     Reps 3 -- 10  -CP     Additional Comments -- B LE  -CP            Exercise 4    Exercise Name 4 -- 6 minute walk test  -CP     Time 4 -- 6'  -CP     Additional Comments -- No LOB or rest breaks  -CP            Exercise 5    Exercise Name 5 -- rest  -CP            Exercise 6    Exercise Name 6 -- side stepping at taping table  -CP     Cueing 6 -- Verbal;Demo  -CP     Reps 6 -- 5  -CP     Time 6 -- table top assit  -CP            Exercise 7    Exercise Name 7 -- CR/TR  -CP     Cueing 7 -- Verbal;Demo  -CP     Sets 7 -- 2  -CP     Reps 7 -- 10  -CP            Exercise 8    Exercise Name 8 -- review HEP  -CP           User Key  (r) = Recorded By, (t) = Taken By, (c) = Cosigned By    Initials Name Provider Type    CP Yocasta Linares, PTA Physical Therapist Assistant                              PT OP Goals     Row Name 04/01/22 0900          Long Term Goals    LTG Date to Achieve 05/04/22  -CP     LTG 1 complete 6' walk test safely without loss of balance or tripping  -CP     LTG 1 Progress Met  -CP     LTG 2 knee MMT grossly 5/5 sitting  -CP     LTG 2 " Progress Not Met  -CP     LTG 3 hip ER/IR MMT grossly 5/5  -CP     LTG 3 Progress Not Met  -CP     LTG 4 narrow base of support 2' without sway or loss of balance  -CP     LTG 4 Progress Not Met  -CP            Time Calculation    PT Goal Re-Cert Due Date 04/13/22  -CP           User Key  (r) = Recorded By, (t) = Taken By, (c) = Cosigned By    Initials Name Provider Type    CP Yocasta Linares, PTA Physical Therapist Assistant                Therapy Education  Education Details: LAQ with hip AD squeezes, seated hip fl, side stepping at counter, CR/TR in standing  Given: HEP  Program: New  How Provided: Verbal, Demonstration, Written  Provided to: Patient  Level of Understanding: Teach back education performed, Verbalized, Demonstrated              Time Calculation:   Start Time: 0935  Stop Time: 1015  Time Calculation (min): 40 min  Total Timed Code Minutes- PT: 40 minute(s)  Timed Charges  33985 - PT Therapeutic Exercise Minutes: 30  94821 - PT Therapeutic Activity Minutes: 10  Total Minutes  Timed Charges Total Minutes: 40   Total Minutes: 40  Therapy Charges for Today     Code Description Service Date Service Provider Modifiers Qty    58691154123 HC PT THERAPEUTIC ACT EA 15 MIN 4/1/2022 Yocasta Linares PTA GP, CQ 1    36649588159 HC PT THER PROC EA 15 MIN 4/1/2022 Yocasta Linares PTA GP, CQ 2                    Yocasta Linares PTA  4/1/2022

## 2022-04-04 ENCOUNTER — HOSPITAL ENCOUNTER (OUTPATIENT)
Dept: PHYSICAL THERAPY | Facility: HOSPITAL | Age: 72
Setting detail: THERAPIES SERIES
Discharge: HOME | End: 2022-04-04
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
## 2022-04-04 DIAGNOSIS — R29.898 WEAKNESS OF BOTH LEGS: ICD-10-CM

## 2022-04-04 PROCEDURE — 97110 THERAPEUTIC EXERCISES: CPT

## 2022-04-04 NOTE — THERAPY TREATMENT NOTE
Outpatient Physical Therapy Ortho Treatment Note  [facility]     Patient Name: Emily Haas  : 1950  MRN: [id number]  Today's Date: 2022      Visit Date: 2022     Subjective Improvement some  Visits   Visits Med nec  RTMD PRN  Recert Date 2022    Falls and decrease LE strength    Visit Dx:    ICD-10-CM ICD-9-CM   1. Fall, initial encounter  W19.XXXA E888.9   2. Weakness of both legs  R29.898 729.89       Patient Active Problem List   Diagnosis   • Vitamin D deficiency   • Irritable bowel syndrome   • Insomnia   • Hyperlipidemia   • Generalized anxiety disorder   • Essential hypertension   • Diverticular disease of colon   • Diet-controlled type 2 diabetes mellitus (HCC)   • Anxiety state   • Acquired hypothyroidism   • Asthma   • Periumbilical abdominal pain   • Spider veins of limb   • Varicose veins of lower extremity   • Diabetes mellitus (HCC)   • Posterior subcapsular polar age-related cataract   • Encounter for screening for malignant neoplasm of colon   • Diverticulosis of large intestine without hemorrhage   • Irritable bowel syndrome with both constipation and diarrhea   • Vaginosis   • Fatty liver   • Diabetes mellitus type 2 without retinopathy (HCC)   • Depressive disorder   • Pure hypercholesterolemia   • Class 1 obesity due to excess calories with serious comorbidity and body mass index (BMI) of 33.0 to 33.9 in adult   • Right lower quadrant abdominal pain   • Left lower quadrant abdominal pain   • Diarrhea   • Nausea   • Bloating   • Pneumonia due to COVID-19 virus   • Stage 3b chronic kidney disease (HCC)   • Acute respiratory failure with hypoxia (HCC)   • Cytokine release syndrome, grade 2   • Barnett's esophagus without dysplasia        Past Medical History:   Diagnosis Date   • Anxiety    • Cancer (HCC)    • Chronic bronchitis (HCC)    • CKD (chronic kidney disease) stage 3, GFR 30-59 ml/min (HCC)    • Diabetes mellitus (HCC)    • Diverticulitis    •  "Hyperlipidemia    • Hypertension    • Hypothyroidism    • Irritable bowel syndrome    • Obesity    • Osteoarthritis    • Vitamin D deficiency         Past Surgical History:   Procedure Laterality Date   • BILATERAL BREAST REDUCTION     • CHOLECYSTECTOMY     • COLONOSCOPY N/A 11/17/2017   • COLONOSCOPY N/A 1/14/2021   • ENDOSCOPY N/A 1/14/2021   • ENDOSCOPY N/A 2/16/2022    Procedure: ESOPHAGOGASTRODUODENOSCOPY;  Surgeon: Mauricio Betancourt MD;  Location: [facility] ENDOSCOPY;  Service: Gastroenterology;  Laterality: N/A;   • HYSTERECTOMY     • REDUCTION MAMMAPLASTY     • UPPER GASTROINTESTINAL ENDOSCOPY  01/14/2021   • UPPER GASTROINTESTINAL ENDOSCOPY  02/16/2022        PT Ortho     Row Name 04/04/22 0900       Subjective Comments    Subjective Comments Patient states that she was not sore from last PT visit and she was able to go up a couple of stairs at home  -CP       Precautions and Contraindications    Precautions/Limitations fall precautions  -CP    Precautions HX bladder CA  -CP       Subjective Pain    Able to rate subjective pain? yes  -CP    Pre-Treatment Pain Level 0  -CP          User Key  (r) = Recorded By, (t) = Taken By, (c) = Cosigned By    Initials Name Provider Type    Yocasta Quan, PTA Physical Therapist Assistant                             PT Assessment/Plan     Row Name 04/04/22 1016          PT Assessment    Assessment Comments Very good tolereance tother ex.  She is progressing very well.  No LOB today  -CP            PT Plan    PT Frequency 2x/week  -CP     Predicted Duration of Therapy Intervention (PT) 6 weeks  -CP     PT Plan Comments Cont with POC. lat step up 4\"  -CP           User Key  (r) = Recorded By, (t) = Taken By, (c) = Cosigned By    Initials Name Provider Type    Yocasta Quan PTA Physical Therapist Assistant                   OP Exercises     Row Name 04/04/22 1017 04/04/22 0900          Subjective Comments    Subjective Comments -- Patient states that she was not " "sore from last PT visit and she was able to go up a couple of stairs at home  -CP            Subjective Pain    Able to rate subjective pain? -- yes  -CP     Pre-Treatment Pain Level -- 0  -CP     Post-Treatment Pain Level -- 0  -CP            Total Minutes    50772 - PT Therapeutic Exercise Minutes 42  -CP --            Exercise 1    Exercise Name 1 -- Pro II level 2  -CP     Time 1 -- 11  -CP            Exercise 2    Exercise Name 2 -- step up 4\"  -CP     Cueing 2 -- Verbal;Demo  -CP     Sets 2 -- 2  -CP     Reps 2 -- 10  -CP     Time 2 -- B LE  -CP     Additional Comments -- Handrail assist  -CP            Exercise 3    Exercise Name 3 -- cybex leg press  -CP     Cueing 3 -- Verbal;Tactile  -CP     Sets 3 -- 2  -CP     Reps 3 -- 10  -CP     Time 3 -- 60 lb  -CP            Exercise 4    Exercise Name 4 -- cybex hip AB  -CP     Cueing 4 -- Verbal;Tactile  -CP     Sets 4 -- 2  -CP     Reps 4 -- 10  -CP     Time 4 -- 15 lb  -CP            Exercise 5    Exercise Name 5 -- seated ham curl with tband  -CP     Cueing 5 -- Verbal;Tactile  -CP     Sets 5 -- 2  -CP     Reps 5 -- 10  -CP     Time 5 -- red tband  -CP     Additional Comments -- B LE  -CP            Exercise 6    Exercise Name 6 -- seated hip IR/ER  with tband  -CP     Cueing 6 -- Verbal;Tactile  -CP     Sets 6 -- 2  -CP     Reps 6 -- 10 each  -CP     Time 6 -- B LE  -CP     Additional Comments -- Red Tband  -CP            Exercise 7    Exercise Name 7 -- review HEP  -CP           User Key  (r) = Recorded By, (t) = Taken By, (c) = Cosigned By    Initials Name Provider Type    CP Yocasta Linares, PTA Physical Therapist Assistant                              PT OP Goals     Row Name 04/04/22 1000          Long Term Goals    LTG Date to Achieve 05/04/22  -CP     LTG 1 complete 6' walk test safely without loss of balance or tripping  -CP     LTG 1 Progress Met  -CP     LTG 2 knee MMT grossly 5/5 sitting  -CP     LTG 2 Progress Not Met  -CP     LTG 3 hip ER/IR MMT " grossly 5/5  -CP     LTG 3 Progress Not Met  -CP     LTG 4 narrow base of support 2' without sway or loss of balance  -CP     LTG 4 Progress Not Met  -CP            Time Calculation    PT Goal Re-Cert Due Date 04/13/22  -CP           User Key  (r) = Recorded By, (t) = Taken By, (c) = Cosigned By    Initials Name Provider Type    CP Yocasta Linares, ARTIS Physical Therapist Assistant                Therapy Education  Education Details: seated ham curls with tband, seated Hip ir/er with tband  Given: HEP  Program: New  How Provided: Verbal, Demonstration, Written  Provided to: Patient  Level of Understanding: Teach back education performed, Verbalized, Demonstrated              Time Calculation:   Start Time: 0930  Stop Time: 1012  Time Calculation (min): 42 min  Total Timed Code Minutes- PT: 42 minute(s)  Timed Charges  04425 - PT Therapeutic Exercise Minutes: 42  Total Minutes  Timed Charges Total Minutes: 42   Total Minutes: 42  Therapy Charges for Today     Code Description Service Date Service Provider Modifiers Qty    18664313412 HC PT THER PROC EA 15 MIN 4/4/2022 Yocasta Linares PTA GP, CQ 3                    Yocasta Linares PTA  4/4/2022

## 2022-04-06 ENCOUNTER — HOSPITAL ENCOUNTER (OUTPATIENT)
Dept: PHYSICAL THERAPY | Facility: HOSPITAL | Age: 72
Setting detail: THERAPIES SERIES
Discharge: HOME | End: 2022-04-06

## 2022-04-06 DIAGNOSIS — R29.898 WEAKNESS OF BOTH LEGS: ICD-10-CM
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
## 2022-04-06 PROCEDURE — 97110 THERAPEUTIC EXERCISES: CPT

## 2022-04-06 PROCEDURE — 97530 THERAPEUTIC ACTIVITIES: CPT

## 2022-04-06 NOTE — THERAPY TREATMENT NOTE
Outpatient Physical Therapy Ortho Treatment Note  [facility]     Patient Name: Emily Haas  : 1950  MRN: [id number]  Today's Date: 2022      Visit Date: 2022     Subjective Improvement some  Visits 5/5  Visits approved med nec  RTMD PRN  Recert Date 2022    Falls and decrease LE strength    Visit Dx:    ICD-10-CM ICD-9-CM   1. Fall, initial encounter  W19.XXXA E888.9   2. Weakness of both legs  R29.898 729.89       Patient Active Problem List   Diagnosis   • Vitamin D deficiency   • Irritable bowel syndrome   • Insomnia   • Hyperlipidemia   • Generalized anxiety disorder   • Essential hypertension   • Diverticular disease of colon   • Diet-controlled type 2 diabetes mellitus (HCC)   • Anxiety state   • Acquired hypothyroidism   • Asthma   • Periumbilical abdominal pain   • Spider veins of limb   • Varicose veins of lower extremity   • Diabetes mellitus (HCC)   • Posterior subcapsular polar age-related cataract   • Encounter for screening for malignant neoplasm of colon   • Diverticulosis of large intestine without hemorrhage   • Irritable bowel syndrome with both constipation and diarrhea   • Vaginosis   • Fatty liver   • Diabetes mellitus type 2 without retinopathy (HCC)   • Depressive disorder   • Pure hypercholesterolemia   • Class 1 obesity due to excess calories with serious comorbidity and body mass index (BMI) of 33.0 to 33.9 in adult   • Right lower quadrant abdominal pain   • Left lower quadrant abdominal pain   • Diarrhea   • Nausea   • Bloating   • Pneumonia due to COVID-19 virus   • Stage 3b chronic kidney disease (HCC)   • Acute respiratory failure with hypoxia (HCC)   • Cytokine release syndrome, grade 2   • Barnett's esophagus without dysplasia        Past Medical History:   Diagnosis Date   • Anxiety    • Cancer (HCC)    • Chronic bronchitis (HCC)    • CKD (chronic kidney disease) stage 3, GFR 30-59 ml/min (HCC)    • Diabetes mellitus (HCC)    • Diverticulitis    •  "Hyperlipidemia    • Hypertension    • Hypothyroidism    • Irritable bowel syndrome    • Obesity    • Osteoarthritis    • Vitamin D deficiency         Past Surgical History:   Procedure Laterality Date   • BILATERAL BREAST REDUCTION     • CHOLECYSTECTOMY     • COLONOSCOPY N/A 11/17/2017   • COLONOSCOPY N/A 1/14/2021   • ENDOSCOPY N/A 1/14/2021   • ENDOSCOPY N/A 2/16/2022    Procedure: ESOPHAGOGASTRODUODENOSCOPY;  Surgeon: Mauricio Betancourt MD;  Location: [facility] ENDOSCOPY;  Service: Gastroenterology;  Laterality: N/A;   • HYSTERECTOMY     • REDUCTION MAMMAPLASTY     • UPPER GASTROINTESTINAL ENDOSCOPY  01/14/2021   • UPPER GASTROINTESTINAL ENDOSCOPY  02/16/2022                        PT Assessment/Plan     Row Name 04/06/22 0928          PT Assessment    Assessment Comments Patient is progressing very well.  She is demo'ing increase balance and strength in LE>  -CP            PT Plan    PT Frequency 2x/week  -CP     Predicted Duration of Therapy Intervention (PT) 6 weeks  -CP     PT Plan Comments Cont with POC.  nadinedles next visit  -CP           User Key  (r) = Recorded By, (t) = Taken By, (c) = Cosigned By    Initials Name Provider Type    CP Yocasta Linares, PTA Physical Therapist Assistant                   OP Exercises     Row Name 04/06/22 0933 04/06/22 0800          Subjective Comments    Subjective Comments -- Was able to step up a curb by herself yesterday for the first time in a long time  -CP            Subjective Pain    Able to rate subjective pain? -- yes  -CP     Pre-Treatment Pain Level -- --  body hurts  -CP     Post-Treatment Pain Level -- --  better  -CP            Total Minutes    54972 - PT Therapeutic Exercise Minutes 30  -CP --     07696 - PT Therapeutic Activity Minutes 15  -CP --            Exercise 1    Exercise Name 1 -- Pro II level 2  -CP     Time 1 -- 12  -CP            Exercise 2    Exercise Name 2 -- step up 6\"  -CP     Cueing 2 -- Verbal;Demo  -CP     Sets 2 -- 2  -CP     Reps 2 -- 10 " " -CP     Time 2 -- B LE  -CP     Additional Comments -- handrail assist  -CP            Exercise 3    Exercise Name 3 -- ramp walk  -CP     Cueing 3 -- Verbal;Demo  -CP     Reps 3 -- 5  -CP     Time 3 -- forward  -CP     Additional Comments -- handrail assist  -CP            Exercise 4    Exercise Name 4 -- lat step up 4\"  -CP     Cueing 4 -- Verbal;Demo  -CP     Sets 4 -- 2  -CP     Reps 4 -- 10  -CP     Time 4 -- B LE  -CP     Additional Comments -- handrail assist  -CP            Exercise 5    Exercise Name 5 -- side stepping on foam  -CP     Cueing 5 -- Verbal;Demo  -CP     Reps 5 -- 5  -CP     Time 5 -- handrail assist  -CP            Exercise 6    Exercise Name 6 -- heel toe gait on foam  -CP     Cueing 6 -- Verbal;Demo  -CP     Reps 6 -- 5  -CP     Time 6 -- handrail assist  -CP            Exercise 7    Exercise Name 7 -- cybex rows  -CP     Cueing 7 -- Verbal;Demo  -CP     Sets 7 -- 2  -CP     Reps 7 -- 10  -CP     Time 7 -- 30 lb  -CP            Exercise 8    Exercise Name 8 -- cybex lat pull down  -CP     Cueing 8 -- Verbal;Demo  -CP     Sets 8 -- 2  -CP     Reps 8 -- 10  -CP     Time 8 -- 30 lb  -CP            Exercise 9    Exercise Name 9 -- cybex leg press  -CP     Cueing 9 -- Verbal;Tactile  -CP     Sets 9 -- 3  -CP     Reps 9 -- 10  -CP     Time 9 -- 70 lb  -CP            Exercise 10    Exercise Name 10 -- cybex hip AB  -CP     Cueing 10 -- Verbal;Tactile  -CP     Sets 10 -- 2  -CP     Reps 10 -- 10  -CP     Time 10 -- 20 lb  -CP           User Key  (r) = Recorded By, (t) = Taken By, (c) = Cosigned By    Initials Name Provider Type    CP Yocasta Linares, PTA Physical Therapist Assistant                              PT OP Goals     Row Name 04/06/22 0900          Long Term Goals    LTG Date to Achieve 05/04/22  -CP     LTG 1 complete 6' walk test safely without loss of balance or tripping  -CP     LTG 1 Progress Met  -CP     LTG 2 knee MMT grossly 5/5 sitting  -CP     LTG 2 Progress Not Met  -CP     " LTG 3 hip ER/IR MMT grossly 5/5  -CP     LTG 3 Progress Not Met  -CP     LTG 4 narrow base of support 2' without sway or loss of balance  -CP     LTG 4 Progress Not Met  -CP            Time Calculation    PT Goal Re-Cert Due Date 04/13/22  -CP           User Key  (r) = Recorded By, (t) = Taken By, (c) = Cosigned By    Initials Name Provider Type    CP Yocasta Linares, ARTIS Physical Therapist Assistant                               Time Calculation:   Start Time: 0840  Stop Time: 0925  Time Calculation (min): 45 min  Total Timed Code Minutes- PT: 45 minute(s)  Timed Charges  72410 - PT Therapeutic Exercise Minutes: 30  78406 - PT Therapeutic Activity Minutes: 15  Total Minutes  Timed Charges Total Minutes: 45   Total Minutes: 45  Therapy Charges for Today     Code Description Service Date Service Provider Modifiers Qty    98963330021 HC PT THERAPEUTIC ACT EA 15 MIN 4/6/2022 Yocasta Linares PTA GP, CQ 1    87786939048 HC PT THER PROC EA 15 MIN 4/6/2022 Yocasta Linares PTA GP, CQ 2                    Yocasta Linares PTA  4/6/2022

## 2022-04-11 ENCOUNTER — APPOINTMENT (OUTPATIENT)
Dept: PHYSICAL THERAPY | Facility: HOSPITAL | Age: 72
End: 2022-04-11

## 2022-04-13 ENCOUNTER — APPOINTMENT (OUTPATIENT)
Dept: PHYSICAL THERAPY | Facility: HOSPITAL | Age: 72
End: 2022-04-13

## 2022-04-18 ENCOUNTER — HOSPITAL ENCOUNTER (OUTPATIENT)
Dept: PHYSICAL THERAPY | Facility: HOSPITAL | Age: 72
Setting detail: THERAPIES SERIES
Discharge: HOME | End: 2022-04-18
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
## 2022-04-18 DIAGNOSIS — R29.898 WEAKNESS OF BOTH LEGS: ICD-10-CM

## 2022-04-18 PROCEDURE — 97110 THERAPEUTIC EXERCISES: CPT

## 2022-04-18 PROCEDURE — 97530 THERAPEUTIC ACTIVITIES: CPT

## 2022-04-18 NOTE — THERAPY TREATMENT NOTE
Outpatient Physical Therapy Ortho Treatment Note  [facility]     Patient Name: Emily Haas  : 1950  MRN: [id number]  Today's Date: 2022      Visit Date: 2022  Subjective Improvement: some  MD visit: PRN  Visit Number:   Total Approved:Med nec  Recert Date: 2022  Visit Dx:    ICD-10-CM ICD-9-CM   1. Fall, initial encounter  W19.XXXA E888.9   2. Weakness of both legs  R29.898 729.89       Patient Active Problem List   Diagnosis   • Vitamin D deficiency   • Irritable bowel syndrome   • Insomnia   • Hyperlipidemia   • Generalized anxiety disorder   • Essential hypertension   • Diverticular disease of colon   • Diet-controlled type 2 diabetes mellitus (HCC)   • Anxiety state   • Acquired hypothyroidism   • Asthma   • Periumbilical abdominal pain   • Spider veins of limb   • Varicose veins of lower extremity   • Diabetes mellitus (HCC)   • Posterior subcapsular polar age-related cataract   • Encounter for screening for malignant neoplasm of colon   • Diverticulosis of large intestine without hemorrhage   • Irritable bowel syndrome with both constipation and diarrhea   • Vaginosis   • Fatty liver   • Diabetes mellitus type 2 without retinopathy (HCC)   • Depressive disorder   • Pure hypercholesterolemia   • Class 1 obesity due to excess calories with serious comorbidity and body mass index (BMI) of 33.0 to 33.9 in adult   • Right lower quadrant abdominal pain   • Left lower quadrant abdominal pain   • Diarrhea   • Nausea   • Bloating   • Pneumonia due to COVID-19 virus   • Stage 3b chronic kidney disease (HCC)   • Acute respiratory failure with hypoxia (HCC)   • Cytokine release syndrome, grade 2   • Barnett's esophagus without dysplasia        Past Medical History:   Diagnosis Date   • Anxiety    • Cancer (HCC)    • Chronic bronchitis (HCC)    • CKD (chronic kidney disease) stage 3, GFR 30-59 ml/min (HCC)    • Diabetes mellitus (HCC)    • Diverticulitis    • Hyperlipidemia    •  Hypertension    • Hypothyroidism    • Irritable bowel syndrome    • Obesity    • Osteoarthritis    • Vitamin D deficiency         Past Surgical History:   Procedure Laterality Date   • BILATERAL BREAST REDUCTION     • CHOLECYSTECTOMY     • COLONOSCOPY N/A 11/17/2017   • COLONOSCOPY N/A 1/14/2021   • ENDOSCOPY N/A 1/14/2021   • ENDOSCOPY N/A 2/16/2022    Procedure: ESOPHAGOGASTRODUODENOSCOPY;  Surgeon: Mauricio Betancourt MD;  Location: [facility] ENDOSCOPY;  Service: Gastroenterology;  Laterality: N/A;   • HYSTERECTOMY     • REDUCTION MAMMAPLASTY     • UPPER GASTROINTESTINAL ENDOSCOPY  01/14/2021   • UPPER GASTROINTESTINAL ENDOSCOPY  02/16/2022        PT Ortho     Row Name 04/18/22 1000       Subjective Comments    Subjective Comments Pt reported that her pain is still a 3/10 after ex.  -TL       Precautions and Contraindications    Precautions/Limitations fall precautions  -TL    Precautions HX bladder CA  -TL       Subjective Pain    Able to rate subjective pain? yes  -TL    Pre-Treatment Pain Level 3  chronic pain all over  -TL    Post-Treatment Pain Level 3  -TL          User Key  (r) = Recorded By, (t) = Taken By, (c) = Cosigned By    Initials Name Provider Type    Gisel Bonilla PTA Physical Therapist Assistant                             PT Assessment/Plan     Row Name 04/18/22 1000          PT Assessment    Assessment Comments Pt has met 1 and 4 long term goals. Pt able to stand for 2 mins wihtout sway or LOB noted. Pt did struggle with stepping over hurdles. Pt did well with using GTB for seated ham curls and hip IR/ER. PTA given copies of HEP and GTB for HEP.  -TL            PT Plan    PT Frequency 2x/week  -TL     Predicted Duration of Therapy Intervention (PT) 6 weeks  -TL     PT Plan Comments continue with balance ex. Add clams and reverse clams next visit. Side stepping with TB  -TL           User Key  (r) = Recorded By, (t) = Taken By, (c) = Cosigned By    Initials Name Provider Type    JULIOCESAR Corey  Gisel REINA PTA Physical Therapist Assistant                   OP Exercises     Row Name 04/18/22 1000             Subjective Comments    Subjective Comments Pt reported that her pain is still a 3/10 after ex.  -TL              Subjective Pain    Able to rate subjective pain? yes  -TL      Pre-Treatment Pain Level 3  chronic pain all over  -TL      Post-Treatment Pain Level 3  -TL              Exercise 1    Exercise Name 1 Pro 2 for strengthening legs  -TL      Time 1 10  -TL      Additional Comments level 2.5  -TL              Exercise 2    Exercise Name 2 standing 2 mins  -TL      Additional Comments without sway  -TL              Exercise 3    Exercise Name 3 step over hurdles  -TL      Reps 3 4 laps one at a time  -TL      Additional Comments CGA of 1 for safety.  -TL              Exercise 4    Exercise Name 4 seated IR/ER hip RTB  -TL      Reps 4 20 reps each leg each directions  -TL              Exercise 5    Exercise Name 5 seated ham curls  -TL      Reps 5 20 each leg  -TL            User Key  (r) = Recorded By, (t) = Taken By, (c) = Cosigned By    Initials Name Provider Type    Gisel Bonilla PTA Physical Therapist Assistant                              PT OP Goals     Row Name 04/18/22 1000          Long Term Goals    LTG Date to Achieve 05/04/22  -TL     LTG 1 complete 6' walk test safely without loss of balance or tripping  -TL     LTG 1 Progress Met  -TL     LTG 2 knee MMT grossly 5/5 sitting  -TL     LTG 2 Progress Not Met  -TL     LTG 3 hip ER/IR MMT grossly 5/5  -TL     LTG 3 Progress Ongoing;Progressing  -TL     LTG 4 narrow base of support 2' without sway or loss of balance  -TL     LTG 4 Progress Met  -TL            Time Calculation    PT Goal Re-Cert Due Date 04/13/22  -TL           User Key  (r) = Recorded By, (t) = Taken By, (c) = Cosigned By    Initials Name Provider Type    Gisel Bonilla PTA Physical Therapist Assistant                Therapy Education  Education Details: seated hip  ER/IR, ham curls GTB  Given: HEP, Symptoms/condition management, Posture/body mechanics  Program: New, Reinforced  How Provided: Verbal, Demonstration, Written  Provided to: Patient  Level of Understanding: Teach back education performed, Verbalized, Demonstrated              Time Calculation:   Start Time: 1025  Stop Time: 1104  Time Calculation (min): 39 min  Therapy Charges for Today     Code Description Service Date Service Provider Modifiers Qty    02168580839  PT THER PROC EA 15 MIN 4/18/2022 Gisel Corey PTA GP, CQ 2    [id number]  PT THERAPEUTIC ACT EA 15 MIN 4/18/2022 Gisel Corey PTA GP, CQ 1                    Gisel Corey PTA  4/18/2022

## 2022-04-20 ENCOUNTER — HOSPITAL ENCOUNTER (OUTPATIENT)
Dept: PHYSICAL THERAPY | Facility: HOSPITAL | Age: 72
Setting detail: THERAPIES SERIES
Discharge: HOME | End: 2022-04-20

## 2022-04-20 DIAGNOSIS — R29.898 WEAKNESS OF BOTH LEGS: ICD-10-CM
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
## 2022-04-20 PROCEDURE — 97112 NEUROMUSCULAR REEDUCATION: CPT

## 2022-04-20 PROCEDURE — 97110 THERAPEUTIC EXERCISES: CPT

## 2022-04-20 NOTE — THERAPY PROGRESS REPORT/RE-CERT
Outpatient Physical Therapy Ortho Progress Note  [facility]     Patient Name: Emily Haas  : 1950  MRN: [id number]  Today's Date: 2022      Visit Date: 2022   Attendance:  (med nec approved)  Subjective Improvement: 65%  Next MD Visit: PRN  Recert Date: 22    Therapy Diagnosis: LE weakness, frequent falls      Visit Dx:    ICD-10-CM ICD-9-CM   1. Fall, initial encounter  W19.XXXA E888.9   2. Weakness of both legs  R29.898 729.89       Patient Active Problem List   Diagnosis   • Vitamin D deficiency   • Irritable bowel syndrome   • Insomnia   • Hyperlipidemia   • Generalized anxiety disorder   • Essential hypertension   • Diverticular disease of colon   • Diet-controlled type 2 diabetes mellitus (HCC)   • Anxiety state   • Acquired hypothyroidism   • Asthma   • Periumbilical abdominal pain   • Spider veins of limb   • Varicose veins of lower extremity   • Diabetes mellitus (HCC)   • Posterior subcapsular polar age-related cataract   • Encounter for screening for malignant neoplasm of colon   • Diverticulosis of large intestine without hemorrhage   • Irritable bowel syndrome with both constipation and diarrhea   • Vaginosis   • Fatty liver   • Diabetes mellitus type 2 without retinopathy (HCC)   • Depressive disorder   • Pure hypercholesterolemia   • Class 1 obesity due to excess calories with serious comorbidity and body mass index (BMI) of 33.0 to 33.9 in adult   • Right lower quadrant abdominal pain   • Left lower quadrant abdominal pain   • Diarrhea   • Nausea   • Bloating   • Pneumonia due to COVID-19 virus   • Stage 3b chronic kidney disease (HCC)   • Acute respiratory failure with hypoxia (HCC)   • Cytokine release syndrome, grade 2   • Barnett's esophagus without dysplasia        Past Medical History:   Diagnosis Date   • Anxiety    • Cancer (HCC)    • Chronic bronchitis (HCC)    • CKD (chronic kidney disease) stage 3, GFR 30-59 ml/min (HCC)    • Diabetes mellitus (HCC)   "  • Diverticulitis    • Hyperlipidemia    • Hypertension    • Hypothyroidism    • Irritable bowel syndrome    • Obesity    • Osteoarthritis    • Vitamin D deficiency         Past Surgical History:   Procedure Laterality Date   • BILATERAL BREAST REDUCTION     • CHOLECYSTECTOMY     • COLONOSCOPY N/A 11/17/2017   • COLONOSCOPY N/A 1/14/2021   • ENDOSCOPY N/A 1/14/2021   • ENDOSCOPY N/A 2/16/2022    Procedure: ESOPHAGOGASTRODUODENOSCOPY;  Surgeon: Mauricio Betancourt MD;  Location: [facility] ENDOSCOPY;  Service: Gastroenterology;  Laterality: N/A;   • HYSTERECTOMY     • REDUCTION MAMMAPLASTY     • UPPER GASTROINTESTINAL ENDOSCOPY  01/14/2021   • UPPER GASTROINTESTINAL ENDOSCOPY  02/16/2022        PT Ortho     Row Name 04/20/22 0900       Subjective Comments    Subjective Comments Pt stated that therapy has been \"so far so good\". She reports that the hurdles have been really challenging. 65% subjective improvement.  -       Precautions and Contraindications    Precautions/Limitations fall precautions  -    Precautions HX bladder CA  -       Subjective Pain    Able to rate subjective pain? yes  -    Pre-Treatment Pain Level 2  -    Post-Treatment Pain Level 0  -       General ROM    GENERAL ROM COMMENTS BLE are WFL for gait  -MH       MMT (Manual Muscle Testing)    Rt Lower Ext Rt Hip Flexion;Rt Hip Extension;Rt Hip ABduction;Rt Hip ADduction;Rt Hip Internal (Medial) Rotation;Rt Hip External (Lateral) Rotation;Rt Knee Extension;Rt Knee Flexion;Rt Ankle Dorsiflexion  -    Lt Lower Ext Lt Hip Flexion;Lt Hip Extension;Lt Hip ABduction;Lt Hip ADduction;Lt Hip External (Lateral) Rotation;Lt Hip Internal (Medial) Rotation;Lt Knee Extension;Lt Knee Flexion;Lt Ankle Dorsiflexion  -       MMT Right Lower Ext    Rt Hip Flexion MMT, Gross Movement (4/5) good  -MH    Rt Hip Extension MMT, Gross Movement (5/5) normal  -MH    Rt Hip ABduction MMT, Gross Movement (4+/5) good plus  -MH    Rt Hip ADduction MMT, Gross Movement " (4-/5) good minus  -MH    Rt Hip Internal (Medial) Rotation MMT, Gross Movement (5/5) normal  -MH    Rt Hip External (Lateral) Rotation MMT, Gross Movement (5/5) normal  -MH    Rt Knee Extension MMT, Gross Movement (5/5) normal  -MH    Rt Knee Flexion MMT, Gross Movement (5/5) normal  -MH    Rt Ankle Dorsiflexion MMT, Gross Movement (5/5) normal  -MH       MMT Left Lower Ext    Lt Hip Flexion MMT, Gross Movement (3+/5) fair plus  -MH    Lt Hip Extension MMT, Gross Movement (4/5) good  -MH    Lt Hip ABduction MMT, Gross Movement (4/5) good  -MH    Lt Hip ADduction MMT, Gross Movement (4/5) good  -MH    Lt Hip Internal (Medial) Rotation MMT, Gross Movement (5/5) normal  -MH    Lt Hip External (Lateral) Rotation MMT, Gross Movement (4/5) good  -MH    Lt Knee Extension MMT, Gross Movement (4+/5) good plus  -MH    Lt Knee Flexion MMT, Gross Movement (5/5) normal  -MH    Lt Ankle Dorsiflexion MMT, Gross Movement (4+/5) good plus  -MH       Sensation    Sensation WNL? WNL  -MH    Light Touch No apparent deficits  -       Balance Skills Training    SLS L: 3-4 sec. R 4-5 sec  -    Rhomberg 1 min without sway  -    Sharpened Rhomberg 3-5 sec with R LE leading. 12-30 sec with L LE leading. Increased sway with both.  -          User Key  (r) = Recorded By, (t) = Taken By, (c) = Cosigned By    Initials Name Provider Type     Kayleigh Syed PT Physical Therapist                             PT Assessment/Plan     Row Name 04/20/22 0900          PT Assessment    Functional Limitations Impaired gait;Limitations in functional capacity and performance;Decreased safety during functional activities;Limitations in community activities;Limitation in home management;Impaired locomotion  -     Impairments Balance;Gait;Muscle strength;Poor body mechanics;Impaired muscle endurance;Impaired muscle power  -     Assessment Comments Recheck completed this visit. Pt has met 2/4 goals to date with the remaining two goals partially  "met. . She has demonstrated improvements in balance, gait, LE strength, and LE muscle endurance. She cont to present with hip weakness bilat (L more so than R), L knee weakness, and decreased balance. Pt would benefit from cont skilled PT to further address her remaining deficits in order to reduce her fall risk and for pt to return to her PLOF. Initiated SLR and clams this visit with verbal and tactile cueing required for form.  -     Rehab Potential Good  -     Patient/caregiver participated in establishment of treatment plan and goals Yes  -     Patient would benefit from skilled therapy intervention Yes  -MH            PT Plan    PT Frequency 2x/week  -     Predicted Duration of Therapy Intervention (PT) 2 more weeks for balance  -     PT Plan Comments Review SLR flex and abd and clams (add to HEP). Attempt SLR ext.  -           User Key  (r) = Recorded By, (t) = Taken By, (c) = Cosigned By    Initials Name Provider Type     Kayleigh Syed, PT Physical Therapist                   OP Exercises     Row Name 04/20/22 0900             Subjective Comments    Subjective Comments Pt stated that therapy has been \"so far so good\". She reports that the hurdles have been really challenging. 65% subjective improvement.  -              Subjective Pain    Able to rate subjective pain? yes  -      Pre-Treatment Pain Level 2  -      Post-Treatment Pain Level 0  -              Total Minutes    07093 - PT Therapeutic Exercise Minutes 35  -      31246 -  PT Neuromuscular Reeducation Minutes 10  -              Exercise 1    Exercise Name 1 Pro II LE; strength  -      Time 1 10 min  -      Additional Comments lvl 3; set 10  -              Exercise 2    Exercise Name 2 Recheck  -              Exercise 3    Exercise Name 3 Standing balance with comfortable ADRIAN  -      Time 3 2 min  -              Exercise 4    Exercise Name 4 Rhomberg balance  -      Time 4 1 min  -              Exercise 5    " Exercise Name 5 Tandem balance  -MH      Cueing 5 Verbal  -MH      Sets 5 3  -MH      Time 5 3-5 sec with R LE leading. 12-30 sec with L LE leading. Increased sway with both.  -MH      Additional Comments bilat  -MH              Exercise 6    Exercise Name 6 SLS balance  -MH      Cueing 6 Verbal  -MH      Sets 6 3  -MH      Time 6 L: 3-4 sec. R 4-5 sec  -MH      Additional Comments bilat  -MH              Exercise 7    Exercise Name 7 R Clamshells  -MH      Cueing 7 Verbal;Tactile  -MH      Sets 7 2  -MH      Reps 7 10  -MH              Exercise 8    Exercise Name 8 S/L SLR hip abd  -MH      Cueing 8 Verbal;Tactile  -MH      Sets 8 2  -MH      Reps 8 10  -MH      Additional Comments Bilat  -MH              Exercise 9    Exercise Name 9 SLR flex  -MH      Cueing 9 Verbal;Tactile  -MH      Sets 9 2  -MH      Reps 9 10  -MH      Additional Comments Bilat  -MH            User Key  (r) = Recorded By, (t) = Taken By, (c) = Cosigned By    Initials Name Provider Type    Kayleigh Townsend, PT Physical Therapist                              PT OP Goals     Row Name 04/20/22 0900          Long Term Goals    LTG Date to Achieve 05/11/22  -     LTG 1 complete 6' walk test safely without loss of balance or tripping  -     LTG 1 Progress Met  -MH     LTG 2 knee MMT grossly 5/5 sitting  -MH     LTG 2 Progress Partially Met  -MH     LTG 2 Progress Comments Met except for L knee flex  -MH     LTG 3 hip ER/IR MMT grossly 5/5  -MH     LTG 3 Progress Partially Met  -MH     LTG 3 Progress Comments Met except for L ER.  -MH     LTG 4 narrow base of support 2' without sway or loss of balance  -     LTG 4 Progress Met  -MH            Time Calculation    PT Goal Re-Cert Due Date 05/11/22  -           User Key  (r) = Recorded By, (t) = Taken By, (c) = Cosigned By    Initials Name Provider Type    Kayleigh Townsend, NATHAN Physical Therapist                               Time Calculation:   Start Time: 0930  Stop Time: 1015  Time  Calculation (min): 45 min  Timed Charges  73292 - PT Therapeutic Exercise Minutes: 35  42504 -  PT Neuromuscular Reeducation Minutes: 10  Total Minutes  Timed Charges Total Minutes: 45   Total Minutes: 45  Therapy Charges for Today     Code Description Service Date Service Provider Modifiers Qty    10728207975  PT NEUROMUSC RE EDUCATION EA 15 MIN 4/20/2022 Kayleigh Syed, PT GP 1    10312477315 HC PT THER PROC EA 15 MIN 4/20/2022 Kayleigh Syed, PT GP 2                    Kayleigh Syed, NATHAN, DPT  4/20/2022

## 2022-04-25 ENCOUNTER — HOSPITAL ENCOUNTER (OUTPATIENT)
Dept: PHYSICAL THERAPY | Facility: HOSPITAL | Age: 72
Setting detail: THERAPIES SERIES
Discharge: HOME | End: 2022-04-25

## 2022-04-25 DIAGNOSIS — R29.898 WEAKNESS OF BOTH LEGS: ICD-10-CM
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
## 2022-04-25 PROCEDURE — 97110 THERAPEUTIC EXERCISES: CPT | Performed by: PHYSICAL THERAPIST

## 2022-04-26 NOTE — THERAPY TREATMENT NOTE
Outpatient Physical Therapy Ortho Treatment Note  [facility]     Patient Name: Emily Haas  : 1950  MRN: [id number]  Today's Date: 2022      Visit Date: 2022    Visit Dx:    ICD-10-CM ICD-9-CM   1. Fall, initial encounter  W19.XXXA E888.9   2. Weakness of both legs  R29.898 729.89       Patient Active Problem List   Diagnosis   • Vitamin D deficiency   • Irritable bowel syndrome   • Insomnia   • Hyperlipidemia   • Generalized anxiety disorder   • Essential hypertension   • Diverticular disease of colon   • Diet-controlled type 2 diabetes mellitus (HCC)   • Anxiety state   • Acquired hypothyroidism   • Asthma   • Periumbilical abdominal pain   • Spider veins of limb   • Varicose veins of lower extremity   • Diabetes mellitus (HCC)   • Posterior subcapsular polar age-related cataract   • Encounter for screening for malignant neoplasm of colon   • Diverticulosis of large intestine without hemorrhage   • Irritable bowel syndrome with both constipation and diarrhea   • Vaginosis   • Fatty liver   • Diabetes mellitus type 2 without retinopathy (HCC)   • Depressive disorder   • Pure hypercholesterolemia   • Class 1 obesity due to excess calories with serious comorbidity and body mass index (BMI) of 33.0 to 33.9 in adult   • Right lower quadrant abdominal pain   • Left lower quadrant abdominal pain   • Diarrhea   • Nausea   • Bloating   • Pneumonia due to COVID-19 virus   • Stage 3b chronic kidney disease (HCC)   • Acute respiratory failure with hypoxia (HCC)   • Cytokine release syndrome, grade 2   • Barnett's esophagus without dysplasia        Past Medical History:   Diagnosis Date   • Anxiety    • Cancer (HCC)    • Chronic bronchitis (HCC)    • CKD (chronic kidney disease) stage 3, GFR 30-59 ml/min (HCC)    • Diabetes mellitus (HCC)    • Diverticulitis    • Hyperlipidemia    • Hypertension    • Hypothyroidism    • Irritable bowel syndrome    • Obesity    • Osteoarthritis    • Vitamin D  deficiency         Past Surgical History:   Procedure Laterality Date   • BILATERAL BREAST REDUCTION     • CHOLECYSTECTOMY     • COLONOSCOPY N/A 11/17/2017   • COLONOSCOPY N/A 1/14/2021   • ENDOSCOPY N/A 1/14/2021   • ENDOSCOPY N/A 2/16/2022    Procedure: ESOPHAGOGASTRODUODENOSCOPY;  Surgeon: Mauricio Betancourt MD;  Location: [facility] ENDOSCOPY;  Service: Gastroenterology;  Laterality: N/A;   • HYSTERECTOMY     • REDUCTION MAMMAPLASTY     • UPPER GASTROINTESTINAL ENDOSCOPY  01/14/2021   • UPPER GASTROINTESTINAL ENDOSCOPY  02/16/2022        PT Ortho     Row Name 04/25/22 0940       Precautions and Contraindications    Precautions/Limitations fall precautions  -BB    Precautions HX bladder CA  -BB       Subjective Pain    Post-Treatment Pain Level 0  -BB       Posture/Observations    Posture/Observations Comments wide base of support. No AD.  -BB          User Key  (r) = Recorded By, (t) = Taken By, (c) = Cosigned By    Initials Name Provider Type    BB Marisa Fang, PT DPT Physical Therapist                             PT Assessment/Plan     Row Name 04/25/22 9034          PT Assessment    Assessment Comments HEP progressed this day with pictures given. Good tolerance to treatment with one catching of the toe with self recovery done.  -BB            PT Plan    PT Frequency 2x/week  -BB     Predicted Duration of Therapy Intervention (PT) 2 more weeks for balance  -BB     PT Plan Comments Continue to progress HEP for strength and balance  -BB           User Key  (r) = Recorded By, (t) = Taken By, (c) = Cosigned By    Initials Name Provider Type    BB Marisa Fang, PT DPT Physical Therapist                   OP Exercises     Row Name 04/25/22 0901             Subjective Comments    Subjective Comments Reports stepping up a curb independent this weekend for first time in a long time.  -BB              Subjective Pain    Able to rate subjective pain? yes  -BB      Pre-Treatment Pain Level 0  -BB      Post-Treatment Pain  "Level 0  -BB              Exercise 1    Exercise Name 1 Pro 2 seat 8 endurance  -BB      Time 1 10'  -BB      Additional Comments level 2.5  -BB              Exercise 2    Exercise Name 2 standing hip 3 way with UE support  -BB      Sets 2 1  -BB      Reps 2 10 each plane and bilateral  -BB      Additional Comments HEP  -BB              Exercise 3    Exercise Name 3 standing HR with UE support  -BB      Sets 3 1  -BB      Reps 3 10  -BB      Additional Comments HEP  -BB              Exercise 4    Exercise Name 4 Unable to TR standing  -BB              Exercise 5    Exercise Name 5 sitting TR  -BB      Sets 5 1  -BB      Reps 5 10  -BB      Additional Comments HEP  -BB              Exercise 6    Exercise Name 6 Sit to stand from low mat table  -BB      Sets 6 1  -BB      Reps 6 5  -BB      Additional Comments decreased control  -BB              Exercise 7    Exercise Name 7 6\" inch step up for coordination and strength  -BB      Sets 7 1  -BB      Reps 7 10  -BB      Additional Comments HEP  -BB              Exercise 8    Exercise Name 8 long sitting HS curl  -BB      Sets 8 1  -BB      Reps 8 10  -BB      Additional Comments HEP each  -BB              Exercise 9    Exercise Name 9 SLR long sitting  -BB      Sets 9 1  -BB      Reps 9 10  -BB      Additional Comments HEP  -BB              Exercise 10    Exercise Name 10 sitting LAQ  -BB      Sets 10 1  -BB      Reps 10 10  -BB      Additional Comments HEP each  -BB              Exercise 11    Exercise Name 11 Tandem modified  -BB      Sets 11 5  -BB      Time 11 10\"  -BB      Additional Comments No UE assist  -BB              Exercise 12    Exercise Name 12 SLS with UE assist  -BB      Reps 12 5  -BB      Time 12 10\"  -BB      Additional Comments HEP  -BB            User Key  (r) = Recorded By, (t) = Taken By, (c) = Cosigned By    Initials Name Provider Type    Marisa Carvajal PT DPT Physical Therapist                              PT OP Goals     Row Name 04/25/22 " 0933          Long Term Goals    LTG Date to Achieve 05/11/22  -BB     LTG 1 complete 6' walk test safely without loss of balance or tripping  -BB     LTG 1 Progress Met  -BB     LTG 2 knee MMT grossly 5/5 sitting  -BB     LTG 2 Progress Partially Met  -BB     LTG 3 hip ER/IR MMT grossly 5/5  -BB     LTG 3 Progress Partially Met  -BB     LTG 4 narrow base of support 2' without sway or loss of balance  -BB     LTG 4 Progress Met  -BB            Time Calculation    PT Goal Re-Cert Due Date 05/11/22  -BB           User Key  (r) = Recorded By, (t) = Taken By, (c) = Cosigned By    Initials Name Provider Type    Marisa Carvajal, PT DPT Physical Therapist                               Time Calculation:   Start Time: 0933  Stop Time: 1014  Time Calculation (min): 41 min  Therapy Charges for Today     Code Description Service Date Service Provider Modifiers Qty    13235205755 HC PT THER PROC EA 15 MIN 4/25/2022 Marisa Fang, PT DPT GP 3                    Marisa Fang PT DPT  4/26/2022

## 2022-04-27 ENCOUNTER — HOSPITAL ENCOUNTER (OUTPATIENT)
Dept: PHYSICAL THERAPY | Facility: HOSPITAL | Age: 72
Setting detail: THERAPIES SERIES
Discharge: HOME | End: 2022-04-27
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
## 2022-04-27 DIAGNOSIS — R29.898 WEAKNESS OF BOTH LEGS: ICD-10-CM

## 2022-04-27 PROCEDURE — 97110 THERAPEUTIC EXERCISES: CPT | Performed by: PHYSICAL THERAPIST

## 2022-04-27 NOTE — THERAPY TREATMENT NOTE
Outpatient Physical Therapy Ortho Treatment Note  [facility]     Patient Name: Emily Haas  : 1950  MRN: [id number]  Today's Date: 2022      Visit Date: 2022    Visit Dx:    ICD-10-CM ICD-9-CM   1. Fall, initial encounter  W19.XXXA E888.9   2. Weakness of both legs  R29.898 729.89       Patient Active Problem List   Diagnosis   • Vitamin D deficiency   • Irritable bowel syndrome   • Insomnia   • Hyperlipidemia   • Generalized anxiety disorder   • Essential hypertension   • Diverticular disease of colon   • Diet-controlled type 2 diabetes mellitus (HCC)   • Anxiety state   • Acquired hypothyroidism   • Asthma   • Periumbilical abdominal pain   • Spider veins of limb   • Varicose veins of lower extremity   • Diabetes mellitus (HCC)   • Posterior subcapsular polar age-related cataract   • Encounter for screening for malignant neoplasm of colon   • Diverticulosis of large intestine without hemorrhage   • Irritable bowel syndrome with both constipation and diarrhea   • Vaginosis   • Fatty liver   • Diabetes mellitus type 2 without retinopathy (HCC)   • Depressive disorder   • Pure hypercholesterolemia   • Class 1 obesity due to excess calories with serious comorbidity and body mass index (BMI) of 33.0 to 33.9 in adult   • Right lower quadrant abdominal pain   • Left lower quadrant abdominal pain   • Diarrhea   • Nausea   • Bloating   • Pneumonia due to COVID-19 virus   • Stage 3b chronic kidney disease (HCC)   • Acute respiratory failure with hypoxia (HCC)   • Cytokine release syndrome, grade 2   • Barnett's esophagus without dysplasia        Past Medical History:   Diagnosis Date   • Anxiety    • Cancer (HCC)    • Chronic bronchitis (HCC)    • CKD (chronic kidney disease) stage 3, GFR 30-59 ml/min (HCC)    • Diabetes mellitus (HCC)    • Diverticulitis    • Hyperlipidemia    • Hypertension    • Hypothyroidism    • Irritable bowel syndrome    • Obesity    • Osteoarthritis    • Vitamin D  "deficiency         Past Surgical History:   Procedure Laterality Date   • BILATERAL BREAST REDUCTION     • CHOLECYSTECTOMY     • COLONOSCOPY N/A 11/17/2017   • COLONOSCOPY N/A 1/14/2021   • ENDOSCOPY N/A 1/14/2021   • ENDOSCOPY N/A 2/16/2022    Procedure: ESOPHAGOGASTRODUODENOSCOPY;  Surgeon: Mauricio Betancourt MD;  Location: [facility] ENDOSCOPY;  Service: Gastroenterology;  Laterality: N/A;   • HYSTERECTOMY     • REDUCTION MAMMAPLASTY     • UPPER GASTROINTESTINAL ENDOSCOPY  01/14/2021   • UPPER GASTROINTESTINAL ENDOSCOPY  02/16/2022                        PT Assessment/Plan     Row Name 04/27/22 0900          PT Assessment    Assessment Comments Good tolerance to treatment today. No loss of balance noted. Balance improving with exercises. Eyes close balance most difficult.  -BB            PT Plan    PT Frequency 2x/week  -BB     Predicted Duration of Therapy Intervention (PT) 2 more weeks  -BB     PT Plan Comments next week plan to DC. progress HEP to prepare for DC  -BB           User Key  (r) = Recorded By, (t) = Taken By, (c) = Cosigned By    Initials Name Provider Type    BB Marisa Fang, PT DPT Physical Therapist                   OP Exercises     Row Name 04/27/22 0900             Subjective Comments    Subjective Comments Exercises going good. Strength improving. Pleased with progress.  -BB              Subjective Pain    Able to rate subjective pain? yes  -BB      Pre-Treatment Pain Level 0  -BB      Post-Treatment Pain Level 0  -BB              Exercise 1    Exercise Name 1 pro 2 seat 8  -BB      Time 1 10'  -BB      Additional Comments Level 4  -BB              Exercise 2    Exercise Name 2 tandem and side step on foam  -BB      Time 2 5 laps each  -BB              Exercise 3    Exercise Name 3 NBOS balance eyes closed with close SBA  -BB      Sets 3 1  -BB      Reps 3 10  -BB      Time 3 5\" holds  -BB              Exercise 4    Exercise Name 4 SLS with toe touch bilateral  -BB      Sets 4 1  -BB      Reps " "4 10  -BB      Time 4 3\" holds  -BB              Exercise 5    Exercise Name 5 tandem balance  -BB      Sets 5 1  -BB      Reps 5 5  -BB      Time 5 10\" holds  -BB              Exercise 6    Exercise Name 6 step ups fwd/bkwd  -BB      Sets 6 1  -BB      Reps 6 10 each and on each leg  -BB      Additional Comments 6\"  -BB              Exercise 7    Exercise Name 7 cybex hip abd  -BB      Sets 7 2  -BB      Reps 7 10  -BB      Additional Comments 30lbs  -BB              Exercise 8    Exercise Name 8 cybex DLP  -BB      Sets 8 2  -BB      Reps 8 10  -BB      Additional Comments 90lbs  -BB            User Key  (r) = Recorded By, (t) = Taken By, (c) = Cosigned By    Initials Name Provider Type    Marisa Carvajal PT DPT Physical Therapist                              PT OP Goals     Row Name 04/27/22 0900          Long Term Goals    LTG Date to Achieve 05/11/22  -BB     LTG 1 complete 6' walk test safely without loss of balance or tripping  -BB     LTG 1 Progress Met  -BB     LTG 2 knee MMT grossly 5/5 sitting  -BB     LTG 2 Progress Partially Met  -BB     LTG 3 hip ER/IR MMT grossly 5/5  -BB     LTG 3 Progress Partially Met  -BB     LTG 4 narrow base of support 2' without sway or loss of balance  -BB     LTG 4 Progress Met  -BB            Time Calculation    PT Goal Re-Cert Due Date 05/11/22  -BB           User Key  (r) = Recorded By, (t) = Taken By, (c) = Cosigned By    Initials Name Provider Type    Marisa Carvajal PT DPT Physical Therapist                               Time Calculation:   Start Time: 0930  Stop Time: 1015  Time Calculation (min): 45 min  Therapy Charges for Today     Code Description Service Date Service Provider Modifiers Qty    86581411520 HC PT THER PROC EA 15 MIN 4/27/2022 Marisa Fang, PT DPT GP 3                    Marisa Fang PT ISAIAST  4/28/2022     "

## 2022-05-02 ENCOUNTER — HOSPITAL ENCOUNTER (OUTPATIENT)
Dept: PHYSICAL THERAPY | Facility: HOSPITAL | Age: 72
Setting detail: THERAPIES SERIES
Discharge: HOME | End: 2022-05-02
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
## 2022-05-02 DIAGNOSIS — R29.898 WEAKNESS OF BOTH LEGS: ICD-10-CM

## 2022-05-02 PROCEDURE — 97110 THERAPEUTIC EXERCISES: CPT | Performed by: PHYSICAL THERAPIST

## 2022-05-02 NOTE — THERAPY TREATMENT NOTE
Outpatient Physical Therapy Ortho Treatment Note  [facility]     Patient Name: Emily Haas  : 1950  MRN: [id number]  Today's Date: 2022      Visit Date: 2022    Visit Dx:    ICD-10-CM ICD-9-CM   1. Fall, initial encounter  W19.XXXA E888.9   2. Weakness of both legs  R29.898 729.89       Patient Active Problem List   Diagnosis   • Vitamin D deficiency   • Irritable bowel syndrome   • Insomnia   • Hyperlipidemia   • Generalized anxiety disorder   • Essential hypertension   • Diverticular disease of colon   • Diet-controlled type 2 diabetes mellitus (HCC)   • Anxiety state   • Acquired hypothyroidism   • Asthma   • Periumbilical abdominal pain   • Spider veins of limb   • Varicose veins of lower extremity   • Diabetes mellitus (HCC)   • Posterior subcapsular polar age-related cataract   • Encounter for screening for malignant neoplasm of colon   • Diverticulosis of large intestine without hemorrhage   • Irritable bowel syndrome with both constipation and diarrhea   • Vaginosis   • Fatty liver   • Diabetes mellitus type 2 without retinopathy (HCC)   • Depressive disorder   • Pure hypercholesterolemia   • Class 1 obesity due to excess calories with serious comorbidity and body mass index (BMI) of 33.0 to 33.9 in adult   • Right lower quadrant abdominal pain   • Left lower quadrant abdominal pain   • Diarrhea   • Nausea   • Bloating   • Pneumonia due to COVID-19 virus   • Stage 3b chronic kidney disease (HCC)   • Acute respiratory failure with hypoxia (HCC)   • Cytokine release syndrome, grade 2   • Barnett's esophagus without dysplasia        Past Medical History:   Diagnosis Date   • Anxiety    • Cancer (HCC)    • Chronic bronchitis (HCC)    • CKD (chronic kidney disease) stage 3, GFR 30-59 ml/min (HCC)    • Diabetes mellitus (HCC)    • Diverticulitis    • Hyperlipidemia    • Hypertension    • Hypothyroidism    • Irritable bowel syndrome    • Obesity    • Osteoarthritis    • Vitamin D  deficiency         Past Surgical History:   Procedure Laterality Date   • BILATERAL BREAST REDUCTION     • CHOLECYSTECTOMY     • COLONOSCOPY N/A 11/17/2017   • COLONOSCOPY N/A 1/14/2021   • ENDOSCOPY N/A 1/14/2021   • ENDOSCOPY N/A 2/16/2022    Procedure: ESOPHAGOGASTRODUODENOSCOPY;  Surgeon: Mauricio Betancourt MD;  Location: [facility] ENDOSCOPY;  Service: Gastroenterology;  Laterality: N/A;   • HYSTERECTOMY     • REDUCTION MAMMAPLASTY     • UPPER GASTROINTESTINAL ENDOSCOPY  01/14/2021   • UPPER GASTROINTESTINAL ENDOSCOPY  02/16/2022        PT Ortho     Row Name 05/02/22 1017       Subjective Comments    Subjective Comments Notes arms feel like they have been exercising but arent. No new falls. Legs doing better.  -BB       Precautions and Contraindications    Precautions/Limitations fall precautions  -BB    Precautions HX bladder CA  -BB       Subjective Pain    Able to rate subjective pain? yes  -BB    Pre-Treatment Pain Level --  arms are achy  -BB    Post-Treatment Pain Level --  arms feel the same  -BB       Posture/Observations    Posture/Observations Comments No loss of balance is treatment. Needs CGA for biodex with eyes closed for safety 50% of time  -BB          User Key  (r) = Recorded By, (t) = Taken By, (c) = Cosigned By    Initials Name Provider Type    BB Marisa Fang, PT DPT Physical Therapist                             PT Assessment/Plan     Row Name 05/02/22 1017          PT Assessment    Assessment Comments Good tolerance to treatment. Is noted to have decreased balance with eyes shut and on dynamic surfaces and encouraged to use AD when going to be on uneven terrain. Plan to DC to HEP next visit  -BB            PT Plan    Predicted Duration of Therapy Intervention (PT) 1 more visit  -BB     PT Plan Comments DC next visit, finalize HEP  -BB           User Key  (r) = Recorded By, (t) = Taken By, (c) = Cosigned By    Initials Name Provider Type    BB Marisa Fang, PT DPT Physical Therapist                    OP Exercises     Row Name 05/02/22 1017             Subjective Comments    Subjective Comments Notes arms feel like they have been exercising but arent. No new falls. Legs doing better.  -BB              Subjective Pain    Able to rate subjective pain? yes  -BB      Pre-Treatment Pain Level --  arms are achy  -BB      Post-Treatment Pain Level --  arms feel the same  -BB              Exercise 1    Exercise Name 1 Pro 2 UE/LE Level 2  -BB      Time 1 10'  -BB              Exercise 2    Exercise Name 2 Cybex hip abd/add  -BB      Sets 2 2  -BB      Reps 2 10  -BB      Additional Comments 30lbs  -BB              Exercise 3    Exercise Name 3 Cybex DLP  -BB      Sets 3 2  -BB      Reps 3 10  -BB      Additional Comments 90lbs  -BB              Exercise 4    Exercise Name 4 heel to toe on foam  -BB      Sets 4 1  -BB      Reps 4 5laps  -BB              Exercise 5    Exercise Name 5 side step on foam  -BB      Sets 5 1  -BB      Reps 5 5 laps  -BB              Exercise 6    Exercise Name 6 Sit to stand  -BB      Sets 6 2  -BB      Reps 6 5 reps  -BB      Additional Comments 21 inches tall  -BB              Exercise 7    Exercise Name 7 sit to stand  -BB      Sets 7 1  -BB      Reps 7 5  -BB      Additional Comments 19 inches tall  -BB              Exercise 8    Exercise Name 8 biodex balance  -BB      Time 8 16'  -BB      Additional Comments Composite score: 1.73- is fall risk on uneven terrain  -BB            User Key  (r) = Recorded By, (t) = Taken By, (c) = Cosigned By    Initials Name Provider Type    BB Marisa Fang, PT DPT Physical Therapist                              PT OP Goals     Row Name 05/02/22 1017          Long Term Goals    LTG Date to Achieve 05/11/22  -BB     LTG 1 complete 6' walk test safely without loss of balance or tripping  -BB     LTG 1 Progress Met  -BB     LTG 2 knee MMT grossly 5/5 sitting  -BB     LTG 2 Progress Partially Met  -BB     LTG 3 hip ER/IR MMT grossly 5/5  -BB      LTG 3 Progress Partially Met  -BB     LTG 4 narrow base of support 2' without sway or loss of balance  -BB     LTG 4 Progress Met  -BB            Time Calculation    PT Goal Re-Cert Due Date 05/11/22  -BB           User Key  (r) = Recorded By, (t) = Taken By, (c) = Cosigned By    Initials Name Provider Type    Marisa Carvajal, PT DPT Physical Therapist                               Time Calculation:   Start Time: 1017  Stop Time: 1056  Time Calculation (min): 39 min  Therapy Charges for Today     Code Description Service Date Service Provider Modifiers Qty    48384692198 HC PT THER PROC EA 15 MIN 5/2/2022 Marisa Fang, PT DPT GP 3                    Marisa Fang PT DPT  5/2/2022

## 2022-05-04 ENCOUNTER — HOSPITAL ENCOUNTER (OUTPATIENT)
Dept: PHYSICAL THERAPY | Facility: HOSPITAL | Age: 72
Setting detail: THERAPIES SERIES
Discharge: HOME | End: 2022-05-04
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
## 2022-05-04 DIAGNOSIS — R29.898 WEAKNESS OF BOTH LEGS: ICD-10-CM

## 2022-05-04 PROCEDURE — 97110 THERAPEUTIC EXERCISES: CPT

## 2022-05-04 NOTE — THERAPY DISCHARGE NOTE
Outpatient Physical Therapy Ortho Treatment Note/Discharge Summary  [facility]     Patient Name: Emily Haas  : 1950  MRN: [id number]  Today's Date: 2022      Visit Date: 2022     Subjective Improvement 80% in legs  Visits   Visits approved medicare RTMD 2022  Recert Date n/a    Falls    Visit Dx:    ICD-10-CM ICD-9-CM   1. Fall, initial encounter  W19.XXXA E888.9   2. Weakness of both legs  R29.898 729.89       Patient Active Problem List   Diagnosis   • Vitamin D deficiency   • Irritable bowel syndrome   • Insomnia   • Hyperlipidemia   • Generalized anxiety disorder   • Essential hypertension   • Diverticular disease of colon   • Diet-controlled type 2 diabetes mellitus (HCC)   • Anxiety state   • Acquired hypothyroidism   • Asthma   • Periumbilical abdominal pain   • Spider veins of limb   • Varicose veins of lower extremity   • Diabetes mellitus (HCC)   • Posterior subcapsular polar age-related cataract   • Encounter for screening for malignant neoplasm of colon   • Diverticulosis of large intestine without hemorrhage   • Irritable bowel syndrome with both constipation and diarrhea   • Vaginosis   • Fatty liver   • Diabetes mellitus type 2 without retinopathy (HCC)   • Depressive disorder   • Pure hypercholesterolemia   • Class 1 obesity due to excess calories with serious comorbidity and body mass index (BMI) of 33.0 to 33.9 in adult   • Right lower quadrant abdominal pain   • Left lower quadrant abdominal pain   • Diarrhea   • Nausea   • Bloating   • Pneumonia due to COVID-19 virus   • Stage 3b chronic kidney disease (HCC)   • Acute respiratory failure with hypoxia (HCC)   • Cytokine release syndrome, grade 2   • Barnett's esophagus without dysplasia        Past Medical History:   Diagnosis Date   • Anxiety    • Cancer (HCC)    • Chronic bronchitis (HCC)    • CKD (chronic kidney disease) stage 3, GFR 30-59 ml/min (HCC)    • Diabetes mellitus (HCC)    • Diverticulitis     • Hyperlipidemia    • Hypertension    • Hypothyroidism    • Irritable bowel syndrome    • Obesity    • Osteoarthritis    • Vitamin D deficiency         Past Surgical History:   Procedure Laterality Date   • BILATERAL BREAST REDUCTION     • CHOLECYSTECTOMY     • COLONOSCOPY N/A 11/17/2017   • COLONOSCOPY N/A 1/14/2021   • ENDOSCOPY N/A 1/14/2021   • ENDOSCOPY N/A 2/16/2022    Procedure: ESOPHAGOGASTRODUODENOSCOPY;  Surgeon: Mauricio Betancourt MD;  Location: [facility] ENDOSCOPY;  Service: Gastroenterology;  Laterality: N/A;   • HYSTERECTOMY     • REDUCTION MAMMAPLASTY     • UPPER GASTROINTESTINAL ENDOSCOPY  01/14/2021   • UPPER GASTROINTESTINAL ENDOSCOPY  02/16/2022        PT Ortho     Row Name 05/04/22 1100       Subjective Comments    Subjective Comments Patient states that she is 80% better in the legs.  she does have other things going on and she will contact or visit her MD  -CP          User Key  (r) = Recorded By, (t) = Taken By, (c) = Cosigned By    Initials Name Provider Type    Yocasta Quan PTA Physical Therapist Assistant                             PT Assessment/Plan     Row Name 05/04/22 1355          PT Assessment    Assessment Comments Patient has progressed well with all goals met.  She was encouraged to walk daily on a safe surface and safe location.  -CP            PT Plan    PT Plan Comments D/C to home with HEP  -CP           User Key  (r) = Recorded By, (t) = Taken By, (c) = Cosigned By    Initials Name Provider Type    Yocasta Quan PTA Physical Therapist Assistant                     OP Exercises     Row Name 05/04/22 1356 05/04/22 1100          Subjective Comments    Subjective Comments -- Patient states that she is 80% better in the legs.  she does have other things going on and she will contact or visit her MD  -CP            Subjective Pain    Able to rate subjective pain? -- yes  -CP     Pre-Treatment Pain Level -- 2  -CP     Post-Treatment Pain Level -- 2  -CP             Total Minutes    99479 - PT Therapeutic Exercise Minutes 40  -CP --            Exercise 1    Exercise Name 1 -- LAQ  -CP     Cueing 1 -- Verbal;Demo  -CP     Sets 1 -- 2  -CP     Reps 1 -- 10  -CP     Time 1 -- 1 lb AW B LE  -CP            Exercise 2    Exercise Name 2 -- seated knee fl with tband  -CP     Cueing 2 -- Verbal;Tactile  -CP     Sets 2 -- 2  -CP     Reps 2 -- 10  -CP     Time 2 -- red Tband  -CP     Additional Comments -- B LE  -CP            Exercise 3    Exercise Name 3 -- seated hip IR/ER with tband  -CP     Cueing 3 -- Verbal;Tactile  -CP     Sets 3 -- 2  -CP     Reps 3 -- 10  -CP     Time 3 -- red tband  -CP     Additional Comments -- B LE  -CP            Exercise 4    Exercise Name 4 -- CR at table  -CP     Cueing 4 -- Verbal;Demo  -CP     Reps 4 -- 20  -CP            Exercise 5    Exercise Name 5 -- side stepping at table  -CP     Cueing 5 -- Verbal;Demo  -CP     Reps 5 -- 5  -CP            Exercise 6    Exercise Name 6 -- review HEP  -CP            Exercise 7    Exercise Name 7 -- 6 minute walk  -CP     Sets 7 -- 810 ft  -CP           User Key  (r) = Recorded By, (t) = Taken By, (c) = Cosigned By    Initials Name Provider Type    CP Yocasta Linares PTA Physical Therapist Assistant                                PT OP Goals     Row Name 05/04/22 1000          Long Term Goals    LTG Date to Achieve 05/11/22  -CP     LTG 1 complete 6' walk test safely without loss of balance or tripping  -CP     LTG 1 Progress Met  -CP     LTG 2 knee MMT grossly 5/5 sitting  -CP     LTG 2 Progress Met  -CP     LTG 3 hip ER/IR MMT grossly 5/5  -CP     LTG 3 Progress Met  -CP     LTG 4 narrow base of support 2' without sway or loss of balance  -CP     LTG 4 Progress Met  -CP            Time Calculation    PT Goal Re-Cert Due Date 05/11/22  -CP           User Key  (r) = Recorded By, (t) = Taken By, (c) = Cosigned By    Initials Name Provider Type    Yocasta Quan, PTA Physical Therapist Assistant                 Therapy Education  Education Details: See ex flow sheet  Given: HEP  Program: New  How Provided: Verbal, Demonstration, Written  Provided to: Patient  Level of Understanding: Teach back education performed, Verbalized, Demonstrated              Time Calculation:   Start Time: 1020  Stop Time: 1100  Time Calculation (min): 40 min  Total Timed Code Minutes- PT: 40 minute(s)  Timed Charges  06954 - PT Therapeutic Exercise Minutes: 40  Total Minutes  Timed Charges Total Minutes: 40   Total Minutes: 40  Therapy Charges for Today     Code Description Service Date Service Provider Modifiers Qty    51324436128 HC PT THER PROC EA 15 MIN 5/4/2022 Yocasta Linares PTA GP, CQ 3                OP PT Discharge Summary  Date of Discharge: 05/04/22  Reason for Discharge: All goals achieved  Outcomes Achieved: Able to achieve all goals within established timeline  Discharge Destination: Home with home program      Yocasta Linares PTA  5/4/2022

## 2022-05-06 ENCOUNTER — LAB (OUTPATIENT)
Dept: LAB | Facility: HOSPITAL | Age: 72
End: 2022-05-06

## 2022-05-06 ENCOUNTER — OFFICE VISIT (OUTPATIENT)
Dept: FAMILY MEDICINE CLINIC | Facility: CLINIC | Age: 72
End: 2022-05-06

## 2022-05-06 VITALS
WEIGHT: 194 LBS | DIASTOLIC BLOOD PRESSURE: 100 MMHG | BODY MASS INDEX: 32.32 KG/M2 | SYSTOLIC BLOOD PRESSURE: 160 MMHG | HEIGHT: 65 IN | HEART RATE: 98 BPM | OXYGEN SATURATION: 97 %

## 2022-05-06 DIAGNOSIS — M19.90 ARTHRITIS: Primary | ICD-10-CM

## 2022-05-06 DIAGNOSIS — M19.90 ARTHRITIS: ICD-10-CM

## 2022-05-06 DIAGNOSIS — I10 ESSENTIAL HYPERTENSION: ICD-10-CM

## 2022-05-06 DIAGNOSIS — E11.9 TYPE 2 DIABETES MELLITUS WITHOUT COMPLICATION, WITHOUT LONG-TERM CURRENT USE OF INSULIN: ICD-10-CM

## 2022-05-06 DIAGNOSIS — E03.9 ACQUIRED HYPOTHYROIDISM: ICD-10-CM

## 2022-05-06 LAB
BASOPHILS # BLD AUTO: 0.07 10*3/MM3 (ref 0–0.2)
BASOPHILS NFR BLD AUTO: 0.7 % (ref 0–1.5)
DEPRECATED RDW RBC AUTO: 45.4 FL (ref 37–54)
EOSINOPHIL # BLD AUTO: 0.56 10*3/MM3 (ref 0–0.4)
EOSINOPHIL NFR BLD AUTO: 5.7 % (ref 0.3–6.2)
ERYTHROCYTE [DISTWIDTH] IN BLOOD BY AUTOMATED COUNT: 13.1 % (ref 12.3–15.4)
HCT VFR BLD AUTO: 42.4 % (ref 34–46.6)
HGB BLD-MCNC: 13.5 G/DL (ref 12–15.9)
IMM GRANULOCYTES # BLD AUTO: 0.02 10*3/MM3 (ref 0–0.05)
IMM GRANULOCYTES NFR BLD AUTO: 0.2 % (ref 0–0.5)
LYMPHOCYTES # BLD AUTO: 2.7 10*3/MM3 (ref 0.7–3.1)
LYMPHOCYTES NFR BLD AUTO: 27.5 % (ref 19.6–45.3)
MCH RBC QN AUTO: 30.2 PG (ref 26.6–33)
MCHC RBC AUTO-ENTMCNC: 31.8 G/DL (ref 31.5–35.7)
MCV RBC AUTO: 94.9 FL (ref 79–97)
MONOCYTES # BLD AUTO: 0.53 10*3/MM3 (ref 0.1–0.9)
MONOCYTES NFR BLD AUTO: 5.4 % (ref 5–12)
NEUTROPHILS NFR BLD AUTO: 5.95 10*3/MM3 (ref 1.7–7)
NEUTROPHILS NFR BLD AUTO: 60.5 % (ref 42.7–76)
NRBC BLD AUTO-RTO: 0 /100 WBC (ref 0–0.2)
PLATELET # BLD AUTO: 251 10*3/MM3 (ref 140–450)
PMV BLD AUTO: 11.8 FL (ref 6–12)
RBC # BLD AUTO: 4.47 10*6/MM3 (ref 3.77–5.28)
WBC NRBC COR # BLD: 9.83 10*3/MM3 (ref 3.4–10.8)

## 2022-05-06 PROCEDURE — 86431 RHEUMATOID FACTOR QUANT: CPT

## 2022-05-06 PROCEDURE — 99214 OFFICE O/P EST MOD 30 MIN: CPT | Performed by: FAMILY MEDICINE

## 2022-05-06 PROCEDURE — 82550 ASSAY OF CK (CPK): CPT

## 2022-05-06 PROCEDURE — 84443 ASSAY THYROID STIM HORMONE: CPT

## 2022-05-06 PROCEDURE — 83036 HEMOGLOBIN GLYCOSYLATED A1C: CPT

## 2022-05-06 PROCEDURE — 86140 C-REACTIVE PROTEIN: CPT

## 2022-05-06 PROCEDURE — 86038 ANTINUCLEAR ANTIBODIES: CPT

## 2022-05-06 PROCEDURE — 96372 THER/PROPH/DIAG INJ SC/IM: CPT | Performed by: FAMILY MEDICINE

## 2022-05-06 PROCEDURE — 80053 COMPREHEN METABOLIC PANEL: CPT

## 2022-05-06 PROCEDURE — 85025 COMPLETE CBC W/AUTO DIFF WBC: CPT

## 2022-05-06 PROCEDURE — 36415 COLL VENOUS BLD VENIPUNCTURE: CPT

## 2022-05-06 RX ORDER — TRIAMCINOLONE ACETONIDE 40 MG/ML
80 INJECTION, SUSPENSION INTRA-ARTICULAR; INTRAMUSCULAR ONCE
Status: COMPLETED | OUTPATIENT
Start: 2022-05-06 | End: 2022-05-06

## 2022-05-06 RX ADMIN — TRIAMCINOLONE ACETONIDE 80 MG: 40 INJECTION, SUSPENSION INTRA-ARTICULAR; INTRAMUSCULAR at 15:16

## 2022-05-06 NOTE — PROGRESS NOTES
Chief Complaint  Arthritis    Subjective    History of Present Illness {  Problem List  Visit  Diagnosis   Encounters  Notes  Medications  Labs  Result Review Imaging  Media :23}     Emily Haas presents to [facility] [place] for     Chief Complaint   Patient presents with   • Arthritis      Patient seen today for follow up.  Chronic medical problems include hypothyroidism, diabetes - diet controlled, arthritis, hypertension, chronic kidney disease and GERD.  She desires steroid injection.  When she was seeing previous PCP, she was given steroid injections on a regular basis for treatment of arthritis.  Last time she had a steroid injection was 6-8 months ago.  Trying tylenol and arthritis medication, without relief.        Current Outpatient Medications:   •  acetaminophen (TYLENOL) 650 MG 8 hr tablet, Take 2 tablets by mouth Every 8 (Eight) Hours As Needed., Disp: , Rfl:   •  albuterol sulfate  (90 Base) MCG/ACT inhaler, Inhale 2 puffs Every 6 (Six) Hours As Needed for Wheezing or Shortness of Air., Disp: 6.7 g, Rfl: 0  •  budesonide-formoterol (SYMBICORT) 160-4.5 MCG/ACT inhaler, Inhale 2 puffs 2 (Two) Times a Day. (Patient taking differently: Inhale 2 puffs 2 (Two) Times a Day As Needed.), Disp: 6 g, Rfl: 0  •  carvedilol (COREG) 12.5 MG tablet, Take 1 tablet by mouth 2 (Two) Times a Day With Meals., Disp: 180 tablet, Rfl: 3  •  dicyclomine (BENTYL) 10 MG capsule, Take 10 mg by mouth 2 (two) times a day., Disp: , Rfl:   •  docusate sodium (COLACE) 50 MG capsule, Take 1 capsule by mouth Daily As Needed., Disp: , Rfl:   •  escitalopram (LEXAPRO) 10 MG tablet, Take 1 tablet by mouth Daily., Disp: 90 tablet, Rfl: 3  •  losartan (COZAAR) 100 MG tablet, Take 1 tablet by mouth Daily., Disp: 90 tablet, Rfl: 2  •  melatonin 3 MG tablet, TAKE 1 TABLET BY MOUTH AT NIGHT AS NEEDED FOR SLEEP., Disp: 90 tablet, Rfl: 1  •  omeprazole (priLOSEC) 40 MG  "capsule, Take 1 capsule by mouth Daily. (Patient taking differently: Take 40 mg by mouth Daily As Needed.), Disp: 30 capsule, Rfl: 11  •  ondansetron ODT (ZOFRAN-ODT) 4 MG disintegrating tablet, Place 1 tablet on the tongue 4 (Four) Times a Day As Needed for Nausea or Vomiting., Disp: 12 tablet, Rfl: 0  •  Synthroid 112 MCG tablet, Take 1 tablet by mouth Daily., Disp: 90 tablet, Rfl: 0  •  vitamin D (ERGOCALCIFEROL) 1.25 MG (53290 UT) capsule capsule, Take 50,000 Units by mouth 1 (One) Time Per Week., Disp: , Rfl:      Objective       Vital Signs:   /100   Pulse 98   Ht 165.1 cm (65\")   Wt 88 kg (194 lb)   SpO2 97%   BMI 32.28 kg/m²     Physical Exam  Vitals reviewed.   Constitutional:       General: She is not in acute distress.     Appearance: She is well-developed.   Cardiovascular:      Rate and Rhythm: Normal rate and regular rhythm.      Heart sounds: Normal heart sounds. No murmur heard.  Pulmonary:      Effort: Pulmonary effort is normal. No respiratory distress.      Breath sounds: Normal breath sounds. No wheezing or rales.   Skin:     General: Skin is warm and dry.   Neurological:      Mental Status: She is alert and oriented to person, place, and time.        Result Review :{ Labs  Result Review  Imaging  Med Tab  Media :23}   The following data was reviewed by: Bharati Love MD on 05/06/2022    Common labs    Common Labsle 8/13/21 12/17/21 12/17/21 1/12/22 1/12/22     1034 1034 0911 0911   Glucose   147 (A)  209 (A)   BUN   14  14   Creatinine   1.07 (A)  1.05 (A)   eGFR Non African Am   51 (A)  52 (A)   Sodium   143  135 (A)   Potassium   4.8  4.1   Chloride   106  97 (A)   Calcium   9.8  9.3   Albumin   3.90     WBC  7.37  13.04 (A)    Hemoglobin  13.1  14.4    Hematocrit  39.3  43.5    Platelets  245  268    Hemoglobin A1C 6.80 (A)       (A) Abnormal value                        Assessment and Plan {CC Problem List  Visit Diagnosis  ROS  Review (Popup)  Health Maintenance  " Quality  BestPractice  Medications  SmartSets  SnapShot Encounters  Media :23}   Diagnoses and all orders for this visit:    1. Arthritis (Primary)  -     CBC & Differential; Future  -     Comprehensive Metabolic Panel; Future  -     Rheumatoid factor; Future  -     C-reactive protein; Future  -     ELENA; Future  -     TSH; Future  -     Hemoglobin A1c; Future  -     CK; Future  -     triamcinolone acetonide (KENALOG-40) injection 80 mg    2. Acquired hypothyroidism  -     TSH; Future    3. Essential hypertension  -     CBC & Differential; Future  -     Comprehensive Metabolic Panel; Future  -     TSH; Future  -     Hemoglobin A1c; Future  -     CK; Future    4. Type 2 diabetes mellitus without complication, without long-term current use of insulin (HCC)  -     Hemoglobin A1c; Future       Patient seen for follow up  Arthritis not well controlled  Check labs as above  Kenalog injection given at office visit  Hypothyroidism controlled, check TSH  Hypertension not well controlled, patient has not had medication today  Advised to take medication upon return home  Check bebe as above  A1c for monitoring of diabetes - diet controlled at this time      Follow Up {Instructions Charge Capture  Follow-up Communications :23}   Return in about 4 months (around 9/6/2022) for Recheck.  Patient was given instructions and counseling regarding her condition or for health maintenance advice. Please see specific information pulled into the AVS if appropriate.        This document has been electronically signed by Bharati Love MD

## 2022-05-07 LAB
ALBUMIN SERPL-MCNC: 3.9 G/DL (ref 3.5–5.2)
ALBUMIN/GLOB SERPL: 1.2 G/DL
ALP SERPL-CCNC: 82 U/L (ref 39–117)
ALT SERPL W P-5'-P-CCNC: 14 U/L (ref 1–33)
ANION GAP SERPL CALCULATED.3IONS-SCNC: 12.3 MMOL/L (ref 5–15)
AST SERPL-CCNC: 28 U/L (ref 1–32)
BILIRUB SERPL-MCNC: 0.4 MG/DL (ref 0–1.2)
BUN SERPL-MCNC: 14 MG/DL (ref 8–23)
BUN/CREAT SERPL: 14.3 (ref 7–25)
CALCIUM SPEC-SCNC: 10 MG/DL (ref 8.6–10.5)
CHLORIDE SERPL-SCNC: 103 MMOL/L (ref 98–107)
CHROMATIN AB SERPL-ACNC: <10 IU/ML (ref 0–14)
CK SERPL-CCNC: 51 U/L (ref 20–180)
CO2 SERPL-SCNC: 25.7 MMOL/L (ref 22–29)
CREAT SERPL-MCNC: 0.98 MG/DL (ref 0.57–1)
CRP SERPL-MCNC: 0.91 MG/DL (ref 0–0.5)
EGFRCR SERPLBLD CKD-EPI 2021: 61.5 ML/MIN/1.73
GLOBULIN UR ELPH-MCNC: 3.2 GM/DL
GLUCOSE SERPL-MCNC: 181 MG/DL (ref 65–99)
HBA1C MFR BLD: 7 % (ref 4.8–5.6)
POTASSIUM SERPL-SCNC: 4.6 MMOL/L (ref 3.5–5.2)
PROT SERPL-MCNC: 7.1 G/DL (ref 6–8.5)
SODIUM SERPL-SCNC: 141 MMOL/L (ref 136–145)
TSH SERPL DL<=0.05 MIU/L-ACNC: 1.65 UIU/ML (ref 0.27–4.2)

## 2022-05-09 ENCOUNTER — TELEPHONE (OUTPATIENT)
Dept: FAMILY MEDICINE CLINIC | Facility: CLINIC | Age: 72
End: 2022-05-09

## 2022-05-09 ENCOUNTER — APPOINTMENT (OUTPATIENT)
Dept: PHYSICAL THERAPY | Facility: HOSPITAL | Age: 72
End: 2022-05-09

## 2022-05-09 LAB — ANA SER QL: NEGATIVE

## 2022-05-09 NOTE — TELEPHONE ENCOUNTER
Labs ok.  HgbA1c is slightly higher than previous at 7.0%.  Still awaiting ELENA, will contact once results are available.  Genie Love

## 2022-05-09 NOTE — PROGRESS NOTES
Per Dr. Love, Ms. Haas has been called with recent lab results & recommendations.  Continue current medications and follow-up as planned or sooner if any problems.

## 2022-05-09 NOTE — TELEPHONE ENCOUNTER
Per Dr. Love, Ms. Haas has been called with recent lab results & recommendations.  Continue current medications and follow-up as planned or sooner if any problems.       ----- Message from Bharati Love MD sent at 5/9/2022  2:21 PM CDT -----  ELENA is negative.  - CHEMO Love

## 2022-05-11 ENCOUNTER — APPOINTMENT (OUTPATIENT)
Dept: PHYSICAL THERAPY | Facility: HOSPITAL | Age: 72
End: 2022-05-11

## 2022-05-20 DIAGNOSIS — E03.9 ACQUIRED HYPOTHYROIDISM: ICD-10-CM

## 2022-05-20 RX ORDER — LEVOTHYROXINE SODIUM 112 MCG
TABLET ORAL
Qty: 30 TABLET | Refills: 1 | Status: SHIPPED | OUTPATIENT
Start: 2022-05-20 | End: 2022-08-17 | Stop reason: CLARIF

## 2022-05-31 ENCOUNTER — OFFICE VISIT (OUTPATIENT)
Dept: GASTROENTEROLOGY | Facility: CLINIC | Age: 72
End: 2022-05-31

## 2022-05-31 VITALS
HEIGHT: 65 IN | WEIGHT: 190 LBS | SYSTOLIC BLOOD PRESSURE: 138 MMHG | DIASTOLIC BLOOD PRESSURE: 74 MMHG | HEART RATE: 59 BPM | BODY MASS INDEX: 31.65 KG/M2

## 2022-05-31 DIAGNOSIS — R10.32 LEFT LOWER QUADRANT ABDOMINAL PAIN: Primary | ICD-10-CM

## 2022-05-31 DIAGNOSIS — R19.7 DIARRHEA, UNSPECIFIED TYPE: ICD-10-CM

## 2022-05-31 PROCEDURE — 99213 OFFICE O/P EST LOW 20 MIN: CPT | Performed by: INTERNAL MEDICINE

## 2022-06-16 NOTE — PROGRESS NOTES
Chief Complaint   Patient presents with   • 3 month f/u        Subjective    Emily Haas is a 72 y.o. female.    History of Present Illness  Patient presented to GI clinic for follow-up visit today.  Has occasional symptoms of left lower quadrant pain with the significant improvement since last visit.  Has occasional symptoms of diarrhea.  Chest pain has resolved.  Denied nausea, vomiting, rectal bleeding or weight loss.       The following portions of the patient's history were reviewed and updated as appropriate:   Past Medical History:   Diagnosis Date   • Anxiety    • Cancer (HCC)    • Chronic bronchitis (HCC)    • CKD (chronic kidney disease) stage 3, GFR 30-59 ml/min (HCC)    • Diabetes mellitus (HCC)    • Diverticulitis    • Hyperlipidemia    • Hypertension    • Hypothyroidism    • Irritable bowel syndrome    • Obesity    • Osteoarthritis    • Vitamin D deficiency      Past Surgical History:   Procedure Laterality Date   • BILATERAL BREAST REDUCTION     • CHOLECYSTECTOMY     • COLONOSCOPY N/A 2017   • COLONOSCOPY N/A 2021   • ENDOSCOPY N/A 2021   • ENDOSCOPY N/A 2022    Procedure: ESOPHAGOGASTRODUODENOSCOPY;  Surgeon: Mauricio Betancourt MD;  Location: [facility], [place] ENDOSCOPY;  Service: Gastroenterology;  Laterality: N/A;   • HYSTERECTOMY     • REDUCTION MAMMAPLASTY     • UPPER GASTROINTESTINAL ENDOSCOPY  2021   • UPPER GASTROINTESTINAL ENDOSCOPY  2022     Family History   Problem Relation Age of Onset   • Hypertension Mother    • Alzheimer's disease Father    • Diabetes Brother    • Hypothyroidism Daughter      OB History        2    Para   2    Term   2            AB        Living           SAB        IAB        Ectopic        Molar        Multiple        Live Births                  Prior to Admission medications    Medication Sig Start Date End Date Taking? Authorizing Provider   acetaminophen (TYLENOL) 650 MG 8 hr tablet Take 2 tablets by mouth Every 8 (Eight)  Hours As Needed.   Yes Jose Alejandro Sommers MD   albuterol sulfate  (90 Base) MCG/ACT inhaler Inhale 2 puffs Every 6 (Six) Hours As Needed for Wheezing or Shortness of Air. 1/18/22  Yes Bharati Love MD   budesonide-formoterol (SYMBICORT) 160-4.5 MCG/ACT inhaler Inhale 2 puffs 2 (Two) Times a Day.  Patient taking differently: Inhale 2 puffs 2 (Two) Times a Day As Needed. 4/6/21  Yes Fabiola Layne MD   carvedilol (COREG) 12.5 MG tablet Take 1 tablet by mouth 2 (Two) Times a Day With Meals. 5/12/20  Yes Fernando Hutton MD   dicyclomine (BENTYL) 10 MG capsule Take 10 mg by mouth 2 (two) times a day.   Yes Jose Alejandro Sommers MD   docusate sodium (COLACE) 50 MG capsule Take 1 capsule by mouth Daily As Needed.   Yes Jose Alejandro Sommers MD   escitalopram (LEXAPRO) 10 MG tablet Take 1 tablet by mouth Daily. 12/13/21  Yes Bharati Love MD   losartan (COZAAR) 100 MG tablet Take 1 tablet by mouth Daily. 6/25/21  Yes Bharati Love MD   melatonin 3 MG tablet TAKE 1 TABLET BY MOUTH AT NIGHT AS NEEDED FOR SLEEP. 12/9/21  Yes Bharati Love MD   omeprazole (priLOSEC) 40 MG capsule Take 1 capsule by mouth Daily.  Patient taking differently: Take 40 mg by mouth Daily As Needed. 2/12/21  Yes Mauricio Betancourt MD   ondansetron ODT (ZOFRAN-ODT) 4 MG disintegrating tablet Place 1 tablet on the tongue 4 (Four) Times a Day As Needed for Nausea or Vomiting. 1/12/22  Yes Dewey Del Valle MD   Synthroid 112 MCG tablet Take 1 tablet by mouth once daily 5/20/22  Yes Bharati Love MD   vitamin D (ERGOCALCIFEROL) 1.25 MG (91371 UT) capsule capsule Take 50,000 Units by mouth 1 (One) Time Per Week. 11/12/20  Yes Jose Alejandro Sommers MD   azithromycin (ZITHROMAX) 250 MG tablet Take 2 tablets the first day, then 1 tablet daily for 4 days. 6/15/22   Radha Cuba APRN   fluconazole (DIFLUCAN) 200 MG tablet Take one at the first sign of infection and may repeat in 3 days as needed. 6/15/22    "Radha Cuba APRN   promethazine-dextromethorphan (PROMETHAZINE-DM) 6.25-15 MG/5ML syrup Take 5 mL by mouth 4 (Four) Times a Day As Needed for Cough. 6/15/22   Radha Cbua APRN     Allergies   Allergen Reactions   • Augmentin [Amoxicillin-Pot Clavulanate] Hives   • Dilaudid [Hydromorphone] Nausea And Vomiting   • Sulfa Antibiotics Hives   • Atenolol Myalgia   • Lipitor [Atorvastatin] Myalgia   • Pravachol [Pravastatin] Myalgia   • Statins Myalgia     \"muscle pain \"      Social History     Socioeconomic History   • Marital status:    Tobacco Use   • Smoking status: Former Smoker     Packs/day: 3.00     Years: 15.00     Pack years: 45.00     Types: Cigarettes   • Smokeless tobacco: Never Used   • Tobacco comment: 08/10/2021 - Patient states she has not utilized Cigarettes X 34 years and upon ceassation, patient was utilizing 3 packs of Cigarettes per day.   Vaping Use   • Vaping Use: Never used   Substance and Sexual Activity   • Alcohol use: No   • Drug use: No   • Sexual activity: Defer       Review of Systems  Review of Systems   Constitutional: Negative for chills, fatigue, fever and unexpected weight change.   HENT: Negative for congestion, ear discharge, hearing loss, nosebleeds and sore throat.    Eyes: Negative for pain, discharge and redness.   Respiratory: Negative for cough, chest tightness, shortness of breath and wheezing.    Cardiovascular: Negative for chest pain and palpitations.   Gastrointestinal: Positive for abdominal pain and diarrhea. Negative for abdominal distention, blood in stool, constipation, nausea and vomiting.   Endocrine: Negative for cold intolerance, polydipsia, polyphagia and polyuria.   Genitourinary: Negative for dysuria, flank pain, frequency, hematuria and urgency.   Musculoskeletal: Negative for arthralgias, back pain, joint swelling and myalgias.   Skin: Negative for color change, pallor and rash.   Neurological: Negative for tremors, seizures, syncope, " "weakness and headaches.   Hematological: Negative for adenopathy. Does not bruise/bleed easily.   Psychiatric/Behavioral: Negative for behavioral problems, confusion, dysphoric mood, hallucinations and suicidal ideas. The patient is not nervous/anxious.         /74 (BP Location: Right arm)   Pulse 59   Ht 165.1 cm (65\")   Wt 86.2 kg (190 lb)   LMP  (LMP Unknown)   BMI 31.62 kg/m²     Objective    Physical Exam  Constitutional:       Appearance: She is well-developed.   HENT:      Head: Normocephalic and atraumatic.   Eyes:      Conjunctiva/sclera: Conjunctivae normal.      Pupils: Pupils are equal, round, and reactive to light.   Neck:      Thyroid: No thyromegaly.   Cardiovascular:      Rate and Rhythm: Normal rate and regular rhythm.      Heart sounds: Normal heart sounds. No murmur heard.  Pulmonary:      Effort: Pulmonary effort is normal.      Breath sounds: Normal breath sounds. No wheezing.   Abdominal:      General: Bowel sounds are normal. There is no distension.      Palpations: Abdomen is soft. There is no mass.      Tenderness: There is no abdominal tenderness.      Hernia: No hernia is present.   Genitourinary:     Comments: No lesions noted  Musculoskeletal:         General: No tenderness. Normal range of motion.      Cervical back: Normal range of motion and neck supple.   Lymphadenopathy:      Cervical: No cervical adenopathy.   Skin:     General: Skin is warm and dry.      Findings: No rash.   Neurological:      Mental Status: She is alert and oriented to person, place, and time.      Cranial Nerves: No cranial nerve deficit.   Psychiatric:         Thought Content: Thought content normal.       Lab on 05/06/2022   Component Date Value Ref Range Status   • Glucose 05/06/2022 181 (A) 65 - 99 mg/dL Final   • BUN 05/06/2022 14  8 - 23 mg/dL Final   • Creatinine 05/06/2022 0.98  0.57 - 1.00 mg/dL Final   • Sodium 05/06/2022 141  136 - 145 mmol/L Final   • Potassium 05/06/2022 4.6  3.5 - 5.2 " mmol/L Final   • Chloride 05/06/2022 103  98 - 107 mmol/L Final   • CO2 05/06/2022 25.7  22.0 - 29.0 mmol/L Final   • Calcium 05/06/2022 10.0  8.6 - 10.5 mg/dL Final   • Total Protein 05/06/2022 7.1  6.0 - 8.5 g/dL Final   • Albumin 05/06/2022 3.90  3.50 - 5.20 g/dL Final   • ALT (SGPT) 05/06/2022 14  1 - 33 U/L Final   • AST (SGOT) 05/06/2022 28  1 - 32 U/L Final   • Alkaline Phosphatase 05/06/2022 82  39 - 117 U/L Final   • Total Bilirubin 05/06/2022 0.4  0.0 - 1.2 mg/dL Final   • Globulin 05/06/2022 3.2  gm/dL Final   • A/G Ratio 05/06/2022 1.2  g/dL Final   • BUN/Creatinine Ratio 05/06/2022 14.3  7.0 - 25.0 Final   • Anion Gap 05/06/2022 12.3  5.0 - 15.0 mmol/L Final   • eGFR 05/06/2022 61.5  >60.0 mL/min/1.73 Final    National Kidney Foundation and American Society of Nephrology (ASN) Task Force recommended calculation based on the Chronic Kidney Disease Epidemiology Collaboration (CKD-EPI) equation refit without adjustment for race.   • Rheumatoid Factor Quantitative 05/06/2022 <10.0  0.0 - 14.0 IU/mL Final   • C-Reactive Protein 05/06/2022 0.91 (A) 0.00 - 0.50 mg/dL Final   • ELENA Direct 05/06/2022 Negative  Negative Final   • TSH 05/06/2022 1.650  0.270 - 4.200 uIU/mL Final   • Hemoglobin A1C 05/06/2022 7.00 (A) 4.80 - 5.60 % Final   • Creatine Kinase 05/06/2022 51  20 - 180 U/L Final   • WBC 05/06/2022 9.83  3.40 - 10.80 10*3/mm3 Final   • RBC 05/06/2022 4.47  3.77 - 5.28 10*6/mm3 Final   • Hemoglobin 05/06/2022 13.5  12.0 - 15.9 g/dL Final   • Hematocrit 05/06/2022 42.4  34.0 - 46.6 % Final   • MCV 05/06/2022 94.9  79.0 - 97.0 fL Final   • MCH 05/06/2022 30.2  26.6 - 33.0 pg Final   • MCHC 05/06/2022 31.8  31.5 - 35.7 g/dL Final   • RDW 05/06/2022 13.1  12.3 - 15.4 % Final   • RDW-SD 05/06/2022 45.4  37.0 - 54.0 fl Final   • MPV 05/06/2022 11.8  6.0 - 12.0 fL Final   • Platelets 05/06/2022 251  140 - 450 10*3/mm3 Final   • Neutrophil % 05/06/2022 60.5  42.7 - 76.0 % Final   • Lymphocyte % 05/06/2022 27.5   19.6 - 45.3 % Final   • Monocyte % 05/06/2022 5.4  5.0 - 12.0 % Final   • Eosinophil % 05/06/2022 5.7  0.3 - 6.2 % Final   • Basophil % 05/06/2022 0.7  0.0 - 1.5 % Final   • Immature Grans % 05/06/2022 0.2  0.0 - 0.5 % Final   • Neutrophils, Absolute 05/06/2022 5.95  1.70 - 7.00 10*3/mm3 Final   • Lymphocytes, Absolute 05/06/2022 2.70  0.70 - 3.10 10*3/mm3 Final   • Monocytes, Absolute 05/06/2022 0.53  0.10 - 0.90 10*3/mm3 Final   • Eosinophils, Absolute 05/06/2022 0.56 (A) 0.00 - 0.40 10*3/mm3 Final   • Basophils, Absolute 05/06/2022 0.07  0.00 - 0.20 10*3/mm3 Final   • Immature Grans, Absolute 05/06/2022 0.02  0.00 - 0.05 10*3/mm3 Final   • nRBC 05/06/2022 0.0  0.0 - 0.2 /100 WBC Final     Assessment & Plan    No diagnosis found..   1.   Chest pain and back pain, resolved.  Likely due to GERD.  Continue PPI and antireflux lifestyle.   2.  Left lower quadrant abdominal pain associated with diarrhea, improving with intermittent symptoms.  Likely due to irritable bowel syndrome.  Could also be due to adhesions due to previous colonic surgery.  Continue high-fiber diet and Benefiber daily.  Increase Bentyl to 4 times daily.  CT abdomen pelvis if continues.  3. Nausea, improved. likely due to reactive gastropathy.  Continue Prilosec 40 mg p.o. daily.    Gastric emptying scan if symptoms recurs.  4.  Obesity BMI 32.12, recommend exercise and diet control.  5.  History of segmental colonic resection  6.  Diverticulosis, high-fiber diet.  7.  Barnett's esophagus, repeat EGD in 3 years for surveillance.  Continue PPI and antireflux lifestyle    Orders placed during this encounter include:  No orders of the defined types were placed in this encounter.      * Surgery not found *    Review and/or summary of lab tests, radiology, procedures, medications. Review and summary of old records and obtaining of history. The risks and benefits of my recommendations, as well as other treatment options were discussed with the patient  today. Questions were answered.    No orders of the defined types were placed in this encounter.      Follow-up: Return in about 3 months (around 8/31/2022).               Results for orders placed or performed in visit on 05/06/22   CBC Auto Differential    Specimen: Blood   Result Value Ref Range    WBC 9.83 3.40 - 10.80 10*3/mm3    RBC 4.47 3.77 - 5.28 10*6/mm3    Hemoglobin 13.5 12.0 - 15.9 g/dL    Hematocrit 42.4 34.0 - 46.6 %    MCV 94.9 79.0 - 97.0 fL    MCH 30.2 26.6 - 33.0 pg    MCHC 31.8 31.5 - 35.7 g/dL    RDW 13.1 12.3 - 15.4 %    RDW-SD 45.4 37.0 - 54.0 fl    MPV 11.8 6.0 - 12.0 fL    Platelets 251 140 - 450 10*3/mm3    Neutrophil % 60.5 42.7 - 76.0 %    Lymphocyte % 27.5 19.6 - 45.3 %    Monocyte % 5.4 5.0 - 12.0 %    Eosinophil % 5.7 0.3 - 6.2 %    Basophil % 0.7 0.0 - 1.5 %    Immature Grans % 0.2 0.0 - 0.5 %    Neutrophils, Absolute 5.95 1.70 - 7.00 10*3/mm3    Lymphocytes, Absolute 2.70 0.70 - 3.10 10*3/mm3    Monocytes, Absolute 0.53 0.10 - 0.90 10*3/mm3    Eosinophils, Absolute 0.56 (H) 0.00 - 0.40 10*3/mm3    Basophils, Absolute 0.07 0.00 - 0.20 10*3/mm3    Immature Grans, Absolute 0.02 0.00 - 0.05 10*3/mm3    nRBC 0.0 0.0 - 0.2 /100 WBC   Rheumatoid factor    Specimen: Blood   Result Value Ref Range    Rheumatoid Factor Quantitative <10.0 0.0 - 14.0 IU/mL   C-reactive protein    Specimen: Blood   Result Value Ref Range    C-Reactive Protein 0.91 (H) 0.00 - 0.50 mg/dL   ELENA    Specimen: Blood   Result Value Ref Range    ELENA Direct Negative Negative   TSH    Specimen: Blood   Result Value Ref Range    TSH 1.650 0.270 - 4.200 uIU/mL   Hemoglobin A1c    Specimen: Blood   Result Value Ref Range    Hemoglobin A1C 7.00 (H) 4.80 - 5.60 %   CK    Specimen: Blood   Result Value Ref Range    Creatine Kinase 51 20 - 180 U/L   Comprehensive Metabolic Panel    Specimen: Blood   Result Value Ref Range    Glucose 181 (H) 65 - 99 mg/dL    BUN 14 8 - 23 mg/dL    Creatinine 0.98 0.57 - 1.00 mg/dL    Sodium 141  136 - 145 mmol/L    Potassium 4.6 3.5 - 5.2 mmol/L    Chloride 103 98 - 107 mmol/L    CO2 25.7 22.0 - 29.0 mmol/L    Calcium 10.0 8.6 - 10.5 mg/dL    Total Protein 7.1 6.0 - 8.5 g/dL    Albumin 3.90 3.50 - 5.20 g/dL    ALT (SGPT) 14 1 - 33 U/L    AST (SGOT) 28 1 - 32 U/L    Alkaline Phosphatase 82 39 - 117 U/L    Total Bilirubin 0.4 0.0 - 1.2 mg/dL    Globulin 3.2 gm/dL    A/G Ratio 1.2 g/dL    BUN/Creatinine Ratio 14.3 7.0 - 25.0    Anion Gap 12.3 5.0 - 15.0 mmol/L    eGFR 61.5 >60.0 mL/min/1.73   Results for orders placed or performed during the hospital encounter of 02/16/22   Tissue Pathology Exam    Specimen: Esophagus, Distal; Tissue   Result Value Ref Range    Case Report       Surgical Pathology Report                         Case: [id number]                                  Authorizing Provider:  Mauricio Betancourt MD      Collected:           02/16/2022 11:46 AM          Ordering Location:     [facility]   Received:            02/17/2022 07:10 AM                                 [facility]                                                     Pathologist:           Anthony Howard MD                                                           Specimen:    Esophagus, Distal, eg junction cj                                                          Final Diagnosis       SEE SCANNED REPORT       Results for orders placed or performed in visit on 02/14/22   COVID-19,  [facility] IN-HOUSE, NP SWAB IN TRANSPORT MEDIA 8-10 HR TAT - Swab, Nasopharynx    Specimen: Nasopharynx; Swab   Result Value Ref Range    COVID19 Not Detected Not Detected - Ref. Range   Results for orders placed or performed in visit on 02/08/22   POC Glucose    Specimen: Blood   Result Value Ref Range    Glucose 138 (H) 70 - 130 mg/dL   Results for orders placed or performed in visit on 01/18/22   COVID-19,[facility] ROUTINE, NP/OP SWAB IN TRANSPORT MEDIA OR ESWAB 72 HR TAT - Swab, Nasopharynx    Specimen: Nasopharynx; Swab    Result Value Ref Range    SARS-CoV-2, [facility] Not Detected Not Detected   Results for orders placed or performed in visit on 01/18/22   POCT Influenza A/B    Specimen: Swab   Result Value Ref Range    Rapid Influenza A Ag Negative Negative    Rapid Influenza B Ag Negative Negative    Internal Control Passed Passed    Lot Number [id number]     Expiration Date 05-    Results for orders placed or performed during the hospital encounter of 01/12/22   Gold Top - SST   Result Value Ref Range    Extra Tube Hold for add-ons.    Urinalysis, Microscopic Only - Urine, Clean Catch    Specimen: Urine, Clean Catch   Result Value Ref Range    RBC, UA 0-2 (A) None Seen /HPF    WBC, UA 6-12 (A) None Seen, 0-2, 3-5 /HPF    Bacteria, UA 2+ (A) None Seen /HPF    Squamous Epithelial Cells, UA Too Numerous to Count (A) None Seen, 0-2 /HPF    Hyaline Casts, UA 3-6 None Seen /LPF    Methodology Manual Light Microscopy    Urinalysis With Microscopic If Indicated (No Culture) - Urine, Clean Catch    Specimen: Urine, Clean Catch   Result Value Ref Range    Color, UA Yellow Yellow, Straw, Dark Yellow, Ángela    Appearance, UA Turbid (A) Clear    pH, UA 5.5 5.0 - 9.0    Specific Gravity, UA 1.016 1.003 - 1.030    Glucose, UA Negative Negative    Ketones, UA Trace (A) Negative    Bilirubin, UA Negative Negative    Blood, UA Negative Negative    Protein, UA 30 mg/dL (1+) (A) Negative    Leuk Esterase, UA Small (1+) (A) Negative    Nitrite, UA Negative Negative    Urobilinogen, UA 1.0 E.U./dL 0.2 - 1.0 E.U./dL   CBC Auto Differential    Specimen: Blood   Result Value Ref Range    WBC 13.04 (H) 3.40 - 10.80 10*3/mm3    RBC 4.76 3.77 - 5.28 10*6/mm3    Hemoglobin 14.4 12.0 - 15.9 g/dL    Hematocrit 43.5 34.0 - 46.6 %    MCV 91.4 79.0 - 97.0 fL    MCH 30.3 26.6 - 33.0 pg    MCHC 33.1 31.5 - 35.7 g/dL    RDW 11.9 (L) 12.3 - 15.4 %    RDW-SD 40.0 37.0 - 54.0 fl    MPV 11.0 6.0 - 12.0 fL    Platelets 268 140 - 450 10*3/mm3    Neutrophil % 73.5 42.7 -  76.0 %    Lymphocyte % 18.5 (L) 19.6 - 45.3 %    Monocyte % 5.3 5.0 - 12.0 %    Eosinophil % 1.2 0.3 - 6.2 %    Basophil % 0.6 0.0 - 1.5 %    Immature Grans % 0.9 (H) 0.0 - 0.5 %    Neutrophils, Absolute 9.59 (H) 1.70 - 7.00 10*3/mm3    Lymphocytes, Absolute 2.41 0.70 - 3.10 10*3/mm3    Monocytes, Absolute 0.69 0.10 - 0.90 10*3/mm3    Eosinophils, Absolute 0.15 0.00 - 0.40 10*3/mm3    Basophils, Absolute 0.08 0.00 - 0.20 10*3/mm3    Immature Grans, Absolute 0.12 (H) 0.00 - 0.05 10*3/mm3    nRBC 0.0 0.0 - 0.2 /100 WBC     *Note: Due to a large number of results and/or encounters for the requested time period, some results have not been displayed. A complete set of results can be found in Results Review.         This document has been electronically signed by Mauricio Betancourt MD on Paula 15, 2022 20:48 CDT

## 2022-07-08 ENCOUNTER — TELEPHONE (OUTPATIENT)
Dept: FAMILY MEDICINE CLINIC | Facility: CLINIC | Age: 72
End: 2022-07-08

## 2022-07-08 ENCOUNTER — HOSPITAL ENCOUNTER (OUTPATIENT)
Dept: CT IMAGING | Facility: HOSPITAL | Age: 72
Discharge: HOME | End: 2022-07-08

## 2022-07-08 ENCOUNTER — LAB (OUTPATIENT)
Dept: LAB | Facility: HOSPITAL | Age: 72
End: 2022-07-08

## 2022-07-08 ENCOUNTER — OFFICE VISIT (OUTPATIENT)
Dept: FAMILY MEDICINE CLINIC | Facility: CLINIC | Age: 72
End: 2022-07-08

## 2022-07-08 ENCOUNTER — APPOINTMENT (OUTPATIENT)
Dept: LAB | Facility: HOSPITAL | Age: 72
End: 2022-07-08

## 2022-07-08 VITALS
HEIGHT: 65 IN | DIASTOLIC BLOOD PRESSURE: 81 MMHG | HEART RATE: 64 BPM | WEIGHT: 195 LBS | OXYGEN SATURATION: 96 % | SYSTOLIC BLOOD PRESSURE: 151 MMHG | BODY MASS INDEX: 32.49 KG/M2

## 2022-07-08 DIAGNOSIS — E78.5 HYPERLIPIDEMIA, UNSPECIFIED HYPERLIPIDEMIA TYPE: ICD-10-CM

## 2022-07-08 DIAGNOSIS — R44.9 SENSORY DEFICIT, RIGHT: Primary | ICD-10-CM

## 2022-07-08 DIAGNOSIS — E11.9 TYPE 2 DIABETES MELLITUS WITHOUT COMPLICATION, WITHOUT LONG-TERM CURRENT USE OF INSULIN: ICD-10-CM

## 2022-07-08 DIAGNOSIS — R20.0 RIGHT SIDED NUMBNESS: ICD-10-CM

## 2022-07-08 DIAGNOSIS — R44.9 SENSORY DEFICIT, RIGHT: ICD-10-CM

## 2022-07-08 LAB
ALBUMIN SERPL-MCNC: 4.2 G/DL (ref 3.5–5.2)
ALBUMIN/GLOB SERPL: 1.4 G/DL
ALP SERPL-CCNC: 78 U/L (ref 39–117)
ALT SERPL W P-5'-P-CCNC: 18 U/L (ref 1–33)
ANION GAP SERPL CALCULATED.3IONS-SCNC: 12 MMOL/L (ref 5–15)
AST SERPL-CCNC: 21 U/L (ref 1–32)
BASOPHILS # BLD AUTO: 0.07 10*3/MM3 (ref 0–0.2)
BASOPHILS NFR BLD AUTO: 0.7 % (ref 0–1.5)
BILIRUB SERPL-MCNC: 0.5 MG/DL (ref 0–1.2)
BUN SERPL-MCNC: 25 MG/DL (ref 8–23)
BUN/CREAT SERPL: 20.5 (ref 7–25)
CALCIUM SPEC-SCNC: 9.8 MG/DL (ref 8.6–10.5)
CHLORIDE SERPL-SCNC: 105 MMOL/L (ref 98–107)
CHOLEST SERPL-MCNC: 330 MG/DL (ref 0–200)
CO2 SERPL-SCNC: 24 MMOL/L (ref 22–29)
CREAT SERPL-MCNC: 1.22 MG/DL (ref 0.57–1)
DEPRECATED RDW RBC AUTO: 44.5 FL (ref 37–54)
EGFRCR SERPLBLD CKD-EPI 2021: 47.2 ML/MIN/1.73
EOSINOPHIL # BLD AUTO: 0.19 10*3/MM3 (ref 0–0.4)
EOSINOPHIL NFR BLD AUTO: 2 % (ref 0.3–6.2)
ERYTHROCYTE [DISTWIDTH] IN BLOOD BY AUTOMATED COUNT: 14.2 % (ref 12.3–15.4)
GLOBULIN UR ELPH-MCNC: 2.9 GM/DL
GLUCOSE SERPL-MCNC: 113 MG/DL (ref 65–99)
HCT VFR BLD AUTO: 41.2 % (ref 34–46.6)
HDLC SERPL-MCNC: 63 MG/DL (ref 40–60)
HGB BLD-MCNC: 13.8 G/DL (ref 12–15.9)
IMM GRANULOCYTES # BLD AUTO: 0.05 10*3/MM3 (ref 0–0.05)
IMM GRANULOCYTES NFR BLD AUTO: 0.5 % (ref 0–0.5)
LDLC SERPL CALC-MCNC: 235 MG/DL (ref 0–100)
LDLC/HDLC SERPL: 3.7 {RATIO}
LYMPHOCYTES # BLD AUTO: 3.01 10*3/MM3 (ref 0.7–3.1)
LYMPHOCYTES NFR BLD AUTO: 31.3 % (ref 19.6–45.3)
MAGNESIUM SERPL-MCNC: 2.7 MG/DL (ref 1.6–2.4)
MCH RBC QN AUTO: 29.9 PG (ref 26.6–33)
MCHC RBC AUTO-ENTMCNC: 33.5 G/DL (ref 31.5–35.7)
MCV RBC AUTO: 89.2 FL (ref 79–97)
MONOCYTES # BLD AUTO: 0.6 10*3/MM3 (ref 0.1–0.9)
MONOCYTES NFR BLD AUTO: 6.2 % (ref 5–12)
NEUTROPHILS NFR BLD AUTO: 5.7 10*3/MM3 (ref 1.7–7)
NEUTROPHILS NFR BLD AUTO: 59.3 % (ref 42.7–76)
NRBC BLD AUTO-RTO: 0 /100 WBC (ref 0–0.2)
PLATELET # BLD AUTO: 237 10*3/MM3 (ref 140–450)
PMV BLD AUTO: 12 FL (ref 6–12)
POTASSIUM SERPL-SCNC: 5.2 MMOL/L (ref 3.5–5.2)
PROT SERPL-MCNC: 7.1 G/DL (ref 6–8.5)
RBC # BLD AUTO: 4.62 10*6/MM3 (ref 3.77–5.28)
SODIUM SERPL-SCNC: 141 MMOL/L (ref 136–145)
TRIGL SERPL-MCNC: 169 MG/DL (ref 0–150)
TSH SERPL DL<=0.05 MIU/L-ACNC: 7.14 UIU/ML (ref 0.27–4.2)
VLDLC SERPL-MCNC: 32 MG/DL (ref 5–40)
WBC NRBC COR # BLD: 9.62 10*3/MM3 (ref 3.4–10.8)

## 2022-07-08 PROCEDURE — 85025 COMPLETE CBC W/AUTO DIFF WBC: CPT

## 2022-07-08 PROCEDURE — 80053 COMPREHEN METABOLIC PANEL: CPT

## 2022-07-08 PROCEDURE — 84443 ASSAY THYROID STIM HORMONE: CPT

## 2022-07-08 PROCEDURE — 99214 OFFICE O/P EST MOD 30 MIN: CPT | Performed by: FAMILY MEDICINE

## 2022-07-08 PROCEDURE — 70450 CT HEAD/BRAIN W/O DYE: CPT

## 2022-07-08 PROCEDURE — 83735 ASSAY OF MAGNESIUM: CPT

## 2022-07-08 PROCEDURE — 80061 LIPID PANEL: CPT

## 2022-07-08 PROCEDURE — 36415 COLL VENOUS BLD VENIPUNCTURE: CPT

## 2022-07-08 RX ORDER — AMLODIPINE BESYLATE 2.5 MG/1
2.5 TABLET ORAL DAILY
Qty: 30 TABLET | Refills: 2 | Status: ON HOLD | OUTPATIENT
Start: 2022-07-08 | End: 2022-11-10

## 2022-07-08 NOTE — PROGRESS NOTES
"Chief Complaint  Numbness (Hand, foot and face all on right side. \"Comes and goes\")    Subjective    History of Present Illness {CC  Problem List  Visit  Diagnosis   Encounters  Notes  Medications  Labs  Result Review Imaging  Media :23}     Emily Haas presents to [facility] [place] for     Chief Complaint   Patient presents with   • Numbness     Hand, foot and face all on right side. \"Comes and goes\"      Patient seen today for numbness.  Started about a week ago.  Symptoms are intermittent, happening 3-4 times daily.  Numbness lasts a few minutes, then resolves.  None at this moment, no episodes today.  Does not have trouble with speech during these episodes, but sometimes does feel like she is \"walking toward the right side\".  She does not have any history of stroke or heart attack.  Has risk factors including hypertension, hyperlipidemia, diabetes and history of tobacco use.  Patient is not currently taking baby aspirin.  Has been unable to tolerate statin in the past.     Patient would like to discuss options for treatment of diabetes and weight loss.  She has tried metformin, but could not tolerate due to GI side effects.  Blood glucose in the morning ranges from 120-150 generally.  Does not check throughout the day         Current Outpatient Medications:   •  albuterol sulfate  (90 Base) MCG/ACT inhaler, Inhale 2 puffs Every 6 (Six) Hours As Needed for Wheezing or Shortness of Air., Disp: 6.7 g, Rfl: 0  •  carvedilol (COREG) 12.5 MG tablet, Take 1 tablet by mouth 2 (Two) Times a Day With Meals., Disp: 180 tablet, Rfl: 3  •  dicyclomine (BENTYL) 10 MG capsule, Take 10 mg by mouth 2 (two) times a day., Disp: , Rfl:   •  docusate sodium (COLACE) 50 MG capsule, Take 1 capsule by mouth Daily As Needed., Disp: , Rfl:   •  escitalopram (LEXAPRO) 10 MG tablet, Take 1 tablet by mouth Daily., Disp: 90 tablet, Rfl: 3  •  losartan (COZAAR) 100 MG " "tablet, Take 1 tablet by mouth Daily., Disp: 90 tablet, Rfl: 2  •  melatonin 3 MG tablet, TAKE 1 TABLET BY MOUTH AT NIGHT AS NEEDED FOR SLEEP., Disp: 90 tablet, Rfl: 1  •  omeprazole (priLOSEC) 40 MG capsule, Take 1 capsule by mouth Daily. (Patient taking differently: Take 40 mg by mouth Daily As Needed.), Disp: 30 capsule, Rfl: 11  •  ondansetron ODT (ZOFRAN-ODT) 4 MG disintegrating tablet, Place 1 tablet on the tongue 4 (Four) Times a Day As Needed for Nausea or Vomiting., Disp: 12 tablet, Rfl: 0  •  Synthroid 112 MCG tablet, Take 1 tablet by mouth once daily, Disp: 30 tablet, Rfl: 1  •  vitamin D (ERGOCALCIFEROL) 1.25 MG (75898 UT) capsule capsule, Take 50,000 Units by mouth 1 (One) Time Per Week., Disp: , Rfl:   •  acetaminophen (TYLENOL) 650 MG 8 hr tablet, Take 2 tablets by mouth Every 8 (Eight) Hours As Needed., Disp: , Rfl:   •  budesonide-formoterol (SYMBICORT) 160-4.5 MCG/ACT inhaler, Inhale 2 puffs 2 (Two) Times a Day. (Patient taking differently: Inhale 2 puffs 2 (Two) Times a Day As Needed.), Disp: 6 g, Rfl: 0     Objective       Vital Signs:   /81   Pulse 64   Ht 165.1 cm (65\")   Wt 88.5 kg (195 lb)   SpO2 96%   BMI 32.45 kg/m²     Physical Exam  Vitals reviewed.   Constitutional:       General: She is not in acute distress.     Appearance: She is well-developed.   HENT:      Head: Normocephalic and atraumatic.   Eyes:      Extraocular Movements: Extraocular movements intact.      Conjunctiva/sclera: Conjunctivae normal.      Pupils: Pupils are equal, round, and reactive to light.   Cardiovascular:      Rate and Rhythm: Normal rate and regular rhythm.      Heart sounds: Normal heart sounds. No murmur heard.  Pulmonary:      Effort: Pulmonary effort is normal. No respiratory distress.      Breath sounds: Normal breath sounds. No wheezing or rales.   Abdominal:      Palpations: Abdomen is soft.      Tenderness: There is no abdominal tenderness.   Musculoskeletal:      Cervical back: Neck " supple.   Skin:     General: Skin is warm and dry.      Findings: No rash.   Neurological:      Mental Status: She is alert and oriented to person, place, and time.      Cranial Nerves: No dysarthria or facial asymmetry.      Sensory: Sensation is intact.      Motor: Motor function is intact.      Coordination: Coordination is intact.      Gait: Gait is intact.      Comments: CN III-XI intact        Result Review :{ Labs  Result Review  Imaging  Med Tab  Media :23}   The following data was reviewed by: Bharati Love MD on 07/08/2022    Common labs    Common Labsle 5/6/22 5/6/22 5/6/22    1411 1411 1411   Glucose   181 (A)   BUN   14   Creatinine   0.98   eGFR Non African Am      Sodium   141   Potassium   4.6   Chloride   103   Calcium   10.0   Albumin   3.90   Total Bilirubin   0.4   Alkaline Phosphatase   82   AST (SGOT)   28   ALT (SGPT)   14   WBC 9.83     Hemoglobin 13.5     Hematocrit 42.4     Platelets 251     Hemoglobin A1C  7.00 (A)    (A) Abnormal value                    Assessment and Plan {CC Problem List  Visit Diagnosis  ROS  Review (Popup)  [facility] Maintenance  Quality  BestPractice  Medications  SmartSets  SnapShot Encounters  Media :23}   Diagnoses and all orders for this visit:    1. Sensory deficit, right (Primary)  -     CBC & Differential; Future  -     Comprehensive Metabolic Panel; Future  -     TSH; Future  -     Magnesium; Future  -     CT Head Without Contrast; Future  -     Lipid Panel; Future    2. Right sided numbness  -     CBC & Differential; Future  -     Comprehensive Metabolic Panel; Future  -     TSH; Future  -     Magnesium; Future  -     CT Head Without Contrast; Future  -     Lipid Panel; Future    3. Hyperlipidemia, unspecified hyperlipidemia type  -     Lipid Panel; Future    4. Type 2 diabetes mellitus without complication, without long-term current use of insulin (HCC)       Patient seen today for new numbness  Discussed possible etiologies, most  concerning for TIA  CT head today, with MRI to follow  Check labs as above  Start baby aspirin  Keep blood pressure controlled  If significant LDL elevation still on labs today, will need to consider Repatha  Will contact patient with results and further management following imaging  Will discuss diabetes management with ozempic after dealing with acute concerns      Follow Up {Instructions Charge Capture  Follow-up Communications :23}   Return if symptoms worsen or fail to improve, for Next scheduled follow up.  Patient was given instructions and counseling regarding her condition or for health maintenance advice. Please see specific information pulled into the AVS if appropriate.            This document has been electronically signed by Bharati Love MD

## 2022-07-09 DIAGNOSIS — R20.0 RIGHT SIDED NUMBNESS: Primary | ICD-10-CM

## 2022-07-09 RX ORDER — LEVOTHYROXINE SODIUM 125 MCG
125 TABLET ORAL DAILY
Qty: 30 TABLET | Refills: 2 | Status: SHIPPED | OUTPATIENT
Start: 2022-07-09 | End: 2022-09-28

## 2022-07-14 ENCOUNTER — HOSPITAL ENCOUNTER (OUTPATIENT)
Dept: MRI IMAGING | Facility: HOSPITAL | Age: 72
Discharge: HOME | End: 2022-07-14
Admitting: FAMILY MEDICINE

## 2022-07-14 DIAGNOSIS — R44.9 SENSORY DEFICIT, RIGHT: ICD-10-CM

## 2022-07-14 DIAGNOSIS — R20.0 RIGHT SIDED NUMBNESS: ICD-10-CM

## 2022-07-14 PROCEDURE — 70551 MRI BRAIN STEM W/O DYE: CPT

## 2022-07-18 ENCOUNTER — TELEPHONE (OUTPATIENT)
Dept: FAMILY MEDICINE CLINIC | Facility: CLINIC | Age: 72
End: 2022-07-18

## 2022-07-18 NOTE — PROGRESS NOTES
Per Dr. Love, Ms. Haas has been called with recent MRI of the Brain results & recommendations.  Continue current medications and follow-up as planned or sooner if any problems.     She states she checked her B/P at [facility] the other day and it was around 128/78 she said but that is the only time she has checked it.     She wants to know when do you want to see her back?   Please Advise

## 2022-07-18 NOTE — TELEPHONE ENCOUNTER
Per Dr. Love, Ms. Haas has been called with recent MRI of the Brain results & recommendations.  Continue current medications and follow-up as planned or sooner if any problems.     She states she checked her B/P at [facility] the other day and it was around 128/78 she said but that is the only time she has checked it.     She wants to know when do you want to see her back?   Please Advise      ----- Message from Bharati Love MD sent at 7/17/2022 12:48 AM CDT -----  MRI brain shows chronic changes in the vasculature that have caused some atrophy (decreased size) of brain.  Do not suspect that this is causing her current symptoms.  Please find out how blood pressure has been doing since last visit.  Thanks, CHEMO Love

## 2022-08-04 ENCOUNTER — LAB (OUTPATIENT)
Dept: LAB | Facility: HOSPITAL | Age: 72
End: 2022-08-04

## 2022-08-04 DIAGNOSIS — R20.0 RIGHT SIDED NUMBNESS: ICD-10-CM

## 2022-08-04 PROCEDURE — 36415 COLL VENOUS BLD VENIPUNCTURE: CPT

## 2022-08-04 PROCEDURE — 83735 ASSAY OF MAGNESIUM: CPT

## 2022-08-04 PROCEDURE — 80053 COMPREHEN METABOLIC PANEL: CPT

## 2022-08-05 LAB
ALBUMIN SERPL-MCNC: 3.6 G/DL (ref 3.5–5.2)
ALBUMIN/GLOB SERPL: 1.6 G/DL
ALP SERPL-CCNC: 68 U/L (ref 39–117)
ALT SERPL W P-5'-P-CCNC: 16 U/L (ref 1–33)
ANION GAP SERPL CALCULATED.3IONS-SCNC: 9.2 MMOL/L (ref 5–15)
AST SERPL-CCNC: 17 U/L (ref 1–32)
BILIRUB SERPL-MCNC: 0.3 MG/DL (ref 0–1.2)
BUN SERPL-MCNC: 22 MG/DL (ref 8–23)
BUN/CREAT SERPL: 19.5 (ref 7–25)
CALCIUM SPEC-SCNC: 9.3 MG/DL (ref 8.6–10.5)
CHLORIDE SERPL-SCNC: 104 MMOL/L (ref 98–107)
CO2 SERPL-SCNC: 25.8 MMOL/L (ref 22–29)
CREAT SERPL-MCNC: 1.13 MG/DL (ref 0.57–1)
EGFRCR SERPLBLD CKD-EPI 2021: 51.8 ML/MIN/1.73
GLOBULIN UR ELPH-MCNC: 2.3 GM/DL
GLUCOSE SERPL-MCNC: 108 MG/DL (ref 65–99)
MAGNESIUM SERPL-MCNC: 2.2 MG/DL (ref 1.6–2.4)
POTASSIUM SERPL-SCNC: 5.4 MMOL/L (ref 3.5–5.2)
PROT SERPL-MCNC: 5.9 G/DL (ref 6–8.5)
SODIUM SERPL-SCNC: 139 MMOL/L (ref 136–145)

## 2022-08-12 ENCOUNTER — HOSPITAL ENCOUNTER (OUTPATIENT)
Dept: CT IMAGING | Facility: HOSPITAL | Age: 72
Discharge: HOME | End: 2022-08-12
Admitting: UROLOGY

## 2022-08-12 DIAGNOSIS — R10.2 PERINEAL NEURALGIA, UNSPECIFIED LATERALITY: ICD-10-CM

## 2022-08-12 DIAGNOSIS — Z85.51 PERSONAL HISTORY OF MALIGNANT NEOPLASM OF BLADDER: ICD-10-CM

## 2022-08-12 PROCEDURE — 25010000002 IOPAMIDOL 61 % SOLUTION: Performed by: UROLOGY

## 2022-08-12 PROCEDURE — 74178 CT ABD&PLV WO CNTR FLWD CNTR: CPT

## 2022-08-12 RX ADMIN — IOPAMIDOL 90 ML: 612 INJECTION, SOLUTION INTRAVENOUS at 11:11

## 2022-08-17 ENCOUNTER — HOSPITAL ENCOUNTER (OUTPATIENT)
Dept: ULTRASOUND IMAGING | Facility: HOSPITAL | Age: 72
Discharge: HOME | End: 2022-08-17
Admitting: NURSE PRACTITIONER

## 2022-08-17 ENCOUNTER — OFFICE VISIT (OUTPATIENT)
Dept: FAMILY MEDICINE CLINIC | Facility: CLINIC | Age: 72
End: 2022-08-17

## 2022-08-17 VITALS
DIASTOLIC BLOOD PRESSURE: 76 MMHG | OXYGEN SATURATION: 98 % | HEART RATE: 84 BPM | SYSTOLIC BLOOD PRESSURE: 136 MMHG | HEIGHT: 65 IN | WEIGHT: 195.1 LBS | RESPIRATION RATE: 26 BRPM | BODY MASS INDEX: 32.51 KG/M2

## 2022-08-17 DIAGNOSIS — M79.661 RIGHT CALF PAIN: Primary | ICD-10-CM

## 2022-08-17 DIAGNOSIS — R09.89 SUSPECTED DEEP VEIN THROMBOSIS (DVT): ICD-10-CM

## 2022-08-17 PROCEDURE — 93971 EXTREMITY STUDY: CPT

## 2022-08-17 PROCEDURE — 99213 OFFICE O/P EST LOW 20 MIN: CPT | Performed by: NURSE PRACTITIONER

## 2022-08-17 RX ORDER — ASPIRIN 81 MG/1
81 TABLET, CHEWABLE ORAL DAILY
COMMUNITY

## 2022-08-17 NOTE — PROGRESS NOTES
Chief Complaint  Pain (Right leg)    Subjective          Emily Haas presents to [facility] [place] with right leg pain that started last night.  She has noticed some redness and swelling in her right calf, hurts more when she walks.     Leg Pain   The incident occurred 12 to 24 hours ago. There was no injury mechanism. The pain is present in the right leg. The quality of the pain is described as cramping and shooting. The pain is mild. The pain has been constant since onset. She reports no foreign bodies present. The symptoms are aggravated by movement and weight bearing. She has tried elevation for the symptoms. The treatment provided no relief.     Outpatient Medications Prior to Visit   Medication Sig Dispense Refill   • acetaminophen (TYLENOL) 650 MG 8 hr tablet Take 2 tablets by mouth Every 8 (Eight) Hours As Needed.     • albuterol sulfate  (90 Base) MCG/ACT inhaler Inhale 2 puffs Every 6 (Six) Hours As Needed for Wheezing or Shortness of Air. 6.7 g 0   • amLODIPine (NORVASC) 2.5 MG tablet Take 1 tablet by mouth Daily. 30 tablet 2   • aspirin 81 MG chewable tablet Chew 81 mg Daily.     • budesonide-formoterol (SYMBICORT) 160-4.5 MCG/ACT inhaler Inhale 2 puffs 2 (Two) Times a Day. (Patient taking differently: Inhale 2 puffs 2 (Two) Times a Day As Needed.) 6 g 0   • carvedilol (COREG) 12.5 MG tablet Take 1 tablet by mouth 2 (Two) Times a Day With Meals. 180 tablet 3   • dicyclomine (BENTYL) 10 MG capsule Take 10 mg by mouth 2 (two) times a day.     • escitalopram (LEXAPRO) 10 MG tablet Take 1 tablet by mouth Daily. 90 tablet 3   • losartan (COZAAR) 100 MG tablet Take 1 tablet by mouth Daily. 90 tablet 2   • melatonin 3 MG tablet TAKE 1 TABLET BY MOUTH AT NIGHT AS NEEDED FOR SLEEP. 90 tablet 1   • omeprazole (priLOSEC) 40 MG capsule Take 1 capsule by mouth Daily. (Patient taking differently: Take 40 mg by mouth Daily As Needed.) 30 capsule 11   •  "ondansetron ODT (ZOFRAN-ODT) 4 MG disintegrating tablet Place 1 tablet on the tongue 4 (Four) Times a Day As Needed for Nausea or Vomiting. 12 tablet 0   • Synthroid 125 MCG tablet Take 1 tablet by mouth Daily. 30 tablet 2   • vitamin D (ERGOCALCIFEROL) 1.25 MG (62673 UT) capsule capsule Take 50,000 Units by mouth 1 (One) Time Per Week.     • docusate sodium (COLACE) 50 MG capsule Take 1 capsule by mouth Daily As Needed.     • Evolocumab (REPATHA) solution prefilled syringe injection Inject 1 mL under the skin into the appropriate area as directed Every 14 (Fourteen) Days. 2 mL 3   • Synthroid 112 MCG tablet Take 1 tablet by mouth once daily 30 tablet 1     No facility-administered medications prior to visit.       Review of Systems      Objective   Vital Signs:   Visit Vitals  /76 (BP Location: Left arm, Patient Position: Sitting, Cuff Size: Adult)   Pulse 84   Resp 26   Ht 165.1 cm (65\")   Wt 88.5 kg (195 lb 1.6 oz)   LMP  (LMP Unknown)   SpO2 98%   BMI 32.47 kg/m²     Physical Exam  Vitals and nursing note reviewed.   Constitutional:       Appearance: She is well-developed.   HENT:      Head: Normocephalic and atraumatic.   Eyes:      General: Lids are normal.      Conjunctiva/sclera: Conjunctivae normal.   Neck:      Thyroid: No thyroid mass or thyromegaly.      Trachea: Trachea normal. No tracheal tenderness.   Cardiovascular:      Rate and Rhythm: Normal rate.      Pulses: Normal pulses.      Heart sounds: Normal heart sounds.   Pulmonary:      Effort: Pulmonary effort is normal. No respiratory distress.      Breath sounds: Normal breath sounds. No wheezing.   Abdominal:      General: There is no distension.      Palpations: Abdomen is soft. There is no mass.   Musculoskeletal:         General: Normal range of motion.      Cervical back: Normal range of motion. No edema.      Right lower leg: Swelling and tenderness present.        Legs:    Skin:     General: Skin is warm and dry.      Coloration: Skin " is not pale.      Findings: Bruising and erythema present. No abrasion or lesion.          Neurological:      Mental Status: She is alert and oriented to person, place, and time.   Psychiatric:         Mood and Affect: Mood is not anxious. Affect is not inappropriate.         Speech: Speech normal.         Behavior: Behavior normal.         Thought Content: Thought content normal.         Judgment: Judgment normal. Judgment is not impulsive.        Result Review :                 Assessment and Plan    Diagnoses and all orders for this visit:    1. Right calf pain (Primary)  -     US venous doppler lower extremity right (duplex)    2. Suspected deep vein thrombosis (DVT)  -     US venous doppler lower extremity right (duplex)      Upon assessment, it seems to be a superficial vein    She does not wear compression stockings, discussed     I do not feel like this is a DVT, however we will test to rule out on the rare case it is.     Please call the office if you have any issues     Follow Up   Return if symptoms worsen or fail to improve, for Next scheduled follow up.  Patient was given instructions and counseling regarding her condition or for health maintenance advice. Please see specific information pulled into the AVS if appropriate.           This document has been electronically signed by MADHAV Angel on August 19, 2022 10:13 CDT

## 2022-08-19 ENCOUNTER — TELEPHONE (OUTPATIENT)
Dept: FAMILY MEDICINE CLINIC | Facility: CLINIC | Age: 72
End: 2022-08-19

## 2022-08-23 ENCOUNTER — TELEPHONE (OUTPATIENT)
Dept: FAMILY MEDICINE CLINIC | Facility: CLINIC | Age: 72
End: 2022-08-23

## 2022-09-27 ENCOUNTER — OFFICE VISIT (OUTPATIENT)
Dept: FAMILY MEDICINE CLINIC | Facility: CLINIC | Age: 72
End: 2022-09-27

## 2022-09-27 ENCOUNTER — LAB (OUTPATIENT)
Dept: LAB | Facility: HOSPITAL | Age: 72
End: 2022-09-27

## 2022-09-27 VITALS
HEIGHT: 65 IN | SYSTOLIC BLOOD PRESSURE: 125 MMHG | DIASTOLIC BLOOD PRESSURE: 78 MMHG | BODY MASS INDEX: 31.99 KG/M2 | OXYGEN SATURATION: 99 % | HEART RATE: 61 BPM | WEIGHT: 192 LBS

## 2022-09-27 DIAGNOSIS — E11.9 TYPE 2 DIABETES MELLITUS WITHOUT COMPLICATION, WITHOUT LONG-TERM CURRENT USE OF INSULIN: Primary | ICD-10-CM

## 2022-09-27 DIAGNOSIS — E03.9 ACQUIRED HYPOTHYROIDISM: ICD-10-CM

## 2022-09-27 DIAGNOSIS — M54.2 NECK PAIN: ICD-10-CM

## 2022-09-27 DIAGNOSIS — E78.5 HYPERLIPIDEMIA, UNSPECIFIED HYPERLIPIDEMIA TYPE: ICD-10-CM

## 2022-09-27 DIAGNOSIS — R20.0 NUMBNESS OF FACE: ICD-10-CM

## 2022-09-27 DIAGNOSIS — E11.9 TYPE 2 DIABETES MELLITUS WITHOUT COMPLICATION, WITHOUT LONG-TERM CURRENT USE OF INSULIN: ICD-10-CM

## 2022-09-27 DIAGNOSIS — I10 ESSENTIAL HYPERTENSION: ICD-10-CM

## 2022-09-27 LAB
ALBUMIN SERPL-MCNC: 3.8 G/DL (ref 3.5–5.2)
ALBUMIN/GLOB SERPL: 1.3 G/DL
ALP SERPL-CCNC: 78 U/L (ref 39–117)
ALT SERPL W P-5'-P-CCNC: 17 U/L (ref 1–33)
ANION GAP SERPL CALCULATED.3IONS-SCNC: 9.7 MMOL/L (ref 5–15)
AST SERPL-CCNC: 21 U/L (ref 1–32)
BILIRUB SERPL-MCNC: 0.5 MG/DL (ref 0–1.2)
BUN SERPL-MCNC: 17 MG/DL (ref 8–23)
BUN/CREAT SERPL: 13.2 (ref 7–25)
CALCIUM SPEC-SCNC: 10.6 MG/DL (ref 8.6–10.5)
CHLORIDE SERPL-SCNC: 104 MMOL/L (ref 98–107)
CO2 SERPL-SCNC: 26.3 MMOL/L (ref 22–29)
CREAT SERPL-MCNC: 1.29 MG/DL (ref 0.57–1)
EGFRCR SERPLBLD CKD-EPI 2021: 44.2 ML/MIN/1.73
GLOBULIN UR ELPH-MCNC: 2.9 GM/DL
GLUCOSE SERPL-MCNC: 142 MG/DL (ref 65–99)
HBA1C MFR BLD: 7.1 % (ref 4.8–5.6)
POTASSIUM SERPL-SCNC: 5.6 MMOL/L (ref 3.5–5.2)
PROT SERPL-MCNC: 6.7 G/DL (ref 6–8.5)
SODIUM SERPL-SCNC: 140 MMOL/L (ref 136–145)
TSH SERPL DL<=0.05 MIU/L-ACNC: 8.67 UIU/ML (ref 0.27–4.2)

## 2022-09-27 PROCEDURE — 99214 OFFICE O/P EST MOD 30 MIN: CPT | Performed by: FAMILY MEDICINE

## 2022-09-27 PROCEDURE — 83036 HEMOGLOBIN GLYCOSYLATED A1C: CPT

## 2022-09-27 PROCEDURE — 84443 ASSAY THYROID STIM HORMONE: CPT

## 2022-09-27 PROCEDURE — 36415 COLL VENOUS BLD VENIPUNCTURE: CPT

## 2022-09-27 PROCEDURE — 80053 COMPREHEN METABOLIC PANEL: CPT

## 2022-09-27 NOTE — PROGRESS NOTES
Chief Complaint  Med Refill and Arthritis    Subjective    History of Present Illness {CC  Problem List  Visit  Diagnosis   Encounters  Notes  Medications  Labs  Result Review Imaging  Media :23}     Emily Haas presents to [facility] [place] for     Chief Complaint   Patient presents with   • Med Refill   • Arthritis      Patient seen today to for follow-up.  Has chronic medical problems including hypertension, chronic kidney disease, hyperlipidemia, type 2 diabetes, hypothyroidism, generalized anxiety disorder and vitamin D deficiency.  Blood pressures been well controlled.  Patient is compliant with medications.  Type 2 diabetes fair control.  Blood glucose levels, generally 120-150.  Not monitoring for diabetic diet at home.  She does have a spot on her right foot that is tender after a pedicure.  Started as a small skin lesion, now has some bruising.  Compliant with thyroid medication.  Has not had any more episodes of transient facial numbness.  Has had a couple of episodes of sharp shooting pain in the neck bilaterally.  Generally lasting about 20 seconds.  No pain or sensory changes radiating to face or upper extremities bilaterally.      Current Outpatient Medications:   •  acetaminophen (TYLENOL) 650 MG 8 hr tablet, Take 2 tablets by mouth Every 8 (Eight) Hours As Needed., Disp: , Rfl:   •  albuterol sulfate  (90 Base) MCG/ACT inhaler, Inhale 2 puffs Every 6 (Six) Hours As Needed for Wheezing or Shortness of Air., Disp: 6.7 g, Rfl: 0  •  amLODIPine (NORVASC) 2.5 MG tablet, Take 1 tablet by mouth Daily., Disp: 30 tablet, Rfl: 2  •  aspirin 81 MG chewable tablet, Chew 81 mg Daily., Disp: , Rfl:   •  budesonide-formoterol (SYMBICORT) 160-4.5 MCG/ACT inhaler, Inhale 2 puffs 2 (Two) Times a Day. (Patient taking differently: Inhale 2 puffs 2 (Two) Times a Day As Needed.), Disp: 6 g, Rfl: 0  •  carvedilol (COREG) 12.5 MG tablet, Take 1 tablet  "by mouth 2 (Two) Times a Day With Meals., Disp: 180 tablet, Rfl: 3  •  dicyclomine (BENTYL) 10 MG capsule, Take 10 mg by mouth 2 (two) times a day., Disp: , Rfl:   •  docusate sodium (COLACE) 50 MG capsule, Take 1 capsule by mouth Daily As Needed., Disp: , Rfl:   •  escitalopram (LEXAPRO) 10 MG tablet, Take 1 tablet by mouth Daily., Disp: 90 tablet, Rfl: 3  •  losartan (COZAAR) 100 MG tablet, Take 1 tablet by mouth Daily., Disp: 90 tablet, Rfl: 2  •  melatonin 3 MG tablet, TAKE 1 TABLET BY MOUTH AT NIGHT AS NEEDED FOR SLEEP., Disp: 90 tablet, Rfl: 1  •  omeprazole (priLOSEC) 40 MG capsule, Take 1 capsule by mouth Daily. (Patient taking differently: Take 40 mg by mouth Daily As Needed.), Disp: 30 capsule, Rfl: 11  •  ondansetron ODT (ZOFRAN-ODT) 4 MG disintegrating tablet, Place 1 tablet on the tongue 4 (Four) Times a Day As Needed for Nausea or Vomiting., Disp: 12 tablet, Rfl: 0  •  Synthroid 125 MCG tablet, Take 1 tablet by mouth Daily., Disp: 30 tablet, Rfl: 2  •  vitamin D (ERGOCALCIFEROL) 1.25 MG (30727 UT) capsule capsule, Take 50,000 Units by mouth 1 (One) Time Per Week., Disp: , Rfl:      Objective       Vital Signs:   /78   Pulse 61   Ht 165.1 cm (65\")   Wt 87.1 kg (192 lb)   SpO2 99%   BMI 31.95 kg/m²     Physical Exam  Vitals reviewed.   Constitutional:       General: She is not in acute distress.     Appearance: She is well-developed.   Neck:      Vascular: No carotid bruit.   Cardiovascular:      Rate and Rhythm: Normal rate and regular rhythm.      Heart sounds: Normal heart sounds. No murmur heard.  Pulmonary:      Effort: Pulmonary effort is normal. No respiratory distress.      Breath sounds: Normal breath sounds. No wheezing or rales.   Skin:     General: Skin is warm and dry.      Comments: Small skin lesion on medial aspect of right heel, bruising, no surrounding erythema.  There is a small opening of the skin and central part of the lesion without ulceration or drainage. "   Neurological:      Mental Status: She is alert and oriented to person, place, and time.        Result Review :{ Labs  Result Review  Imaging  Med Tab  Media :23}   The following data was reviewed by: Bharati Love MD on 09/27/2022    Common labs    Common Labs 5/6/22 5/6/22 5/6/22 7/8/22 7/8/22 7/8/22 8/4/22    1411 1411 1411 [id number] [id number]    Glucose   181 (A)  113 (A)  108 (A)   BUN   14  25 (A)  22   Creatinine   0.98  1.22 (A)  1.13 (A)   Sodium   141  141  139   Potassium   4.6  5.2  5.4 (A)   Chloride   103  105  104   Calcium   10.0  9.8  9.3   Albumin   3.90  4.20  3.60   Total Bilirubin   0.4  0.5  0.3   Alkaline Phosphatase   82  78  68   AST (SGOT)   28  21  17   ALT (SGPT)   14  18  16   WBC 9.83   9.62      Hemoglobin 13.5   13.8      Hematocrit 42.4   41.2      Platelets 251   237      Total Cholesterol      330 (A)    Triglycerides      169 (A)    HDL Cholesterol      63 (A)    LDL Cholesterol       235 (A)    Hemoglobin A1C  7.00 (A)        (A) Abnormal value       Comments are available for some flowsheets but are not being displayed.            Lab Results   Component Value Date    TSH 7.140 (H) 07/08/2022              Assessment and Plan {CC Problem List  Visit Diagnosis  ROS  Review (Popup)  [facility] Maintenance  Quality  BestPractice  Medications  SmartSets  SnapShot Encounters  Media :23}   Diagnoses and all orders for this visit:    1. Type 2 diabetes mellitus without complication, without long-term current use of insulin (HCC) (Primary)  -     Hemoglobin A1c; Future  -     US Carotid Bilateral; Future    2. Acquired hypothyroidism  -     TSH; Future    3. Essential hypertension  -     Comprehensive Metabolic Panel; Future    4. Hyperlipidemia, unspecified hyperlipidemia type    5. Numbness of face  -     US Carotid Bilateral; Future    6. Neck pain  -     US Carotid Bilateral; Future       Hypertension controlled, continue current medication.  Recheck CMP.  Type 2  diabetes has been diet controlled.  Recheck hemoglobin A1c today.  Patient has not been following diabetic diet, encouraged more attention to diet.  Pay close attention to skin lesion on right foot.  Suspect pressure injury.  Shoes that do not put pressure in this area advised.  May need pillow between feet while sleeping.  If any erythema or drainage, will call for antibiotic.  Hypothyroidism, on levothyroxine.  Recheck TSH, as it was abnormal with last check.  MRI of the brain did not show any acute abnormalities.  There was some concern for possible TIAs with the transient facial numbness.  Now having short brief episodes of intense neck pain as well.  We will go ahead and check ultrasound of the carotids to ensure no severe carotid artery stenosis.        Follow Up {Instructions Charge Capture  Follow-up Communications :23}   Return in about 3 months (around 12/27/2022) for Medicare Wellness, Recheck.  Patient was given instructions and counseling regarding her condition or for health maintenance advice. Please see specific information pulled into the AVS if appropriate.            This document has been electronically signed by Bharati Love MD

## 2022-09-28 ENCOUNTER — TELEPHONE (OUTPATIENT)
Dept: FAMILY MEDICINE CLINIC | Facility: CLINIC | Age: 72
End: 2022-09-28

## 2022-09-28 DIAGNOSIS — I10 ESSENTIAL HYPERTENSION: ICD-10-CM

## 2022-09-28 DIAGNOSIS — R20.0 RIGHT SIDED NUMBNESS: ICD-10-CM

## 2022-09-28 DIAGNOSIS — E03.9 ACQUIRED HYPOTHYROIDISM: Primary | ICD-10-CM

## 2022-09-28 RX ORDER — LEVOTHYROXINE SODIUM 137 MCG
137 TABLET ORAL DAILY
Qty: 30 TABLET | Refills: 2 | Status: SHIPPED | OUTPATIENT
Start: 2022-09-28 | End: 2023-01-24

## 2022-09-28 NOTE — TELEPHONE ENCOUNTER
Per ,  has been called with recent lab results & recommendations.  Continue current medications and follow-up as planned or sooner if any problems.   She states she is not taking anything with Potassium in it, no multi vitamins   She will have her labs repeated in 1 week      ----- Message from Bharati Love MD sent at 9/28/2022 10:07 AM CDT -----  A1c is elevated at 7.1%, work on diet.  Kidney function is unchanged, however potassium is elevated more.  Please make sure she is not taking any supplement that has potassium - if so she should hold.  Recheck next week.  TSH is high, needs increased_   dose of levothyroxine which has been sent to the pharmacy.  Thanks, CHEMO Love

## 2022-10-03 ENCOUNTER — TELEPHONE (OUTPATIENT)
Dept: FAMILY MEDICINE CLINIC | Facility: CLINIC | Age: 72
End: 2022-10-03

## 2022-10-03 RX ORDER — CLINDAMYCIN HYDROCHLORIDE 300 MG/1
300 CAPSULE ORAL 3 TIMES DAILY
Qty: 21 CAPSULE | Refills: 0 | Status: SHIPPED | OUTPATIENT
Start: 2022-10-03 | End: 2022-10-10

## 2022-10-03 NOTE — TELEPHONE ENCOUNTER
Emily called and said, she saw you last week for Cellulitis and you told her if it started to look infected, you would call her in something for it.  Said, it is starting to look like it is getting infected.

## 2022-10-06 ENCOUNTER — LAB (OUTPATIENT)
Dept: LAB | Facility: HOSPITAL | Age: 72
End: 2022-10-06

## 2022-10-06 ENCOUNTER — HOSPITAL ENCOUNTER (OUTPATIENT)
Dept: ULTRASOUND IMAGING | Facility: HOSPITAL | Age: 72
Discharge: HOME | End: 2022-10-06

## 2022-10-06 DIAGNOSIS — R20.0 NUMBNESS OF FACE: ICD-10-CM

## 2022-10-06 DIAGNOSIS — I10 ESSENTIAL HYPERTENSION: ICD-10-CM

## 2022-10-06 DIAGNOSIS — E11.9 TYPE 2 DIABETES MELLITUS WITHOUT COMPLICATION, WITHOUT LONG-TERM CURRENT USE OF INSULIN: ICD-10-CM

## 2022-10-06 DIAGNOSIS — M54.2 NECK PAIN: ICD-10-CM

## 2022-10-06 LAB
ANION GAP SERPL CALCULATED.3IONS-SCNC: 9 MMOL/L (ref 5–15)
BUN SERPL-MCNC: 25 MG/DL (ref 8–23)
BUN/CREAT SERPL: 20.7 (ref 7–25)
CALCIUM SPEC-SCNC: 9.4 MG/DL (ref 8.6–10.5)
CHLORIDE SERPL-SCNC: 104 MMOL/L (ref 98–107)
CO2 SERPL-SCNC: 26 MMOL/L (ref 22–29)
CREAT SERPL-MCNC: 1.21 MG/DL (ref 0.57–1)
EGFRCR SERPLBLD CKD-EPI 2021: 47.7 ML/MIN/1.73
GLUCOSE SERPL-MCNC: 144 MG/DL (ref 65–99)
POTASSIUM SERPL-SCNC: 4.3 MMOL/L (ref 3.5–5.2)
SODIUM SERPL-SCNC: 139 MMOL/L (ref 136–145)

## 2022-10-06 PROCEDURE — 80048 BASIC METABOLIC PNL TOTAL CA: CPT

## 2022-10-06 PROCEDURE — 36415 COLL VENOUS BLD VENIPUNCTURE: CPT

## 2022-10-06 PROCEDURE — 93880 EXTRACRANIAL BILAT STUDY: CPT

## 2022-10-07 ENCOUNTER — TELEPHONE (OUTPATIENT)
Dept: FAMILY MEDICINE CLINIC | Facility: CLINIC | Age: 72
End: 2022-10-07

## 2022-10-07 DIAGNOSIS — I65.22 CAROTID STENOSIS, LEFT: Primary | ICD-10-CM

## 2022-10-07 NOTE — PROGRESS NOTES
Per Dr. Love, Ms. Haas has been called with recent Bilateral Carotid Artery results & recommendations.  Continue current medications and follow-up as planned or sooner if any problems.

## 2022-10-07 NOTE — TELEPHONE ENCOUNTER
Per Dr. Love, Ms. Haas has been called with recent Bilateral Carotid Artery results & recommendations.  Continue current medications and follow-up as planned or sooner if any problems.       ----- Message from hBarati Love MD sent at 10/7/2022  7:59 AM CDT -----  Some stenosis in one of the arteries on the left.  Will refer to vascular for continued monitoring.   - CHEMO Love

## 2022-10-10 ENCOUNTER — PATIENT MESSAGE (OUTPATIENT)
Dept: FAMILY MEDICINE CLINIC | Facility: CLINIC | Age: 72
End: 2022-10-10

## 2022-10-14 ENCOUNTER — OFFICE VISIT (OUTPATIENT)
Dept: CARDIAC SURGERY | Facility: CLINIC | Age: 72
End: 2022-10-14

## 2022-10-14 VITALS
HEART RATE: 73 BPM | BODY MASS INDEX: 32.15 KG/M2 | HEIGHT: 65 IN | WEIGHT: 193 LBS | SYSTOLIC BLOOD PRESSURE: 152 MMHG | DIASTOLIC BLOOD PRESSURE: 80 MMHG | OXYGEN SATURATION: 99 %

## 2022-10-14 DIAGNOSIS — I73.9 PVD (PERIPHERAL VASCULAR DISEASE) WITH CLAUDICATION: ICD-10-CM

## 2022-10-14 DIAGNOSIS — I65.23 CAROTID STENOSIS, ASYMPTOMATIC, BILATERAL: ICD-10-CM

## 2022-10-14 DIAGNOSIS — E78.2 MIXED HYPERLIPIDEMIA: Primary | ICD-10-CM

## 2022-10-14 PROCEDURE — 99214 OFFICE O/P EST MOD 30 MIN: CPT | Performed by: NURSE PRACTITIONER

## 2022-10-14 NOTE — PATIENT INSTRUCTIONS
The results of your carotid ultrasound shows mild disease of the right carotid and is stable from previous exam, ultrasound of the left carotid shows moderate disease and is stable from previous exam.    Follow up in 6 months with carotid ultrasound     If you should experience any neurological symptoms including but not limited to visual or speech disturbances confusion, seizures, or weakness of limbs of one side of your body notify Heart and Vascular center immediately for evaluation or if after hours present to the nearest Emergency Department.     AYESHA in 2 weeks will call Monday with appointment time

## 2022-10-17 NOTE — PROGRESS NOTES
CVTS Office Progress Note     10/17/2022    Emily Haas  1950    Chief Complaint:    Chief Complaint   Patient presents with   • Carotid Artery Disease       HPI:      PCP:  Bharati Love MD    72 y.o. female with HTN(stable, increased risk stroke, rupture), Hyperlipidemia(stable, increased risk cardiovascular events), Diabetes Mellitus(stable, increased risk cardiovascular events) and Obesity(uncontrolled, increased risk cardiovascular events) hypothyroid, colon resection (volvulus), adia, hysterectomy.  former smoker and quit 1990.  Referred to vascular surgery for moderate R ICA stenosis, also has claudication.  No amaurosis fugax, TIA, or CVA.  No other associated signs, symptoms or modifying factors.    10/2022 Carotid Duplex: VIN 0-49% mPSV 118c/s Ratio 1.3, LICA 50-69% mPSV 136c/s Ratio 1.7, Antegrade vertebrals    The following portions of the patient's history were reviewed and updated as appropriate: allergies, current medications, past family history, past medical history, past social history, past surgical history and problem list.  Recent images independently reviewed.  Available laboratory values reviewed.    PMH:  Past Medical History:   Diagnosis Date   • Anxiety    • Cancer (HCC)    • Chronic bronchitis (HCC)    • CKD (chronic kidney disease) stage 3, GFR 30-59 ml/min (HCC)    • Diabetes mellitus (HCC)    • Diverticulitis    • Hyperlipidemia    • Hypertension    • Hypothyroidism    • Irritable bowel syndrome    • Obesity    • Osteoarthritis    • Vitamin D deficiency      Past Surgical History:   Procedure Laterality Date   • BILATERAL BREAST REDUCTION     • CHOLECYSTECTOMY     • COLONOSCOPY N/A 11/17/2017   • COLONOSCOPY N/A 1/14/2021   • ENDOSCOPY N/A 1/14/2021   • ENDOSCOPY N/A 2/16/2022    Procedure: ESOPHAGOGASTRODUODENOSCOPY;  Surgeon: Mauricio Betancourt MD;  Location: [facility] ENDOSCOPY;  Service: Gastroenterology;  Laterality: N/A;   • HYSTERECTOMY     • REDUCTION MAMMAPLASTY  "    • UPPER GASTROINTESTINAL ENDOSCOPY  01/14/2021   • UPPER GASTROINTESTINAL ENDOSCOPY  02/16/2022     Family History   Problem Relation Age of Onset   • Hypertension Mother    • Alzheimer's disease Father    • Diabetes Brother    • Hypothyroidism Daughter      Social History     Tobacco Use   • Smoking status: Former     Packs/day: 3.00     Years: 15.00     Pack years: 45.00     Types: Cigarettes   • Smokeless tobacco: Never   • Tobacco comments:     08/10/2021 - Patient states she has not utilized Cigarettes X 34 years and upon ceassation, patient was utilizing 3 packs of Cigarettes per day.   Vaping Use   • Vaping Use: Never used   Substance Use Topics   • Alcohol use: No   • Drug use: No       ALLERGIES:  Allergies   Allergen Reactions   • Augmentin [Amoxicillin-Pot Clavulanate] Hives   • Dilaudid [Hydromorphone] Nausea And Vomiting   • Sulfa Antibiotics Hives   • Atenolol Myalgia   • Lipitor [Atorvastatin] Myalgia   • Pravachol [Pravastatin] Myalgia   • Statins Myalgia     \"muscle pain \"          MEDICATIONS:    Current Outpatient Medications:   •  acetaminophen (TYLENOL) 650 MG 8 hr tablet, Take 2 tablets by mouth Every 8 (Eight) Hours As Needed., Disp: , Rfl:   •  albuterol sulfate  (90 Base) MCG/ACT inhaler, Inhale 2 puffs Every 6 (Six) Hours As Needed for Wheezing or Shortness of Air., Disp: 6.7 g, Rfl: 0  •  amLODIPine (NORVASC) 2.5 MG tablet, Take 1 tablet by mouth Daily., Disp: 30 tablet, Rfl: 2  •  aspirin 81 MG chewable tablet, Chew 81 mg Daily., Disp: , Rfl:   •  bacitracin 500 UNIT/GM ointment, Apply 1 application topically to the appropriate area as directed 2 (Two) Times a Day., Disp: 14 g, Rfl: 0  •  budesonide-formoterol (SYMBICORT) 160-4.5 MCG/ACT inhaler, Inhale 2 puffs 2 (Two) Times a Day. (Patient taking differently: Inhale 2 puffs 2 (Two) Times a Day As Needed.), Disp: 6 g, Rfl: 0  •  carvedilol (COREG) 12.5 MG tablet, Take 1 tablet by mouth 2 (Two) Times a Day With Meals., Disp: 180 " tablet, Rfl: 3  •  dicyclomine (BENTYL) 10 MG capsule, Take 10 mg by mouth 2 (two) times a day., Disp: , Rfl:   •  docusate sodium (COLACE) 50 MG capsule, Take 1 capsule by mouth Daily As Needed., Disp: , Rfl:   •  escitalopram (LEXAPRO) 10 MG tablet, Take 1 tablet by mouth Daily., Disp: 90 tablet, Rfl: 3  •  ibuprofen (ADVIL,MOTRIN) 400 MG tablet, Take 1 tablet by mouth Every 8 (Eight) Hours As Needed for Moderate Pain., Disp: 21 tablet, Rfl: 0  •  losartan (COZAAR) 100 MG tablet, Take 1 tablet by mouth Daily., Disp: 90 tablet, Rfl: 2  •  melatonin 3 MG tablet, TAKE 1 TABLET BY MOUTH AT NIGHT AS NEEDED FOR SLEEP., Disp: 90 tablet, Rfl: 1  •  omeprazole (priLOSEC) 40 MG capsule, Take 1 capsule by mouth Daily. (Patient taking differently: Take 1 capsule by mouth Daily As Needed.), Disp: 30 capsule, Rfl: 11  •  ondansetron ODT (ZOFRAN-ODT) 4 MG disintegrating tablet, Place 1 tablet on the tongue 4 (Four) Times a Day As Needed for Nausea or Vomiting., Disp: 12 tablet, Rfl: 0  •  Synthroid 137 MCG tablet, Take 1 tablet by mouth Daily., Disp: 30 tablet, Rfl: 2  •  vitamin D (ERGOCALCIFEROL) 1.25 MG (51828 UT) capsule capsule, Take 50,000 Units by mouth 1 (One) Time Per Week., Disp: , Rfl:       Review of Systems   Constitutional: Negative for chills, decreased appetite, fever and weight loss.   HENT: Negative for congestion, nosebleeds and sore throat.    Eyes: Negative for blurred vision, visual disturbance and visual halos.   Cardiovascular: Positive for claudication and leg swelling. Negative for chest pain and dyspnea on exertion.   Respiratory: Negative for cough, shortness of breath, sputum production and wheezing.    Endocrine: Negative for cold intolerance and polyuria.   Hematologic/Lymphatic: Negative for bleeding problem. Does not bruise/bleed easily.   Skin: Positive for unusual hair distribution. Negative for flushing and nail changes.   Musculoskeletal: Positive for arthritis and joint pain. Negative for  "back pain.   Gastrointestinal: Negative for bloating, abdominal pain, hematemesis, melena, nausea and vomiting.   Genitourinary: Negative for flank pain and hematuria.   Neurological: Negative for brief paralysis, difficulty with concentration, focal weakness, light-headedness, loss of balance, numbness, paresthesias and weakness.   Psychiatric/Behavioral: Negative for altered mental status, depression, substance abuse and suicidal ideas.   Allergic/Immunologic: Negative for hives and persistent infections.         Vitals:    10/14/22 0938   BP: 152/80   BP Location: Left arm   Patient Position: Sitting   Cuff Size: Adult   Pulse: 73   SpO2: 99%   Weight: 87.5 kg (193 lb)   Height: 165.1 cm (65\")     Physical Exam  Vitals and nursing note reviewed.   Constitutional:       Appearance: Normal appearance. She is obese.   HENT:      Head: Normocephalic.      Nose: Nose normal.      Mouth/Throat:      Mouth: Mucous membranes are moist.   Eyes:      Pupils: Pupils are equal, round, and reactive to light.   Cardiovascular:      Rate and Rhythm: Normal rate.      Pulses: Normal pulses.   Pulmonary:      Effort: Pulmonary effort is normal.   Abdominal:      General: Abdomen is flat.      Palpations: Abdomen is soft.   Musculoskeletal:         General: Normal range of motion.      Cervical back: Normal range of motion.   Skin:     General: Skin is warm and dry.      Capillary Refill: Capillary refill takes 2 to 3 seconds.   Neurological:      General: No focal deficit present.      Mental Status: She is alert and oriented to person, place, and time.   Psychiatric:         Mood and Affect: Mood normal.         Assessment & Plan     Independent Review of Studies  10/2022 Carotid Duplex: VIN 0-49% mPSV 118c/s Ratio 1.3, LICA 50-69% mPSV 136c/s Ratio 1.7, Antegrade vertebrals    1. Mixed hyperlipidemia  Lipid-lowering therapy has been proven beneficial in patients with cardio-vascular disease. Current guidelines recommend statin " treatment for all patients with PAD,CAD and carotid stenosis. Statins are beneficial in preventing cardiovascular events, increasing functional capacity and lower the risk of adverse limb loss in PAD. Statins decrease the progression of plaque formation and may improve peripheral vessel lining, and aid in reversing atherosclerosis.    2. PVD (peripheral vascular disease) with claudication (HCC)  Claudication at distances >25 feet.  Obtain AYESHA next available.  Will follow up with results.     3. Carotid stenosis, asymptomatic, bilateral  Mild R ICA disease.  Moderate ICA.   No surgical intervention indicated at this time.     Will plan to follow up with interval imaging, duplex in 6 months    ASA, Statin recommended.     Detailed discussion regarding risks, benefits, and treatment plan. Images independently reviewed. Patient understands, agrees, and wishes to proceed with plan.       This document has been electronically signed by Yobany Nguyen [id number] @  On October 17, 2022 14:12 CDT

## 2022-10-27 ENCOUNTER — OFFICE VISIT (OUTPATIENT)
Dept: CARDIAC SURGERY | Facility: CLINIC | Age: 72
End: 2022-10-27

## 2022-10-27 VITALS
SYSTOLIC BLOOD PRESSURE: 140 MMHG | HEIGHT: 65 IN | HEART RATE: 64 BPM | DIASTOLIC BLOOD PRESSURE: 80 MMHG | OXYGEN SATURATION: 98 % | WEIGHT: 195 LBS | BODY MASS INDEX: 32.49 KG/M2

## 2022-10-27 DIAGNOSIS — E78.2 MIXED HYPERLIPIDEMIA: ICD-10-CM

## 2022-10-27 DIAGNOSIS — I65.23 CAROTID STENOSIS, ASYMPTOMATIC, BILATERAL: ICD-10-CM

## 2022-10-27 DIAGNOSIS — I73.9 PVD (PERIPHERAL VASCULAR DISEASE) WITH CLAUDICATION: Primary | ICD-10-CM

## 2022-10-27 PROCEDURE — 99215 OFFICE O/P EST HI 40 MIN: CPT | Performed by: NURSE PRACTITIONER

## 2022-10-27 RX ORDER — SODIUM CHLORIDE 9 MG/ML
100 INJECTION, SOLUTION INTRAVENOUS CONTINUOUS
Status: CANCELLED | OUTPATIENT
Start: 2022-11-10

## 2022-10-27 NOTE — PATIENT INSTRUCTIONS
The results of your vascular studies of your right leg shows moderate disease with fair  circulation, in the left leg shows milddisease with good  circulation.      Plan arteriogram 11/10

## 2022-10-27 NOTE — PROGRESS NOTES
CVTS Office Progress Note     10/27/2022    Emily Haas  1950    Chief Complaint:    Chief Complaint   Patient presents with   • Peripheral Vascular Disease       HPI:      PCP:  Bharati Love MD    72 y.o. female with HTN(stable, increased risk stroke, rupture), Hyperlipidemia(stable, increased risk cardiovascular events), Diabetes Mellitus(stable, increased risk cardiovascular events) and Obesity(uncontrolled, increased risk cardiovascular events) hypothyroid, colon resection (volvulus), adia, hysterectomy.  former smoker and quit 1990.  Referred to vascular surgery for moderate R ICA stenosis, also has claudication of right leg.  No amaurosis fugax, TIA, or CVA.  No other associated signs, symptoms or modifying factors.    10/2022 Carotid Duplex: VIN 0-49% mPSV 118c/s Ratio 1.3, LICA 50-69% mPSV 136c/s Ratio 1.7, Antegrade vertebrals  10/2022 AYESHA: Right 0.64 triphasic fem biphasic pop.  Left 1.21 triphasic      The following portions of the patient's history were reviewed and updated as appropriate: allergies, current medications, past family history, past medical history, past social history, past surgical history and problem list.  Recent images independently reviewed.  Available laboratory values reviewed.    PMH:  Past Medical History:   Diagnosis Date   • Anxiety    • Cancer (HCC)    • Chronic bronchitis (HCC)    • CKD (chronic kidney disease) stage 3, GFR 30-59 ml/min (HCC)    • Diabetes mellitus (HCC)    • Diverticulitis    • Hyperlipidemia    • Hypertension    • Hypothyroidism    • Irritable bowel syndrome    • Obesity    • Osteoarthritis    • Vitamin D deficiency      Past Surgical History:   Procedure Laterality Date   • BILATERAL BREAST REDUCTION     • CHOLECYSTECTOMY     • COLONOSCOPY N/A 11/17/2017   • COLONOSCOPY N/A 1/14/2021   • ENDOSCOPY N/A 1/14/2021   • ENDOSCOPY N/A 2/16/2022    Procedure: ESOPHAGOGASTRODUODENOSCOPY;  Surgeon: Mauricio Betancourt MD;  Location: [facility]  "ENDOSCOPY;  Service: Gastroenterology;  Laterality: N/A;   • HYSTERECTOMY     • REDUCTION MAMMAPLASTY     • UPPER GASTROINTESTINAL ENDOSCOPY  01/14/2021   • UPPER GASTROINTESTINAL ENDOSCOPY  02/16/2022     Family History   Problem Relation Age of Onset   • Hypertension Mother    • Alzheimer's disease Father    • Diabetes Brother    • Hypothyroidism Daughter      Social History     Tobacco Use   • Smoking status: Former     Packs/day: 3.00     Years: 15.00     Pack years: 45.00     Types: Cigarettes   • Smokeless tobacco: Never   • Tobacco comments:     08/10/2021 - Patient states she has not utilized Cigarettes X 34 years and upon ceassation, patient was utilizing 3 packs of Cigarettes per day.   Vaping Use   • Vaping Use: Never used   Substance Use Topics   • Alcohol use: No   • Drug use: No       ALLERGIES:  Allergies   Allergen Reactions   • Augmentin [Amoxicillin-Pot Clavulanate] Hives   • Dilaudid [Hydromorphone] Nausea And Vomiting   • Sulfa Antibiotics Hives   • Atenolol Myalgia   • Lipitor [Atorvastatin] Myalgia   • Pravachol [Pravastatin] Myalgia   • Statins Myalgia     \"muscle pain \"          MEDICATIONS:    Current Outpatient Medications:   •  acetaminophen (TYLENOL) 650 MG 8 hr tablet, Take 2 tablets by mouth Every 8 (Eight) Hours As Needed., Disp: , Rfl:   •  albuterol sulfate  (90 Base) MCG/ACT inhaler, Inhale 2 puffs Every 6 (Six) Hours As Needed for Wheezing or Shortness of Air., Disp: 6.7 g, Rfl: 0  •  amLODIPine (NORVASC) 2.5 MG tablet, Take 1 tablet by mouth Daily., Disp: 30 tablet, Rfl: 2  •  aspirin 81 MG chewable tablet, Chew 81 mg Daily., Disp: , Rfl:   •  bacitracin 500 UNIT/GM ointment, Apply 1 application topically to the appropriate area as directed 2 (Two) Times a Day., Disp: 14 g, Rfl: 0  •  budesonide-formoterol (SYMBICORT) 160-4.5 MCG/ACT inhaler, Inhale 2 puffs 2 (Two) Times a Day. (Patient taking differently: Inhale 2 puffs 2 (Two) Times a Day As Needed.), Disp: 6 g, Rfl: 0  • "  carvedilol (COREG) 12.5 MG tablet, Take 1 tablet by mouth 2 (Two) Times a Day With Meals., Disp: 180 tablet, Rfl: 3  •  dicyclomine (BENTYL) 10 MG capsule, Take 10 mg by mouth 2 (two) times a day., Disp: , Rfl:   •  docusate sodium (COLACE) 50 MG capsule, Take 1 capsule by mouth Daily As Needed., Disp: , Rfl:   •  escitalopram (LEXAPRO) 10 MG tablet, Take 1 tablet by mouth Daily., Disp: 90 tablet, Rfl: 3  •  ibuprofen (ADVIL,MOTRIN) 400 MG tablet, Take 1 tablet by mouth Every 8 (Eight) Hours As Needed for Moderate Pain., Disp: 21 tablet, Rfl: 0  •  losartan (COZAAR) 100 MG tablet, Take 1 tablet by mouth Daily., Disp: 90 tablet, Rfl: 2  •  melatonin 3 MG tablet, TAKE 1 TABLET BY MOUTH AT NIGHT AS NEEDED FOR SLEEP., Disp: 90 tablet, Rfl: 1  •  omeprazole (priLOSEC) 40 MG capsule, Take 1 capsule by mouth Daily. (Patient taking differently: Take 1 capsule by mouth Daily As Needed.), Disp: 30 capsule, Rfl: 11  •  ondansetron ODT (ZOFRAN-ODT) 4 MG disintegrating tablet, Place 1 tablet on the tongue 4 (Four) Times a Day As Needed for Nausea or Vomiting., Disp: 12 tablet, Rfl: 0  •  Synthroid 137 MCG tablet, Take 1 tablet by mouth Daily., Disp: 30 tablet, Rfl: 2  •  vitamin D (ERGOCALCIFEROL) 1.25 MG (79778 UT) capsule capsule, Take 50,000 Units by mouth 1 (One) Time Per Week., Disp: , Rfl:       Review of Systems   Constitutional: Negative for chills, decreased appetite, fever and weight loss.   HENT: Negative for congestion, nosebleeds and sore throat.    Eyes: Negative for blurred vision, visual disturbance and visual halos.   Cardiovascular: Positive for claudication and leg swelling. Negative for chest pain and dyspnea on exertion.   Respiratory: Negative for cough, shortness of breath, sputum production and wheezing.    Endocrine: Negative for cold intolerance and polyuria.   Hematologic/Lymphatic: Negative for bleeding problem. Does not bruise/bleed easily.   Skin: Positive for unusual hair distribution. Negative  "for flushing and nail changes.   Musculoskeletal: Positive for arthritis and joint pain. Negative for back pain.   Gastrointestinal: Negative for bloating, abdominal pain, hematemesis, melena, nausea and vomiting.   Genitourinary: Negative for flank pain and hematuria.   Neurological: Negative for brief paralysis, difficulty with concentration, focal weakness, light-headedness, loss of balance, numbness, paresthesias and weakness.   Psychiatric/Behavioral: Negative for altered mental status, depression, substance abuse and suicidal ideas.   Allergic/Immunologic: Negative for hives and persistent infections.         Vitals:    10/27/22 1336   BP: 140/80   BP Location: Left arm   Patient Position: Sitting   Cuff Size: Adult   Pulse: 64   SpO2: 98%   Weight: 88.5 kg (195 lb)   Height: 165.1 cm (65\")     Physical Exam  Vitals and nursing note reviewed.   Constitutional:       Appearance: Normal appearance. She is obese.   HENT:      Head: Normocephalic.      Nose: Nose normal.      Mouth/Throat:      Mouth: Mucous membranes are moist.   Eyes:      Pupils: Pupils are equal, round, and reactive to light.   Cardiovascular:      Rate and Rhythm: Normal rate.      Pulses:           Dorsalis pedis pulses are 1+ on the right side and 2+ on the left side.        Posterior tibial pulses are 1+ on the right side and 2+ on the left side.   Pulmonary:      Effort: Pulmonary effort is normal.   Abdominal:      General: Abdomen is flat.      Palpations: Abdomen is soft.   Musculoskeletal:         General: Normal range of motion.      Cervical back: Normal range of motion.   Skin:     General: Skin is warm and dry.      Capillary Refill: Capillary refill takes 2 to 3 seconds.   Neurological:      General: No focal deficit present.      Mental Status: She is alert and oriented to person, place, and time.   Psychiatric:         Mood and Affect: Mood normal.         Assessment & Plan     Independent Review of Studies  10/2022 AYESHA: Right " 0.64 triphasic fem biphasic pop.  Left 1.21 triphasic    1. PVD (peripheral vascular disease) with claudication (HCC)  Detailed discussion with Emily Haas regarding situation, options and plans.  Severe lifestyle limiting claudication.  Noninvasive studies indicate severe peripheral vascular disease.  Requires additional urgent evaluation with arteriography to evaluate options for improving blood flow to feet, healing wounds and avoiding limb loss.    Risks, including but not limited to:  pain, infection, bleeding, renal failure (dialysis), nerve or blood vessel injury, need for emergent open operation to restore blood flow.  Benefits: relief of symptoms, reduction in risk of limb loss, improved wound healing.  Options:  Medical therapy, alternative imaging discussed.  Understands and wishes to proceed with plan.    Aorta iliac, right lower extremity arteriogram, possible lithotripsy, possible balloon angioplasty, thrombolysis, atherectomy or stent.  Local/IV sedation.  SDS.  11/10/2022    - Case request    2. Mixed hyperlipidemia  Lipid-lowering therapy has been proven beneficial in patients with cardio-vascular disease. Current guidelines recommend statin treatment for all patients with PAD,CAD and carotid stenosis. Statins are beneficial in preventing cardiovascular events, increasing functional capacity and lower the risk of adverse limb loss in PAD. Statins decrease the progression of plaque formation and may improve peripheral vessel lining, and aid in reversing atherosclerosis.    3. Carotid stenosis, asymptomatic, bilateral  Moderate left internal carotid artery disease.  Asymptomatic, repeat duplex as scheduled approximately 6 months.    Aspirin, statin      Detailed discussion regarding risks, benefits, and treatment plan. Images independently reviewed. Patient understands, agrees, and wishes to proceed with plan.       This document has been electronically signed by SUSAN Funez @  On  October 27, 2022 14:43 CDT

## 2022-10-28 PROCEDURE — 87510 GARDNER VAG DNA DIR PROBE: CPT | Performed by: NURSE PRACTITIONER

## 2022-10-28 PROCEDURE — 87660 TRICHOMONAS VAGIN DIR PROBE: CPT | Performed by: NURSE PRACTITIONER

## 2022-10-28 PROCEDURE — 87480 CANDIDA DNA DIR PROBE: CPT | Performed by: NURSE PRACTITIONER

## 2022-11-10 ENCOUNTER — HOSPITAL ENCOUNTER (OUTPATIENT)
Facility: HOSPITAL | Age: 72
Setting detail: HOSPITAL OUTPATIENT SURGERY
Discharge: HOME | End: 2022-11-10
Attending: THORACIC SURGERY (CARDIOTHORACIC VASCULAR SURGERY) | Admitting: NURSE PRACTITIONER

## 2022-11-10 ENCOUNTER — APPOINTMENT (OUTPATIENT)
Dept: ULTRASOUND IMAGING | Facility: HOSPITAL | Age: 72
End: 2022-11-10

## 2022-11-10 VITALS
RESPIRATION RATE: 18 BRPM | TEMPERATURE: 97 F | HEART RATE: 60 BPM | HEIGHT: 65 IN | WEIGHT: 192.68 LBS | DIASTOLIC BLOOD PRESSURE: 79 MMHG | SYSTOLIC BLOOD PRESSURE: 169 MMHG | OXYGEN SATURATION: 98 % | BODY MASS INDEX: 32.1 KG/M2

## 2022-11-10 DIAGNOSIS — I73.9 PVD (PERIPHERAL VASCULAR DISEASE) WITH CLAUDICATION: ICD-10-CM

## 2022-11-10 PROCEDURE — 76937 US GUIDE VASCULAR ACCESS: CPT

## 2022-11-10 PROCEDURE — 75625 CONTRAST EXAM ABDOMINL AORTA: CPT | Performed by: THORACIC SURGERY (CARDIOTHORACIC VASCULAR SURGERY)

## 2022-11-10 PROCEDURE — C1760 CLOSURE DEV, VASC: HCPCS | Performed by: THORACIC SURGERY (CARDIOTHORACIC VASCULAR SURGERY)

## 2022-11-10 PROCEDURE — C1887 CATHETER, GUIDING: HCPCS | Performed by: THORACIC SURGERY (CARDIOTHORACIC VASCULAR SURGERY)

## 2022-11-10 PROCEDURE — 75716 ARTERY X-RAYS ARMS/LEGS: CPT | Performed by: THORACIC SURGERY (CARDIOTHORACIC VASCULAR SURGERY)

## 2022-11-10 PROCEDURE — 25010000002 MIDAZOLAM PER 1 MG: Performed by: THORACIC SURGERY (CARDIOTHORACIC VASCULAR SURGERY)

## 2022-11-10 PROCEDURE — C1725 CATH, TRANSLUMIN NON-LASER: HCPCS | Performed by: THORACIC SURGERY (CARDIOTHORACIC VASCULAR SURGERY)

## 2022-11-10 PROCEDURE — 25010000002 HEPARIN (PORCINE) PER 1000 UNITS: Performed by: THORACIC SURGERY (CARDIOTHORACIC VASCULAR SURGERY)

## 2022-11-10 PROCEDURE — 25010000002 FENTANYL CITRATE (PF) 50 MCG/ML SOLUTION: Performed by: THORACIC SURGERY (CARDIOTHORACIC VASCULAR SURGERY)

## 2022-11-10 PROCEDURE — C1769 GUIDE WIRE: HCPCS | Performed by: THORACIC SURGERY (CARDIOTHORACIC VASCULAR SURGERY)

## 2022-11-10 PROCEDURE — C1894 INTRO/SHEATH, NON-LASER: HCPCS | Performed by: THORACIC SURGERY (CARDIOTHORACIC VASCULAR SURGERY)

## 2022-11-10 PROCEDURE — 25010000002 IOPAMIDOL 61 % SOLUTION: Performed by: THORACIC SURGERY (CARDIOTHORACIC VASCULAR SURGERY)

## 2022-11-10 PROCEDURE — C2623 CATH, TRANSLUMIN, DRUG-COAT: HCPCS | Performed by: THORACIC SURGERY (CARDIOTHORACIC VASCULAR SURGERY)

## 2022-11-10 PROCEDURE — 37224 PR REVSC OPN/PRG FEM/POP W/ANGIOPLASTY UNI: CPT | Performed by: THORACIC SURGERY (CARDIOTHORACIC VASCULAR SURGERY)

## 2022-11-10 RX ORDER — MIDAZOLAM HYDROCHLORIDE 1 MG/ML
INJECTION INTRAMUSCULAR; INTRAVENOUS AS NEEDED
Status: DISCONTINUED | OUTPATIENT
Start: 2022-11-10 | End: 2022-11-10 | Stop reason: HOSPADM

## 2022-11-10 RX ORDER — CLOPIDOGREL BISULFATE 75 MG/1
150 TABLET ORAL DAILY
Status: DISCONTINUED | OUTPATIENT
Start: 2022-11-10 | End: 2022-11-10 | Stop reason: HOSPADM

## 2022-11-10 RX ORDER — LIDOCAINE HYDROCHLORIDE 20 MG/ML
INJECTION, SOLUTION EPIDURAL; INFILTRATION; INTRACAUDAL; PERINEURAL
Status: DISCONTINUED
Start: 2022-11-10 | End: 2022-11-10 | Stop reason: HOSPADM

## 2022-11-10 RX ORDER — SODIUM CHLORIDE 9 MG/ML
100 INJECTION, SOLUTION INTRAVENOUS CONTINUOUS
Status: DISCONTINUED | OUTPATIENT
Start: 2022-11-10 | End: 2022-11-10 | Stop reason: HOSPADM

## 2022-11-10 RX ORDER — MIDAZOLAM HYDROCHLORIDE 1 MG/ML
INJECTION INTRAMUSCULAR; INTRAVENOUS
Status: DISCONTINUED
Start: 2022-11-10 | End: 2022-11-10 | Stop reason: HOSPADM

## 2022-11-10 RX ORDER — FENTANYL CITRATE 50 UG/ML
INJECTION, SOLUTION INTRAMUSCULAR; INTRAVENOUS AS NEEDED
Status: DISCONTINUED | OUTPATIENT
Start: 2022-11-10 | End: 2022-11-10 | Stop reason: HOSPADM

## 2022-11-10 RX ORDER — GUANFACINE 1 MG/1
1 TABLET, EXTENDED RELEASE ORAL DAILY
COMMUNITY

## 2022-11-10 RX ORDER — ACETAMINOPHEN 325 MG/1
650 TABLET ORAL EVERY 4 HOURS PRN
Status: DISCONTINUED | OUTPATIENT
Start: 2022-11-10 | End: 2022-11-10 | Stop reason: HOSPADM

## 2022-11-10 RX ORDER — LIDOCAINE HYDROCHLORIDE 20 MG/ML
INJECTION, SOLUTION EPIDURAL; INFILTRATION; INTRACAUDAL; PERINEURAL AS NEEDED
Status: DISCONTINUED | OUTPATIENT
Start: 2022-11-10 | End: 2022-11-10 | Stop reason: HOSPADM

## 2022-11-10 RX ORDER — CLOPIDOGREL BISULFATE 75 MG/1
75 TABLET ORAL DAILY
Qty: 30 TABLET | Refills: 11 | Status: SHIPPED | OUTPATIENT
Start: 2022-11-10 | End: 2022-12-08 | Stop reason: SDUPTHER

## 2022-11-10 RX ORDER — HEPARIN SODIUM 1000 [USP'U]/ML
INJECTION, SOLUTION INTRAVENOUS; SUBCUTANEOUS AS NEEDED
Status: DISCONTINUED | OUTPATIENT
Start: 2022-11-10 | End: 2022-11-10 | Stop reason: HOSPADM

## 2022-11-10 RX ORDER — FENTANYL CITRATE 50 UG/ML
INJECTION, SOLUTION INTRAMUSCULAR; INTRAVENOUS
Status: DISCONTINUED
Start: 2022-11-10 | End: 2022-11-10 | Stop reason: HOSPADM

## 2022-11-10 RX ORDER — HEPARIN SODIUM 1000 [USP'U]/ML
INJECTION, SOLUTION INTRAVENOUS; SUBCUTANEOUS
Status: DISCONTINUED
Start: 2022-11-10 | End: 2022-11-10 | Stop reason: HOSPADM

## 2022-11-10 RX ADMIN — CLOPIDOGREL BISULFATE 150 MG: 75 TABLET ORAL at 11:00

## 2022-11-10 RX ADMIN — SODIUM CHLORIDE 100 ML/HR: 9 INJECTION, SOLUTION INTRAVENOUS at 07:47

## 2022-11-21 ENCOUNTER — OFFICE VISIT (OUTPATIENT)
Dept: CARDIAC SURGERY | Facility: CLINIC | Age: 72
End: 2022-11-21

## 2022-11-21 VITALS
WEIGHT: 190 LBS | OXYGEN SATURATION: 98 % | HEART RATE: 68 BPM | BODY MASS INDEX: 31.65 KG/M2 | SYSTOLIC BLOOD PRESSURE: 146 MMHG | HEIGHT: 65 IN | DIASTOLIC BLOOD PRESSURE: 82 MMHG

## 2022-11-21 DIAGNOSIS — N18.32 STAGE 3B CHRONIC KIDNEY DISEASE: ICD-10-CM

## 2022-11-21 DIAGNOSIS — I73.9 PVD (PERIPHERAL VASCULAR DISEASE) WITH CLAUDICATION: Primary | ICD-10-CM

## 2022-11-21 DIAGNOSIS — E78.2 MIXED HYPERLIPIDEMIA: ICD-10-CM

## 2022-11-21 DIAGNOSIS — E66.09 CLASS 1 OBESITY DUE TO EXCESS CALORIES WITH SERIOUS COMORBIDITY AND BODY MASS INDEX (BMI) OF 33.0 TO 33.9 IN ADULT: ICD-10-CM

## 2022-11-21 PROCEDURE — 99214 OFFICE O/P EST MOD 30 MIN: CPT | Performed by: NURSE PRACTITIONER

## 2022-11-21 NOTE — PATIENT INSTRUCTIONS
The results of your vascular studies of your right leg shows mild disease with good  circulation, in the left leg shows milddisease with good  circulation.      If signs and symptoms of ischemia should occur including but not limited to pale/blue discoloration of limb, increasing pain with ambulation or at rest, or a non-healing wound. Patient is to notify [facility] for immediate evaluation.    3 months AYESHA

## 2022-11-22 NOTE — PROGRESS NOTES
CVTS Office Progress Note     11/22/2022    Emily Haas  1950    Chief Complaint:    Chief Complaint   Patient presents with   • Peripheral Vascular Disease       HPI:      PCP:  Bharati Love MD    72 y.o. female with HTN(stable, increased risk stroke, rupture), Hyperlipidemia(stable, increased risk cardiovascular events), Diabetes Mellitus(stable, increased risk cardiovascular events) and Obesity(uncontrolled, increased risk cardiovascular events) hypothyroid, colon resection (volvulus), adia, hysterectomy.  former smoker and quit 1990.  Referred to vascular surgery for moderate R ICA stenosis, also has claudication of right leg.  Recent arteriogram, claudication resolved postprocedure.  Returns today in postprocedural follow-up with AYESHA no amaurosis fugax, TIA, or CVA.  No other associated signs, symptoms or modifying factors.    10/2022 Carotid Duplex: VIN 0-49% mPSV 118c/s Ratio 1.3, LICA 50-69% mPSV 136c/s Ratio 1.7, Antegrade vertebrals  10/2022 AYESHA: Right 0.64 triphasic fem biphasic pop.  Left 1.21 triphasic  11/2022 arteriogram: PTA right SFA      The following portions of the patient's history were reviewed and updated as appropriate: allergies, current medications, past family history, past medical history, past social history, past surgical history and problem list.  Recent images independently reviewed.  Available laboratory values reviewed.    PMH:  Past Medical History:   Diagnosis Date   • Anxiety    • Cancer (HCC)     bladder   • Chronic bronchitis (HCC)    • CKD (chronic kidney disease) stage 3, GFR 30-59 ml/min (HCC)    • Diabetes mellitus (HCC)    • Diverticulitis    • Hyperlipidemia    • Hypertension    • Hypothyroidism    • Irritable bowel syndrome    • Obesity    • Osteoarthritis    • Vitamin D deficiency      Past Surgical History:   Procedure Laterality Date   • ARTERIOGRAM Right 11/10/2022    Procedure: Arteriogram;  Surgeon: Tigao Hendricks MD;  Location: [facility]  [facility];  Service: Interventional Radiology;  Laterality: Right;   • BILATERAL BREAST REDUCTION     • CHOLECYSTECTOMY     • COLON SURGERY      colon resection for obtruction   • COLONOSCOPY N/A 11/17/2017   • COLONOSCOPY N/A 01/14/2021   • ENDOSCOPY N/A 01/14/2021   • ENDOSCOPY N/A 02/16/2022    Procedure: ESOPHAGOGASTRODUODENOSCOPY;  Surgeon: Mauricio Betancourt MD;  Location: [facility] ENDOSCOPY;  Service: Gastroenterology;  Laterality: N/A;   • HYSTERECTOMY     • REDUCTION MAMMAPLASTY     • UPPER GASTROINTESTINAL ENDOSCOPY  01/14/2021   • UPPER GASTROINTESTINAL ENDOSCOPY  02/16/2022     Family History   Problem Relation Age of Onset   • Hypertension Mother    • Alzheimer's disease Father    • Diabetes Brother    • Hypothyroidism Daughter      Social History     Tobacco Use   • Smoking status: Former     Packs/day: 3.00     Years: 15.00     Pack years: 45.00     Types: Cigarettes   • Smokeless tobacco: Never   • Tobacco comments:     08/10/2021 - Patient states she has not utilized Cigarettes X 34 years and upon ceassation, patient was utilizing 3 packs of Cigarettes per day.   Vaping Use   • Vaping Use: Never used   Substance Use Topics   • Alcohol use: No   • Drug use: No       ALLERGIES:  Allergies   Allergen Reactions   • Augmentin [Amoxicillin-Pot Clavulanate] Hives   • Dilaudid [Hydromorphone] Nausea And Vomiting   • Sulfa Antibiotics Hives   • Atenolol Myalgia   • Lipitor [Atorvastatin] Myalgia   • Pravachol [Pravastatin] Myalgia   • Statins Myalgia     \"muscle pain \"    • Clindamycin/Lincomycin Other (See Comments)     \"Yeast infection\"         MEDICATIONS:    Current Outpatient Medications:   •  acetaminophen (TYLENOL) 650 MG 8 hr tablet, Take 2 tablets by mouth Every 8 (Eight) Hours As Needed., Disp: , Rfl:   •  albuterol sulfate  (90 Base) MCG/ACT inhaler, Inhale 2 puffs Every 6 (Six) Hours As Needed for Wheezing or Shortness of Air., Disp: 6.7 g, Rfl: 0  •  aspirin 81 MG chewable " tablet, Chew 81 mg Daily., Disp: , Rfl:   •  bacitracin 500 UNIT/GM ointment, Apply 1 application topically to the appropriate area as directed 2 (Two) Times a Day., Disp: 14 g, Rfl: 0  •  budesonide-formoterol (SYMBICORT) 160-4.5 MCG/ACT inhaler, Inhale 2 puffs 2 (Two) Times a Day., Disp: 6 g, Rfl: 0  •  carvedilol (COREG) 12.5 MG tablet, Take 1 tablet by mouth 2 (Two) Times a Day With Meals., Disp: 180 tablet, Rfl: 3  •  clopidogrel (Plavix) 75 MG tablet, Take 1 tablet by mouth Daily., Disp: 30 tablet, Rfl: 11  •  dicyclomine (BENTYL) 10 MG capsule, Take 10 mg by mouth 2 (two) times a day., Disp: , Rfl:   •  docusate sodium (COLACE) 50 MG capsule, Take 1 capsule by mouth Daily As Needed., Disp: , Rfl:   •  escitalopram (LEXAPRO) 10 MG tablet, Take 1 tablet by mouth Daily., Disp: 90 tablet, Rfl: 3  •  guanFACINE HCl ER (INTUNIV) 1 MG tablet sustained-release 24 hour, Take 1 mg by mouth Daily., Disp: , Rfl:   •  losartan (COZAAR) 100 MG tablet, Take 1 tablet by mouth Daily., Disp: 90 tablet, Rfl: 2  •  melatonin 3 MG tablet, TAKE 1 TABLET BY MOUTH AT NIGHT AS NEEDED FOR SLEEP., Disp: 90 tablet, Rfl: 1  •  omeprazole (priLOSEC) 40 MG capsule, Take 1 capsule by mouth Daily., Disp: 30 capsule, Rfl: 11  •  Synthroid 137 MCG tablet, Take 1 tablet by mouth Daily., Disp: 30 tablet, Rfl: 2  •  vitamin D (ERGOCALCIFEROL) 1.25 MG (36449 UT) capsule capsule, Take 50,000 Units by mouth 1 (One) Time Per Week., Disp: , Rfl:       Review of Systems   Constitutional: Negative for chills, decreased appetite, fever and weight loss.   HENT: Negative for congestion, nosebleeds and sore throat.    Eyes: Negative for blurred vision, visual disturbance and visual halos.   Cardiovascular: Positive for leg swelling. Negative for chest pain, claudication and dyspnea on exertion.   Respiratory: Negative for cough, shortness of breath, sputum production and wheezing.    Endocrine: Negative for cold intolerance and polyuria.  "  Hematologic/Lymphatic: Negative for bleeding problem. Does not bruise/bleed easily.   Skin: Positive for unusual hair distribution. Negative for flushing and nail changes.   Musculoskeletal: Positive for arthritis and joint pain. Negative for back pain.   Gastrointestinal: Negative for bloating, abdominal pain, hematemesis, melena, nausea and vomiting.   Genitourinary: Negative for flank pain and hematuria.   Neurological: Negative for brief paralysis, difficulty with concentration, focal weakness, light-headedness, loss of balance, numbness, paresthesias and weakness.   Psychiatric/Behavioral: Negative for altered mental status, depression, substance abuse and suicidal ideas.   Allergic/Immunologic: Negative for hives and persistent infections.         Vitals:    11/21/22 0833   BP: 146/82   BP Location: Left arm   Patient Position: Sitting   Cuff Size: Adult   Pulse: 68   SpO2: 98%   Weight: 86.2 kg (190 lb)   Height: 165.1 cm (65\")     Physical Exam  Vitals and nursing note reviewed.   Constitutional:       Appearance: Normal appearance. She is obese.   HENT:      Head: Normocephalic.      Nose: Nose normal.      Mouth/Throat:      Mouth: Mucous membranes are moist.   Eyes:      Pupils: Pupils are equal, round, and reactive to light.   Cardiovascular:      Rate and Rhythm: Normal rate.      Pulses:           Dorsalis pedis pulses are 2+ on the right side and 2+ on the left side.        Posterior tibial pulses are 2+ on the right side and 2+ on the left side.   Pulmonary:      Effort: Pulmonary effort is normal.   Abdominal:      General: Abdomen is flat.      Palpations: Abdomen is soft.   Musculoskeletal:         General: Normal range of motion.      Cervical back: Normal range of motion.   Skin:     General: Skin is warm and dry.      Capillary Refill: Capillary refill takes 2 to 3 seconds.   Neurological:      General: No focal deficit present.      Mental Status: She is alert and oriented to person, place, " and time.   Psychiatric:         Mood and Affect: Mood normal.         Assessment & Plan     Independent Review of Studies    1. PVD (peripheral vascular disease) with claudication (HCC)  Normal resting perfusion post procedure.    Resolution of claudication.    Remains on aspirin, Plavix, unfortunately she is intolerant of statins.  Repeat AYESHA in 3 months    - Doppler Ankle Brachial Index Single Level CAR; Future    2. Class 1 obesity due to excess calories with serious comorbidity and body mass index (BMI) of 33.0 to 33.9 in adult  Body mass index is 31.62 kg/m². Class 1 obesity, We have discussed the benefits of weight loss as it relates to long term morbidity and disease prevention.  Interventions discussed included self-monitoring of caloric intake, increasing physical activity/exercise, goal setting, stimulus control, consultation with dietitian, and non-food rewards.        3. Mixed hyperlipidemia  Lipid-lowering therapy has been proven beneficial in patients with cardio-vascular disease. Current guidelines recommend statin treatment for all patients with PAD,CAD and carotid stenosis. Statins are beneficial in preventing cardiovascular events, increasing functional capacity and lower the risk of adverse limb loss in PAD. Statins decrease the progression of plaque formation and may improve peripheral vessel lining, and aid in reversing atherosclerosis.    4. Stage 3b chronic kidney disease (HCC)  Lab Results   Component Value Date    CREATININE 1.21 (H) 10/06/2022    BUN 25 (H) 10/06/2022     10/06/2022    K 4.3 10/06/2022     10/06/2022    CO2 26.0 10/06/2022     Continue to follow-up with primary care provider as directed.          Detailed discussion regarding risks, benefits, and treatment plan. Images independently reviewed. Patient understands, agrees, and wishes to proceed with plan.       This document has been electronically signed by Yobany Nguyen AGACNP-BC @  On November 22, 2022 08:40 CST

## 2022-12-07 RX ORDER — LOSARTAN POTASSIUM 100 MG/1
TABLET ORAL
Qty: 90 TABLET | Refills: 2 | Status: SHIPPED | OUTPATIENT
Start: 2022-12-07

## 2022-12-08 RX ORDER — CLOPIDOGREL BISULFATE 75 MG/1
75 TABLET ORAL DAILY
Qty: 90 TABLET | Refills: 2 | Status: SHIPPED | OUTPATIENT
Start: 2022-12-08 | End: 2023-12-08

## 2022-12-20 ENCOUNTER — LAB (OUTPATIENT)
Dept: LAB | Facility: HOSPITAL | Age: 72
End: 2022-12-20

## 2022-12-20 ENCOUNTER — TELEPHONE (OUTPATIENT)
Dept: FAMILY MEDICINE CLINIC | Facility: CLINIC | Age: 72
End: 2022-12-20

## 2022-12-20 ENCOUNTER — TRANSCRIBE ORDERS (OUTPATIENT)
Dept: LAB | Facility: HOSPITAL | Age: 72
End: 2022-12-20

## 2022-12-20 DIAGNOSIS — I10 ESSENTIAL HYPERTENSION: ICD-10-CM

## 2022-12-20 DIAGNOSIS — N18.31 CHRONIC KIDNEY DISEASE (CKD) STAGE G3A/A1, MODERATELY DECREASED GLOMERULAR FILTRATION RATE (GFR) BETWEEN 45-59 ML/MIN/1.73 SQUARE METER AND ALBUMINURIA CREATININE RATIO LESS THAN 30 MG/G (CMS/H*: ICD-10-CM

## 2022-12-20 DIAGNOSIS — N18.31 CHRONIC KIDNEY DISEASE (CKD) STAGE G3A/A1, MODERATELY DECREASED GLOMERULAR FILTRATION RATE (GFR) BETWEEN 45-59 ML/MIN/1.73 SQUARE METER AND ALBUMINURIA CREATININE RATIO LESS THAN 30 MG/G (CMS/H*: Primary | ICD-10-CM

## 2022-12-20 DIAGNOSIS — E78.2 MIXED HYPERLIPIDEMIA: ICD-10-CM

## 2022-12-20 DIAGNOSIS — E11.9 DIET-CONTROLLED TYPE 2 DIABETES MELLITUS: ICD-10-CM

## 2022-12-20 DIAGNOSIS — E55.9 VITAMIN D DEFICIENCY DISEASE: ICD-10-CM

## 2022-12-20 PROCEDURE — 85007 BL SMEAR W/DIFF WBC COUNT: CPT

## 2022-12-20 PROCEDURE — 82746 ASSAY OF FOLIC ACID SERUM: CPT

## 2022-12-20 PROCEDURE — 84466 ASSAY OF TRANSFERRIN: CPT

## 2022-12-20 PROCEDURE — 81001 URINALYSIS AUTO W/SCOPE: CPT

## 2022-12-20 PROCEDURE — 85025 COMPLETE CBC W/AUTO DIFF WBC: CPT

## 2022-12-20 PROCEDURE — 82607 VITAMIN B-12: CPT

## 2022-12-20 PROCEDURE — 83540 ASSAY OF IRON: CPT

## 2022-12-20 PROCEDURE — 82306 VITAMIN D 25 HYDROXY: CPT

## 2022-12-20 PROCEDURE — 82570 ASSAY OF URINE CREATININE: CPT

## 2022-12-20 PROCEDURE — 36415 COLL VENOUS BLD VENIPUNCTURE: CPT

## 2022-12-20 PROCEDURE — 80069 RENAL FUNCTION PANEL: CPT

## 2022-12-20 PROCEDURE — 84156 ASSAY OF PROTEIN URINE: CPT

## 2022-12-20 PROCEDURE — 83735 ASSAY OF MAGNESIUM: CPT

## 2022-12-20 PROCEDURE — 83970 ASSAY OF PARATHORMONE: CPT

## 2022-12-21 LAB
25(OH)D3 SERPL-MCNC: 32.4 NG/ML (ref 30–100)
ALBUMIN SERPL-MCNC: 4.1 G/DL (ref 3.5–5.2)
ANION GAP SERPL CALCULATED.3IONS-SCNC: 9 MMOL/L (ref 5–15)
BACTERIA UR QL AUTO: ABNORMAL /HPF
BASOPHILS # BLD AUTO: 0.09 10*3/MM3 (ref 0–0.2)
BASOPHILS NFR BLD AUTO: 0.8 % (ref 0–1.5)
BILIRUB UR QL STRIP: NEGATIVE
BUN SERPL-MCNC: 16 MG/DL (ref 8–23)
BUN/CREAT SERPL: 13 (ref 7–25)
CALCIUM SPEC-SCNC: 9.7 MG/DL (ref 8.6–10.5)
CHLORIDE SERPL-SCNC: 99 MMOL/L (ref 98–107)
CLARITY UR: ABNORMAL
CLUMPED PLATELETS: PRESENT
CO2 SERPL-SCNC: 27 MMOL/L (ref 22–29)
COLOR UR: ABNORMAL
CREAT SERPL-MCNC: 1.23 MG/DL (ref 0.57–1)
CREAT UR-MCNC: 220 MG/DL
DEPRECATED RDW RBC AUTO: 44.8 FL (ref 37–54)
EGFRCR SERPLBLD CKD-EPI 2021: 46.8 ML/MIN/1.73
EOSINOPHIL # BLD AUTO: 0.4 10*3/MM3 (ref 0–0.4)
EOSINOPHIL NFR BLD AUTO: 3.7 % (ref 0.3–6.2)
ERYTHROCYTE [DISTWIDTH] IN BLOOD BY AUTOMATED COUNT: 12.7 % (ref 12.3–15.4)
FOLATE SERPL-MCNC: 8.63 NG/ML (ref 4.78–24.2)
GLUCOSE SERPL-MCNC: 128 MG/DL (ref 65–99)
GLUCOSE UR STRIP-MCNC: NEGATIVE MG/DL
HCT VFR BLD AUTO: 40 % (ref 34–46.6)
HGB BLD-MCNC: 12.9 G/DL (ref 12–15.9)
HGB UR QL STRIP.AUTO: NEGATIVE
HYALINE CASTS UR QL AUTO: ABNORMAL /LPF
IRON 24H UR-MRATE: 88 MCG/DL (ref 37–145)
IRON SATN MFR SERPL: 20 % (ref 20–50)
KETONES UR QL STRIP: ABNORMAL
LEUKOCYTE ESTERASE UR QL STRIP.AUTO: ABNORMAL
LYMPHOCYTES # BLD AUTO: 2.83 10*3/MM3 (ref 0.7–3.1)
LYMPHOCYTES NFR BLD AUTO: 26.1 % (ref 19.6–45.3)
MAGNESIUM SERPL-MCNC: 1.8 MG/DL (ref 1.6–2.4)
MCH RBC QN AUTO: 30.7 PG (ref 26.6–33)
MCHC RBC AUTO-ENTMCNC: 32.3 G/DL (ref 31.5–35.7)
MCV RBC AUTO: 95.2 FL (ref 79–97)
MONOCYTES # BLD AUTO: 0.65 10*3/MM3 (ref 0.1–0.9)
MONOCYTES NFR BLD AUTO: 6 % (ref 5–12)
NEUTROPHILS NFR BLD AUTO: 6.81 10*3/MM3 (ref 1.7–7)
NEUTROPHILS NFR BLD AUTO: 62.9 % (ref 42.7–76)
NITRITE UR QL STRIP: NEGATIVE
PH UR STRIP.AUTO: 6 [PH] (ref 5–8)
PHOSPHATE SERPL-MCNC: 2.7 MG/DL (ref 2.5–4.5)
PLATELET # BLD AUTO: 284 10*3/MM3 (ref 140–450)
PMV BLD AUTO: 11.7 FL (ref 6–12)
POTASSIUM SERPL-SCNC: 4.2 MMOL/L (ref 3.5–5.2)
PROT ?TM UR-MCNC: 13 MG/DL
PROT UR QL STRIP: ABNORMAL
PROT/CREAT UR: 59.1 MG/G CREA (ref 0–200)
PTH-INTACT SERPL-MCNC: 42 PG/ML (ref 15–65)
RBC # BLD AUTO: 4.2 10*6/MM3 (ref 3.77–5.28)
RBC # UR STRIP: ABNORMAL /HPF
RBC MORPH BLD: NORMAL
REF LAB TEST METHOD: ABNORMAL
SODIUM SERPL-SCNC: 135 MMOL/L (ref 136–145)
SP GR UR STRIP: 1.03 (ref 1–1.03)
SQUAMOUS #/AREA URNS HPF: ABNORMAL /HPF
TIBC SERPL-MCNC: 437 MCG/DL (ref 298–536)
TRANSFERRIN SERPL-MCNC: 293 MG/DL (ref 200–360)
UROBILINOGEN UR QL STRIP: ABNORMAL
VIT B12 BLD-MCNC: 380 PG/ML (ref 211–946)
WBC # UR STRIP: ABNORMAL /HPF
WBC MORPH BLD: NORMAL
WBC NRBC COR # BLD: 10.83 10*3/MM3 (ref 3.4–10.8)

## 2022-12-29 ENCOUNTER — OFFICE VISIT (OUTPATIENT)
Dept: GASTROENTEROLOGY | Facility: CLINIC | Age: 72
End: 2022-12-29
Payer: MEDICARE

## 2022-12-29 VITALS
HEIGHT: 65 IN | DIASTOLIC BLOOD PRESSURE: 66 MMHG | WEIGHT: 189.8 LBS | HEART RATE: 63 BPM | SYSTOLIC BLOOD PRESSURE: 119 MMHG | BODY MASS INDEX: 31.62 KG/M2

## 2022-12-29 DIAGNOSIS — R63.4 WEIGHT LOSS: ICD-10-CM

## 2022-12-29 DIAGNOSIS — K59.09 OTHER CONSTIPATION: ICD-10-CM

## 2022-12-29 DIAGNOSIS — K21.9 GASTROESOPHAGEAL REFLUX DISEASE, UNSPECIFIED WHETHER ESOPHAGITIS PRESENT: ICD-10-CM

## 2022-12-29 DIAGNOSIS — R10.32 LEFT LOWER QUADRANT ABDOMINAL PAIN: ICD-10-CM

## 2022-12-29 DIAGNOSIS — K22.70 BARRETT'S ESOPHAGUS WITHOUT DYSPLASIA: Primary | ICD-10-CM

## 2022-12-29 PROCEDURE — 99214 OFFICE O/P EST MOD 30 MIN: CPT | Performed by: INTERNAL MEDICINE

## 2022-12-29 RX ORDER — SODIUM CHLORIDE 9 MG/ML
40 INJECTION, SOLUTION INTRAVENOUS AS NEEDED
Status: CANCELLED | OUTPATIENT
Start: 2023-01-26

## 2022-12-29 RX ORDER — DEXTROSE AND SODIUM CHLORIDE 5; .45 G/100ML; G/100ML
30 INJECTION, SOLUTION INTRAVENOUS CONTINUOUS PRN
Status: CANCELLED | OUTPATIENT
Start: 2023-01-26

## 2023-01-03 PROBLEM — K59.09 OTHER CONSTIPATION: Status: ACTIVE | Noted: 2023-01-03

## 2023-01-03 PROBLEM — R63.4 WEIGHT LOSS: Status: ACTIVE | Noted: 2023-01-03

## 2023-01-03 PROBLEM — K21.9 GASTROESOPHAGEAL REFLUX DISEASE: Status: ACTIVE | Noted: 2023-01-03

## 2023-01-04 ENCOUNTER — LAB (OUTPATIENT)
Dept: LAB | Facility: HOSPITAL | Age: 73
End: 2023-01-04
Payer: MEDICARE

## 2023-01-04 ENCOUNTER — OFFICE VISIT (OUTPATIENT)
Dept: FAMILY MEDICINE CLINIC | Facility: CLINIC | Age: 73
End: 2023-01-04
Payer: MEDICARE

## 2023-01-04 VITALS
BODY MASS INDEX: 31.49 KG/M2 | HEART RATE: 63 BPM | WEIGHT: 189 LBS | SYSTOLIC BLOOD PRESSURE: 128 MMHG | DIASTOLIC BLOOD PRESSURE: 80 MMHG | OXYGEN SATURATION: 96 % | HEIGHT: 65 IN

## 2023-01-04 DIAGNOSIS — E11.9 TYPE 2 DIABETES MELLITUS WITHOUT COMPLICATION, WITHOUT LONG-TERM CURRENT USE OF INSULIN: ICD-10-CM

## 2023-01-04 DIAGNOSIS — E03.9 ACQUIRED HYPOTHYROIDISM: ICD-10-CM

## 2023-01-04 DIAGNOSIS — Z00.00 MEDICARE ANNUAL WELLNESS VISIT, SUBSEQUENT: Primary | ICD-10-CM

## 2023-01-04 DIAGNOSIS — I10 ESSENTIAL HYPERTENSION: ICD-10-CM

## 2023-01-04 LAB
HBA1C MFR BLD: 7.2 % (ref 4.8–5.6)
TSH SERPL DL<=0.05 MIU/L-ACNC: 3.95 UIU/ML (ref 0.27–4.2)

## 2023-01-04 PROCEDURE — 83036 HEMOGLOBIN GLYCOSYLATED A1C: CPT

## 2023-01-04 PROCEDURE — G0439 PPPS, SUBSEQ VISIT: HCPCS | Performed by: FAMILY MEDICINE

## 2023-01-04 PROCEDURE — 1170F FXNL STATUS ASSESSED: CPT | Performed by: FAMILY MEDICINE

## 2023-01-04 PROCEDURE — 84443 ASSAY THYROID STIM HORMONE: CPT

## 2023-01-04 PROCEDURE — 36415 COLL VENOUS BLD VENIPUNCTURE: CPT

## 2023-01-04 PROCEDURE — 1159F MED LIST DOCD IN RCRD: CPT | Performed by: FAMILY MEDICINE

## 2023-01-04 NOTE — PROGRESS NOTES
The ABCs of the Annual Wellness Visit  Subsequent Medicare Wellness Visit    Subjective      Emily Haas is a 72 y.o. female who presents for a Subsequent Medicare Wellness Visit.    The following portions of the patient's history were reviewed and   updated as appropriate: allergies, current medications, past family history, past medical history, past social history, past surgical history and problem list.    Compared to one year ago, the patient feels her physical   health is better.    Compared to one year ago, the patient feels her mental   health is better.    Recent Hospitalizations:  She was not admitted to the hospital during the last year.       Current Medical Providers:  Patient Care Team:  Bharati Love MD as PCP - General (Family Medicine)  Mauricio Betancourt MD as Consulting Physician (Gastroenterology)  Rosendo Villanueva MD as Consulting Physician (Cardiology)  Juan José Bernard APRN as Nurse Practitioner (Nurse Practitioner)  Eric Nguyen APRN as Nurse Practitioner (Cardiothoracic Surgery)  D'Amico, Anna M., MD as Consulting Physician (Urology)    Outpatient Medications Prior to Visit   Medication Sig Dispense Refill   • acetaminophen (TYLENOL) 650 MG 8 hr tablet Take 2 tablets by mouth Every 8 (Eight) Hours As Needed.     • albuterol sulfate  (90 Base) MCG/ACT inhaler Inhale 2 puffs Every 6 (Six) Hours As Needed for Wheezing or Shortness of Air. 6.7 g 0   • aspirin 81 MG chewable tablet Chew 81 mg Daily.     • bacitracin 500 UNIT/GM ointment Apply 1 application topically to the appropriate area as directed 2 (Two) Times a Day. 14 g 0   • budesonide-formoterol (SYMBICORT) 160-4.5 MCG/ACT inhaler Inhale 2 puffs 2 (Two) Times a Day. 6 g 0   • carvedilol (COREG) 12.5 MG tablet Take 1 tablet by mouth 2 (Two) Times a Day With Meals. 180 tablet 3   • clopidogrel (Plavix) 75 MG tablet Take 1 tablet by mouth Daily. 90 tablet 2   • dicyclomine (BENTYL) 10 MG capsule Take 10 mg by mouth 2  (two) times a day.     • docusate sodium (COLACE) 50 MG capsule Take 1 capsule by mouth Daily As Needed.     • escitalopram (LEXAPRO) 10 MG tablet Take 1 tablet by mouth Daily. 90 tablet 3   • guanFACINE HCl ER (INTUNIV) 1 MG tablet sustained-release 24 hour Take 1 mg by mouth Daily.     • losartan (COZAAR) 100 MG tablet TAKE 1 TABLET EVERY DAY 90 tablet 2   • melatonin 3 MG tablet TAKE 1 TABLET BY MOUTH AT NIGHT AS NEEDED FOR SLEEP. 90 tablet 1   • omeprazole (priLOSEC) 40 MG capsule Take 1 capsule by mouth Daily. 30 capsule 11   • Synthroid 137 MCG tablet Take 1 tablet by mouth Daily. 30 tablet 2   • vitamin D (ERGOCALCIFEROL) 1.25 MG (34226 UT) capsule capsule Take 50,000 Units by mouth 1 (One) Time Per Week.       No facility-administered medications prior to visit.       No opioid medication identified on active medication list. I have reviewed chart for other potential  high risk medication/s and harmful drug interactions in the elderly.          Aspirin is on active medication list. Aspirin use is indicated based on review of current medical condition/s. Pros and cons of this therapy have been discussed today. Benefits of this medication outweigh potential harm.  Patient has been encouraged to continue taking this medication.  .      Patient Active Problem List   Diagnosis   • Vitamin D deficiency   • Irritable bowel syndrome   • Insomnia   • Hyperlipidemia   • Generalized anxiety disorder   • Essential hypertension   • Diverticular disease of colon   • Diet-controlled type 2 diabetes mellitus (HCC)   • Anxiety state   • Acquired hypothyroidism   • Asthma   • Periumbilical abdominal pain   • Spider veins of limb   • Varicose veins of lower extremity   • Diabetes mellitus (HCC)   • Posterior subcapsular polar age-related cataract   • Encounter for screening for malignant neoplasm of colon   • Diverticulosis of large intestine without hemorrhage   • Irritable bowel syndrome with both constipation and diarrhea    • Vaginosis   • Fatty liver   • Diabetes mellitus type 2 without retinopathy (HCC)   • Depressive disorder   • Pure hypercholesterolemia   • Class 1 obesity due to excess calories with serious comorbidity and body mass index (BMI) of 33.0 to 33.9 in adult   • Right lower quadrant abdominal pain   • Left lower quadrant abdominal pain   • Diarrhea   • Nausea   • Bloating   • Pneumonia due to COVID-19 virus   • Stage 3b chronic kidney disease (HCC)   • Acute respiratory failure with hypoxia (HCC)   • Cytokine release syndrome, grade 2   • Barnett's esophagus without dysplasia   • PVD (peripheral vascular disease) with claudication (HCC)   • Gastroesophageal reflux disease   • Other constipation   • Weight loss     Advance Care Planning  Advance Directive is on file.  ACP discussion was held with the patient during this visit. Patient has an advance directive in EMR which is still valid.      Objective    Vitals:    01/04/23 0956   BP: 128/80   Pulse: 63   SpO2: 96%   Weight: 85.7 kg (189 lb)   Height: 165.1 cm (65\")   PainSc: 0-No pain     Estimated body mass index is 31.45 kg/m² as calculated from the following:    Height as of this encounter: 165.1 cm (65\").    Weight as of this encounter: 85.7 kg (189 lb).    BMI is >= 30 and <35. (Class 1 Obesity). The following options were offered after discussion;: exercise counseling/recommendations and nutrition counseling/recommendations      Does the patient have evidence of cognitive impairment?   No            HEALTH RISK ASSESSMENT    Smoking Status:  Social History     Tobacco Use   Smoking Status Former   • Packs/day: 3.00   • Years: 15.00   • Pack years: 45.00   • Types: Cigarettes   Smokeless Tobacco Never   Tobacco Comments    08/10/2021 - Patient states she has not utilized Cigarettes X 34 years and upon ceassation, patient was utilizing 3 packs of Cigarettes per day.     Alcohol Consumption:  Social History     Substance and Sexual Activity   Alcohol Use No      Fall Risk Screen:    STEADI Fall Risk Assessment was completed, and patient is at HIGH risk for falls. Assessment completed on:1/4/2023    Depression Screening:  PHQ-2/PHQ-9 Depression Screening 1/4/2023   Little Interest or Pleasure in Doing Things 0-->not at all   Feeling Down, Depressed or Hopeless 0-->not at all   PHQ-9: Brief Depression Severity Measure Score 0       Health Habits and Functional and Cognitive Screening:  Functional & Cognitive Status 1/4/2023   Do you have difficulty preparing food and eating? No   Do you have difficulty bathing yourself, getting dressed or grooming yourself? No   Do you have difficulty using the toilet? No   Do you have difficulty moving around from place to place? No   Do you have trouble with steps or getting out of a bed or a chair? No   Current Diet Well Balanced Diet   Dental Exam Up to date   Eye Exam Up to date   Exercise (times per week) 0 times per week   Current Exercises Include -   Current Exercise Activities Include -   Do you need help using the phone?  No   Are you deaf or do you have serious difficulty hearing?  No   Do you need help with transportation? No   Do you need help shopping? No   Do you need help preparing meals?  No   Do you need help with housework?  No   Do you need help with laundry? No   Do you need help taking your medications? No   Do you need help managing money? No   Do you ever drive or ride in a car without wearing a seat belt? No   Have you felt unusual stress, anger or loneliness in the last month? No   Who do you live with? Spouse   If you need help, do you have trouble finding someone available to you? No   Have you been bothered in the last four weeks by sexual problems? No   Do you have difficulty concentrating, remembering or making decisions? No       Age-appropriate Screening Schedule:  Refer to the list below for future screening recommendations based on patient's age, sex and/or medical conditions. Orders for these  recommended tests are listed in the plan section. The patient has been provided with a written plan.    Health Maintenance   Topic Date Due   • ZOSTER VACCINE (2 of 3) 12/31/2015   • DIABETIC FOOT EXAM  02/12/2021   • DIABETIC EYE EXAM  06/15/2021   • HEMOGLOBIN A1C  03/27/2023   • LIPID PANEL  07/08/2023   • MAMMOGRAM  09/27/2023   • DXA SCAN  09/27/2023   • URINE MICROALBUMIN  12/20/2023   • TDAP/TD VACCINES (2 - Td or Tdap) 05/05/2026   • INFLUENZA VACCINE  Completed   • COLONOSCOPY  Discontinued                CMS Preventative Services Quick Reference  Risk Factors Identified During Encounter:    Inactivity/Sedentary: Advised to start gradual exercise, 10 mins twice a week, and increase from there    The above risks/problems have been discussed with the patient.  Pertinent information has been shared with the patient in the After Visit Summary.    Diagnoses and all orders for this visit:    1. Medicare annual wellness visit, subsequent (Primary)    2. Acquired hypothyroidism  -     TSH; Future    3. Essential hypertension    4. Type 2 diabetes mellitus without complication, without long-term current use of insulin (HCC)  -     Hemoglobin A1c; Future      Medicare wellness visit completed today  Chronic medical problems, needs recheck TSH and HgbA1c today  Encouraged healthy diet and increased physical activity      Follow Up:   Return in about 4 months (around 5/4/2023) for Recheck.  Next Medicare Wellness visit to be scheduled in 1 year.      An After Visit Summary and PPPS were made available to the patient.          This document has been electronically signed by Bharati Love MD

## 2023-01-04 NOTE — PATIENT INSTRUCTIONS
Medicare Wellness  Personal Prevention Plan of Service     Date of Office Visit:    Encounter Provider:  Bharati Love MD  Place of Service:  [facility] [place]  Patient Name: Emily Haas  :  1950    As part of the Medicare Wellness portion of your visit today, we are providing you with this personalized preventive plan of services (PPPS). This plan is based upon recommendations of the [place] Preventive Services Task Force (USPSTF) and the Advisory Committee on Immunization Practices (ACIP).    This lists the preventive care services that should be considered, and provides dates of when you are due. Items listed as completed are up-to-date and do not require any further intervention.    Health Maintenance   Topic Date Due    ZOSTER VACCINE (2 of 3) 2015    DIABETIC FOOT EXAM  2021    DIABETIC EYE EXAM  06/15/2021    ANNUAL WELLNESS VISIT  2021    COVID-19 Vaccine (4 - Booster for Pfizer series) 2022    HEMOGLOBIN A1C  2023    LIPID PANEL  2023    MAMMOGRAM  2023    DXA SCAN  2023    URINE MICROALBUMIN  2023    TDAP/TD VACCINES (2 - Td or Tdap) 2026    HEPATITIS C SCREENING  Completed    INFLUENZA VACCINE  Completed    Pneumococcal Vaccine 65+  Completed    COLONOSCOPY  Discontinued       No orders of the defined types were placed in this encounter.      No follow-ups on file.

## 2023-01-05 ENCOUNTER — TELEPHONE (OUTPATIENT)
Dept: FAMILY MEDICINE CLINIC | Facility: CLINIC | Age: 73
End: 2023-01-05
Payer: COMMERCIAL

## 2023-01-05 NOTE — TELEPHONE ENCOUNTER
Per Dr. Love, Ms. Haas has been called with recent lab results & recommendations.  Continue current medications and follow-up as planned or sooner if any problems.       ----- Message from Bharati Love MD sent at 1/5/2023  1:39 PM CST -----  TSH normal.  HgbA1c stable at 7.2%.  Thanks, CHEMO Love

## 2023-01-09 NOTE — TELEPHONE ENCOUNTER
Pr Dr. Hutton, Ms. Haas has been called with her recent lab & US of the Liver results & recommendations.  Continue her current medications and follow-up as planned or sooner if any problems.      ----- Message from Fernando Hutton MD sent at 10/4/2018  3:34 PM CDT -----  Day liver.  Recommend weight loss    
No

## 2023-01-11 NOTE — PROGRESS NOTES
Chief Complaint   Patient presents with   • Abdominal Pain     3 month follow up       Subjective    Emily Haas is a 72 y.o. female.    History of Present Illness  Patient presented to GI clinic for follow-up visit today.  Has intermittent symptoms with left lower quadrant pain with constipation.  Symptoms are somewhat better with Bentyl but persistent.  Also lost 10 pounds weight since last visit.  Denies nausea or vomiting.  Due for EGD.       The following portions of the patient's history were reviewed and updated as appropriate:   Past Medical History:   Diagnosis Date   • Anxiety    • Cancer (HCC)     bladder   • Chronic bronchitis (HCC)    • CKD (chronic kidney disease) stage 3, GFR 30-59 ml/min (HCC)    • Diabetes mellitus (HCC)    • Diverticulitis    • Hyperlipidemia    • Hypertension    • Hypothyroidism    • Irritable bowel syndrome    • Obesity    • Osteoarthritis    • Vitamin D deficiency      Past Surgical History:   Procedure Laterality Date   • ARTERIOGRAM Right 11/10/2022    Procedure: Arteriogram;  Surgeon: Tiago Hendricks MD;  Location: [facility];  Service: Interventional Radiology;  Laterality: Right;   • BILATERAL BREAST REDUCTION     • CHOLECYSTECTOMY     • COLON SURGERY      colon resection for obtruction   • COLONOSCOPY N/A 2017   • COLONOSCOPY N/A 2021   • ENDOSCOPY N/A 2021   • ENDOSCOPY N/A 2022    Procedure: ESOPHAGOGASTRODUODENOSCOPY;  Surgeon: Mauricio Betancourt MD;  Location: [facility] ENDOSCOPY;  Service: Gastroenterology;  Laterality: N/A;   • HYSTERECTOMY     • REDUCTION MAMMAPLASTY     • UPPER GASTROINTESTINAL ENDOSCOPY  2021   • UPPER GASTROINTESTINAL ENDOSCOPY  2022     Family History   Problem Relation Age of Onset   • Hypertension Mother    • Alzheimer's disease Father    • Diabetes Brother    • Hypothyroidism Daughter      OB History        2    Para   2    Term   2            AB        Living            SAB        IAB        Ectopic        Molar        Multiple        Live Births                  Prior to Admission medications    Medication Sig Start Date End Date Taking? Authorizing Provider   acetaminophen (TYLENOL) 650 MG 8 hr tablet Take 2 tablets by mouth Every 8 (Eight) Hours As Needed.   Yes Jose Alejandro Sommers MD   albuterol sulfate  (90 Base) MCG/ACT inhaler Inhale 2 puffs Every 6 (Six) Hours As Needed for Wheezing or Shortness of Air. 1/18/22  Yes Bharati Love MD   aspirin 81 MG chewable tablet Chew 81 mg Daily.   Yes Jose Alejandro Sommers MD   budesonide-formoterol (SYMBICORT) 160-4.5 MCG/ACT inhaler Inhale 2 puffs 2 (Two) Times a Day. 4/6/21  Yes Fabiola Layne MD   carvedilol (COREG) 12.5 MG tablet Take 1 tablet by mouth 2 (Two) Times a Day With Meals. 5/12/20  Yes Fernando Hutton MD   clopidogrel (Plavix) 75 MG tablet Take 1 tablet by mouth Daily. 12/8/22 12/8/23 Yes Laura Ratliff APRN   dicyclomine (BENTYL) 10 MG capsule Take 10 mg by mouth 2 (two) times a day.   Yes Jose Alejandro Sommers MD   escitalopram (LEXAPRO) 10 MG tablet Take 1 tablet by mouth Daily. 12/13/21  Yes Bharati Love MD   guanFACINE HCl ER (INTUNIV) 1 MG tablet sustained-release 24 hour Take 1 mg by mouth Daily.   Yes Jose Alejandro Sommers MD   losartan (COZAAR) 100 MG tablet TAKE 1 TABLET EVERY DAY 12/7/22  Yes Bharati Love MD   melatonin 3 MG tablet TAKE 1 TABLET BY MOUTH AT NIGHT AS NEEDED FOR SLEEP. 12/9/21  Yes Bharati Love MD   omeprazole (priLOSEC) 40 MG capsule Take 1 capsule by mouth Daily. 2/12/21  Yes Mauricio Betancourt MD   Synthroid 137 MCG tablet Take 1 tablet by mouth Daily. 9/28/22  Yes Bharati Love MD   vitamin D (ERGOCALCIFEROL) 1.25 MG (78580 UT) capsule capsule Take 50,000 Units by mouth 1 (One) Time Per Week. 11/12/20  Yes Jose Alejandro Sommers MD   bacitracin 500 UNIT/GM ointment Apply 1 application topically to the appropriate area as directed 2  "(Two) Times a Day. 9/29/22   Jeremias Lay MD   docusate sodium (COLACE) 50 MG capsule Take 1 capsule by mouth Daily As Needed.    Provider, Jose Alejandro, MD     Allergies   Allergen Reactions   • Augmentin [Amoxicillin-Pot Clavulanate] Hives   • Dilaudid [Hydromorphone] Nausea And Vomiting   • Sulfa Antibiotics Hives   • Atenolol Myalgia   • Lipitor [Atorvastatin] Myalgia   • Pravachol [Pravastatin] Myalgia   • Statins Myalgia     \"muscle pain \"    • Clindamycin/Lincomycin Other (See Comments)     \"Yeast infection\"     Social History     Socioeconomic History   • Marital status:    Tobacco Use   • Smoking status: Former     Packs/day: 3.00     Years: 15.00     Pack years: 45.00     Types: Cigarettes   • Smokeless tobacco: Never   • Tobacco comments:     08/10/2021 - Patient states she has not utilized Cigarettes X 34 years and upon ceassation, patient was utilizing 3 packs of Cigarettes per day.   Vaping Use   • Vaping Use: Never used   Substance and Sexual Activity   • Alcohol use: No   • Drug use: No   • Sexual activity: Defer       Review of Systems  Review of Systems   Constitutional: Positive for unexpected weight change. Negative for chills, fatigue and fever.   HENT: Negative for congestion, ear discharge, hearing loss, nosebleeds and sore throat.    Eyes: Negative for pain, discharge and redness.   Respiratory: Negative for cough, chest tightness, shortness of breath and wheezing.    Cardiovascular: Negative for chest pain and palpitations.   Gastrointestinal: Positive for abdominal pain and constipation. Negative for abdominal distention, blood in stool, diarrhea, nausea and vomiting.   Endocrine: Negative for cold intolerance, polydipsia, polyphagia and polyuria.   Genitourinary: Negative for dysuria, flank pain, frequency, hematuria and urgency.   Musculoskeletal: Negative for arthralgias, back pain, joint swelling and myalgias.   Skin: Negative for color change, pallor and rash.   Neurological: " "Negative for tremors, seizures, syncope, weakness and headaches.   Hematological: Negative for adenopathy. Does not bruise/bleed easily.   Psychiatric/Behavioral: Negative for behavioral problems, confusion, dysphoric mood, hallucinations and suicidal ideas. The patient is not nervous/anxious.         /66   Pulse 63   Ht 165.1 cm (65\")   Wt 86.1 kg (189 lb 12.8 oz)   LMP  (LMP Unknown)   BMI 31.58 kg/m²     Objective    Physical Exam  Constitutional:       Appearance: She is well-developed.   HENT:      Head: Normocephalic and atraumatic.   Eyes:      Conjunctiva/sclera: Conjunctivae normal.      Pupils: Pupils are equal, round, and reactive to light.   Neck:      Thyroid: No thyromegaly.   Cardiovascular:      Rate and Rhythm: Normal rate and regular rhythm.      Heart sounds: Normal heart sounds. No murmur heard.  Pulmonary:      Effort: Pulmonary effort is normal.      Breath sounds: Normal breath sounds. No wheezing.   Abdominal:      General: Bowel sounds are normal. There is no distension.      Palpations: Abdomen is soft. There is no mass.      Tenderness: There is no abdominal tenderness.      Hernia: No hernia is present.   Genitourinary:     Comments: No lesions noted  Musculoskeletal:         General: No tenderness. Normal range of motion.      Cervical back: Normal range of motion and neck supple.   Lymphadenopathy:      Cervical: No cervical adenopathy.   Skin:     General: Skin is warm and dry.      Findings: No rash.   Neurological:      Mental Status: She is alert and oriented to person, place, and time.      Cranial Nerves: No cranial nerve deficit.   Psychiatric:         Thought Content: Thought content normal.       Lab on 12/20/2022   Component Date Value Ref Range Status   • Iron 12/20/2022 88  37 - 145 mcg/dL Final   • Iron Saturation 12/20/2022 20  20 - 50 % Final   • Transferrin 12/20/2022 293  200 - 360 mg/dL Final   • TIBC 12/20/2022 437  298 - 536 mcg/dL Final   • Magnesium " 12/20/2022 1.8  1.6 - 2.4 mg/dL Final   • Glucose 12/20/2022 128 (H)  65 - 99 mg/dL Final   • BUN 12/20/2022 16  8 - 23 mg/dL Final   • Creatinine 12/20/2022 1.23 (H)  0.57 - 1.00 mg/dL Final   • Sodium 12/20/2022 135 (L)  136 - 145 mmol/L Final   • Potassium 12/20/2022 4.2  3.5 - 5.2 mmol/L Final    Slight hemolysis detected by analyzer. Results may be affected.   • Chloride 12/20/2022 99  98 - 107 mmol/L Final   • CO2 12/20/2022 27.0  22.0 - 29.0 mmol/L Final   • Calcium 12/20/2022 9.7  8.6 - 10.5 mg/dL Final   • Albumin 12/20/2022 4.10  3.50 - 5.20 g/dL Final   • Phosphorus 12/20/2022 2.7  2.5 - 4.5 mg/dL Final   • Anion Gap 12/20/2022 9.0  5.0 - 15.0 mmol/L Final   • BUN/Creatinine Ratio 12/20/2022 13.0  7.0 - 25.0 Final   • eGFR 12/20/2022 46.8 (L)  >60.0 mL/min/1.73 Final    National Kidney Foundation and American Society of Nephrology (ASN) Task Force recommended calculation based on the Chronic Kidney Disease Epidemiology Collaboration (CKD-EPI) equation refit without adjustment for race.   • PTH, Intact 12/20/2022 42.0  15.0 - 65.0 pg/mL Final   • 25 Hydroxy, Vitamin D 12/20/2022 32.4  30.0 - 100.0 ng/ml Final   • Vitamin B-12 12/20/2022 380  211 - 946 pg/mL Final   • Folate 12/20/2022 8.63  4.78 - 24.20 ng/mL Final   • WBC 12/20/2022 10.83 (H)  3.40 - 10.80 10*3/mm3 Final   • RBC 12/20/2022 4.20  3.77 - 5.28 10*6/mm3 Final   • Hemoglobin 12/20/2022 12.9  12.0 - 15.9 g/dL Final   • Hematocrit 12/20/2022 40.0  34.0 - 46.6 % Final   • MCV 12/20/2022 95.2  79.0 - 97.0 fL Final   • MCH 12/20/2022 30.7  26.6 - 33.0 pg Final   • MCHC 12/20/2022 32.3  31.5 - 35.7 g/dL Final   • RDW 12/20/2022 12.7  12.3 - 15.4 % Final   • RDW-SD 12/20/2022 44.8  37.0 - 54.0 fl Final   • MPV 12/20/2022 11.7  6.0 - 12.0 fL Final   • Platelets 12/20/2022 284  140 - 450 10*3/mm3 Final    Platelet clumping noted. Platelet count may be falsely decreased due to platelet clumping.       • Neutrophil % 12/20/2022 62.9  42.7 - 76.0 % Final    • Lymphocyte % 12/20/2022 26.1  19.6 - 45.3 % Final   • Monocyte % 12/20/2022 6.0  5.0 - 12.0 % Final   • Eosinophil % 12/20/2022 3.7  0.3 - 6.2 % Final   • Basophil % 12/20/2022 0.8  0.0 - 1.5 % Final   • Neutrophils, Absolute 12/20/2022 6.81  1.70 - 7.00 10*3/mm3 Final   • Lymphocytes, Absolute 12/20/2022 2.83  0.70 - 3.10 10*3/mm3 Final   • Monocytes, Absolute 12/20/2022 0.65  0.10 - 0.90 10*3/mm3 Final   • Eosinophils, Absolute 12/20/2022 0.40  0.00 - 0.40 10*3/mm3 Final   • Basophils, Absolute 12/20/2022 0.09  0.00 - 0.20 10*3/mm3 Final   • Color, UA 12/20/2022 Dark Yellow (A)  Yellow, Straw Final   • Appearance, UA 12/20/2022 Turbid (A)  Clear Final   • pH, UA 12/20/2022 6.0  5.0 - 8.0 Final   • Specific Gravity, UA 12/20/2022 1.028  1.005 - 1.030 Final   • Glucose, UA 12/20/2022 Negative  Negative Final   • Ketones, UA 12/20/2022 Trace (A)  Negative Final   • Bilirubin, UA 12/20/2022 Negative  Negative Final   • Blood, UA 12/20/2022 Negative  Negative Final   • Protein, UA 12/20/2022 30 mg/dL (1+) (A)  Negative Final   • Leuk Esterase, UA 12/20/2022 Trace (A)  Negative Final   • Nitrite, UA 12/20/2022 Negative  Negative Final   • Urobilinogen, UA 12/20/2022 1.0 E.U./dL  0.2 - 1.0 E.U./dL Final   • RBC, UA 12/20/2022 0-2  None Seen, 0-2 /HPF Final   • WBC, UA 12/20/2022 0-2  None Seen, 0-2 /HPF Final   • Bacteria, UA 12/20/2022 None Seen  None Seen /HPF Final   • Squamous Epithelial Cells, UA 12/20/2022 7-12 (A)  None Seen, 0-2 /HPF Final   • Hyaline Casts, UA 12/20/2022 None Seen  None Seen /LPF Final   • Methodology 12/20/2022 Manual Light Microscopy   Final   • Protein/Creatinine Ratio, Urine 12/20/2022 59.1  0.0 - 200.0 mg/G Crea Final   • Creatinine, Urine 12/20/2022 220.0  mg/dL Final   • Total Protein, Urine 12/20/2022 13.0  mg/dL Final   • RBC Morphology 12/20/2022 Normal  Normal Final   • WBC Morphology 12/20/2022 Normal  Normal Final   • Clumped Platelets 12/20/2022 Present  None Seen Final      Assessment & Plan      1. Barnett's esophagus without dysplasia    2. Gastroesophageal reflux disease, unspecified whether esophagitis present    3. Left lower quadrant abdominal pain    4. Other constipation    5. Weight loss    1.  Left lower quadrant pain with constipation rule out IBD and colorectal neoplasia.  Could also be due to previous colonic surgery.  Add MiraLAX 17 g p.o. daily.  Proceed with colonoscopy for further evaluation.  2.  Barnett's esophagus, continue PPI and antireflux lifestyle.  Proceed with EGD for surveillance.  3.  Diverticulosis, continue high-fiber diet.  4.  Obesity with recent weight loss, recommend exercise and diet control.  5.  Nausea, improved.       Orders placed during this encounter include:  Orders Placed This Encounter   Procedures   • Obtain Informed Consent     Standing Status:   Future     Order Specific Question:   Informed Consent Given For     Answer:   egd and colonoscopy       ESOPHAGOGASTRODUODENOSCOPY (N/A), COLONOSCOPY (N/A)    Review and/or summary of lab tests, radiology, procedures, medications. Review and summary of old records and obtaining of history. The risks and benefits of my recommendations, as well as other treatment options were discussed with the patient today. Questions were answered.    No orders of the defined types were placed in this encounter.      Follow-up: Return in about 1 month (around 1/29/2023).               Results for orders placed or performed in visit on 01/04/23   TSH    Specimen: Blood   Result Value Ref Range    TSH 3.950 0.270 - 4.200 uIU/mL   Hemoglobin A1c    Specimen: Blood   Result Value Ref Range    Hemoglobin A1C 7.20 (H) 4.80 - 5.60 %   Results for orders placed or performed in visit on 12/20/22   Scan Slide    Specimen: Blood   Result Value Ref Range    RBC Morphology Normal Normal    WBC Morphology Normal Normal    Clumped Platelets Present None Seen   Urinalysis, Microscopic Only - Urine, Clean Catch    Specimen:  Urine, Clean Catch   Result Value Ref Range    RBC, UA 0-2 None Seen, 0-2 /HPF    WBC, UA 0-2 None Seen, 0-2 /HPF    Bacteria, UA None Seen None Seen /HPF    Squamous Epithelial Cells, UA 7-12 (A) None Seen, 0-2 /HPF    Hyaline Casts, UA None Seen None Seen /LPF    Methodology Manual Light Microscopy    CBC Auto Differential    Specimen: Blood   Result Value Ref Range    WBC 10.83 (H) 3.40 - 10.80 10*3/mm3    RBC 4.20 3.77 - 5.28 10*6/mm3    Hemoglobin 12.9 12.0 - 15.9 g/dL    Hematocrit 40.0 34.0 - 46.6 %    MCV 95.2 79.0 - 97.0 fL    MCH 30.7 26.6 - 33.0 pg    MCHC 32.3 31.5 - 35.7 g/dL    RDW 12.7 12.3 - 15.4 %    RDW-SD 44.8 37.0 - 54.0 fl    MPV 11.7 6.0 - 12.0 fL    Platelets 284 140 - 450 10*3/mm3    Neutrophil % 62.9 42.7 - 76.0 %    Lymphocyte % 26.1 19.6 - 45.3 %    Monocyte % 6.0 5.0 - 12.0 %    Eosinophil % 3.7 0.3 - 6.2 %    Basophil % 0.8 0.0 - 1.5 %    Neutrophils, Absolute 6.81 1.70 - 7.00 10*3/mm3    Lymphocytes, Absolute 2.83 0.70 - 3.10 10*3/mm3    Monocytes, Absolute 0.65 0.10 - 0.90 10*3/mm3    Eosinophils, Absolute 0.40 0.00 - 0.40 10*3/mm3    Basophils, Absolute 0.09 0.00 - 0.20 10*3/mm3   Iron Profile    Specimen: Blood   Result Value Ref Range    Iron 88 37 - 145 mcg/dL    Iron Saturation 20 20 - 50 %    Transferrin 293 200 - 360 mg/dL    TIBC 437 298 - 536 mcg/dL   Protein / Creatinine Ratio, Urine - Urine, Clean Catch    Specimen: Urine, Clean Catch   Result Value Ref Range    Protein/Creatinine Ratio, Urine 59.1 0.0 - 200.0 mg/G Crea    Creatinine, Urine 220.0 mg/dL    Total Protein, Urine 13.0 mg/dL   Vitamin D 25 Hydroxy    Specimen: Blood   Result Value Ref Range    25 Hydroxy, Vitamin D 32.4 30.0 - 100.0 ng/ml   Urinalysis without microscopic (no culture) - Urine, Clean Catch    Specimen: Urine, Clean Catch   Result Value Ref Range    Color, UA Dark Yellow (A) Yellow, Straw    Appearance, UA Turbid (A) Clear    pH, UA 6.0 5.0 - 8.0    Specific Gravity, UA 1.028 1.005 - 1.030     Glucose, UA Negative Negative    Ketones, UA Trace (A) Negative    Bilirubin, UA Negative Negative    Blood, UA Negative Negative    Protein, UA 30 mg/dL (1+) (A) Negative    Leuk Esterase, UA Trace (A) Negative    Nitrite, UA Negative Negative    Urobilinogen, UA 1.0 E.U./dL 0.2 - 1.0 E.U./dL   PTH, Intact    Specimen: Blood   Result Value Ref Range    PTH, Intact 42.0 15.0 - 65.0 pg/mL   Magnesium    Specimen: Blood   Result Value Ref Range    Magnesium 1.8 1.6 - 2.4 mg/dL   Folate    Specimen: Blood   Result Value Ref Range    Folate 8.63 4.78 - 24.20 ng/mL   Vitamin B12    Specimen: Blood   Result Value Ref Range    Vitamin B-12 380 211 - 946 pg/mL   Renal Function Panel    Specimen: Blood   Result Value Ref Range    Glucose 128 (H) 65 - 99 mg/dL    BUN 16 8 - 23 mg/dL    Creatinine 1.23 (H) 0.57 - 1.00 mg/dL    Sodium 135 (L) 136 - 145 mmol/L    Potassium 4.2 3.5 - 5.2 mmol/L    Chloride 99 98 - 107 mmol/L    CO2 27.0 22.0 - 29.0 mmol/L    Calcium 9.7 8.6 - 10.5 mg/dL    Albumin 4.10 3.50 - 5.20 g/dL    Phosphorus 2.7 2.5 - 4.5 mg/dL    Anion Gap 9.0 5.0 - 15.0 mmol/L    BUN/Creatinine Ratio 13.0 7.0 - 25.0    eGFR 46.8 (L) >60.0 mL/min/1.73   Results for orders placed or performed during the hospital encounter of 11/21/22   Doppler Ankle Brachial Index Single Level CAR   Result Value Ref Range    Target HR (85%) 126 bpm    Max. Pred. HR (100%) 148 bpm    Upper arterial right arm brachial sys max 167 mmHg    Upper arterial left arm brachial sys max 171 mmHg    RIGHT POST TIBIAL SYS      LEFT POST TIBIAL SYS      RIGHT DORSALIS PEDIS SYS      LEFT DORSALIS PEDIS SYS      RIGHT AYESHA RATIO 1.04     LEFT AYESHA RATIO 0.98    Results for orders placed or performed during the hospital encounter of 10/28/22   POC Urine Micro    Specimen: Urine   Result Value Ref Range    Leukocytes, UA Negative     Blood, UA Negative     Bacteria, UA Negative     Trichomonas, UA      Clue Cells, Wet Prep      POC Urinalysis Dipstick, Multipro (Automated Dipstick)    Specimen: Urine   Result Value Ref Range    Color Yellow     Clarity, UA Clear     Glucose, UA Negative mg/dL    Bilirubin Negative     Ketones, UA Negative     Specific Gravity  1.025 1.005 - 1.030    Blood, UA Negative     pH, Urine 5.5 5.0 - 8.0    Protein, POC Trace (A) mg/dL    Urobilinogen, UA Normal     Nitrite, UA Negative     Leukocytes Negative    Gardnerella vaginalis, Trichomonas vaginalis, Candida albicans, DNA - Swab, Vagina    Specimen: Vagina; Swab   Result Value Ref Range    CANDIDA ALBICANS Negative Negative    GARDNERELLA VAGINALIS Negative Negative    TRICHOMONAS VAGINALIS Negative Negative   Results for orders placed or performed during the hospital encounter of 10/27/22   Doppler Ankle Brachial Index Single Level CAR   Result Value Ref Range    Target HR (85%) 126 bpm    Max. Pred. HR (100%) 148 bpm    Upper arterial right arm brachial sys max 137 mmHg    Upper arterial left arm brachial sys max 139 mmHg    RIGHT POST TIBIAL SYS MAX 74     LEFT POST TIBIAL SYS      RIGHT DORSALIS PEDIS SYS MAX 43     LEFT DORSALIS PEDIS SYS      RIGHT PERONEAL SYS MAX 85     LEFT PERONEAL SYS      RIGHT AYESHA RATIO 0.61     LEFT AYESHA RATIO 1.22      *Note: Due to a large number of results and/or encounters for the requested time period, some results have not been displayed. A complete set of results can be found in Results Review.         This document has been electronically signed by Mauricio Betancourt MD on January 11, 2023 16:49 CST

## 2023-01-24 DIAGNOSIS — E03.9 ACQUIRED HYPOTHYROIDISM: ICD-10-CM

## 2023-01-24 RX ORDER — LEVOTHYROXINE SODIUM 137 MCG
TABLET ORAL
Qty: 90 TABLET | Refills: 3 | Status: SHIPPED | OUTPATIENT
Start: 2023-01-24

## 2023-01-26 ENCOUNTER — ANESTHESIA (OUTPATIENT)
Dept: GASTROENTEROLOGY | Facility: HOSPITAL | Age: 73
End: 2023-01-26
Payer: MEDICARE

## 2023-01-26 ENCOUNTER — HOSPITAL ENCOUNTER (OUTPATIENT)
Facility: HOSPITAL | Age: 73
Setting detail: HOSPITAL OUTPATIENT SURGERY
Discharge: HOME | End: 2023-01-26
Attending: INTERNAL MEDICINE | Admitting: INTERNAL MEDICINE
Payer: MEDICARE

## 2023-01-26 ENCOUNTER — ANESTHESIA EVENT (OUTPATIENT)
Dept: GASTROENTEROLOGY | Facility: HOSPITAL | Age: 73
End: 2023-01-26
Payer: MEDICARE

## 2023-01-26 VITALS
WEIGHT: 187.4 LBS | SYSTOLIC BLOOD PRESSURE: 110 MMHG | RESPIRATION RATE: 18 BRPM | OXYGEN SATURATION: 97 % | DIASTOLIC BLOOD PRESSURE: 74 MMHG | BODY MASS INDEX: 31.22 KG/M2 | HEIGHT: 65 IN | TEMPERATURE: 97.3 F | HEART RATE: 63 BPM

## 2023-01-26 DIAGNOSIS — R63.4 WEIGHT LOSS: ICD-10-CM

## 2023-01-26 DIAGNOSIS — K59.09 OTHER CONSTIPATION: ICD-10-CM

## 2023-01-26 DIAGNOSIS — K22.70 BARRETT'S ESOPHAGUS WITHOUT DYSPLASIA: ICD-10-CM

## 2023-01-26 DIAGNOSIS — R10.32 LEFT LOWER QUADRANT ABDOMINAL PAIN: ICD-10-CM

## 2023-01-26 DIAGNOSIS — K21.9 GASTROESOPHAGEAL REFLUX DISEASE, UNSPECIFIED WHETHER ESOPHAGITIS PRESENT: ICD-10-CM

## 2023-01-26 LAB — GLUCOSE BLDC GLUCOMTR-MCNC: 109 MG/DL (ref 70–130)

## 2023-01-26 PROCEDURE — 43239 EGD BIOPSY SINGLE/MULTIPLE: CPT | Performed by: INTERNAL MEDICINE

## 2023-01-26 PROCEDURE — 25010000002 PROPOFOL 10 MG/ML EMULSION: Performed by: NURSE ANESTHETIST, CERTIFIED REGISTERED

## 2023-01-26 PROCEDURE — 82962 GLUCOSE BLOOD TEST: CPT

## 2023-01-26 PROCEDURE — 45385 COLONOSCOPY W/LESION REMOVAL: CPT | Performed by: INTERNAL MEDICINE

## 2023-01-26 PROCEDURE — 88305 TISSUE EXAM BY PATHOLOGIST: CPT

## 2023-01-26 RX ORDER — PROPOFOL 10 MG/ML
VIAL (ML) INTRAVENOUS AS NEEDED
Status: DISCONTINUED | OUTPATIENT
Start: 2023-01-26 | End: 2023-01-26 | Stop reason: SURG

## 2023-01-26 RX ORDER — DEXTROSE AND SODIUM CHLORIDE 5; .45 G/100ML; G/100ML
30 INJECTION, SOLUTION INTRAVENOUS CONTINUOUS PRN
Status: DISCONTINUED | OUTPATIENT
Start: 2023-01-26 | End: 2023-01-26 | Stop reason: HOSPADM

## 2023-01-26 RX ORDER — LIDOCAINE HYDROCHLORIDE 20 MG/ML
INJECTION, SOLUTION INTRAVENOUS AS NEEDED
Status: DISCONTINUED | OUTPATIENT
Start: 2023-01-26 | End: 2023-01-26 | Stop reason: SURG

## 2023-01-26 RX ORDER — SODIUM CHLORIDE 9 MG/ML
40 INJECTION, SOLUTION INTRAVENOUS AS NEEDED
Status: DISCONTINUED | OUTPATIENT
Start: 2023-01-26 | End: 2023-01-26 | Stop reason: HOSPADM

## 2023-01-26 RX ADMIN — PROPOFOL 20 MG: 10 INJECTION, EMULSION INTRAVENOUS at 11:46

## 2023-01-26 RX ADMIN — LIDOCAINE HYDROCHLORIDE 60 MG: 20 INJECTION, SOLUTION INTRAVENOUS at 11:44

## 2023-01-26 RX ADMIN — DEXTROSE AND SODIUM CHLORIDE 30 ML/HR: 5; 450 INJECTION, SOLUTION INTRAVENOUS at 11:01

## 2023-01-26 RX ADMIN — PROPOFOL 30 MG: 10 INJECTION, EMULSION INTRAVENOUS at 11:54

## 2023-01-26 RX ADMIN — PROPOFOL 20 MG: 10 INJECTION, EMULSION INTRAVENOUS at 11:48

## 2023-01-26 RX ADMIN — PROPOFOL 100 MG: 10 INJECTION, EMULSION INTRAVENOUS at 11:44

## 2023-01-26 RX ADMIN — PROPOFOL 30 MG: 10 INJECTION, EMULSION INTRAVENOUS at 11:51

## 2023-01-26 RX ADMIN — PROPOFOL 30 MG: 10 INJECTION, EMULSION INTRAVENOUS at 11:58

## 2023-01-26 NOTE — ANESTHESIA PREPROCEDURE EVALUATION
Anesthesia Evaluation     Patient summary reviewed and Nursing notes reviewed   no history of anesthetic complications:  NPO Solid Status: > 8 hours  NPO Liquid Status: > 2 hours           Airway   Mallampati: I  TM distance: >3 FB  Neck ROM: full  no difficulty expected  Dental    (+) poor dentition    Pulmonary - normal exam   (+) pneumonia resolved , a smoker Former, asthma,sleep apnea,   Cardiovascular - normal exam    (+) hypertension well controlled, PVD, hyperlipidemia,       Neuro/Psych  (+) psychiatric history Anxiety and Depression,    GI/Hepatic/Renal/Endo    (+) obesity,  GERD,  liver disease fatty liver disease, renal disease, diabetes mellitus type 2 well controlled, thyroid problem hypothyroidism    Musculoskeletal     (+) back pain, neck pain,       ROS comment: Degenerative joint disease involving multiple joints  Abdominal    Substance History      OB/GYN          Other   arthritis,    history of cancer                      Anesthesia Plan    ASA 3     general   total IV anesthesia  intravenous induction     Anesthetic plan, risks, benefits, and alternatives have been provided, discussed and informed consent has been obtained with: patient.

## 2023-01-26 NOTE — ANESTHESIA POSTPROCEDURE EVALUATION
Patient: Emily Haas    Procedure Summary     Date: 01/26/23 Room / Location: [facility] ENDOSCOPY 1 / [facility] ENDOSCOPY    Anesthesia Start: 1141 Anesthesia Stop: 1200    Procedures:       ESOPHAGOGASTRODUODENOSCOPY      COLONOSCOPY Diagnosis:       Barnett's esophagus without dysplasia      Gastroesophageal reflux disease, unspecified whether esophagitis present      Left lower quadrant abdominal pain      Other constipation      Weight loss      (Barnett's esophagus without dysplasia [K22.70])      (Gastroesophageal reflux disease, unspecified whether esophagitis present [K21.9])      (Left lower quadrant abdominal pain [R10.32])      (Other constipation [K59.09])      (Weight loss [R63.4])    Surgeons: Mauricio Betancourt MD Provider: Anay Min CRNA    Anesthesia Type: general ASA Status: 3          Anesthesia Type: general    Vitals  No vitals data found for the desired time range.          Post Anesthesia Care and Evaluation    Patient location during evaluation: bedside  Patient participation: complete - patient participated  Level of consciousness: sleepy but conscious  Pain score: 0  Pain management: adequate    Airway patency: patent  Anesthetic complications: No anesthetic complications  PONV Status: none  Cardiovascular status: acceptable and stable  Respiratory status: acceptable, room air and spontaneous ventilation  Hydration status: acceptable    Comments: BP:  148/72  HR:  63  SAT:  99  RR:  16  TEMP: 97.0

## 2023-01-31 LAB — REF LAB TEST METHOD: NORMAL

## 2023-02-02 ENCOUNTER — OFFICE VISIT (OUTPATIENT)
Dept: GASTROENTEROLOGY | Facility: CLINIC | Age: 73
End: 2023-02-02
Payer: MEDICARE

## 2023-02-02 DIAGNOSIS — K22.70 BARRETT'S ESOPHAGUS WITHOUT DYSPLASIA: ICD-10-CM

## 2023-02-02 DIAGNOSIS — K21.9 GASTROESOPHAGEAL REFLUX DISEASE, UNSPECIFIED WHETHER ESOPHAGITIS PRESENT: Primary | ICD-10-CM

## 2023-02-02 PROCEDURE — 99442 PR PHYS/QHP TELEPHONE EVALUATION 11-20 MIN: CPT | Performed by: INTERNAL MEDICINE

## 2023-02-18 NOTE — PROGRESS NOTES
No chief complaint on file.      Subjective    Emily Haas is a 73 y.o. female.    History of Present Illness  Patient had a 15-minute telephone follow-up visit today.  Feels better currently.  GERD is well controlled.  Denied abdominal pain, nausea or vomiting.  Bowel movements are regular.  Weight is stable.  EGD was consistent with grade 2 esophagitis and gastritis.  Path was consistent with Barnett's.  Colonoscopy was consistent with adenomatous colon polyps, diverticulosis and hemorrhoids.       The following portions of the patient's history were reviewed and updated as appropriate:   Past Medical History:   Diagnosis Date   • Anxiety    • Cancer (HCC)     bladder   • Chronic bronchitis (HCC)    • CKD (chronic kidney disease) stage 3, GFR 30-59 ml/min (HCC)    • Diabetes mellitus (HCC)    • Diverticulitis    • Hyperlipidemia    • Hypertension    • Hypothyroidism    • Irritable bowel syndrome    • Obesity    • Osteoarthritis    • Vitamin D deficiency      Past Surgical History:   Procedure Laterality Date   • ARTERIOGRAM Right 11/10/2022    Procedure: Arteriogram;  Surgeon: Tiago Hendricks MD;  Location: [facility];  Service: Interventional Radiology;  Laterality: Right;   • BILATERAL BREAST REDUCTION     • CHOLECYSTECTOMY     • COLON SURGERY      colon resection for obtruction   • COLONOSCOPY N/A 11/17/2017   • COLONOSCOPY N/A 01/14/2021   • COLONOSCOPY N/A 1/26/2023    Procedure: COLONOSCOPY;  Surgeon: Mauricio Betancourt MD;  Location: [facility] ENDOSCOPY;  Service: Gastroenterology;  Laterality: N/A;   • ENDOSCOPY N/A 01/14/2021   • ENDOSCOPY N/A 02/16/2022    Procedure: ESOPHAGOGASTRODUODENOSCOPY;  Surgeon: Mauricio Betancourt MD;  Location: [facility] ENDOSCOPY;  Service: Gastroenterology;  Laterality: N/A;   • ENDOSCOPY N/A 1/26/2023    Procedure: ESOPHAGOGASTRODUODENOSCOPY;  Surgeon: Mauricio Betancourt MD;  Location: [facility] ENDOSCOPY;  Service: Gastroenterology;  Laterality: N/A;    • HYSTERECTOMY     • REDUCTION MAMMAPLASTY     • UPPER GASTROINTESTINAL ENDOSCOPY  2021   • UPPER GASTROINTESTINAL ENDOSCOPY  2022     Family History   Problem Relation Age of Onset   • Hypertension Mother    • Alzheimer's disease Father    • Diabetes Brother    • Hypothyroidism Daughter      OB History        2    Para   2    Term   2            AB        Living           SAB        IAB        Ectopic        Molar        Multiple        Live Births                  Prior to Admission medications    Medication Sig Start Date End Date Taking? Authorizing Provider   acetaminophen (TYLENOL) 650 MG 8 hr tablet Take 2 tablets by mouth Every 8 (Eight) Hours As Needed.    ProviderJose Alejandro MD   albuterol sulfate  (90 Base) MCG/ACT inhaler Inhale 2 puffs Every 6 (Six) Hours As Needed for Wheezing or Shortness of Air. 22   Bharati Love MD   aspirin 81 MG chewable tablet Chew 81 mg Daily.    ProviderJose Alejandro MD   bacitracin 500 UNIT/GM ointment Apply 1 application topically to the appropriate area as directed 2 (Two) Times a Day. 22   Jeremias Lay MD   budesonide-formoterol (SYMBICORT) 160-4.5 MCG/ACT inhaler Inhale 2 puffs 2 (Two) Times a Day. 21   Fabiola Layne MD   carvedilol (COREG) 12.5 MG tablet Take 1 tablet by mouth 2 (Two) Times a Day With Meals. 20   Fernando Hutton MD   clopidogrel (Plavix) 75 MG tablet Take 1 tablet by mouth Daily. 22  Laura Ratliff APRN   dicyclomine (BENTYL) 10 MG capsule Take 10 mg by mouth 2 (two) times a day.    ProviderJose Alejandro MD   docusate sodium (COLACE) 50 MG capsule Take 1 capsule by mouth Daily As Needed.    Jose Alejandro Sommers MD   escitalopram (LEXAPRO) 10 MG tablet Take 1 tablet by mouth Daily. 21   Bharati Love MD   guanFACINE HCl ER (INTUNIV) 1 MG tablet sustained-release 24 hour Take 1 mg by mouth Daily.    Jose Alejandro Sommers MD   losartan (COZAAR) 100 MG  "tablet TAKE 1 TABLET EVERY DAY 12/7/22   Bharati Love MD   melatonin 3 MG tablet TAKE 1 TABLET BY MOUTH AT NIGHT AS NEEDED FOR SLEEP. 12/9/21   Bharati Love MD   omeprazole (priLOSEC) 40 MG capsule Take 1 capsule by mouth Daily. 2/12/21   Mauricio eBtancourt MD   Synthroid 137 MCG tablet TAKE 1 TABLET EVERY DAY 1/24/23   Bharati Love MD   vitamin D (ERGOCALCIFEROL) 1.25 MG (96287 UT) capsule capsule Take 50,000 Units by mouth 1 (One) Time Per Week. 11/12/20   ProviderJose Alejandro MD     Allergies   Allergen Reactions   • Augmentin [Amoxicillin-Pot Clavulanate] Hives   • Dilaudid [Hydromorphone] Nausea And Vomiting   • Sulfa Antibiotics Hives   • Atenolol Myalgia   • Lipitor [Atorvastatin] Myalgia   • Pravachol [Pravastatin] Myalgia   • Statins Myalgia     \"muscle pain \"    • Clindamycin/Lincomycin Other (See Comments)     \"Yeast infection\"     Social History     Socioeconomic History   • Marital status:    Tobacco Use   • Smoking status: Former     Packs/day: 3.00     Years: 15.00     Pack years: 45.00     Types: Cigarettes   • Smokeless tobacco: Never   • Tobacco comments:     08/10/2021 - Patient states she has not utilized Cigarettes X 34 years and upon ceassation, patient was utilizing 3 packs of Cigarettes per day.   Vaping Use   • Vaping Use: Never used   Substance and Sexual Activity   • Alcohol use: No   • Drug use: No   • Sexual activity: Defer       Review of Systems  Review of Systems   Constitutional: Negative for chills, fatigue, fever and unexpected weight change.   HENT: Negative for congestion, ear discharge, hearing loss, nosebleeds and sore throat.    Eyes: Negative for pain, discharge and redness.   Respiratory: Negative for cough, chest tightness, shortness of breath and wheezing.    Cardiovascular: Negative for chest pain and palpitations.   Gastrointestinal: Negative for abdominal distention, abdominal pain, blood in stool, constipation, diarrhea, nausea and " vomiting.   Endocrine: Negative for cold intolerance, polydipsia, polyphagia and polyuria.   Genitourinary: Negative for dysuria, flank pain, frequency, hematuria and urgency.   Musculoskeletal: Negative for arthralgias, back pain, joint swelling and myalgias.   Skin: Negative for color change, pallor and rash.   Neurological: Negative for tremors, seizures, syncope, weakness and headaches.   Hematological: Negative for adenopathy. Does not bruise/bleed easily.   Psychiatric/Behavioral: Negative for behavioral problems, confusion, dysphoric mood, hallucinations and suicidal ideas. The patient is not nervous/anxious.         LMP  (LMP Unknown)     Objective    Physical Exam    Telephone visit  Admission on 2023, Discharged on 2023   Component Date Value Ref Range Status   • Glucose 2023 109  70 - 130 mg/dL Final    : [id number] ROSELINE [place] ID: [id number]   • Reference Lab Report 2023    Final                    Value:[facility]  [address] [place] [address]  Phone: [phone] or [phone]  Fax: [phone]  Tim Webb M.D., Medical Director    PATIENT NAME                           LABORATORY NO.  GLENN OLMOS.                     [id number]  [id number]                         AGE              SEX  SSN           CLIENT REF #  [facility]           72      1950  F    [id number]   [id number]    [place]                       REQUESTING M.D.     ATTENDING M.D.     COPY TO[address]  DATE COLLECTED      DATE RECEIVED      DATE REPORTED  2023    DIAGNOSIS:  A.   GASTRIC ANTRUM, BIOPSY:  Reactive gastropathy  B.   GE JUNCTION:  Reactive gastric and squamous mucosa  Negative for specialized Barnett's mucosa  C.   ASCENDING COLON POLYP:  Tubular  "adenoma  D.                             TRANSVERSE COLON POLYP:  Tubular adenoma    JBS/pah    CLINICAL HISTORY:  Barnett's esophagus without dysplasia, gastroesophageal reflux disease,  unspecified whether esophagitis present, left lower quadrant abdominal pain,  other constipation, weight loss    SPECIMENS RECEIVED:  A.  GASTRIC ANTRUM, BIOPSY  B.  GE JUNCTION  C.  ASCENDING COLON POLYP  D.  TRANSVERSE COLON POLYP    MICROSCOPIC DESCRIPTION:  Tissue blocks are prepared and slides are examined microscopically on all  specimens. See diagnosis for details.    Professional interpretation rendered by Anthony Howard M.D. at [facility], [address] [place] [address].    GROSS DESCRIPTION:  A.  Specimen is received in 1 formalin filled container labeled \"gastric antrum\"  and consists of 2 portions of tan soft tissue measuring 0.7 x 0.3 x 0.2 cm.  The specimen is submitted entirely in 1 cassette.  BW  B.  Specimen is received in 1 formalin filled container labeled \"GE junction\"  and consists of a single portion                           of tan soft tissue measuring 0.3 x 0.2 x 0.2  cm.  Specimen is submitted entirely in 1 cassette.  C.  Specimen is received in 1 formalin filled container labeled \"ascending  polyp cold snare\" and consists of 3 portions of tan soft tissue measuring 0.5  x 0.4 x 0.2 cm.  The specimen is submitted entirely in 1 cassette.  D.  Specimen is received in 1 formalin filled container labeled \"transverse  polyp cold snare\" and consists of a single portion of tan soft tissue  measuring 0.5 x 0.3 x 0.3 cm.  The specimen is submitted entirely in 1  cassette.    REVIEWED, DIAGNOSED AND ELECTRONICALLY  SIGNED BY:    Anthony Howard M.D.  CPT CODES:  88305x4       Assessment & Plan    No diagnosis found..   1.  Left lower quadrant pain with constipation, well controlled.  Continue MiraLAX and high-fiber diet.  2.  Barnett's esophagus, continue PPI and antireflux lifestyle.  Repeat EGD " in 3 years for surveillance.  3.  Colon polyps, repeat colonoscopy in 3 years.  4.  Diverticulosis, continue high-fiber diet.  5.  Obesity, recommend exercise and diet control.  6.  GERD, well controlled.  Continue PPI and antireflux lifestyle.    Orders placed during this encounter include:  No orders of the defined types were placed in this encounter.      * Surgery not found *    Review and/or summary of lab tests, radiology, procedures, medications. Review and summary of old records and obtaining of history. The risks and benefits of my recommendations, as well as other treatment options were discussed with the patient today. Questions were answered.    No orders of the defined types were placed in this encounter.      Follow-up: Return in about 1 year (around 2024).               Results for orders placed or performed during the hospital encounter of 23   TISSUE EXAM, [facility] ([facility])    Specimen: A: Gastric, Antrum; Tissue    B: GE Junction; Tissue    C: Large Intestine, Right / Ascending Colon; Tissue    D: Large Intestine, Transverse Colon; Tissue   Result Value Ref Range    Reference Lab Report       [facility]  [address] [place] [address]  Phone: [phone] or [phone]  Fax: [phone]  Tim Webb M.D., Medical Director    PATIENT NAME                           LABORATORY NO.  [address]  GLENN MIJARES.                     [id number]  [id number]                         AGE              SEX  N           CLIENT REF #  [facility]           72      1950  F    [id number]   [id number]    [place]                       REQUESTING M.D.     ATTENDING M.D.     COPY TO[address]  DATE COLLECTED      DATE RECEIVED      DATE REPORTED  2023    DIAGNOSIS:  A.   GASTRIC  "ANTRUM, BIOPSY:  Reactive gastropathy  B.   GE JUNCTION:  Reactive gastric and squamous mucosa  Negative for specialized Barnett's mucosa  C.   ASCENDING COLON POLYP:  Tubular adenoma  D.    TRANSVERSE COLON POLYP:  Tubular adenoma    JBS/pah    CLINICAL HISTORY:  Barnett's esophagus without dysplasia, gastroesophageal reflux disease,  unspecified whether esophagitis present, left lower quadrant abdominal pain,  other constipation, weight loss    SPECIMENS RECEIVED:  A.  GASTRIC ANTRUM, BIOPSY  B.  GE JUNCTION  C.  ASCENDING COLON POLYP  D.  TRANSVERSE COLON POLYP    MICROSCOPIC DESCRIPTION:  Tissue blocks are prepared and slides are examined microscopically on all  specimens. See diagnosis for details.    Professional interpretation rendered by Anthony Howard M.D. at [facility],  [facility], [address] [place] [address].    GROSS DESCRIPTION:  A.  Specimen is received in 1 formalin filled container labeled \"gastric antrum\"  and consists of 2 portions of tan soft tissue measuring 0.7 x 0.3 x 0.2 cm.  The specimen is submitted entirely in 1 cassette.  BW  B.  Specimen is received in 1 formalin filled container labeled \"GE junction\"  and consists of a single portion  of tan soft tissue measuring 0.3 x 0.2 x 0.2  cm.  Specimen is submitted entirely in 1 cassette.  C.  Specimen is received in 1 formalin filled container labeled \"ascending  polyp cold snare\" and consists of 3 portions of tan soft tissue measuring 0.5  x 0.4 x 0.2 cm.  The specimen is submitted entirely in 1 cassette.  D.  Specimen is received in 1 formalin filled container labeled \"transverse  polyp cold snare\" and consists of a single portion of tan soft tissue  measuring 0.5 x 0.3 x 0.3 cm.  The specimen is submitted entirely in 1  cassette.    REVIEWED, DIAGNOSED AND ELECTRONICALLY  SIGNED BY:    Anthony Howard M.D.  CPT CODES:  88305x4     POC Glucose Once    Specimen: Blood   Result Value Ref Range    Glucose 109 70 - 130 mg/dL   Results " for orders placed or performed in visit on 01/04/23   TSH    Specimen: Blood   Result Value Ref Range    TSH 3.950 0.270 - 4.200 uIU/mL   Hemoglobin A1c    Specimen: Blood   Result Value Ref Range    Hemoglobin A1C 7.20 (H) 4.80 - 5.60 %   Results for orders placed or performed in visit on 12/20/22   Scan Slide    Specimen: Blood   Result Value Ref Range    RBC Morphology Normal Normal    WBC Morphology Normal Normal    Clumped Platelets Present None Seen   Urinalysis, Microscopic Only - Urine, Clean Catch    Specimen: Urine, Clean Catch   Result Value Ref Range    RBC, UA 0-2 None Seen, 0-2 /HPF    WBC, UA 0-2 None Seen, 0-2 /HPF    Bacteria, UA None Seen None Seen /HPF    Squamous Epithelial Cells, UA 7-12 (A) None Seen, 0-2 /HPF    Hyaline Casts, UA None Seen None Seen /LPF    Methodology Manual Light Microscopy    CBC Auto Differential    Specimen: Blood   Result Value Ref Range    WBC 10.83 (H) 3.40 - 10.80 10*3/mm3    RBC 4.20 3.77 - 5.28 10*6/mm3    Hemoglobin 12.9 12.0 - 15.9 g/dL    Hematocrit 40.0 34.0 - 46.6 %    MCV 95.2 79.0 - 97.0 fL    MCH 30.7 26.6 - 33.0 pg    MCHC 32.3 31.5 - 35.7 g/dL    RDW 12.7 12.3 - 15.4 %    RDW-SD 44.8 37.0 - 54.0 fl    MPV 11.7 6.0 - 12.0 fL    Platelets 284 140 - 450 10*3/mm3    Neutrophil % 62.9 42.7 - 76.0 %    Lymphocyte % 26.1 19.6 - 45.3 %    Monocyte % 6.0 5.0 - 12.0 %    Eosinophil % 3.7 0.3 - 6.2 %    Basophil % 0.8 0.0 - 1.5 %    Neutrophils, Absolute 6.81 1.70 - 7.00 10*3/mm3    Lymphocytes, Absolute 2.83 0.70 - 3.10 10*3/mm3    Monocytes, Absolute 0.65 0.10 - 0.90 10*3/mm3    Eosinophils, Absolute 0.40 0.00 - 0.40 10*3/mm3    Basophils, Absolute 0.09 0.00 - 0.20 10*3/mm3   Iron Profile    Specimen: Blood   Result Value Ref Range    Iron 88 37 - 145 mcg/dL    Iron Saturation 20 20 - 50 %    Transferrin 293 200 - 360 mg/dL    TIBC 437 298 - 536 mcg/dL   Protein / Creatinine Ratio, Urine - Urine, Clean Catch    Specimen: Urine, Clean Catch   Result Value Ref Range     Protein/Creatinine Ratio, Urine 59.1 0.0 - 200.0 mg/G Crea    Creatinine, Urine 220.0 mg/dL    Total Protein, Urine 13.0 mg/dL   Vitamin D 25 Hydroxy    Specimen: Blood   Result Value Ref Range    25 Hydroxy, Vitamin D 32.4 30.0 - 100.0 ng/ml   Urinalysis without microscopic (no culture) - Urine, Clean Catch    Specimen: Urine, Clean Catch   Result Value Ref Range    Color, UA Dark Yellow (A) Yellow, Straw    Appearance, UA Turbid (A) Clear    pH, UA 6.0 5.0 - 8.0    Specific Gravity, UA 1.028 1.005 - 1.030    Glucose, UA Negative Negative    Ketones, UA Trace (A) Negative    Bilirubin, UA Negative Negative    Blood, UA Negative Negative    Protein, UA 30 mg/dL (1+) (A) Negative    Leuk Esterase, UA Trace (A) Negative    Nitrite, UA Negative Negative    Urobilinogen, UA 1.0 E.U./dL 0.2 - 1.0 E.U./dL   PTH, Intact    Specimen: Blood   Result Value Ref Range    PTH, Intact 42.0 15.0 - 65.0 pg/mL   Magnesium    Specimen: Blood   Result Value Ref Range    Magnesium 1.8 1.6 - 2.4 mg/dL   Folate    Specimen: Blood   Result Value Ref Range    Folate 8.63 4.78 - 24.20 ng/mL   Vitamin B12    Specimen: Blood   Result Value Ref Range    Vitamin B-12 380 211 - 946 pg/mL   Renal Function Panel    Specimen: Blood   Result Value Ref Range    Glucose 128 (H) 65 - 99 mg/dL    BUN 16 8 - 23 mg/dL    Creatinine 1.23 (H) 0.57 - 1.00 mg/dL    Sodium 135 (L) 136 - 145 mmol/L    Potassium 4.2 3.5 - 5.2 mmol/L    Chloride 99 98 - 107 mmol/L    CO2 27.0 22.0 - 29.0 mmol/L    Calcium 9.7 8.6 - 10.5 mg/dL    Albumin 4.10 3.50 - 5.20 g/dL    Phosphorus 2.7 2.5 - 4.5 mg/dL    Anion Gap 9.0 5.0 - 15.0 mmol/L    BUN/Creatinine Ratio 13.0 7.0 - 25.0    eGFR 46.8 (L) >60.0 mL/min/1.73   Results for orders placed or performed during the hospital encounter of 11/21/22   Doppler Ankle Brachial Index Single Level CAR   Result Value Ref Range    Target HR (85%) 126 bpm    Max. Pred. HR (100%) 148 bpm    Upper arterial right arm brachial sys max 167  mmHg    Upper arterial left arm brachial sys max 171 mmHg    RIGHT POST TIBIAL SYS      LEFT POST TIBIAL SYS      RIGHT DORSALIS PEDIS SYS      LEFT DORSALIS PEDIS SYS      RIGHT AYESHA RATIO 1.04     LEFT AYESHA RATIO 0.98    Results for orders placed or performed during the hospital encounter of 10/28/22   POC Urine Micro    Specimen: Urine   Result Value Ref Range    Leukocytes, UA Negative     Blood, UA Negative     Bacteria, UA Negative     Trichomonas, UA      Clue Cells, Wet Prep     POC Urinalysis Dipstick, Multipro (Automated Dipstick)    Specimen: Urine   Result Value Ref Range    Color Yellow     Clarity, UA Clear     Glucose, UA Negative mg/dL    Bilirubin Negative     Ketones, UA Negative     Specific Gravity  1.025 1.005 - 1.030    Blood, UA Negative     pH, Urine 5.5 5.0 - 8.0    Protein, POC Trace (A) mg/dL    Urobilinogen, UA Normal     Nitrite, UA Negative     Leukocytes Negative    Gardnerella vaginalis, Trichomonas vaginalis, Candida albicans, DNA - Swab, Vagina    Specimen: Vagina; Swab   Result Value Ref Range    CANDIDA ALBICANS Negative Negative    GARDNERELLA VAGINALIS Negative Negative    TRICHOMONAS VAGINALIS Negative Negative     *Note: Due to a large number of results and/or encounters for the requested time period, some results have not been displayed. A complete set of results can be found in Results Review.         This document has been electronically signed by Mauricio Betancourt MD on February 17, 2023 21:47 CST

## 2023-02-28 ENCOUNTER — OFFICE VISIT (OUTPATIENT)
Dept: CARDIAC SURGERY | Facility: CLINIC | Age: 73
End: 2023-02-28
Payer: MEDICARE

## 2023-02-28 VITALS
DIASTOLIC BLOOD PRESSURE: 80 MMHG | WEIGHT: 192.2 LBS | OXYGEN SATURATION: 98 % | HEART RATE: 68 BPM | HEIGHT: 65 IN | SYSTOLIC BLOOD PRESSURE: 142 MMHG | TEMPERATURE: 97.8 F | BODY MASS INDEX: 32.02 KG/M2

## 2023-02-28 DIAGNOSIS — N18.32 STAGE 3B CHRONIC KIDNEY DISEASE: ICD-10-CM

## 2023-02-28 DIAGNOSIS — I65.23 CAROTID STENOSIS, ASYMPTOMATIC, BILATERAL: ICD-10-CM

## 2023-02-28 DIAGNOSIS — E66.09 CLASS 1 OBESITY DUE TO EXCESS CALORIES WITH SERIOUS COMORBIDITY AND BODY MASS INDEX (BMI) OF 33.0 TO 33.9 IN ADULT: ICD-10-CM

## 2023-02-28 DIAGNOSIS — I73.9 PVD (PERIPHERAL VASCULAR DISEASE) WITH CLAUDICATION: ICD-10-CM

## 2023-02-28 DIAGNOSIS — E78.2 MIXED HYPERLIPIDEMIA: Primary | ICD-10-CM

## 2023-02-28 PROCEDURE — 99214 OFFICE O/P EST MOD 30 MIN: CPT | Performed by: NURSE PRACTITIONER

## 2023-02-28 NOTE — PROGRESS NOTES
CVTS Office Progress Note     2/28/2023    Emily Haas  1950    Chief Complaint:    Chief Complaint   Patient presents with   • Peripheral Vascular Disease       HPI:      PCP:  Bharati Love MD    73 y.o. female with HTN(stable, increased risk stroke, rupture), Hyperlipidemia(stable, increased risk cardiovascular events), Diabetes Mellitus(stable, increased risk cardiovascular events) and Obesity(uncontrolled, increased risk cardiovascular events) hypothyroid, colon resection (volvulus), adia, hysterectomy.  former smoker and quit 1990.  Referred to vascular surgery for moderate R ICA stenosis, also has claudication of right leg.  Recent arteriogram, claudication resolved postprocedure.  Returns today in postprocedural follow-up with AYESHA no amaurosis fugax, TIA, or CVA.  Denies recurrence of claudication.  No other associated signs, symptoms or modifying factors.    10/2022 Carotid Duplex: VIN 0-49% mPSV 118c/s Ratio 1.3, LICA 50-69% mPSV 136c/s Ratio 1.7, Antegrade vertebrals  10/2022 AYESHA: Right 0.64 triphasic fem biphasic pop.  Left 1.21 triphasic  11/2022 arteriogram: PTA right SFA  2/2023 AYESHA: Right 1.05 triphasic.  Left 1.04 triphasic      The following portions of the patient's history were reviewed and updated as appropriate: allergies, current medications, past family history, past medical history, past social history, past surgical history and problem list.  Recent images independently reviewed.  Available laboratory values reviewed.    PMH:  Past Medical History:   Diagnosis Date   • Anxiety    • Cancer (HCC)     bladder   • Chronic bronchitis (HCC)    • CKD (chronic kidney disease) stage 3, GFR 30-59 ml/min (HCC)    • Diabetes mellitus (HCC)    • Diverticulitis    • Hyperlipidemia    • Hypertension    • Hypothyroidism    • Irritable bowel syndrome    • Obesity    • Osteoarthritis    • Vitamin D deficiency      Past Surgical History:   Procedure Laterality Date   • ARTERIOGRAM Right  11/10/2022    Procedure: Arteriogram;  Surgeon: Tiago Hendricks MD;  Location: [facility] LOCATION;  Service: Interventional Radiology;  Laterality: Right;   • BILATERAL BREAST REDUCTION     • CHOLECYSTECTOMY     • COLON SURGERY      colon resection for obtruction   • COLONOSCOPY N/A 11/17/2017   • COLONOSCOPY N/A 01/14/2021   • COLONOSCOPY N/A 1/26/2023    Procedure: COLONOSCOPY;  Surgeon: Mauricio Betancourt MD;  Location: [facility] ENDOSCOPY;  Service: Gastroenterology;  Laterality: N/A;   • ENDOSCOPY N/A 01/14/2021   • ENDOSCOPY N/A 02/16/2022    Procedure: ESOPHAGOGASTRODUODENOSCOPY;  Surgeon: Mauricio Betancourt MD;  Location: [facility] ENDOSCOPY;  Service: Gastroenterology;  Laterality: N/A;   • ENDOSCOPY N/A 1/26/2023    Procedure: ESOPHAGOGASTRODUODENOSCOPY;  Surgeon: Mauricio Betancourt MD;  Location: [facility] ENDOSCOPY;  Service: Gastroenterology;  Laterality: N/A;   • HYSTERECTOMY     • REDUCTION MAMMAPLASTY     • UPPER GASTROINTESTINAL ENDOSCOPY  01/14/2021   • UPPER GASTROINTESTINAL ENDOSCOPY  02/16/2022     Family History   Problem Relation Age of Onset   • Hypertension Mother    • Alzheimer's disease Father    • Diabetes Brother    • Hypothyroidism Daughter      Social History     Tobacco Use   • Smoking status: Former     Packs/day: 3.00     Years: 15.00     Pack years: 45.00     Types: Cigarettes     Passive exposure: Past   • Smokeless tobacco: Never   • Tobacco comments:     08/10/2021 - Patient states she has not utilized Cigarettes X 34 years and upon ceassation, patient was utilizing 3 packs of Cigarettes per day.   Vaping Use   • Vaping Use: Never used   Substance Use Topics   • Alcohol use: No   • Drug use: No       ALLERGIES:  Allergies   Allergen Reactions   • Augmentin [Amoxicillin-Pot Clavulanate] Hives   • Dilaudid [Hydromorphone] Nausea And Vomiting   • Sulfa Antibiotics Hives   • Atenolol Myalgia   • Lipitor [Atorvastatin] Myalgia   • Pravachol [Pravastatin] Myalgia   •  "Statins Myalgia     \"muscle pain \"    • Clindamycin/Lincomycin Other (See Comments)     \"Yeast infection\"         MEDICATIONS:    Current Outpatient Medications:   •  acetaminophen (TYLENOL) 650 MG 8 hr tablet, Take 2 tablets by mouth Every 8 (Eight) Hours As Needed., Disp: , Rfl:   •  albuterol sulfate  (90 Base) MCG/ACT inhaler, Inhale 2 puffs Every 6 (Six) Hours As Needed for Wheezing or Shortness of Air., Disp: 6.7 g, Rfl: 0  •  aspirin 81 MG chewable tablet, Chew 1 tablet Daily., Disp: , Rfl:   •  bacitracin 500 UNIT/GM ointment, Apply 1 application topically to the appropriate area as directed 2 (Two) Times a Day., Disp: 14 g, Rfl: 0  •  budesonide-formoterol (SYMBICORT) 160-4.5 MCG/ACT inhaler, Inhale 2 puffs 2 (Two) Times a Day., Disp: 6 g, Rfl: 0  •  carvedilol (COREG) 12.5 MG tablet, Take 1 tablet by mouth 2 (Two) Times a Day With Meals., Disp: 180 tablet, Rfl: 3  •  clopidogrel (Plavix) 75 MG tablet, Take 1 tablet by mouth Daily., Disp: 90 tablet, Rfl: 2  •  dicyclomine (BENTYL) 10 MG capsule, Take 1 capsule by mouth 2 (two) times a day., Disp: , Rfl:   •  docusate sodium (COLACE) 50 MG capsule, Take 1 capsule by mouth Daily As Needed., Disp: , Rfl:   •  escitalopram (LEXAPRO) 10 MG tablet, Take 1 tablet by mouth Daily., Disp: 90 tablet, Rfl: 3  •  guanFACINE HCl ER (INTUNIV) 1 MG tablet sustained-release 24 hour, Take 1 mg by mouth Daily., Disp: , Rfl:   •  losartan (COZAAR) 100 MG tablet, TAKE 1 TABLET EVERY DAY, Disp: 90 tablet, Rfl: 2  •  melatonin 3 MG tablet, TAKE 1 TABLET BY MOUTH AT NIGHT AS NEEDED FOR SLEEP., Disp: 90 tablet, Rfl: 1  •  omeprazole (priLOSEC) 40 MG capsule, Take 1 capsule by mouth Daily., Disp: 30 capsule, Rfl: 11  •  Synthroid 137 MCG tablet, TAKE 1 TABLET EVERY DAY, Disp: 90 tablet, Rfl: 3  •  vitamin D (ERGOCALCIFEROL) 1.25 MG (47930 UT) capsule capsule, Take 1 capsule by mouth 1 (One) Time Per Week., Disp: , Rfl:       Review of Systems   Constitutional: Negative for " "chills, decreased appetite and fever.   HENT: Negative for congestion, nosebleeds and sore throat.    Eyes: Negative for blurred vision, visual disturbance and visual halos.   Cardiovascular: Negative for chest pain, claudication, dyspnea on exertion and leg swelling.   Respiratory: Negative for cough, shortness of breath, sputum production and wheezing.    Endocrine: Negative for cold intolerance and polyuria.   Hematologic/Lymphatic: Negative for bleeding problem. Does not bruise/bleed easily.   Skin: Positive for unusual hair distribution. Negative for flushing and nail changes.   Musculoskeletal: Positive for arthritis and joint pain. Negative for back pain.   Gastrointestinal: Negative for bloating, abdominal pain, hematemesis, melena, nausea and vomiting.   Genitourinary: Negative for flank pain and hematuria.   Neurological: Negative for brief paralysis, difficulty with concentration, focal weakness, light-headedness, loss of balance, numbness, paresthesias and weakness.   Psychiatric/Behavioral: Negative for altered mental status, depression, substance abuse and suicidal ideas.   Allergic/Immunologic: Negative for hives and persistent infections.         Vitals:    02/28/23 0938   BP: 142/80   BP Location: Left arm   Pulse: 68   Temp: 97.8 °F (36.6 °C)   TempSrc: Infrared   SpO2: 98%   Weight: 87.2 kg (192 lb 3.2 oz)   Height: 165.1 cm (65\")     Physical Exam  Vitals and nursing note reviewed.   Constitutional:       Appearance: Normal appearance. She is obese.   HENT:      Head: Normocephalic.      Nose: Nose normal.      Mouth/Throat:      Mouth: Mucous membranes are moist.   Eyes:      Pupils: Pupils are equal, round, and reactive to light.   Cardiovascular:      Rate and Rhythm: Normal rate.      Pulses: Normal pulses.           Dorsalis pedis pulses are 2+ on the right side and 2+ on the left side.        Posterior tibial pulses are 2+ on the right side and 2+ on the left side.   Pulmonary:      Effort: " Pulmonary effort is normal.   Abdominal:      General: Abdomen is flat.      Palpations: Abdomen is soft.   Musculoskeletal:         General: Normal range of motion.      Cervical back: Normal range of motion.   Skin:     General: Skin is warm and dry.      Capillary Refill: Capillary refill takes 2 to 3 seconds.   Neurological:      General: No focal deficit present.      Mental Status: She is alert and oriented to person, place, and time.   Psychiatric:         Mood and Affect: Mood normal.         Assessment & Plan     Independent Review of Studies    1. Mixed hyperlipidemia  Lipid-lowering therapy has been proven beneficial in patients with cardio-vascular disease. Current guidelines recommend statin treatment for all patients with PAD,CAD and carotid stenosis. Statins are beneficial in preventing cardiovascular events, increasing functional capacity and lower the risk of adverse limb loss in PAD. Statins decrease the progression of plaque formation and may improve peripheral vessel lining, and aid in reversing atherosclerosis.    2. PVD (peripheral vascular disease) with claudication (HCC)  Normal resting perfusion bilateral lower extremities.  No surgical intervention indicated at this time.    Sustained patency of right SFA angioplasty, remains on dual antiplatelet therapy intolerant of statins consider PCSK9 or Leqvio    Repeat AYESHA in 6 months    3. Carotid stenosis, asymptomatic, bilateral  Known moderate right ICA disease.  No surgical intervention indicated at this time follow-up with normal imaging repeat carotid duplex in approximately 6 months    Remains on dual antiplatelet therapy    4. Stage 3b chronic kidney disease (HCC)  Lab Results   Component Value Date    CREATININE 1.23 (H) 12/20/2022    BUN 16 12/20/2022     (L) 12/20/2022    K 4.2 12/20/2022    CL 99 12/20/2022    CO2 27.0 12/20/2022     Continue follow-up PCP and/or nephrology as directed    5. Class 1 obesity due to excess calories  with serious comorbidity and body mass index (BMI) of 33.0 to 33.9 in adult  Body mass index is 31.98 kg/m². Class 1 obesity, We have discussed the benefits of weight loss as it relates to long term morbidity and disease prevention.  Interventions discussed included self-monitoring of caloric intake, increasing physical activity/exercise, goal setting, stimulus control, consultation with dietitian, and non-food rewards.                  Detailed discussion regarding risks, benefits, and treatment plan. Images independently reviewed. Patient understands, agrees, and wishes to proceed with plan.       This document has been electronically signed by Yobany Nguyen AGACNP-BC @  On February 28, 2023 10:18 CST

## 2023-02-28 NOTE — PATIENT INSTRUCTIONS
The results of your vascular studies of your right leg shows mild disease with good  circulation, in the left leg shows milddisease with good  circulation.      If signs and symptoms of ischemia should occur including but not limited to pale/blue discoloration of limb, increasing pain with ambulation or at rest, or a non-healing wound. Patient is to notify [facility] V[facility] center for immediate evaluation.    Carotid AYESHA in 6 months

## 2023-03-31 RX ORDER — ESCITALOPRAM OXALATE 10 MG/1
TABLET ORAL
Qty: 90 TABLET | Refills: 3 | Status: SHIPPED | OUTPATIENT
Start: 2023-03-31

## 2023-05-03 ENCOUNTER — OFFICE VISIT (OUTPATIENT)
Dept: FAMILY MEDICINE CLINIC | Facility: CLINIC | Age: 73
End: 2023-05-03
Payer: MEDICARE

## 2023-05-03 ENCOUNTER — LAB (OUTPATIENT)
Dept: LAB | Facility: HOSPITAL | Age: 73
End: 2023-05-03
Payer: MEDICARE

## 2023-05-03 VITALS
DIASTOLIC BLOOD PRESSURE: 80 MMHG | SYSTOLIC BLOOD PRESSURE: 122 MMHG | WEIGHT: 191 LBS | OXYGEN SATURATION: 95 % | HEIGHT: 65 IN | HEART RATE: 65 BPM | BODY MASS INDEX: 31.82 KG/M2

## 2023-05-03 DIAGNOSIS — I10 ESSENTIAL HYPERTENSION: ICD-10-CM

## 2023-05-03 DIAGNOSIS — E78.5 HYPERLIPIDEMIA, UNSPECIFIED HYPERLIPIDEMIA TYPE: ICD-10-CM

## 2023-05-03 DIAGNOSIS — E03.9 ACQUIRED HYPOTHYROIDISM: ICD-10-CM

## 2023-05-03 DIAGNOSIS — M54.2 NECK PAIN: ICD-10-CM

## 2023-05-03 DIAGNOSIS — E11.9 TYPE 2 DIABETES MELLITUS WITHOUT COMPLICATION, WITHOUT LONG-TERM CURRENT USE OF INSULIN: ICD-10-CM

## 2023-05-03 DIAGNOSIS — E03.9 ACQUIRED HYPOTHYROIDISM: Primary | ICD-10-CM

## 2023-05-03 LAB
ALBUMIN SERPL-MCNC: 3.9 G/DL (ref 3.5–5.2)
ALBUMIN/GLOB SERPL: 1.4 G/DL
ALP SERPL-CCNC: 73 U/L (ref 39–117)
ALT SERPL W P-5'-P-CCNC: 10 U/L (ref 1–33)
ANION GAP SERPL CALCULATED.3IONS-SCNC: 11.5 MMOL/L (ref 5–15)
AST SERPL-CCNC: 16 U/L (ref 1–32)
BASOPHILS # BLD AUTO: 0.05 10*3/MM3 (ref 0–0.2)
BASOPHILS NFR BLD AUTO: 0.5 % (ref 0–1.5)
BILIRUB SERPL-MCNC: 0.4 MG/DL (ref 0–1.2)
BUN SERPL-MCNC: 22 MG/DL (ref 8–23)
BUN/CREAT SERPL: 20.8 (ref 7–25)
CALCIUM SPEC-SCNC: 10 MG/DL (ref 8.6–10.5)
CHLORIDE SERPL-SCNC: 104 MMOL/L (ref 98–107)
CHOLEST SERPL-MCNC: 294 MG/DL (ref 0–200)
CO2 SERPL-SCNC: 26.5 MMOL/L (ref 22–29)
CREAT SERPL-MCNC: 1.06 MG/DL (ref 0.57–1)
DEPRECATED RDW RBC AUTO: 42 FL (ref 37–54)
EGFRCR SERPLBLD CKD-EPI 2021: 55.6 ML/MIN/1.73
EOSINOPHIL # BLD AUTO: 0.27 10*3/MM3 (ref 0–0.4)
EOSINOPHIL NFR BLD AUTO: 3 % (ref 0.3–6.2)
ERYTHROCYTE [DISTWIDTH] IN BLOOD BY AUTOMATED COUNT: 12.8 % (ref 12.3–15.4)
GLOBULIN UR ELPH-MCNC: 2.7 GM/DL
GLUCOSE SERPL-MCNC: 120 MG/DL (ref 65–99)
HCT VFR BLD AUTO: 38.6 % (ref 34–46.6)
HDLC SERPL-MCNC: 51 MG/DL (ref 40–60)
HGB BLD-MCNC: 12.8 G/DL (ref 12–15.9)
IMM GRANULOCYTES # BLD AUTO: 0.03 10*3/MM3 (ref 0–0.05)
IMM GRANULOCYTES NFR BLD AUTO: 0.3 % (ref 0–0.5)
LDLC SERPL CALC-MCNC: 203 MG/DL (ref 0–100)
LDLC/HDLC SERPL: 3.94 {RATIO}
LYMPHOCYTES # BLD AUTO: 2.64 10*3/MM3 (ref 0.7–3.1)
LYMPHOCYTES NFR BLD AUTO: 28.9 % (ref 19.6–45.3)
MCH RBC QN AUTO: 30.4 PG (ref 26.6–33)
MCHC RBC AUTO-ENTMCNC: 33.2 G/DL (ref 31.5–35.7)
MCV RBC AUTO: 91.7 FL (ref 79–97)
MONOCYTES # BLD AUTO: 0.61 10*3/MM3 (ref 0.1–0.9)
MONOCYTES NFR BLD AUTO: 6.7 % (ref 5–12)
NEUTROPHILS NFR BLD AUTO: 5.53 10*3/MM3 (ref 1.7–7)
NEUTROPHILS NFR BLD AUTO: 60.6 % (ref 42.7–76)
NRBC BLD AUTO-RTO: 0 /100 WBC (ref 0–0.2)
PLATELET # BLD AUTO: 230 10*3/MM3 (ref 140–450)
PMV BLD AUTO: 11.5 FL (ref 6–12)
POTASSIUM SERPL-SCNC: 4.7 MMOL/L (ref 3.5–5.2)
PROT SERPL-MCNC: 6.6 G/DL (ref 6–8.5)
RBC # BLD AUTO: 4.21 10*6/MM3 (ref 3.77–5.28)
SODIUM SERPL-SCNC: 142 MMOL/L (ref 136–145)
TRIGL SERPL-MCNC: 210 MG/DL (ref 0–150)
TSH SERPL DL<=0.05 MIU/L-ACNC: 3.15 UIU/ML (ref 0.27–4.2)
VLDLC SERPL-MCNC: 40 MG/DL (ref 5–40)
WBC NRBC COR # BLD: 9.13 10*3/MM3 (ref 3.4–10.8)

## 2023-05-03 PROCEDURE — 83036 HEMOGLOBIN GLYCOSYLATED A1C: CPT

## 2023-05-03 PROCEDURE — 80053 COMPREHEN METABOLIC PANEL: CPT

## 2023-05-03 PROCEDURE — 84443 ASSAY THYROID STIM HORMONE: CPT

## 2023-05-03 PROCEDURE — 80061 LIPID PANEL: CPT

## 2023-05-03 PROCEDURE — 36415 COLL VENOUS BLD VENIPUNCTURE: CPT

## 2023-05-03 PROCEDURE — 85025 COMPLETE CBC W/AUTO DIFF WBC: CPT

## 2023-05-03 RX ORDER — GUANFACINE 1 MG/1
TABLET ORAL
COMMUNITY
Start: 2023-01-10 | End: 2023-05-03

## 2023-05-03 NOTE — PROGRESS NOTES
"Chief Complaint  Diabetes    Subjective    History of Present Illness {  Problem List  Visit  Diagnosis   Encounters  Notes  Medications  Labs  Result Review Imaging  Media :23}     Emily Haas presents to [facility] [place] for     Chief Complaint   Patient presents with   • Diabetes      Patient seen today for follow up.  Has chronic medical problems including hypothyroidism, hypertension, type 2 diabetes, hyperlipidemia, GERD, peripheral vascular disease, anxiety/depression and irritable bowel syndrome. Blood pressure has been controlled, adherent with current medication regimen.  Blood glucose levels have been controlled, ranging 127-145 fasting.  She has increased neck pain episodes.  Pain on right side. Lasts about a minute.  Notes they are \"nearly paralyzing\" where she has to put her head in certain position to make it go away.  More of a sharp pain rather than muscular.  Patient has history of cervical spine fusion C6/C7 in 2014.       Current Outpatient Medications:   •  acetaminophen (TYLENOL) 650 MG 8 hr tablet, Take 2 tablets by mouth Every 8 (Eight) Hours As Needed., Disp: , Rfl:   •  albuterol sulfate  (90 Base) MCG/ACT inhaler, Inhale 2 puffs Every 6 (Six) Hours As Needed for Wheezing or Shortness of Air., Disp: 6.7 g, Rfl: 0  •  aspirin 81 MG chewable tablet, Chew 1 tablet Daily., Disp: , Rfl:   •  bacitracin 500 UNIT/GM ointment, Apply 1 application topically to the appropriate area as directed 2 (Two) Times a Day., Disp: 14 g, Rfl: 0  •  budesonide-formoterol (SYMBICORT) 160-4.5 MCG/ACT inhaler, Inhale 2 puffs 2 (Two) Times a Day., Disp: 6 g, Rfl: 0  •  carvedilol (COREG) 12.5 MG tablet, Take 1 tablet by mouth 2 (Two) Times a Day With Meals., Disp: 180 tablet, Rfl: 3  •  clopidogrel (Plavix) 75 MG tablet, Take 1 tablet by mouth Daily., Disp: 90 tablet, Rfl: 2  •  dicyclomine (BENTYL) 10 MG capsule, Take 1 capsule by mouth 2 " "(two) times a day., Disp: , Rfl:   •  docusate sodium (COLACE) 50 MG capsule, Take 1 capsule by mouth Daily As Needed., Disp: , Rfl:   •  escitalopram (LEXAPRO) 10 MG tablet, TAKE 1 TABLET EVERY DAY, Disp: 90 tablet, Rfl: 3  •  guanFACINE HCl ER (INTUNIV) 1 MG tablet sustained-release 24 hour, Take 1 mg by mouth Daily., Disp: , Rfl:   •  losartan (COZAAR) 100 MG tablet, TAKE 1 TABLET EVERY DAY, Disp: 90 tablet, Rfl: 2  •  melatonin 3 MG tablet, TAKE 1 TABLET BY MOUTH AT NIGHT AS NEEDED FOR SLEEP., Disp: 90 tablet, Rfl: 1  •  omeprazole (priLOSEC) 40 MG capsule, Take 1 capsule by mouth Daily., Disp: 30 capsule, Rfl: 11  •  Synthroid 137 MCG tablet, TAKE 1 TABLET EVERY DAY, Disp: 90 tablet, Rfl: 3  •  vitamin D (ERGOCALCIFEROL) 1.25 MG (95690 UT) capsule capsule, Take 1 capsule by mouth 1 (One) Time Per Week., Disp: , Rfl:      Objective       Vital Signs:   /80   Pulse 65   Ht 165.1 cm (65\")   Wt 86.6 kg (191 lb)   SpO2 95%   BMI 31.78 kg/m²     Physical Exam  Vitals reviewed.   Constitutional:       General: She is not in acute distress.     Appearance: She is well-developed.   Cardiovascular:      Rate and Rhythm: Normal rate and regular rhythm.      Heart sounds: Normal heart sounds.   Pulmonary:      Effort: Pulmonary effort is normal. No respiratory distress.      Breath sounds: Normal breath sounds. No wheezing or rales.   Musculoskeletal:      Cervical back: No bony tenderness. Decreased range of motion.   Skin:     General: Skin is warm and dry.   Neurological:      Mental Status: She is alert and oriented to person, place, and time.        Result Review :{ Labs  Result Review  Imaging  Med Tab  Media :23}   The following data was reviewed by: Bharati Love MD on 05/03/2023    Common labs        10/6/2022    13:30 12/20/2022    14:50 1/4/2023    11:04   Common Labs   Glucose 144   128      BUN 25   16      Creatinine 1.21   1.23      Sodium 139   135      Potassium 4.3   4.2      Chloride " 104   99      Calcium 9.4   9.7      Albumin  4.10      WBC  10.83      Hemoglobin  12.9      Hematocrit  40.0      Platelets  284      Hemoglobin A1C   7.20                 Assessment and Plan {CC Problem List  Visit Diagnosis  ROS  Review (Popup)  Health Maintenance  Quality  BestPractice  Medications  SmartSets  SnapShot Encounters  Media :23}   Diagnoses and all orders for this visit:    1. Acquired hypothyroidism (Primary)  -     TSH; Future    2. Essential hypertension  -     CBC & Differential; Future  -     Comprehensive Metabolic Panel; Future  -     Lipid Panel; Future    3. Hyperlipidemia, unspecified hyperlipidemia type    4. Type 2 diabetes mellitus without complication, without long-term current use of insulin  -     Hemoglobin A1c; Future    5. Neck pain  -     XR Spine Cervical Complete 4 or 5 View; Future       Patient seen today for follow up  Hypertension controlled, continue current medication  Check CBC, CMP and Lipid panel  Acquired hypothyroidism - continue current dose of levothyroxine  Check TSH  Patient tolerated Repatha for hyperlipidemia - not sure why she has not been taking  Check lipid today  Plan to restart Repatha  Type 2 diabetes controlled, check HgbA1c  Neck pain episodes, history of cervical spine surgery  Check cervical spine Xray today  May need additional imaging if episodes persist      Follow Up {Instructions Charge Capture  Follow-up Communications :23}   Return in about 3 months (around 8/3/2023) for Recheck.  Patient was given instructions and counseling regarding her condition or for health maintenance advice. Please see specific information pulled into the AVS if appropriate.          This document has been electronically signed by Bharati Love MD

## 2023-05-04 LAB — HBA1C MFR BLD: 7 % (ref 4.8–5.6)

## 2023-05-10 ENCOUNTER — TELEPHONE (OUTPATIENT)
Dept: FAMILY MEDICINE CLINIC | Facility: CLINIC | Age: 73
End: 2023-05-10
Payer: MEDICARE

## 2023-05-10 DIAGNOSIS — M54.12 CERVICAL RADICULOPATHY: ICD-10-CM

## 2023-05-10 DIAGNOSIS — M54.2 NECK PAIN: Primary | ICD-10-CM

## 2023-05-10 NOTE — TELEPHONE ENCOUNTER
Per Dr. Love, Ms. Arevalo has been called with recent Cervical Spine x-ray results & recommendations.  Continue current medications and follow-up as planned or sooner if any problems.

## 2023-05-10 NOTE — TELEPHONE ENCOUNTER
Per Dr. Love, Ms. Arevalo has been called with recent lab results & recommendations.  Continue current medications and follow-up as planned or sooner if any problems.

## 2023-05-10 NOTE — TELEPHONE ENCOUNTER
----- Message from Bharati Love MD sent at 5/10/2023  1:03 PM CDT -----  Arthritis changes and previous surgery on cervical spine, MRI ordered  CHEMO Mchugh

## 2023-05-10 NOTE — TELEPHONE ENCOUNTER
Ms. Haas fell on 5/10/23 around 12:30 and has an open wound on both knees.  Wanting to see if Dr. Love can put in a referral to wound care.    Pt call back [phone]

## 2023-05-10 NOTE — TELEPHONE ENCOUNTER
----- Message from Bharati Love MD sent at 5/10/2023  1:00 PM CDT -----  All labs are stable.  LDL cholesterol still elevated at 203.  Needs to restart the Repatha, sent to pharmacy.  Check with [facility] pharmacy on cost coverage later today or tomorrow, as prescription was sent.  Thanks, CHEMO Love

## 2023-05-17 ENCOUNTER — TELEPHONE (OUTPATIENT)
Dept: FAMILY MEDICINE CLINIC | Facility: CLINIC | Age: 73
End: 2023-05-17
Payer: MEDICARE

## 2023-05-17 DIAGNOSIS — S81.001A OPEN WOUND OF RIGHT KNEE, INITIAL ENCOUNTER: Primary | ICD-10-CM

## 2023-05-17 DIAGNOSIS — S81.002A OPEN WOUND OF LEFT KNEE, INITIAL ENCOUNTER: ICD-10-CM

## 2023-05-17 NOTE — TELEPHONE ENCOUNTER
Radha,  I have called Ms. Arevalo and advised her that a referral to Wound Care has been placed.   She states they have already ca;lled her and she has an appointment for tomorrow Thursday 05/18/2023.    DINO Pugh

## 2023-05-18 ENCOUNTER — OFFICE VISIT (OUTPATIENT)
Dept: WOUND CARE | Facility: HOSPITAL | Age: 73
End: 2023-05-18
Payer: MEDICARE

## 2023-05-23 ENCOUNTER — OUTSIDE FACILITY SERVICE (OUTPATIENT)
Dept: WOUND CARE | Facility: HOSPITAL | Age: 73
End: 2023-05-23
Payer: MEDICARE

## 2023-05-23 ENCOUNTER — OFFICE VISIT (OUTPATIENT)
Dept: WOUND CARE | Facility: HOSPITAL | Age: 73
End: 2023-05-23
Payer: MEDICARE

## 2023-05-25 ENCOUNTER — HOSPITAL ENCOUNTER (OUTPATIENT)
Dept: MRI IMAGING | Facility: HOSPITAL | Age: 73
Discharge: HOME | End: 2023-05-25
Payer: MEDICARE

## 2023-05-25 DIAGNOSIS — M54.12 CERVICAL RADICULOPATHY: ICD-10-CM

## 2023-05-25 DIAGNOSIS — M54.2 NECK PAIN: ICD-10-CM

## 2023-05-25 PROCEDURE — 72141 MRI NECK SPINE W/O DYE: CPT

## 2023-05-30 ENCOUNTER — OFFICE VISIT (OUTPATIENT)
Dept: WOUND CARE | Facility: HOSPITAL | Age: 73
End: 2023-05-30

## 2023-05-30 ENCOUNTER — OUTSIDE FACILITY SERVICE (OUTPATIENT)
Dept: WOUND CARE | Facility: HOSPITAL | Age: 73
End: 2023-05-30

## 2023-05-30 PROCEDURE — G0463 HOSPITAL OUTPT CLINIC VISIT: HCPCS

## 2023-05-31 ENCOUNTER — TELEPHONE (OUTPATIENT)
Dept: FAMILY MEDICINE CLINIC | Facility: CLINIC | Age: 73
End: 2023-05-31

## 2023-05-31 DIAGNOSIS — M54.2 NECK PAIN: Primary | ICD-10-CM

## 2023-05-31 DIAGNOSIS — M47.812 SPONDYLOSIS OF CERVICAL SPINE: ICD-10-CM

## 2023-05-31 DIAGNOSIS — M54.12 CERVICAL RADICULOPATHY: ICD-10-CM

## 2023-05-31 NOTE — TELEPHONE ENCOUNTER
Per Dr. Love, Ms. Haas has been called with recent Cervical Spine MRI results & recommendations.  Continue current medications and follow-up as planned or sooner if any problems.     She would like to go back to Dr. Anurag Benavidez at [facility] in [place], she said she had seen him before

## 2023-05-31 NOTE — TELEPHONE ENCOUNTER
----- Message from Bharati Love MD sent at 5/29/2023  8:55 PM CDT -----  MRI shows some narrowing and degenerative changes - more prominent on the right, which is the same side as her symptoms.  I would like for her to see neurosurgery, please let me know where she would like referral to go.  Also let her know she will need to contact radiology department to get a disc of MRI images to take with her.  Thanks, CHEMO Love

## 2023-08-01 DIAGNOSIS — I65.23 CAROTID STENOSIS, ASYMPTOMATIC, BILATERAL: ICD-10-CM

## 2023-08-01 DIAGNOSIS — I73.9 PVD (PERIPHERAL VASCULAR DISEASE) WITH CLAUDICATION: Primary | ICD-10-CM

## 2023-08-04 ENCOUNTER — OFFICE VISIT (OUTPATIENT)
Dept: FAMILY MEDICINE CLINIC | Facility: CLINIC | Age: 73
End: 2023-08-04
Payer: MEDICARE

## 2023-08-04 ENCOUNTER — LAB (OUTPATIENT)
Dept: LAB | Facility: HOSPITAL | Age: 73
End: 2023-08-04
Payer: MEDICARE

## 2023-08-04 VITALS
HEIGHT: 65 IN | HEART RATE: 71 BPM | DIASTOLIC BLOOD PRESSURE: 70 MMHG | SYSTOLIC BLOOD PRESSURE: 120 MMHG | BODY MASS INDEX: 31.32 KG/M2 | OXYGEN SATURATION: 99 % | WEIGHT: 188 LBS

## 2023-08-04 DIAGNOSIS — E11.9 TYPE 2 DIABETES MELLITUS WITHOUT COMPLICATION, WITHOUT LONG-TERM CURRENT USE OF INSULIN: ICD-10-CM

## 2023-08-04 DIAGNOSIS — I10 ESSENTIAL HYPERTENSION: Primary | ICD-10-CM

## 2023-08-04 DIAGNOSIS — I10 ESSENTIAL HYPERTENSION: ICD-10-CM

## 2023-08-04 DIAGNOSIS — M79.10 MUSCLE ACHE: ICD-10-CM

## 2023-08-04 DIAGNOSIS — Z12.31 ENCOUNTER FOR SCREENING MAMMOGRAM FOR MALIGNANT NEOPLASM OF BREAST: ICD-10-CM

## 2023-08-04 DIAGNOSIS — H65.91 MEE (MIDDLE EAR EFFUSION), RIGHT: ICD-10-CM

## 2023-08-04 LAB
ALBUMIN SERPL-MCNC: 3.8 G/DL (ref 3.5–5.2)
ALBUMIN/GLOB SERPL: 1.5 G/DL
ALP SERPL-CCNC: 65 U/L (ref 39–117)
ALT SERPL W P-5'-P-CCNC: 16 U/L (ref 1–33)
ANION GAP SERPL CALCULATED.3IONS-SCNC: 11.4 MMOL/L (ref 5–15)
AST SERPL-CCNC: 17 U/L (ref 1–32)
BASOPHILS # BLD AUTO: 0.06 10*3/MM3 (ref 0–0.2)
BASOPHILS NFR BLD AUTO: 0.5 % (ref 0–1.5)
BILIRUB SERPL-MCNC: 0.3 MG/DL (ref 0–1.2)
BUN SERPL-MCNC: 28 MG/DL (ref 8–23)
BUN/CREAT SERPL: 23.9 (ref 7–25)
CALCIUM SPEC-SCNC: 9.7 MG/DL (ref 8.6–10.5)
CHLORIDE SERPL-SCNC: 105 MMOL/L (ref 98–107)
CK SERPL-CCNC: 31 U/L (ref 20–180)
CO2 SERPL-SCNC: 23.6 MMOL/L (ref 22–29)
CREAT SERPL-MCNC: 1.17 MG/DL (ref 0.57–1)
DEPRECATED RDW RBC AUTO: 44.4 FL (ref 37–54)
EGFRCR SERPLBLD CKD-EPI 2021: 49.4 ML/MIN/1.73
EOSINOPHIL # BLD AUTO: 0.07 10*3/MM3 (ref 0–0.4)
EOSINOPHIL NFR BLD AUTO: 0.6 % (ref 0.3–6.2)
ERYTHROCYTE [DISTWIDTH] IN BLOOD BY AUTOMATED COUNT: 13.3 % (ref 12.3–15.4)
GLOBULIN UR ELPH-MCNC: 2.6 GM/DL
GLUCOSE SERPL-MCNC: 97 MG/DL (ref 65–99)
HCT VFR BLD AUTO: 40.6 % (ref 34–46.6)
HGB BLD-MCNC: 13.6 G/DL (ref 12–15.9)
IMM GRANULOCYTES # BLD AUTO: 0.07 10*3/MM3 (ref 0–0.05)
IMM GRANULOCYTES NFR BLD AUTO: 0.6 % (ref 0–0.5)
LYMPHOCYTES # BLD AUTO: 2.67 10*3/MM3 (ref 0.7–3.1)
LYMPHOCYTES NFR BLD AUTO: 23.6 % (ref 19.6–45.3)
MCH RBC QN AUTO: 31 PG (ref 26.6–33)
MCHC RBC AUTO-ENTMCNC: 33.5 G/DL (ref 31.5–35.7)
MCV RBC AUTO: 92.5 FL (ref 79–97)
MONOCYTES # BLD AUTO: 0.86 10*3/MM3 (ref 0.1–0.9)
MONOCYTES NFR BLD AUTO: 7.6 % (ref 5–12)
NEUTROPHILS NFR BLD AUTO: 67.1 % (ref 42.7–76)
NEUTROPHILS NFR BLD AUTO: 7.56 10*3/MM3 (ref 1.7–7)
NRBC BLD AUTO-RTO: 0 /100 WBC (ref 0–0.2)
PLATELET # BLD AUTO: 264 10*3/MM3 (ref 140–450)
PMV BLD AUTO: 10.6 FL (ref 6–12)
POTASSIUM SERPL-SCNC: 4.8 MMOL/L (ref 3.5–5.2)
PROT SERPL-MCNC: 6.4 G/DL (ref 6–8.5)
RBC # BLD AUTO: 4.39 10*6/MM3 (ref 3.77–5.28)
SODIUM SERPL-SCNC: 140 MMOL/L (ref 136–145)
WBC NRBC COR # BLD: 11.29 10*3/MM3 (ref 3.4–10.8)

## 2023-08-04 PROCEDURE — 80053 COMPREHEN METABOLIC PANEL: CPT

## 2023-08-04 PROCEDURE — 82550 ASSAY OF CK (CPK): CPT

## 2023-08-04 PROCEDURE — 83036 HEMOGLOBIN GLYCOSYLATED A1C: CPT

## 2023-08-04 PROCEDURE — 36415 COLL VENOUS BLD VENIPUNCTURE: CPT

## 2023-08-04 PROCEDURE — 85025 COMPLETE CBC W/AUTO DIFF WBC: CPT

## 2023-08-04 RX ORDER — FLUTICASONE PROPIONATE 50 MCG
2 SPRAY, SUSPENSION (ML) NASAL DAILY
Qty: 16 G | Refills: 2 | Status: SHIPPED | OUTPATIENT
Start: 2023-08-04 | End: 2023-08-18

## 2023-08-04 RX ORDER — DICYCLOMINE HYDROCHLORIDE 10 MG/1
10 CAPSULE ORAL
Qty: 360 CAPSULE | Refills: 3 | Status: SHIPPED | OUTPATIENT
Start: 2023-08-04

## 2023-08-04 NOTE — PROGRESS NOTES
Chief Complaint  Ear Fullness (Right ear-started about 2 weeks ago)    Subjective    History of Present Illness {CC  Problem List  Visit  Diagnosis   Encounters  Notes  Medications  Labs  Result Review Imaging  Media :23}     Emily Haas presents to [facility] [place] for     Chief Complaint   Patient presents with    Ear Fullness     Right ear-started about 2 weeks ago      Patient seen today for follow up.  Has chronic medical problems including hypothyroidism, hypertension, type 2 diabetes, hyperlipidemia, GERD, peripheral vascular disease, anxiety/depression and irritable bowel syndrome. Blood pressure has been controlled, taking losartan and coreg.  Restarted repatha, some concerns after the first doses with body aches and what patient describes as fibromyalgia type symptoms - resolved now.  Took repatha injection this time without any problems.  Right ear has been feeling full.       Current Outpatient Medications:     acetaminophen (TYLENOL) 650 MG 8 hr tablet, Take 2 tablets by mouth Every 8 (Eight) Hours As Needed., Disp: , Rfl:     albuterol sulfate  (90 Base) MCG/ACT inhaler, Inhale 2 puffs Every 6 (Six) Hours As Needed for Wheezing or Shortness of Air., Disp: 6.7 g, Rfl: 0    aspirin 81 MG chewable tablet, Chew 1 tablet Daily., Disp: , Rfl:     carvedilol (COREG) 12.5 MG tablet, Take 1 tablet by mouth 2 (Two) Times a Day With Meals., Disp: 180 tablet, Rfl: 3    clopidogrel (PLAVIX) 75 MG tablet, TAKE 1 TABLET EVERY DAY, Disp: 90 tablet, Rfl: 2    dicyclomine (BENTYL) 10 MG capsule, Take 1 capsule by mouth 4 (Four) Times a Day Before Meals & at Bedtime., Disp: 360 capsule, Rfl: 3    escitalopram (LEXAPRO) 10 MG tablet, TAKE 1 TABLET EVERY DAY, Disp: 90 tablet, Rfl: 3    Evolocumab (REPATHA) solution prefilled syringe injection, Inject 1 mL under the skin into the appropriate area as directed Every 14 (Fourteen) Days., Disp: 2 mL, Rfl:  "5    guanFACINE HCl ER (INTUNIV) 1 MG tablet sustained-release 24 hour, Take 1 mg by mouth Daily., Disp: , Rfl:     losartan (COZAAR) 100 MG tablet, TAKE 1 TABLET EVERY DAY, Disp: 90 tablet, Rfl: 2    melatonin 3 MG tablet, TAKE 1 TABLET BY MOUTH AT NIGHT AS NEEDED FOR SLEEP., Disp: 90 tablet, Rfl: 1    omeprazole (priLOSEC) 40 MG capsule, Take 1 capsule by mouth Daily., Disp: 30 capsule, Rfl: 11    Synthroid 137 MCG tablet, TAKE 1 TABLET EVERY DAY, Disp: 90 tablet, Rfl: 3    vitamin D (ERGOCALCIFEROL) 1.25 MG (65795 UT) capsule capsule, Take 1 capsule by mouth 1 (One) Time Per Week., Disp: , Rfl:      Objective       Vital Signs:   /70   Pulse 71   Ht 165.1 cm (65\")   Wt 85.3 kg (188 lb)   SpO2 99%   BMI 31.28 kg/mý     Physical Exam  Vitals reviewed.   Constitutional:       General: She is not in acute distress.     Appearance: She is well-developed.   HENT:      Right Ear: Ear canal normal. A middle ear effusion is present. Tympanic membrane is not erythematous or bulging.      Left Ear: Tympanic membrane and ear canal normal.   Cardiovascular:      Rate and Rhythm: Normal rate and regular rhythm.      Heart sounds: Normal heart sounds. No murmur heard.  Pulmonary:      Effort: Pulmonary effort is normal. No respiratory distress.      Breath sounds: Normal breath sounds. No wheezing or rales.   Skin:     General: Skin is warm and dry.   Neurological:      Mental Status: She is alert and oriented to person, place, and time.      Result Review :{ Labs  Result Review  Imaging  Med Tab  Media :23}   The following data was reviewed by: Bharati Love MD on 08/04/2023    Common labs          5/3/2023    10:49 6/22/2023    12:51   Common Labs   Glucose 120  113    BUN 22  23    Creatinine 1.06  1.50    Sodium 142  138    Potassium 4.7  4.8    Chloride 104  101    Calcium 10.0  9.6    Albumin 3.9  4.0    Total Bilirubin 0.4     Alkaline Phosphatase 73     AST (SGOT) 16     ALT (SGPT) 10     WBC 9.13   "   Hemoglobin 12.8  13.0    Hematocrit 38.6  39.1    Platelets 230     Total Cholesterol 294     Triglycerides 210     HDL Cholesterol 51     LDL Cholesterol  203     Hemoglobin A1C 7.00     Uric Acid  7.1                Assessment and Plan {CC Problem List  Visit Diagnosis  ROS  Review (Popup)  Health Maintenance  Quality  BestPractice  Medications  SmartSets  SnapShot Encounters  Media :23}   Diagnoses and all orders for this visit:    1. Essential hypertension (Primary)  -     CBC & Differential; Future  -     Comprehensive Metabolic Panel; Future    2. Type 2 diabetes mellitus without complication, without long-term current use of insulin  -     CK; Future  -     Hemoglobin A1c; Future    3. Muscle ache  -     CK; Future    4. CATALINA (middle ear effusion), right  -     fluticasone (FLONASE) 50 MCG/ACT nasal spray; 2 sprays into the nostril(s) as directed by provider Daily for 14 days.  Dispense: 16 g; Refill: 2    5. Encounter for screening mammogram for malignant neoplasm of breast  -     Mammo Screening Digital Tomosynthesis Bilateral With CAD; Future    Other orders  -     dicyclomine (BENTYL) 10 MG capsule; Take 1 capsule by mouth 4 (Four) Times a Day Before Meals & at Bedtime.  Dispense: 360 capsule; Refill: 3       Patient seen today for follow up  Hypertension controlled, continue current medications  Check CMP and CBC  Type 2 diabetes - check HgbA1c for monitoring  Muscle aches with repatha, will check CK level  Middle ear effusion on right, try flonase  Due for mammogram - ordered  Needed medication refills provided today      Follow Up {Instructions Charge Capture  Follow-up Communications :23}   Return in about 4 months (around 12/4/2023) for Recheck.  Patient was given instructions and counseling regarding her condition or for health maintenance advice. Please see specific information pulled into the AVS if appropriate.            This document has been electronically signed by Bharati BARR  MD Kate

## 2023-08-05 LAB — HBA1C MFR BLD: 7 % (ref 4.8–5.6)

## 2023-08-25 ENCOUNTER — TELEPHONE (OUTPATIENT)
Dept: FAMILY MEDICINE CLINIC | Facility: CLINIC | Age: 73
End: 2023-08-25
Payer: MEDICARE

## 2023-08-25 NOTE — TELEPHONE ENCOUNTER
Per Dr. Love, Ms. Haas has been called with recent Screening Mammogram results & recommendations.  Continue current medications and follow-up as planned or sooner if any problems.     ----- Message from Bharati Love MD sent at 8/25/2023  1:01 PM CDT -----  Please call and let patient know that mammogram is normal.  Plan to repeat in 1 year.  Thanks, CHEMO Love

## 2023-08-29 ENCOUNTER — OFFICE VISIT (OUTPATIENT)
Dept: CARDIAC SURGERY | Facility: CLINIC | Age: 73
End: 2023-08-29
Payer: MEDICARE

## 2023-08-29 VITALS
SYSTOLIC BLOOD PRESSURE: 154 MMHG | HEART RATE: 67 BPM | BODY MASS INDEX: 31.99 KG/M2 | WEIGHT: 192 LBS | DIASTOLIC BLOOD PRESSURE: 89 MMHG | HEIGHT: 65 IN | OXYGEN SATURATION: 97 %

## 2023-08-29 DIAGNOSIS — I65.23 CAROTID STENOSIS, ASYMPTOMATIC, BILATERAL: ICD-10-CM

## 2023-08-29 DIAGNOSIS — I73.9 PVD (PERIPHERAL VASCULAR DISEASE) WITH CLAUDICATION: Primary | ICD-10-CM

## 2023-08-29 DIAGNOSIS — E78.2 MIXED HYPERLIPIDEMIA: ICD-10-CM

## 2023-08-29 DIAGNOSIS — N18.32 STAGE 3B CHRONIC KIDNEY DISEASE: ICD-10-CM

## 2023-08-29 PROCEDURE — 99214 OFFICE O/P EST MOD 30 MIN: CPT | Performed by: NURSE PRACTITIONER

## 2023-08-29 PROCEDURE — 3077F SYST BP >= 140 MM HG: CPT | Performed by: NURSE PRACTITIONER

## 2023-08-29 PROCEDURE — 1160F RVW MEDS BY RX/DR IN RCRD: CPT | Performed by: NURSE PRACTITIONER

## 2023-08-29 PROCEDURE — 3079F DIAST BP 80-89 MM HG: CPT | Performed by: NURSE PRACTITIONER

## 2023-08-29 PROCEDURE — 1159F MED LIST DOCD IN RCRD: CPT | Performed by: NURSE PRACTITIONER

## 2023-08-29 NOTE — PATIENT INSTRUCTIONS
The results of your carotid ultrasound shows moderate disease of the right carotid and is stable from previous exam, ultrasound of the left carotid shows moderate disease and is stable from previous exam.    Follow up in 6 months    If you should experience any neurological symptoms including but not limited to visual or speech disturbances confusion, seizures, or weakness of limbs of one side of your body notify Heart and Vascular center immediately for evaluation or if after hours present to the nearest Emergency Department.     The results of your vascular studies of your right leg shows mild disease with good  circulation, in the left leg shows milddisease with good  circulation.      If signs and symptoms of ischemia should occur including but not limited to pale/blue discoloration of limb, increasing pain with ambulation or at rest, or a non-healing wound. Patient is to notify Heart and Vascular center for immediate evaluation.    Return one year AYESHA and carotid.

## 2023-08-29 NOTE — PROGRESS NOTES
CVTS Office Progress Note     8/29/2023    Emily Haas  1950    Chief Complaint:    Chief Complaint   Patient presents with    Peripheral Vascular Disease       HPI:      PCP:  Bharati Love MD    73 y.o. female with HTN(stable, increased risk stroke, rupture), Hyperlipidemia(stable, increased risk cardiovascular events), Diabetes Mellitus(stable, increased risk cardiovascular events) and Obesity(uncontrolled, increased risk cardiovascular events) hypothyroid, colon resection (volvulus), adia, hysterectomy.  former smoker and quit 1990 .  Referred to vascular surgery for moderate R ICA stenosis, also has claudication of right leg.  Recent arteriogram, claudication resolved postprocedure.  Returns today in follow-up with AYESHA and carotid ultrasound.  Amaurosis fugax, TIA, or CVA.  Denies recurrence of claudication.  No other associated signs, symptoms or modifying factors.      10/2022 AYESHA: Right 0.64 triphasic fem biphasic pop.  Left 1.21 triphasic  11/2022 arteriogram: PTA right SFA  2/2023 AYESHA: Right 1.05 triphasic.  Left 1.04 triphasic  8/2023 AYESHA: Right 1.07 triphasic.  Left 1.0 triphasic    10/2022 Carotid Duplex: VIN 0-49% mPSV 118c/s Ratio 1.3, LICA 50-69% mPSV 136c/s Ratio 1.7, Antegrade vertebrals  8/2023 Carotid Duplex: VIN 50-69% mPSV 136c/s mEDV 40c/s Ratio 2.4, LICA 50-69% mPSV 142c/s mEDV 41c/s Ratio 2.1, Antegrade vertebrals      The following portions of the patient's history were reviewed and updated as appropriate: allergies, current medications, past family history, past medical history, past social history, past surgical history and problem list.  Recent images independently reviewed.  Available laboratory values reviewed.    PMH:  Past Medical History:   Diagnosis Date    Anxiety     Cancer     bladder    Chronic bronchitis     CKD (chronic kidney disease) stage 3, GFR 30-59 ml/min     Diabetes mellitus     Diverticulitis     Hyperlipidemia     Hypertension     Hypothyroidism      Irritable bowel syndrome     Obesity     Osteoarthritis     Vitamin D deficiency      Past Surgical History:   Procedure Laterality Date    ARTERIOGRAM Right 11/10/2022    Procedure: Arteriogram;  Surgeon: Tiago Hendricks MD;  Location: [facility];  Service: Interventional Radiology;  Laterality: Right;    BILATERAL BREAST REDUCTION      CHOLECYSTECTOMY      COLON SURGERY      colon resection for obtruction    COLONOSCOPY N/A 11/17/2017    COLONOSCOPY N/A 01/14/2021    COLONOSCOPY N/A 1/26/2023    Procedure: COLONOSCOPY;  Surgeon: Mauricio Betancourt MD;  Location: [facility] ENDOSCOPY;  Service: Gastroenterology;  Laterality: N/A;    ENDOSCOPY N/A 01/14/2021    ENDOSCOPY N/A 02/16/2022    Procedure: ESOPHAGOGASTRODUODENOSCOPY;  Surgeon: Mauricio Betancourt MD;  Location: [facility] ENDOSCOPY;  Service: Gastroenterology;  Laterality: N/A;    ENDOSCOPY N/A 1/26/2023    Procedure: ESOPHAGOGASTRODUODENOSCOPY;  Surgeon: Mauricio Betancourt MD;  Location: [facility] ENDOSCOPY;  Service: Gastroenterology;  Laterality: N/A;    HYSTERECTOMY      REDUCTION MAMMAPLASTY      UPPER GASTROINTESTINAL ENDOSCOPY  01/14/2021    UPPER GASTROINTESTINAL ENDOSCOPY  02/16/2022     Family History   Problem Relation Age of Onset    Hypertension Mother     Alzheimer's disease Father     Diabetes Brother     Hypothyroidism Daughter      Social History     Tobacco Use    Smoking status: Former     Packs/day: 3.00     Years: 15.00     Pack years: 45.00     Types: Cigarettes     Passive exposure: Past    Smokeless tobacco: Never    Tobacco comments:     08/10/2021 - Patient states she has not utilized Cigarettes X 34 years and upon ceassation, patient was utilizing 3 packs of Cigarettes per day.   Vaping Use    Vaping Use: Never used   Substance Use Topics    Alcohol use: No    Drug use: No       ALLERGIES:  Allergies   Allergen Reactions    Augmentin [Amoxicillin-Pot Clavulanate] Hives    Dilaudid [Hydromorphone] Nausea And  "Vomiting    Sulfa Antibiotics Hives    Atenolol Myalgia    Lipitor [Atorvastatin] Myalgia    Pravachol [Pravastatin] Myalgia    Statins Myalgia     \"muscle pain \"     Clindamycin/Lincomycin Other (See Comments)     \"Yeast infection\"         MEDICATIONS:    Current Outpatient Medications:     acetaminophen (TYLENOL) 650 MG 8 hr tablet, Take 2 tablets by mouth Every 8 (Eight) Hours As Needed., Disp: , Rfl:     albuterol sulfate  (90 Base) MCG/ACT inhaler, Inhale 2 puffs Every 6 (Six) Hours As Needed for Wheezing or Shortness of Air., Disp: 6.7 g, Rfl: 0    aspirin 81 MG chewable tablet, Chew 1 tablet Daily., Disp: , Rfl:     carvedilol (COREG) 12.5 MG tablet, Take 1 tablet by mouth 2 (Two) Times a Day With Meals., Disp: 180 tablet, Rfl: 3    clopidogrel (PLAVIX) 75 MG tablet, TAKE 1 TABLET EVERY DAY, Disp: 90 tablet, Rfl: 2    dicyclomine (BENTYL) 10 MG capsule, Take 1 capsule by mouth 4 (Four) Times a Day Before Meals & at Bedtime., Disp: 360 capsule, Rfl: 3    escitalopram (LEXAPRO) 10 MG tablet, TAKE 1 TABLET EVERY DAY, Disp: 90 tablet, Rfl: 3    Evolocumab (REPATHA) solution prefilled syringe injection, Inject 1 mL under the skin into the appropriate area as directed Every 14 (Fourteen) Days., Disp: 2 mL, Rfl: 5    guanFACINE HCl ER (INTUNIV) 1 MG tablet sustained-release 24 hour, Take 1 mg by mouth Daily., Disp: , Rfl:     losartan (COZAAR) 100 MG tablet, TAKE 1 TABLET EVERY DAY, Disp: 90 tablet, Rfl: 2    melatonin 3 MG tablet, TAKE 1 TABLET BY MOUTH AT NIGHT AS NEEDED FOR SLEEP., Disp: 90 tablet, Rfl: 1    omeprazole (priLOSEC) 40 MG capsule, Take 1 capsule by mouth Daily., Disp: 30 capsule, Rfl: 11    Synthroid 137 MCG tablet, TAKE 1 TABLET EVERY DAY, Disp: 90 tablet, Rfl: 3    vitamin D (ERGOCALCIFEROL) 1.25 MG (45626 UT) capsule capsule, Take 1 capsule by mouth 1 (One) Time Per Week., Disp: , Rfl:       Review of Systems   Constitutional: Negative for chills, decreased appetite, fever and weight loss. " "  HENT:  Negative for congestion, nosebleeds and sore throat.    Eyes:  Negative for blurred vision, visual disturbance and visual halos.   Cardiovascular:  Negative for chest pain, claudication, dyspnea on exertion and leg swelling.   Respiratory:  Negative for cough, shortness of breath, sputum production and wheezing.    Endocrine: Negative for cold intolerance and polyuria.   Hematologic/Lymphatic: Negative for bleeding problem. Does not bruise/bleed easily.   Skin:  Positive for unusual hair distribution. Negative for flushing and nail changes.   Musculoskeletal:  Positive for arthritis and joint pain. Negative for back pain.   Gastrointestinal:  Negative for bloating, abdominal pain, hematemesis, melena, nausea and vomiting.   Genitourinary:  Negative for flank pain and hematuria.   Neurological:  Negative for brief paralysis, difficulty with concentration, focal weakness, light-headedness, loss of balance, numbness, paresthesias and weakness.   Psychiatric/Behavioral:  Negative for altered mental status, depression, substance abuse and suicidal ideas.    Allergic/Immunologic: Negative for hives and persistent infections.       Vitals:    08/29/23 1010   BP: 154/89   BP Location: Left arm   Pulse: 67   SpO2: 97%   Weight: 87.1 kg (192 lb)   Height: 165.1 cm (65\")     Physical Exam  Vitals and nursing note reviewed.   Constitutional:       Appearance: Normal appearance. She is obese.   HENT:      Head: Normocephalic.      Nose: Nose normal.      Mouth/Throat:      Mouth: Mucous membranes are moist.   Eyes:      Pupils: Pupils are equal, round, and reactive to light.   Cardiovascular:      Rate and Rhythm: Normal rate.      Pulses: Normal pulses.           Dorsalis pedis pulses are 2+ on the right side and 2+ on the left side.        Posterior tibial pulses are 2+ on the right side and 2+ on the left side.   Pulmonary:      Effort: Pulmonary effort is normal.   Abdominal:      General: Abdomen is flat.      " Palpations: Abdomen is soft.   Musculoskeletal:         General: Normal range of motion.      Cervical back: Normal range of motion.      Right lower leg: Edema present.      Left lower leg: Edema present.   Skin:     General: Skin is warm and dry.      Capillary Refill: Capillary refill takes 2 to 3 seconds.   Neurological:      General: No focal deficit present.      Mental Status: She is alert and oriented to person, place, and time.   Psychiatric:         Mood and Affect: Mood normal.       Assessment & Plan     Independent Review of Studies    1. PVD (peripheral vascular disease) with claudication  Normal resting perfusion to bilateral lower extremities status post angioplasty of the right SFA.    No surgical intervention is indicated at this time plan to follow-up with interval imaging.    Repeat AYESHA in approximately 1 year    DAPT, Repatha    - Doppler Ankle Brachial Index Single Level CAR; Future    2. Carotid stenosis, asymptomatic, bilateral  Moderate bilateral asymptomatic disease.    No surgical intervention indicated at this time follow-up with interval imaging.    Duplex in 1 year    - Duplex Carotid Ultrasound CAR; Future    3. Mixed hyperlipidemia  Treatment of hyperlipidemia prevents the progression of peripheral vascular disease and atherosclerosis in multiple beds.  Lipid-lowering therapy may improve claudication distance.  Statins are also considered mandatory in patients with PAD by virtue of reducing cardiovascular events as shown in the heart protection study.  Target LDL for PAD patients is less than 70 mg/dL.  Consideration of PCSK9 inhibitors and/or other adjuvant therapies and statin intolerant patients is recommended.  Continued follow up with PCP is recommended for patients on lipid lowering therapies.    Lab Results   Component Value Date    CHOL 294 (H) 05/03/2023    CHLPL 252 (H) 01/20/2017    TRIG 210 (H) 05/03/2023    HDL 51 05/03/2023     (H) 05/03/2023     Repatha    4.  Stage 3b chronic kidney disease  Lab Results   Component Value Date    CREATININE 1.17 (H) 08/04/2023    BUN 28 (H) 08/04/2023     08/04/2023    K 4.8 08/04/2023     08/04/2023    CO2 23.6 08/04/2023     Follow up as scheduled with nephrology/PCP                    Detailed discussion regarding risks, benefits, and treatment plan. Images independently reviewed. Patient understands, agrees, and wishes to proceed with plan.       This document has been electronically signed by SUSAN Funez @  On August 29, 2023 11:02 CDT

## 2023-09-27 RX ORDER — LOSARTAN POTASSIUM 100 MG/1
TABLET ORAL
Qty: 90 TABLET | Refills: 2 | Status: SHIPPED | OUTPATIENT
Start: 2023-09-27

## 2024-02-26 NOTE — PLAN OF CARE
Goal Outcome Evaluation:  Plan of Care Reviewed With: patient  Progress: no change  Outcome Summary: Pt desats today with activity. Pt t/f sup to sit with mod ind using bed rail. Pt sat EOB 17 minutes to perform B LE ther ex and O2 sats decreased to 88%. Pt stood with amb with CGA using RW and O2 sats decreased to 84%. Pt was able to recover with 4 minute seated rest and returned to sup with mod ind. Pt would cont to benefit from therapy upon DC.   [Time Spent: ___ minutes] : I have spent [unfilled] minutes of time on the encounter.

## 2024-03-14 NOTE — TELEPHONE ENCOUNTER
Per Dr. Love, Ms. Haas has been called with recent lab results & recommendations.  Continue current medications and follow-up as planned or sooner if any problems.       ----- Message from Bharati Love MD sent at 10/7/2022  2:57 PM CDT -----  Kidney function still abnormal, stable.  Potassium level improved.  Thanks, CHEMO Love     normal...

## (undated) DEVICE — PINNACLE INTRODUCER SHEATH: Brand: PINNACLE

## (undated) DEVICE — TRAP SXN POLYP QUICKCATCH LF

## (undated) DEVICE — DESTINATION RENAL GUIDING SHEATH: Brand: DESTINATION

## (undated) DEVICE — SINGLE-USE BIOPSY FORCEPS: Brand: RADIAL JAW 4

## (undated) DEVICE — BITEBLOCK ENDO W/STRAP 60F A/ LF DISP

## (undated) DEVICE — ULTRAVERSE 035 PTA DILATATION CATHETER 5.0MM X 40MM BALLOON, 130CM SHAFT: Brand: ULTRAVERSE® 035 PTA DILATATION CATHETER

## (undated) DEVICE — PK ANGIO LF 60

## (undated) DEVICE — BALN PTA LUTONIX DRUGCOAT LP .035 5F 5X60MM

## (undated) DEVICE — CANN SMPL SOFTECH BIFLO ETCO2 A/M 7FT

## (undated) DEVICE — PERCLOSE PROGLIDE™ SUTURE-MEDIATED CLOSURE SYSTEM: Brand: PERCLOSE PROGLIDE™

## (undated) DEVICE — CATH GUIDE SOFTVU SELECT/V HT OMNI .038 5F 65CM

## (undated) DEVICE — Device: Brand: DISPOSABLE ELECTROSURGICAL SNARE

## (undated) DEVICE — RADIFOCUS GLIDECATH: Brand: GLIDECATH

## (undated) DEVICE — TBG HI PRESSURE 500PSI 20IN

## (undated) DEVICE — RADIFOCUS GLIDEWIRE: Brand: GLIDEWIRE

## (undated) DEVICE — KT INTRO MINISTICK MAX W/GW PALLADIUM ECHO 4F 21G 7CM